# Patient Record
Sex: MALE | Race: WHITE | Employment: FULL TIME | URBAN - METROPOLITAN AREA
[De-identification: names, ages, dates, MRNs, and addresses within clinical notes are randomized per-mention and may not be internally consistent; named-entity substitution may affect disease eponyms.]

---

## 2019-05-03 ENCOUNTER — APPOINTMENT (OUTPATIENT)
Dept: GENERAL RADIOLOGY | Age: 59
DRG: 193 | End: 2019-05-03
Payer: COMMERCIAL

## 2019-05-03 ENCOUNTER — APPOINTMENT (OUTPATIENT)
Dept: CT IMAGING | Age: 59
DRG: 193 | End: 2019-05-03
Payer: COMMERCIAL

## 2019-05-03 ENCOUNTER — HOSPITAL ENCOUNTER (INPATIENT)
Age: 59
LOS: 14 days | Discharge: HOME | DRG: 193 | End: 2019-05-17
Attending: EMERGENCY MEDICINE | Admitting: HOSPITALIST
Payer: COMMERCIAL

## 2019-05-03 DIAGNOSIS — N17.9 AKI (ACUTE KIDNEY INJURY) (HCC): ICD-10-CM

## 2019-05-03 DIAGNOSIS — R77.8 ELEVATED TROPONIN: ICD-10-CM

## 2019-05-03 DIAGNOSIS — R52 PAIN: ICD-10-CM

## 2019-05-03 DIAGNOSIS — J18.9 PNEUMONIA DUE TO ORGANISM: ICD-10-CM

## 2019-05-03 DIAGNOSIS — R11.2 NAUSEA VOMITING AND DIARRHEA: Primary | ICD-10-CM

## 2019-05-03 DIAGNOSIS — R19.7 NAUSEA VOMITING AND DIARRHEA: Primary | ICD-10-CM

## 2019-05-03 DIAGNOSIS — J18.9 PNEUMONIA DUE TO INFECTIOUS ORGANISM, UNSPECIFIED LATERALITY, UNSPECIFIED PART OF LUNG: ICD-10-CM

## 2019-05-03 LAB
ADENOVIRUS DETECTION BY PCR: ABNORMAL
ALBUMIN SERPL-MCNC: 4 GM/DL (ref 3.4–5)
ALP BLD-CCNC: 73 IU/L (ref 40–129)
ALT SERPL-CCNC: 47 U/L (ref 10–40)
ANION GAP SERPL CALCULATED.3IONS-SCNC: 17 MMOL/L (ref 4–16)
AST SERPL-CCNC: 67 IU/L (ref 15–37)
BACTERIA: ABNORMAL /HPF
BASE EXCESS: ABNORMAL (ref 0–3.3)
BASOPHILS ABSOLUTE: 0 K/CU MM
BASOPHILS RELATIVE PERCENT: 0.5 % (ref 0–1)
BILIRUB SERPL-MCNC: 0.3 MG/DL (ref 0–1)
BILIRUBIN URINE: NEGATIVE MG/DL
BLOOD, URINE: ABNORMAL
BORDETELLA PERTUSSIS PCR: NOT DETECTED
BUN BLDV-MCNC: 61 MG/DL (ref 6–23)
CALCIUM SERPL-MCNC: 8 MG/DL (ref 8.3–10.6)
CARBON MONOXIDE, BLOOD: 1.1 % (ref 0–5)
CHLAMYDOPHILA PNEUMONIA PCR: NOT DETECTED
CHLORIDE BLD-SCNC: 96 MMOL/L (ref 99–110)
CHOLESTEROL: 108 MG/DL
CLARITY: CLEAR
CO2 CONTENT: 15.4 MMOL/L (ref 19–24)
CO2: 18 MMOL/L (ref 21–32)
COLOR: YELLOW
COMMENT: ABNORMAL
CORONAVIRUS 229E PCR: NOT DETECTED
CORONAVIRUS HKU1 PCR: NOT DETECTED
CORONAVIRUS NL63 PCR: NOT DETECTED
CORONAVIRUS OC43 PCR: NOT DETECTED
CREAT SERPL-MCNC: 2.5 MG/DL (ref 0.9–1.3)
CREATININE URINE: 100 MG/DL
CREATININE URINE: 101.3 MG/DL (ref 39–259)
DIFFERENTIAL TYPE: ABNORMAL
EKG ATRIAL RATE: 112 BPM
EKG DIAGNOSIS: NORMAL
EKG P AXIS: 28 DEGREES
EKG P-R INTERVAL: 142 MS
EKG Q-T INTERVAL: 314 MS
EKG QRS DURATION: 72 MS
EKG QTC CALCULATION (BAZETT): 428 MS
EKG R AXIS: -10 DEGREES
EKG T AXIS: 58 DEGREES
EKG VENTRICULAR RATE: 112 BPM
EOSINOPHILS ABSOLUTE: 0 K/CU MM
EOSINOPHILS RELATIVE PERCENT: 0 % (ref 0–3)
GFR AFRICAN AMERICAN: 32 ML/MIN/1.73M2
GFR NON-AFRICAN AMERICAN: 27 ML/MIN/1.73M2
GLUCOSE BLD-MCNC: 126 MG/DL (ref 70–99)
GLUCOSE BLD-MCNC: 140 MG/DL (ref 70–99)
GLUCOSE BLD-MCNC: 142 MG/DL (ref 70–99)
GLUCOSE, URINE: NEGATIVE MG/DL
HCO3 ARTERIAL: 14.6 MMOL/L (ref 18–23)
HCT VFR BLD CALC: 49.8 % (ref 42–52)
HDLC SERPL-MCNC: 32 MG/DL
HEMOGLOBIN: 16.1 GM/DL (ref 13.5–18)
HUMAN METAPNEUMOVIRUS PCR: NOT DETECTED
HYALINE CASTS: 2 /LPF
IMMATURE NEUTROPHIL %: 0.3 % (ref 0–0.43)
INFLUENZA A BY PCR: NOT DETECTED
INFLUENZA A H1 (2009) PCR: NOT DETECTED
INFLUENZA A H1 PANDEMIC PCR: NOT DETECTED
INFLUENZA A H3 PCR: NOT DETECTED
INFLUENZA B BY PCR: NOT DETECTED
KETONES, URINE: NEGATIVE MG/DL
LACTATE: 1.5 MMOL/L (ref 0.4–2)
LDL CHOLESTEROL DIRECT: 56 MG/DL
LEUKOCYTE ESTERASE, URINE: NEGATIVE
LIPASE: 76 IU/L (ref 13–60)
LYMPHOCYTES ABSOLUTE: 0.8 K/CU MM
LYMPHOCYTES RELATIVE PERCENT: 10.4 % (ref 24–44)
MCH RBC QN AUTO: 29.8 PG (ref 27–31)
MCHC RBC AUTO-ENTMCNC: 32.3 % (ref 32–36)
MCV RBC AUTO: 92.2 FL (ref 78–100)
METHEMOGLOBIN ARTERIAL: 0.9 %
MONOCYTES ABSOLUTE: 0.3 K/CU MM
MONOCYTES RELATIVE PERCENT: 3.4 % (ref 0–4)
MUCUS: ABNORMAL HPF
MYCOPLASMA PNEUMONIAE PCR: NOT DETECTED
NITRITE URINE, QUANTITATIVE: NEGATIVE
NUCLEATED RBC %: 0 %
O2 SATURATION: 93.9 % (ref 96–97)
OSMOLALITY URINE: 416 MOS/L (ref 292–1090)
PARAINFLUENZA 1 PCR: NOT DETECTED
PARAINFLUENZA 2 PCR: NOT DETECTED
PARAINFLUENZA 3 PCR: NOT DETECTED
PARAINFLUENZA 4 PCR: NOT DETECTED
PCO2 ARTERIAL: 27 MMHG (ref 32–45)
PDW BLD-RTO: 13.4 % (ref 11.7–14.9)
PH BLOOD: 7.34 (ref 7.34–7.45)
PH, URINE: 5 (ref 5–8)
PLATELET # BLD: 136 K/CU MM (ref 140–440)
PMV BLD AUTO: 10.6 FL (ref 7.5–11.1)
PO2 ARTERIAL: 72 MMHG (ref 75–100)
POTASSIUM SERPL-SCNC: 4.3 MMOL/L (ref 3.5–5.1)
PROT/CREAT RATIO, UR: ABNORMAL
PROTEIN UA: 30 MG/DL
RBC # BLD: 5.4 M/CU MM (ref 4.6–6.2)
RBC URINE: <1 /HPF (ref 0–3)
RHINOVIRUS ENTEROVIRUS PCR: NOT DETECTED
RSV PCR: NOT DETECTED
SEGMENTED NEUTROPHILS ABSOLUTE COUNT: 6.3 K/CU MM
SEGMENTED NEUTROPHILS RELATIVE PERCENT: 85.4 % (ref 36–66)
SODIUM BLD-SCNC: 131 MMOL/L (ref 135–145)
SODIUM URINE: 34 MMOL/L (ref 35–167)
SPECIFIC GRAVITY UA: 1.01 (ref 1–1.03)
SQUAMOUS EPITHELIAL: <1 /HPF
TOTAL IMMATURE NEUTOROPHIL: 0.02 K/CU MM
TOTAL NUCLEATED RBC: 0 K/CU MM
TOTAL PROTEIN: 7.1 GM/DL (ref 6.4–8.2)
TRICHOMONAS: ABNORMAL /HPF
TRIGL SERPL-MCNC: 150 MG/DL
TROPONIN T: 0.03 NG/ML
TROPONIN T: <0.01 NG/ML
URINE TOTAL PROTEIN: 37.2 MG/DL
UROBILINOGEN, URINE: NORMAL MG/DL (ref 0.2–1)
WBC # BLD: 7.4 K/CU MM (ref 4–10.5)
WBC UA: 2 /HPF (ref 0–2)

## 2019-05-03 PROCEDURE — 94640 AIRWAY INHALATION TREATMENT: CPT

## 2019-05-03 PROCEDURE — 36600 WITHDRAWAL OF ARTERIAL BLOOD: CPT

## 2019-05-03 PROCEDURE — 93005 ELECTROCARDIOGRAM TRACING: CPT | Performed by: PHYSICIAN ASSISTANT

## 2019-05-03 PROCEDURE — 96375 TX/PRO/DX INJ NEW DRUG ADDON: CPT

## 2019-05-03 PROCEDURE — 94761 N-INVAS EAR/PLS OXIMETRY MLT: CPT

## 2019-05-03 PROCEDURE — 93010 ELECTROCARDIOGRAM REPORT: CPT | Performed by: INTERNAL MEDICINE

## 2019-05-03 PROCEDURE — 82962 GLUCOSE BLOOD TEST: CPT

## 2019-05-03 PROCEDURE — 99285 EMERGENCY DEPT VISIT HI MDM: CPT

## 2019-05-03 PROCEDURE — 2580000003 HC RX 258: Performed by: NURSE PRACTITIONER

## 2019-05-03 PROCEDURE — 1200000000 HC SEMI PRIVATE

## 2019-05-03 PROCEDURE — 87798 DETECT AGENT NOS DNA AMP: CPT

## 2019-05-03 PROCEDURE — 84484 ASSAY OF TROPONIN QUANT: CPT

## 2019-05-03 PROCEDURE — 36415 COLL VENOUS BLD VENIPUNCTURE: CPT

## 2019-05-03 PROCEDURE — 85025 COMPLETE CBC W/AUTO DIFF WBC: CPT

## 2019-05-03 PROCEDURE — 80061 LIPID PANEL: CPT

## 2019-05-03 PROCEDURE — 81001 URINALYSIS AUTO W/SCOPE: CPT

## 2019-05-03 PROCEDURE — 6370000000 HC RX 637 (ALT 250 FOR IP): Performed by: NURSE PRACTITIONER

## 2019-05-03 PROCEDURE — 71045 X-RAY EXAM CHEST 1 VIEW: CPT

## 2019-05-03 PROCEDURE — 84156 ASSAY OF PROTEIN URINE: CPT

## 2019-05-03 PROCEDURE — 96361 HYDRATE IV INFUSION ADD-ON: CPT

## 2019-05-03 PROCEDURE — 83605 ASSAY OF LACTIC ACID: CPT

## 2019-05-03 PROCEDURE — 87633 RESP VIRUS 12-25 TARGETS: CPT

## 2019-05-03 PROCEDURE — 6360000002 HC RX W HCPCS: Performed by: NURSE PRACTITIONER

## 2019-05-03 PROCEDURE — 6370000000 HC RX 637 (ALT 250 FOR IP): Performed by: EMERGENCY MEDICINE

## 2019-05-03 PROCEDURE — 2580000003 HC RX 258

## 2019-05-03 PROCEDURE — 82803 BLOOD GASES ANY COMBINATION: CPT

## 2019-05-03 PROCEDURE — 96365 THER/PROPH/DIAG IV INF INIT: CPT

## 2019-05-03 PROCEDURE — 2580000003 HC RX 258: Performed by: EMERGENCY MEDICINE

## 2019-05-03 PROCEDURE — 83935 ASSAY OF URINE OSMOLALITY: CPT

## 2019-05-03 PROCEDURE — 83721 ASSAY OF BLOOD LIPOPROTEIN: CPT

## 2019-05-03 PROCEDURE — 80053 COMPREHEN METABOLIC PANEL: CPT

## 2019-05-03 PROCEDURE — 87581 M.PNEUMON DNA AMP PROBE: CPT

## 2019-05-03 PROCEDURE — 83690 ASSAY OF LIPASE: CPT

## 2019-05-03 PROCEDURE — 6360000002 HC RX W HCPCS: Performed by: EMERGENCY MEDICINE

## 2019-05-03 PROCEDURE — 74176 CT ABD & PELVIS W/O CONTRAST: CPT

## 2019-05-03 PROCEDURE — 6370000000 HC RX 637 (ALT 250 FOR IP): Performed by: HOSPITALIST

## 2019-05-03 PROCEDURE — 87486 CHLMYD PNEUM DNA AMP PROBE: CPT

## 2019-05-03 PROCEDURE — 84300 ASSAY OF URINE SODIUM: CPT

## 2019-05-03 PROCEDURE — 82570 ASSAY OF URINE CREATININE: CPT

## 2019-05-03 PROCEDURE — 87040 BLOOD CULTURE FOR BACTERIA: CPT

## 2019-05-03 RX ORDER — SODIUM CHLORIDE 9 MG/ML
INJECTION, SOLUTION INTRAVENOUS CONTINUOUS
Status: DISPENSED | OUTPATIENT
Start: 2019-05-03 | End: 2019-05-05

## 2019-05-03 RX ORDER — SODIUM CHLORIDE 9 MG/ML
INJECTION, SOLUTION INTRAVENOUS
Status: COMPLETED
Start: 2019-05-03 | End: 2019-05-03

## 2019-05-03 RX ORDER — ONDANSETRON 2 MG/ML
4 INJECTION INTRAMUSCULAR; INTRAVENOUS EVERY 6 HOURS PRN
Status: DISCONTINUED | OUTPATIENT
Start: 2019-05-03 | End: 2019-05-17 | Stop reason: HOSPADM

## 2019-05-03 RX ORDER — GABAPENTIN 300 MG/1
300 CAPSULE ORAL 3 TIMES DAILY
Status: DISCONTINUED | OUTPATIENT
Start: 2019-05-03 | End: 2019-05-17 | Stop reason: HOSPADM

## 2019-05-03 RX ORDER — PROMETHAZINE HYDROCHLORIDE 25 MG/ML
12.5 INJECTION, SOLUTION INTRAMUSCULAR; INTRAVENOUS EVERY 6 HOURS PRN
Status: DISCONTINUED | OUTPATIENT
Start: 2019-05-03 | End: 2019-05-17 | Stop reason: HOSPADM

## 2019-05-03 RX ORDER — ASPIRIN 325 MG
325 TABLET ORAL DAILY
COMMUNITY
End: 2019-07-10

## 2019-05-03 RX ORDER — SODIUM CHLORIDE 0.9 % (FLUSH) 0.9 %
10 SYRINGE (ML) INJECTION EVERY 12 HOURS SCHEDULED
Status: DISCONTINUED | OUTPATIENT
Start: 2019-05-03 | End: 2019-05-17 | Stop reason: HOSPADM

## 2019-05-03 RX ORDER — IPRATROPIUM BROMIDE AND ALBUTEROL SULFATE 2.5; .5 MG/3ML; MG/3ML
1 SOLUTION RESPIRATORY (INHALATION)
Status: DISCONTINUED | OUTPATIENT
Start: 2019-05-03 | End: 2019-05-08

## 2019-05-03 RX ORDER — HEPARIN SODIUM 5000 [USP'U]/ML
5000 INJECTION, SOLUTION INTRAVENOUS; SUBCUTANEOUS EVERY 8 HOURS SCHEDULED
Status: DISCONTINUED | OUTPATIENT
Start: 2019-05-03 | End: 2019-05-06 | Stop reason: SDUPTHER

## 2019-05-03 RX ORDER — BENZONATATE 200 MG/1
200 CAPSULE ORAL 3 TIMES DAILY PRN
COMMUNITY
End: 2019-07-10 | Stop reason: ALTCHOICE

## 2019-05-03 RX ORDER — ACETAMINOPHEN 325 MG/1
650 TABLET ORAL EVERY 4 HOURS PRN
Status: DISCONTINUED | OUTPATIENT
Start: 2019-05-03 | End: 2019-05-17 | Stop reason: HOSPADM

## 2019-05-03 RX ORDER — GLIMEPIRIDE 2 MG/1
2 TABLET ORAL
COMMUNITY
End: 2019-11-19 | Stop reason: ALTCHOICE

## 2019-05-03 RX ORDER — HYDROXYZINE HYDROCHLORIDE 50 MG/ML
50 INJECTION, SOLUTION INTRAMUSCULAR EVERY 6 HOURS PRN
Status: DISCONTINUED | OUTPATIENT
Start: 2019-05-03 | End: 2019-05-17 | Stop reason: HOSPADM

## 2019-05-03 RX ORDER — DOXYCYCLINE HYCLATE 100 MG
100 TABLET ORAL ONCE
Status: COMPLETED | OUTPATIENT
Start: 2019-05-03 | End: 2019-05-03

## 2019-05-03 RX ORDER — ASPIRIN 325 MG
325 TABLET ORAL DAILY
Status: DISCONTINUED | OUTPATIENT
Start: 2019-05-03 | End: 2019-05-17 | Stop reason: HOSPADM

## 2019-05-03 RX ORDER — NICOTINE POLACRILEX 4 MG
15 LOZENGE BUCCAL PRN
Status: DISCONTINUED | OUTPATIENT
Start: 2019-05-03 | End: 2019-05-17 | Stop reason: HOSPADM

## 2019-05-03 RX ORDER — NEBIVOLOL 10 MG/1
5 TABLET ORAL DAILY
Status: DISCONTINUED | OUTPATIENT
Start: 2019-05-03 | End: 2019-05-15

## 2019-05-03 RX ORDER — ONDANSETRON 2 MG/ML
4 INJECTION INTRAMUSCULAR; INTRAVENOUS EVERY 30 MIN PRN
Status: DISCONTINUED | OUTPATIENT
Start: 2019-05-03 | End: 2019-05-17 | Stop reason: HOSPADM

## 2019-05-03 RX ORDER — BENZONATATE 100 MG/1
200 CAPSULE ORAL 3 TIMES DAILY PRN
Status: DISCONTINUED | OUTPATIENT
Start: 2019-05-03 | End: 2019-05-17 | Stop reason: HOSPADM

## 2019-05-03 RX ORDER — 0.9 % SODIUM CHLORIDE 0.9 %
1000 INTRAVENOUS SOLUTION INTRAVENOUS ONCE
Status: COMPLETED | OUTPATIENT
Start: 2019-05-03 | End: 2019-05-03

## 2019-05-03 RX ORDER — IPRATROPIUM BROMIDE AND ALBUTEROL SULFATE 2.5; .5 MG/3ML; MG/3ML
1 SOLUTION RESPIRATORY (INHALATION) ONCE
Status: COMPLETED | OUTPATIENT
Start: 2019-05-03 | End: 2019-05-03

## 2019-05-03 RX ORDER — DOCUSATE SODIUM 100 MG/1
100 CAPSULE, LIQUID FILLED ORAL 2 TIMES DAILY
Status: DISCONTINUED | OUTPATIENT
Start: 2019-05-03 | End: 2019-05-17 | Stop reason: HOSPADM

## 2019-05-03 RX ORDER — DEXTROSE MONOHYDRATE 25 G/50ML
12.5 INJECTION, SOLUTION INTRAVENOUS PRN
Status: DISCONTINUED | OUTPATIENT
Start: 2019-05-03 | End: 2019-05-17 | Stop reason: HOSPADM

## 2019-05-03 RX ORDER — OXYCODONE HYDROCHLORIDE AND ACETAMINOPHEN 5; 325 MG/1; MG/1
1 TABLET ORAL EVERY 4 HOURS PRN
Status: DISCONTINUED | OUTPATIENT
Start: 2019-05-03 | End: 2019-05-17 | Stop reason: HOSPADM

## 2019-05-03 RX ORDER — TRAZODONE HYDROCHLORIDE 50 MG/1
50-100 TABLET ORAL NIGHTLY
COMMUNITY
End: 2019-11-19

## 2019-05-03 RX ORDER — SODIUM CHLORIDE 0.9 % (FLUSH) 0.9 %
10 SYRINGE (ML) INJECTION PRN
Status: DISCONTINUED | OUTPATIENT
Start: 2019-05-03 | End: 2019-05-17 | Stop reason: HOSPADM

## 2019-05-03 RX ORDER — FAMOTIDINE 20 MG/1
20 TABLET, FILM COATED ORAL 2 TIMES DAILY
Status: DISCONTINUED | OUTPATIENT
Start: 2019-05-03 | End: 2019-05-17 | Stop reason: HOSPADM

## 2019-05-03 RX ORDER — 0.9 % SODIUM CHLORIDE 0.9 %
2200 INTRAVENOUS SOLUTION INTRAVENOUS ONCE
Status: COMPLETED | OUTPATIENT
Start: 2019-05-03 | End: 2019-05-03

## 2019-05-03 RX ORDER — DEXTROSE MONOHYDRATE 50 MG/ML
100 INJECTION, SOLUTION INTRAVENOUS PRN
Status: DISCONTINUED | OUTPATIENT
Start: 2019-05-03 | End: 2019-05-17 | Stop reason: HOSPADM

## 2019-05-03 RX ADMIN — FAMOTIDINE 20 MG: 20 TABLET ORAL at 21:20

## 2019-05-03 RX ADMIN — ONDANSETRON 4 MG: 2 INJECTION INTRAMUSCULAR; INTRAVENOUS at 13:45

## 2019-05-03 RX ADMIN — OXYCODONE AND ACETAMINOPHEN 1 TABLET: 5; 325 TABLET ORAL at 23:33

## 2019-05-03 RX ADMIN — SODIUM CHLORIDE: 9 INJECTION, SOLUTION INTRAVENOUS at 18:29

## 2019-05-03 RX ADMIN — PROMETHAZINE HYDROCHLORIDE 12.5 MG: 25 INJECTION INTRAMUSCULAR; INTRAVENOUS at 19:37

## 2019-05-03 RX ADMIN — NEBIVOLOL HYDROCHLORIDE 5 MG: 10 TABLET ORAL at 18:29

## 2019-05-03 RX ADMIN — ASPIRIN 325 MG ORAL TABLET 325 MG: 325 PILL ORAL at 18:29

## 2019-05-03 RX ADMIN — SODIUM CHLORIDE 2200 ML: 9 INJECTION, SOLUTION INTRAVENOUS at 15:08

## 2019-05-03 RX ADMIN — IPRATROPIUM BROMIDE AND ALBUTEROL SULFATE 1 AMPULE: .5; 3 SOLUTION RESPIRATORY (INHALATION) at 21:29

## 2019-05-03 RX ADMIN — INSULIN LISPRO 1 UNITS: 100 INJECTION, SOLUTION INTRAVENOUS; SUBCUTANEOUS at 21:21

## 2019-05-03 RX ADMIN — GABAPENTIN 300 MG: 300 CAPSULE ORAL at 21:20

## 2019-05-03 RX ADMIN — ONDANSETRON 4 MG: 2 INJECTION INTRAMUSCULAR; INTRAVENOUS at 21:59

## 2019-05-03 RX ADMIN — ACETAMINOPHEN 650 MG: 325 TABLET ORAL at 18:29

## 2019-05-03 RX ADMIN — SODIUM CHLORIDE, PRESERVATIVE FREE 10 ML: 5 INJECTION INTRAVENOUS at 21:20

## 2019-05-03 RX ADMIN — DOXYCYCLINE HYCLATE 100 MG: 100 TABLET, COATED ORAL at 15:56

## 2019-05-03 RX ADMIN — IPRATROPIUM BROMIDE AND ALBUTEROL SULFATE 1 AMPULE: .5; 3 SOLUTION RESPIRATORY (INHALATION) at 13:45

## 2019-05-03 RX ADMIN — CEFTRIAXONE SODIUM 1 G: 1 INJECTION, POWDER, FOR SOLUTION INTRAMUSCULAR; INTRAVENOUS at 15:01

## 2019-05-03 RX ADMIN — HEPARIN SODIUM 5000 UNITS: 5000 INJECTION, SOLUTION INTRAVENOUS; SUBCUTANEOUS at 21:21

## 2019-05-03 RX ADMIN — BENZONATATE 200 MG: 100 CAPSULE ORAL at 18:29

## 2019-05-03 RX ADMIN — Medication 2200 ML: at 15:08

## 2019-05-03 RX ADMIN — OXYCODONE AND ACETAMINOPHEN 1 TABLET: 5; 325 TABLET ORAL at 19:37

## 2019-05-03 RX ADMIN — AZITHROMYCIN MONOHYDRATE 500 MG: 500 INJECTION, POWDER, LYOPHILIZED, FOR SOLUTION INTRAVENOUS at 21:21

## 2019-05-03 RX ADMIN — SODIUM CHLORIDE 1000 ML: 9 INJECTION, SOLUTION INTRAVENOUS at 13:46

## 2019-05-03 ASSESSMENT — ENCOUNTER SYMPTOMS
COUGH: 1
CONSTIPATION: 0
NAUSEA: 1
VOMITING: 1
DIARRHEA: 1
EYE REDNESS: 0
CHEST TIGHTNESS: 1
RHINORRHEA: 0
SHORTNESS OF BREATH: 0
SORE THROAT: 0
ABDOMINAL PAIN: 1
BACK PAIN: 0
WHEEZING: 0

## 2019-05-03 ASSESSMENT — PAIN SCALES - GENERAL
PAINLEVEL_OUTOF10: 0
PAINLEVEL_OUTOF10: 0
PAINLEVEL_OUTOF10: 5
PAINLEVEL_OUTOF10: 6
PAINLEVEL_OUTOF10: 6
PAINLEVEL_OUTOF10: 0

## 2019-05-03 ASSESSMENT — PAIN DESCRIPTION - FREQUENCY: FREQUENCY: INTERMITTENT

## 2019-05-03 ASSESSMENT — PAIN DESCRIPTION - PAIN TYPE
TYPE: ACUTE PAIN
TYPE: ACUTE PAIN

## 2019-05-03 ASSESSMENT — PAIN DESCRIPTION - ONSET: ONSET: ON-GOING

## 2019-05-03 ASSESSMENT — PAIN DESCRIPTION - ORIENTATION: ORIENTATION: UPPER

## 2019-05-03 ASSESSMENT — PAIN DESCRIPTION - LOCATION
LOCATION: CHEST
LOCATION: CHEST

## 2019-05-03 ASSESSMENT — PAIN DESCRIPTION - PROGRESSION: CLINICAL_PROGRESSION: NOT CHANGED

## 2019-05-03 ASSESSMENT — PAIN DESCRIPTION - DESCRIPTORS: DESCRIPTORS: TIGHTNESS;DISCOMFORT

## 2019-05-03 NOTE — LETTER
[address]  [facility] [address]  Phone: [phone]             May 17, 2019    Patient: Elicia Kidd   YOB: 1960   Date of Visit: 5/3/2019             To Whom It May Concern:          Keira Cavazos was seen and treated in our facility  beginning 5/3/2019 until . He may return to work on 05/22/2019.           Sincerely,           Burton Mosley MD         Signature:__________________________________

## 2019-05-03 NOTE — ED PROVIDER NOTES
Triage Chief Complaint:   Fatigue; Nausea; Dizziness; and Chest Pain    [place]:  Isela Ward is a 63 y.o. male that presents with nonbloody, nonbilious vomiting and nonbloody diarrhea for the last week. He has had a decreased appetite and states he has been unable to keep any food or fluids down. He complains of constant pain in his lower abdomen that occasionally moves into the left side of his abdomen. He has had associated chills and subjective fevers. He has intermittent sweats and then chills. Associated chest tightness but no sharp chest pain. No shortness of breath. Occasional dry cough. He denies any dysuria or increased urinary frequency. No known sick contacts. He has not had symptoms like this previously. No previous abdominal surgeries. ROS:   Review of Systems   Constitutional: Positive for appetite change, chills, fatigue and fever (subjective). HENT: Negative for congestion, rhinorrhea and sore throat. Eyes: Negative for redness and visual disturbance. Respiratory: Positive for cough and chest tightness. Negative for shortness of breath and wheezing. Cardiovascular: Negative for chest pain and leg swelling. Gastrointestinal: Positive for abdominal pain (lower abdomen), diarrhea, nausea and vomiting. Negative for constipation. Genitourinary: Negative for dysuria and frequency. Musculoskeletal: Negative for arthralgias and back pain. Skin: Negative for rash and wound. Neurological: Positive for light-headedness. Negative for dizziness, syncope, weakness, numbness and headaches. Psychiatric/Behavioral: Negative for hallucinations and suicidal ideas. Past Medical History:   Diagnosis Date    Diabetes mellitus ([facility] [address]     Hypertension      Past Surgical History:   Procedure Laterality Date    CIRCUMCISION, NON-  2013    OTHER SURGICAL HISTORY Right     Transposition of Right Ulnar Nerve     History reviewed. No pertinent family history.   Social History     Socioeconomic History    Marital status:      Spouse name: Not on file    Number of children: Not on file    Years of education: Not on file    Highest education level: Not on file   Occupational History    Not on file   Social Needs    Financial resource strain: Not on file    Food insecurity:     Worry: Not on file     Inability: Not on file    Transportation needs:     Medical: Not on file     Non-medical: Not on file   Tobacco Use    Smoking status: Current Every Day Smoker     Packs/day: 0.25     Years: 30.00     Pack years: 7.50     Types: Cigarettes    Smokeless tobacco: Never Used   Substance and Sexual Activity    Alcohol use: No     Alcohol/week: 0.0 oz    Drug use: Never    Sexual activity: Not on file   Lifestyle    Physical activity:     Days per week: Not on file     Minutes per session: Not on file    Stress: Not on file   Relationships    Social connections:     Talks on phone: Not on file     Gets together: Not on file     Attends Catholic service: Not on file     Active member of club or organization: Not on file     Attends meetings of clubs or organizations: Not on file     Relationship status: Not on file    Intimate partner violence:     Fear of current or ex partner: Not on file     Emotionally abused: Not on file     Physically abused: Not on file     Forced sexual activity: Not on file   Other Topics Concern    Not on file   Social History Narrative    Not on file     Current Facility-Administered Medications   Medication Dose Route Frequency Provider Last Rate Last Dose    ondansetron (ZOFRAN) injection 4 mg  4 mg Intravenous Q30 Min PRN Leland MD Myla   4 mg at 05/03/19 1345    0.9 % sodium chloride infusion   Intravenous Continuous ASMITA Pan CNP 75 mL/hr at 05/03/19 1829      sodium chloride flush 0.9 % injection 10 mL  10 mL Intravenous 2 times per day ASMITA Pan CNP        sodium chloride flush 0.9 % injection 10 mL  10 mL Intravenous PRN Tereasa Cruz, APRN - CNP        docusate sodium (COLACE) capsule 100 mg  100 mg Oral BID Tereasa Cruz, APRN - CNP        ondansetron [facility]) injection 4 mg  4 mg Intravenous Q6H PRN Tereasa Cruz, APRN - CNP        heparin (porcine) injection 5,000 Units  5,000 Units Subcutaneous 3 times per day Tereasa Cruz, APRN - CNP        acetaminophen (TYLENOL) tablet 650 mg  650 mg Oral Q4H PRN Tereasa Cruz, APRN - CNP   650 mg at 05/03/19 1829    famotidine (PEPCID) tablet 20 mg  20 mg Oral BID Tereasa Cruz, APRN - CNP        [START ON 5/4/2019] cefTRIAXone (ROCEPHIN) 1 g in dextrose 5 % 50 mL IVPB  1 g Intravenous Q24H Tereasa Cruz, APRN - CNP        azithromycin (ZITHROMAX) 500 mg in dextrose 5 % 250 mL IVPB  500 mg Intravenous Q24H Tereasa Cruz, APRN - CNP        ipratropium-albuterol (DUONEB) nebulizer solution 1 ampule  1 ampule Inhalation Q4H WA Tereasa Cruz, APRN - CNP        benzonatate (TESSALON) capsule 200 mg  200 mg Oral TID PRN Tereasa Cruz, APRN - CNP   200 mg at 05/03/19 1829    aspirin tablet 325 mg  325 mg Oral Daily Tereasa Cruz, APRN - CNP   325 mg at 05/03/19 1829    nebivolol (BYSTOLIC) tablet 5 mg  5 mg Oral Daily Tereasa Cruz, APRN - CNP   5 mg at 05/03/19 1829    gabapentin (NEURONTIN) capsule 300 mg  300 mg Oral TID Tereasa Cruz, APRN - CNP        glucose (GLUTOSE) 40 % oral gel 15 g  15 g Oral PRN Tereasa Cruz, APRN - CNP        dextrose 50 % solution 12.5 g  12.5 g Intravenous PRN Tereasa Cruz, APRN - CNP        glucagon (rDNA) injection 1 mg  1 mg Intramuscular PRN Tereasa Cruz, APRN - CNP        dextrose 5 % solution  100 mL/hr Intravenous PRN Tereasa Cruz, APRN - CNP        insulin lispro (HUMALOG) injection vial 0-6 Units  0-6 Units Subcutaneous TID WC Tereasa ASMITA Cruz - CNP        insulin lispro (HUMALOG) injection vial 0-3 Units  0-3 Units Subcutaneous Nightly ASMITA Lemus - JEAN-PAUL        promethazine (PHENERGAN) injection 12.5 mg  12.5 mg Intramuscular Q6H PRN Raman Guillen Christine APRN - CNP        hydrOXYzine (VISTARIL) injection 50 mg  50 mg Intramuscular Q6H PRN Shannon Ryan, APRN - JEAN-PAUL        oxyCODONE-acetaminophen (PERCOCET) 5-325 MG per tablet 1 tablet  1 tablet Oral Q4H PRN Sarah Chu MD         No Known Allergies    Nursing Notes Reviewed     Physical Exam:   ED Triage Vitals [05/03/19 1304]   Enc Vitals Group      BP 82/62      Pulse 110      Resp 16      Temp 99.9 °F (37.7 °C)      Temp Source Oral      SpO2 96 %      Weight 230 lb (104.3 kg)      Height 5' 10\" (1.778 m)      Head Circumference       Peak Flow       Pain Score       Pain Loc       Pain Edu? Excl. in [address]? BP 96/62   Pulse 103   Temp 100.9 °F (38.3 °C) (Oral)   Resp 18   Ht 5' 10\" (1.778 m)   Wt 240 lb 6 oz (109 kg)   SpO2 96%   BMI 34.49 kg/m²   My pulse ox interpretation is - normal  Physical Exam   Constitutional: He appears well-developed and well-nourished. Appears to feel unwell     HENT:   Head: Normocephalic and atraumatic. Eyes: Conjunctivae are normal. Right eye exhibits no discharge. Left eye exhibits no discharge. Cardiovascular: Regular rhythm and intact distal pulses. Tachycardia present. Pulses:       Radial pulses are 2+ on the right side, and 2+ on the left side. Pulmonary/Chest: Effort normal. No respiratory distress. He has wheezes (mild expiratory wheezes bilaterally). Abdominal: Soft. He exhibits no distension. There is tenderness in the suprapubic area and left lower quadrant. There is no rebound and no guarding. Musculoskeletal: Normal range of motion. He exhibits no deformity. Neurological: He is alert. No cranial nerve deficit. Skin: Skin is warm and dry. He is not diaphoretic. Psychiatric: He has a normal mood and affect.  His behavior is normal.       I have reviewed and interpreted all of the currently available lab results from this visit (if applicable):  Results for orders placed or performed during the hospital encounter of 05/03/19 CBC auto diff   Result Value Ref Range    WBC 7.4 4.0 - 10.5 K/CU MM    RBC 5.40 4.6 - 6.2 M/CU MM    Hemoglobin 16.1 13.5 - 18.0 GM/DL    Hematocrit 49.8 42 - 52 %    MCV 92.2 78 - 100 FL    MCH 29.8 27 - 31 PG    MCHC 32.3 32.0 - 36.0 %    RDW 13.4 11.7 - 14.9 %    Platelets 367 (L) 869 - 440 K/CU MM    MPV 10.6 7.5 - 11.1 FL    Differential Type AUTOMATED DIFFERENTIAL     Segs Relative 85.4 (H) 36 - 66 %    Lymphocytes % 10.4 (L) 24 - 44 %    Monocytes % 3.4 0 - 4 %    Eosinophils % 0.0 0 - 3 %    Basophils % 0.5 0 - 1 %    Segs Absolute 6.3 K/CU MM    Lymphocytes # 0.8 K/CU MM    Monocytes # 0.3 K/CU MM    Eosinophils # 0.0 K/CU MM    Basophils # 0.0 K/CU MM    Nucleated RBC % 0.0 %    Total Nucleated RBC 0.0 K/CU MM    Total Immature Neutrophil 0.02 K/CU MM    Immature Neutrophil % 0.3 0 - 0.43 %   CMP   Result Value Ref Range    Sodium 131 (L) 135 - 145 MMOL/L    Potassium 4.3 3.5 - 5.1 MMOL/L    Chloride 96 (L) 99 - 110 mMol/L    CO2 18 (L) 21 - 32 MMOL/L    BUN 61 (H) 6 - 23 MG/DL    CREATININE 2.5 (H) 0.9 - 1.3 MG/DL    Glucose 140 (H) 70 - 99 MG/DL    Calcium 8.0 (L) 8.3 - 10.6 MG/DL    Alb 4.0 3.4 - 5.0 GM/DL    Total Protein 7.1 6.4 - 8.2 GM/DL    Total Bilirubin 0.3 0.0 - 1.0 MG/DL    ALT 47 (H) 10 - 40 U/L    AST 67 (H) 15 - 37 IU/L    Alkaline Phosphatase 73 40 - 129 IU/L    GFR Non- 27 (L) >60 mL/min/1.73m2    GFR  32 (L) >60 mL/min/1.73m2    Anion Gap 17 (H) 4 - 16   Urinalysis   Result Value Ref Range    Color, UA YELLOW YELLOW    Clarity, UA CLEAR CLEAR    Glucose, Urine NEGATIVE NEGATIVE MG/DL    Bilirubin Urine NEGATIVE NEGATIVE MG/DL    Ketones, Urine NEGATIVE NEGATIVE MG/DL    Specific Gravity, UA 1.011 1.001 - 1.035    Blood, Urine MODERATE (A) NEGATIVE    pH, Urine 5.0 5.0 - 8.0    Protein, UA 30 (A) NEGATIVE MG/DL    Urobilinogen, Urine NORMAL 0.2 - 1.0 MG/DL    Nitrite Urine, Quantitative NEGATIVE NEGATIVE    Leukocyte Esterase, Urine NEGATIVE NEGATIVE RBC, UA <1 0 - 3 /HPF    WBC, UA 2 0 - 2 /HPF    Bacteria, UA RARE (A) NEGATIVE /HPF    Squam Epithel, UA <1 /HPF    Mucus, UA RARE (A) NEGATIVE HPF    Trichomonas, UA NONE SEEN NONE SEEN /HPF    Hyaline Casts, UA 2 /LPF   Lactic Acid, Plasma   Result Value Ref Range    Lactate 1.5 0.4 - 2.0 mMOL/L   Lipase   Result Value Ref Range    Lipase 76 (H) 13 - 60 IU/L   Troponin   Result Value Ref Range    Troponin T 0.031 (H) <0.01 NG/ML   Creatinine, urine, random   Result Value Ref Range    Creatinine, Ur 100.0 MG/DL   Sodium, urine, random   Result Value Ref Range    Sodium, Ur 34 (L) 35 - 167 MMOL/L   Osmolality, urine   Result Value Ref Range    Osmolality, Ur 416 292 - 1090 MOS/L   Protein / creatinine ratio, urine   Result Value Ref Range    Urine Total Protein 37.2 (H) <12 MG/DL    Creatinine, Ur 101.3 39 - 259 MG/DL    Prot/Creat Ratio, Ur 0.4  NORMAL:    <0.2  NEPHROTIC: >3.5   (H) <0.2   Troponin   Result Value Ref Range    Troponin T <0.010 <0.01 NG/ML   POCT Glucose   Result Value Ref Range    POC Glucose 126 (H) 70 - 99 MG/DL   EKG 12 Lead   Result Value Ref Range    Ventricular Rate 112 BPM    Atrial Rate 112 BPM    P-R Interval 142 ms    QRS Duration 72 ms    Q-T Interval 314 ms    QTc Calculation (Bazett) 428 ms    P Axis 28 degrees    R Axis -10 degrees    T Axis 58 degrees    Diagnosis       Sinus tachycardia with occasional premature ventricular complexes  Low voltage QRS  Cannot rule out Anteroseptal infarct , age undetermined  Abnormal ECG  No previous ECGs available  Confirmed by ROSARIO Caba ([id number]) on 5/3/2019 4:51:36 PM        Radiographs (if obtained):  [] The following radiograph was interpreted by myself in the absence of a radiologist:  [x]Radiologist's Report Reviewed:  CT ABDOMEN PELVIS WO CONTRAST Additional Contrast? None   Final Result   1. Right lower lobe pneumonia. Recommend chest radiograph in 8 weeks to   confirm resolution.    2. 2.2 cm complex right upper pole renal cyst. Recommend nonemergent MRI of   the abdomen with and without contrast for further evaluation. XR CHEST PORTABLE   Final Result   Left perihilar infiltrate/pneumonia. EKG (if obtained): (All EKG's are interpreted by myself in the absence of a cardiologist)  Sinus tachycardia with occasional PVCs. Rate of 112. NV interval 142, QRS 72, QTC of 428. No ST elevations or depressions. Normal T waves. Impression: Nonspecific EKG. When compared to previous EKG from 8/1/13, the PVC is new and the tachycardia is slightly increased from previous. MDM:  Differential diagnoses considered include gastroenteritis, gastritis, colitis, enteritis, appendicitis, diverticulitis, urinary tract infection, pancreatitis, dehydration, asthma exacerbation. Basic labs are obtained and show an elevated creatinine and BUN level concerning for NELSON. Patient was started on IV fluids and blood cultures were obtained. He was given 30 ML's per kilogram of normal saline. Chest x-ray is concerning for right lower lobe pneumonia. He was started on empiric antibiotics for community-acquired pneumonia. CT scan of his abdomen again recognizes the right lower lobe pneumonia but shows no acute intra-abdominal abnormality is. I suspect this pneumonia is the cause of his fever and cough. He may have had a mild GI bug to start which causes dehydration. After nausea medication his nausea significantly improved. We'll admit him to the hospital for further evaluation and treatment of his above findings. I spoke with Kamlesh Rosa, CONSTANZA for hospitalist, who graciously agreed to admit the patient. Plan of care explained to patient. All questions and concerns were addressed to the patient's satisfaction. Patient understood and agreed with plan. Clinical Impression:  1. Nausea vomiting and diarrhea    2. Pneumonia due to organism    3.  NELSON (acute kidney injury) ([facility] [address]    4. Elevated troponin          Meche Fofana MD

## 2019-05-03 NOTE — LETTER
[address]  [facility] [address]  Phone: [phone]        May 9, 2019     Patient: Val Hobson   YOB: 1960   Date of Visit: 5/3/2019       To Whom it May Concern:    Kat Vicente was admitted the hospital on 5/3/2019. He is currently in Intensive Care Unit, and will possibly need hospitalization for 2-3 more days. If you have any questions or concerns, please don't hesitate to call.     Sincerely,         Varun Jenkins MD  Hospitalist

## 2019-05-03 NOTE — PROGRESS NOTES
Medication History  [facility]    Patient Name: Parris Kayser 1960     Medication history has been completed by: Wei Busch CPhT    Source(s) of information: Patient and Insurance claims    Primary Care Physician: Carly Aggarwal MD     Pharmacy: [address] [place] [address] Av    Allergies as of 05/03/2019    (No Known Allergies)        Prior to Admission medications    Medication Sig Start Date End Date Taking? Authorizing Provider   glimepiride (AMARYL) 2 MG tablet Take 2 mg by mouth every morning (before breakfast)   Yes Historical Provider, MD   traZODone (DESYREL) 50 MG tablet Take  mg by mouth nightly   Yes Historical Provider, MD   aspirin 325 MG tablet Take 325 mg by mouth daily   Yes Historical Provider, MD   benzonatate (TESSALON) 200 MG capsule Take 200 mg by mouth 3 times daily as needed for Cough   Yes Historical Provider, MD   Phenyleph-Chlorphen-Codeine (CAPCOF) 5-2-10 MG/5ML SYRP Take by mouth. Yes Historical Provider, MD   gabapentin (NEURONTIN) 600 MG tablet Take 600 mg by mouth 3 times daily. 3/31/15  Yes Historical Provider, MD   hydrochlorothiazide (HYDRODIURIL) 25 MG tablet Take 25 mg by mouth daily  4/1/15  Yes Historical Provider, MD   BYSTOLIC 5 MG tablet Take 5 mg by mouth daily  3/16/15  Yes Historical Provider, MD   lisinopril (PRINIVIL;ZESTRIL) 40 MG tablet Take 40 mg by mouth daily. Yes Historical Provider, MD       Medications added or changed (ex.  new medication, dosage change, interval change, formulation change):  Glimepiride new medication  Trazodone new medication  Aspirin new medication  Benzonatate new medication  Capcof new medication    Medications removed from list (include reason, ex. noncompliance, medication cost, therapy complete etc.):   Byetta no longer using  Metformin no longer taking  Requip no longer taking  Simvastatin no longer taking  Anoro Ellipta no longer using    Other Comments:  Reviewed and updated med list per patient and verified with claims  Patient states he was given capcof and benzonatate and taking but it does not seem to be working    To my knowledge the above medication history is accurate as of 5/3/2019 2:32 PM.   Henna Jay CPhT   5/3/2019 2:32 PM

## 2019-05-03 NOTE — ED NOTES
Patient's GFR 27, Dr. Jc Pastor notified and verbal order over phone to change exam exam to abd/pel  w/o contrast.

## 2019-05-03 NOTE — ED TRIAGE NOTES
Pt presents to the ER with c/o dizziness, nausea, fatigue and intermittent chest pain and heaviness. Pt was sent in by his PCP today. Chest pain has been going on x 2 weeks. Other symptoms began Monday. Pt rates pain 5-6/10.

## 2019-05-03 NOTE — H&P
History and Physical      Name:  Edwin Self /Age/Sex: 1960  (63 y.o. male)   MRN & CSN:  [id number] & [id number] Admission Date/Time: 5/3/2019  1:12 PM   Location:  [facility]04TR- PCP: Hedy Holland MD       Hospital Day: 1        Admitting Physician: Dr. Ken Rogel MD     Assessment and Plan:   Edwin Self is a 63 y.o. male who presents with  Fatigue; Nausea; Dizziness; and Chest Pain     Pneumonia RLL   Admit to Med/Surg   Respiratory interventions- IS, supplemental oxygen, continuous pulse oximetry, and TCDB. Continue routine inhalers and add prn bronchodilators, expectorants, and antitussives. IV abx Azithro/Rocephin   Pending cultures     Acute on chronic kidney insufficiency- sCr 2.5 with baseline 1.4-1.5. Suspect prerenal d/t volume depletion. CT: \"2.2 cm complex right upper pole renal cyst.\"   Monitor lab trends with IVF   Pending urine indices    Holding nephrotoxic agents as able/ renally dosing medications   No previous evaluation by nephrologist     Chest pain r/o ACS. Concern for anginal source given risk factors. Initial troponin 0.31. EKG shows no acute changes. Telemetry monitoring    Serial troponin level and repeat EKG in AM   Cardiology consult per clinical course   Pending labs for further risk stratification    Antiplatelet/BB/Statin/sl Nitro on medication regimen.  Gastroenteritis- NVD x 5 days. Lipase 76   Stool studies   PRN antiemetics      Essential hypertension- continue home antihypertensive regimen- Monitor BP trends.  DMII with chronic complications and without long term use of insulin. Monitor FSBS and cover with low dose SSI   Hold PO medications while inpatient      Diet Renal   DVT Prophylaxis [] Lovenox, [x]  Heparin, [] SCDs, [] Ambulation  [] Long term AC   GI Prophylaxis [] PPI,  [x] H2 Blocker,  [] Carafate,  [] Diet/Tube Feeds   Code Status Full     Disposition Admit to inpatient.     Patient plans to return home upon gross joint deformities. SKIN -Normal coloration, warm, dry. NEURO-Cranial nerves appear grossly intact, normal speech, no lateralizing weakness. PSYC-Awake, alert, oriented x 4- person, place, time, situation,  Appropriate affect. Past Medical History:      Past Medical History:   Diagnosis Date    Diabetes mellitus ([facility] [address]     Hypertension        Past Surgical  History:    has a past surgical history that includes other surgical history (Right) and Circumcision, non- (2013). Social History:    FAM HX: Assessed and noncontributory    Soc HX:   Social History     Socioeconomic History    Marital status:      Spouse name: None    Number of children: None    Years of education: None    Highest education level: None   Occupational History    None   Social Needs    Financial resource strain: None    Food insecurity:     Worry: None     Inability: None    Transportation needs:     Medical: None     Non-medical: None   Tobacco Use    Smoking status: Current Every Day Smoker     Packs/day: 0.25     Years: 30.00     Pack years: 7.50     Types: Cigarettes    Smokeless tobacco: Never Used   Substance and Sexual Activity    Alcohol use: No     Alcohol/week: 0.0 oz    Drug use: Never    Sexual activity: None   Lifestyle    Physical activity:     Days per week: None     Minutes per session: None    Stress: None   Relationships    Social connections:     Talks on phone: None     Gets together: None     Attends Anglican service: None     Active member of club or organization: None     Attends meetings of clubs or organizations: None     Relationship status: None    Intimate partner violence:     Fear of current or ex partner: None     Emotionally abused: None     Physically abused: None     Forced sexual activity: None   Other Topics Concern    None   Social History Narrative    None     TOBACCO:   reports that he has been smoking cigarettes. He has a 7.50 pack-year smoking history. WITHOUT CONTRAST 5/3/2019 3:11 pm TECHNIQUE: CT of the abdomen and pelvis was performed without the administration of intravenous contrast. Multiplanar reformatted images are provided for review. Dose modulation, iterative reconstruction, and/or weight based adjustment of the mA/kV was utilized to reduce the radiation dose to as low as reasonably achievable. COMPARISON: None. HISTORY: ORDERING SYSTEM PROVIDED HISTORY: lower abd pain TECHNOLOGIST PROVIDED HISTORY: Additional Contrast?->None Ordering Physician Provided Reason for Exam: LOWER ABD PAIN Acuity: Acute Type of Exam: Initial Additional signs and symptoms: dizziness, nausea, fatigue and intermittent chest pain and heaviness Relevant Medical/Surgical History: CT CHANGED TO WITHOUT CONTRAST DUE TO GFR 27 FINDINGS: Lower Chest: There is right lower lobe pneumonia. Organs: The unenhanced liver, spleen, pancreas, adrenal glands and right kidney are unremarkable. There is no hydronephrosis or obstructive uropathy. However, there is a complex cyst within the right upper pole measuring 1.6 x 2.2 cm. The lack of intravenous contrast limits evaluation. GI/Bowel: There is no bowel obstruction. The appendix is within normal limits. Pelvis: The pelvic viscera are within normal limits. Peritoneum/Retroperitoneum: There is no evidence of free fluid or adenopathy. Mild atherosclerosis involves the abdominal aorta and bilateral common iliac arteries. Bones/Soft Tissues: Degenerative changes involve the thoracolumbar spine. 1. Right lower lobe pneumonia. Recommend chest radiograph in 8 weeks to confirm resolution. 2. 2.2 cm complex right upper pole renal cyst.  Recommend nonemergent MRI of the abdomen with and without contrast for further evaluation.      Xr Chest Portable    Result Date: 5/3/2019  EXAMINATION: SINGLE X-RAY VIEW OF THE CHEST, 5/3/2019 1:36 pm COMPARISON: 04/24/2015 HISTORY: ORDERING SYSTEM PROVIDED HISTORY: Chest pain TECHNOLOGIST PROVIDED HISTORY: Reason for exam:-> Chest pain Ordering Physician Provided Reason for Exam: Chest pain Acuity: Acute Type of Exam: Initial Additional signs and symptoms: Pt presents to the ER with c/o dizziness, nausea, fatigue and intermittent chest pain and heaviness. Pt was sent in by his PCP today. Chest pain has been going on x 2 weeks. Other symptoms began Monday. FINDINGS: The heart and mediastinal structures are stable. There is an infiltrate in the left perihilar region compatible with pneumonia. The lungs are otherwise clear. Left perihilar infiltrate/pneumonia.          EKG this visit:  Reviewed             Electronically signed by ASMITA Padilla CNP on 5/3/2019 at 4:30 PM

## 2019-05-04 LAB
ALBUMIN SERPL-MCNC: 3.5 GM/DL (ref 3.4–5)
ALP BLD-CCNC: 58 IU/L (ref 40–128)
ALT SERPL-CCNC: 49 U/L (ref 10–40)
ANION GAP SERPL CALCULATED.3IONS-SCNC: 15 MMOL/L (ref 4–16)
AST SERPL-CCNC: 72 IU/L (ref 15–37)
BANDED NEUTROPHILS ABSOLUTE COUNT: 0.72 K/CU MM
BANDED NEUTROPHILS RELATIVE PERCENT: 11 % (ref 5–11)
BILIRUB SERPL-MCNC: 0.4 MG/DL (ref 0–1)
BUN BLDV-MCNC: 52 MG/DL (ref 6–23)
CALCIUM SERPL-MCNC: 7 MG/DL (ref 8.3–10.6)
CHLORIDE BLD-SCNC: 104 MMOL/L (ref 99–110)
CO2: 14 MMOL/L (ref 21–32)
CREAT SERPL-MCNC: 1.7 MG/DL (ref 0.9–1.3)
DIFFERENTIAL TYPE: ABNORMAL
ESTIMATED AVERAGE GLUCOSE: 157 MG/DL
GFR AFRICAN AMERICAN: 51 ML/MIN/1.73M2
GFR NON-AFRICAN AMERICAN: 42 ML/MIN/1.73M2
GLUCOSE BLD-MCNC: 105 MG/DL (ref 70–99)
GLUCOSE BLD-MCNC: 113 MG/DL (ref 70–99)
GLUCOSE BLD-MCNC: 140 MG/DL (ref 70–99)
GLUCOSE BLD-MCNC: 155 MG/DL (ref 70–99)
GLUCOSE BLD-MCNC: 187 MG/DL (ref 70–99)
HBA1C MFR BLD: 7.1 % (ref 4.2–6.3)
HCT VFR BLD CALC: 48.6 % (ref 42–52)
HEMOGLOBIN: 14.5 GM/DL (ref 13.5–18)
LACTATE: 1.2 MMOL/L (ref 0.4–2)
LYMPHOCYTES ABSOLUTE: 1 K/CU MM
LYMPHOCYTES RELATIVE PERCENT: 16 % (ref 24–44)
MACROCYTES: ABNORMAL
MAGNESIUM: 1.9 MG/DL (ref 1.8–2.4)
MCH RBC QN AUTO: 30.3 PG (ref 27–31)
MCHC RBC AUTO-ENTMCNC: 29.8 % (ref 32–36)
MCV RBC AUTO: 101.7 FL (ref 78–100)
MONOCYTES ABSOLUTE: 0.3 K/CU MM
MONOCYTES RELATIVE PERCENT: 4 % (ref 0–4)
PDW BLD-RTO: 13.9 % (ref 11.7–14.9)
PLATELET # BLD: 118 K/CU MM (ref 140–440)
PLT MORPHOLOGY: ABNORMAL
PMV BLD AUTO: 10.4 FL (ref 7.5–11.1)
POTASSIUM SERPL-SCNC: 4 MMOL/L (ref 3.5–5.1)
RBC # BLD: 4.78 M/CU MM (ref 4.6–6.2)
SEGMENTED NEUTROPHILS ABSOLUTE COUNT: 4.5 K/CU MM
SEGMENTED NEUTROPHILS RELATIVE PERCENT: 69 % (ref 36–66)
SODIUM BLD-SCNC: 133 MMOL/L (ref 135–145)
TOTAL PROTEIN: 5.3 GM/DL (ref 6.4–8.2)
TROPONIN T: 0.02 NG/ML
WBC # BLD: 6.5 K/CU MM (ref 4–10.5)

## 2019-05-04 PROCEDURE — 83036 HEMOGLOBIN GLYCOSYLATED A1C: CPT

## 2019-05-04 PROCEDURE — 85007 BL SMEAR W/DIFF WBC COUNT: CPT

## 2019-05-04 PROCEDURE — 1200000000 HC SEMI PRIVATE

## 2019-05-04 PROCEDURE — 83735 ASSAY OF MAGNESIUM: CPT

## 2019-05-04 PROCEDURE — 87040 BLOOD CULTURE FOR BACTERIA: CPT

## 2019-05-04 PROCEDURE — 6360000002 HC RX W HCPCS: Performed by: HOSPITALIST

## 2019-05-04 PROCEDURE — 6360000002 HC RX W HCPCS: Performed by: NURSE PRACTITIONER

## 2019-05-04 PROCEDURE — 2580000003 HC RX 258: Performed by: NURSE PRACTITIONER

## 2019-05-04 PROCEDURE — 6370000000 HC RX 637 (ALT 250 FOR IP): Performed by: NURSE PRACTITIONER

## 2019-05-04 PROCEDURE — 2580000003 HC RX 258: Performed by: INTERNAL MEDICINE

## 2019-05-04 PROCEDURE — 6370000000 HC RX 637 (ALT 250 FOR IP): Performed by: HOSPITALIST

## 2019-05-04 PROCEDURE — 83605 ASSAY OF LACTIC ACID: CPT

## 2019-05-04 PROCEDURE — 93005 ELECTROCARDIOGRAM TRACING: CPT | Performed by: INTERNAL MEDICINE

## 2019-05-04 PROCEDURE — 82962 GLUCOSE BLOOD TEST: CPT

## 2019-05-04 PROCEDURE — 2700000000 HC OXYGEN THERAPY PER DAY

## 2019-05-04 PROCEDURE — 84484 ASSAY OF TROPONIN QUANT: CPT

## 2019-05-04 PROCEDURE — 94761 N-INVAS EAR/PLS OXIMETRY MLT: CPT

## 2019-05-04 PROCEDURE — 80053 COMPREHEN METABOLIC PANEL: CPT

## 2019-05-04 PROCEDURE — 85027 COMPLETE CBC AUTOMATED: CPT

## 2019-05-04 PROCEDURE — 94640 AIRWAY INHALATION TREATMENT: CPT

## 2019-05-04 PROCEDURE — 36415 COLL VENOUS BLD VENIPUNCTURE: CPT

## 2019-05-04 PROCEDURE — 2500000003 HC RX 250 WO HCPCS: Performed by: INTERNAL MEDICINE

## 2019-05-04 PROCEDURE — 2580000003 HC RX 258: Performed by: HOSPITALIST

## 2019-05-04 RX ORDER — 0.9 % SODIUM CHLORIDE 0.9 %
1000 INTRAVENOUS SOLUTION INTRAVENOUS ONCE
Status: COMPLETED | OUTPATIENT
Start: 2019-05-04 | End: 2019-05-04

## 2019-05-04 RX ADMIN — FAMOTIDINE 20 MG: 20 TABLET ORAL at 09:51

## 2019-05-04 RX ADMIN — HEPARIN SODIUM 5000 UNITS: 5000 INJECTION, SOLUTION INTRAVENOUS; SUBCUTANEOUS at 13:40

## 2019-05-04 RX ADMIN — ACETAMINOPHEN 650 MG: 325 TABLET ORAL at 09:26

## 2019-05-04 RX ADMIN — CEFTRIAXONE 1 G: 1 INJECTION, POWDER, FOR SOLUTION INTRAMUSCULAR; INTRAVENOUS at 09:26

## 2019-05-04 RX ADMIN — SODIUM CHLORIDE, PRESERVATIVE FREE 10 ML: 5 INJECTION INTRAVENOUS at 21:16

## 2019-05-04 RX ADMIN — ASPIRIN 325 MG ORAL TABLET 325 MG: 325 PILL ORAL at 09:26

## 2019-05-04 RX ADMIN — DOCUSATE SODIUM 100 MG: 100 CAPSULE, LIQUID FILLED ORAL at 09:28

## 2019-05-04 RX ADMIN — GABAPENTIN 300 MG: 300 CAPSULE ORAL at 21:15

## 2019-05-04 RX ADMIN — GABAPENTIN 300 MG: 300 CAPSULE ORAL at 13:39

## 2019-05-04 RX ADMIN — IPRATROPIUM BROMIDE AND ALBUTEROL SULFATE 1 AMPULE: .5; 3 SOLUTION RESPIRATORY (INHALATION) at 19:55

## 2019-05-04 RX ADMIN — BENZONATATE 200 MG: 100 CAPSULE ORAL at 21:15

## 2019-05-04 RX ADMIN — SODIUM CHLORIDE 1000 ML: 9 INJECTION, SOLUTION INTRAVENOUS at 21:16

## 2019-05-04 RX ADMIN — FAMOTIDINE 20 MG: 20 TABLET ORAL at 21:16

## 2019-05-04 RX ADMIN — IPRATROPIUM BROMIDE AND ALBUTEROL SULFATE 1 AMPULE: .5; 3 SOLUTION RESPIRATORY (INHALATION) at 10:16

## 2019-05-04 RX ADMIN — SODIUM CHLORIDE, PRESERVATIVE FREE 10 ML: 5 INJECTION INTRAVENOUS at 09:29

## 2019-05-04 RX ADMIN — OXYCODONE AND ACETAMINOPHEN 1 TABLET: 5; 325 TABLET ORAL at 21:15

## 2019-05-04 RX ADMIN — HEPARIN SODIUM 5000 UNITS: 5000 INJECTION, SOLUTION INTRAVENOUS; SUBCUTANEOUS at 05:55

## 2019-05-04 RX ADMIN — GABAPENTIN 300 MG: 300 CAPSULE ORAL at 09:26

## 2019-05-04 RX ADMIN — DOXYCYCLINE 100 MG: 100 INJECTION, POWDER, LYOPHILIZED, FOR SOLUTION INTRAVENOUS at 23:47

## 2019-05-04 RX ADMIN — DOXYCYCLINE 100 MG: 100 INJECTION, POWDER, LYOPHILIZED, FOR SOLUTION INTRAVENOUS at 03:35

## 2019-05-04 RX ADMIN — NEBIVOLOL HYDROCHLORIDE 5 MG: 10 TABLET ORAL at 09:26

## 2019-05-04 RX ADMIN — IPRATROPIUM BROMIDE AND ALBUTEROL SULFATE 1 AMPULE: .5; 3 SOLUTION RESPIRATORY (INHALATION) at 12:09

## 2019-05-04 RX ADMIN — AZITHROMYCIN MONOHYDRATE 500 MG: 500 INJECTION, POWDER, LYOPHILIZED, FOR SOLUTION INTRAVENOUS at 21:16

## 2019-05-04 RX ADMIN — ACETAMINOPHEN 650 MG: 325 TABLET ORAL at 17:41

## 2019-05-04 RX ADMIN — HYDROXYZINE HYDROCHLORIDE 50 MG: 50 INJECTION, SOLUTION INTRAMUSCULAR at 03:35

## 2019-05-04 RX ADMIN — DOCUSATE SODIUM 100 MG: 100 CAPSULE, LIQUID FILLED ORAL at 21:15

## 2019-05-04 RX ADMIN — IPRATROPIUM BROMIDE AND ALBUTEROL SULFATE 1 AMPULE: .5; 3 SOLUTION RESPIRATORY (INHALATION) at 14:49

## 2019-05-04 RX ADMIN — INSULIN LISPRO 1 UNITS: 100 INJECTION, SOLUTION INTRAVENOUS; SUBCUTANEOUS at 13:09

## 2019-05-04 RX ADMIN — INSULIN LISPRO 1 UNITS: 100 INJECTION, SOLUTION INTRAVENOUS; SUBCUTANEOUS at 17:35

## 2019-05-04 RX ADMIN — CEFEPIME 2 G: 2 INJECTION, POWDER, FOR SOLUTION INTRAVENOUS at 13:39

## 2019-05-04 RX ADMIN — SODIUM CHLORIDE: 9 INJECTION, SOLUTION INTRAVENOUS at 12:12

## 2019-05-04 RX ADMIN — HEPARIN SODIUM 5000 UNITS: 5000 INJECTION, SOLUTION INTRAVENOUS; SUBCUTANEOUS at 21:25

## 2019-05-04 ASSESSMENT — PAIN DESCRIPTION - FREQUENCY: FREQUENCY: INTERMITTENT

## 2019-05-04 ASSESSMENT — PAIN DESCRIPTION - DESCRIPTORS: DESCRIPTORS: TIGHTNESS;DISCOMFORT

## 2019-05-04 ASSESSMENT — PAIN DESCRIPTION - ORIENTATION: ORIENTATION: UPPER

## 2019-05-04 ASSESSMENT — PAIN SCALES - GENERAL
PAINLEVEL_OUTOF10: 7
PAINLEVEL_OUTOF10: 0

## 2019-05-04 ASSESSMENT — PAIN DESCRIPTION - ONSET: ONSET: ON-GOING

## 2019-05-04 ASSESSMENT — PAIN DESCRIPTION - PAIN TYPE: TYPE: ACUTE PAIN

## 2019-05-04 ASSESSMENT — PAIN DESCRIPTION - LOCATION: LOCATION: CHEST

## 2019-05-04 NOTE — PROGRESS NOTES
[address] [place] [address]  HOSPITALIST PROGRESS NOTE                       Name:  Giuliana Goetz /Age/Sex: 1960  (63 y.o. male)   MRN & CSN:  [id number] & [id number] Admission Date/Time: 5/3/2019  1:12 PM   Location:  3016/3016-A Attending:  Zuleyka Bello MD                                                  HPI  Giuliana Bishnu is a 63 y.o. male who presents with pneumonia    SUBJECTIVE  -reports still feeling very sick, dizzy, diffuse body aches. With some shortness of breath/cough. No fever    10 point review of systems reviewed and negative unless noted above. ALLERGIES: No Known Allergies    PCP: Bipin Wisdom MD    PAST MEDICAL HISTORY, SURGICAL HISTORY, SOCIAL HISTORY and  HOME MEDICATIONS all reviewed. OBJECTIVE  Vitals:    19 2131 19 0200 19 0915 19 0924   BP:  91/61 106/71    Pulse:  88 105 106   Resp: 24 18 25    Temp:  99.5 °F (37.5 °C) 100.2 °F (37.9 °C)    TempSrc:  Oral Oral    SpO2: 98%  100%    Weight:  245 lb 4.8 oz (111.3 kg)     Height:           PHYSICAL EXAM   GEN Awake male, sitting upright in bed in no apparent distress. Appears given age. EYES Pupils are equally round. No scleral erythema, discharge, or conjunctivitis. HENT Mucous membranes are dry. Oral pharynx without exudates, no evidence of thrush. NECK Supple, no apparent thyromegaly or masses. RESP Clear to auscultation, no wheezes, rales or rhonchi. Symmetric chest movement while on room air. CARDIO/VASC S1/S2 auscultated. Regular rate without appreciable murmurs, rubs, or gallops. No JVD or carotid bruits. Peripheral pulses equal bilaterally and palpable. GI Abdomen is soft without significant tenderness, masses, or guarding. Bowel sounds are normoactive. Rectal exam deferred.  No costovertebral angle tenderness. Normal appearing external genitalia. Maldonado catheter is not present. HEME/LYMPH No palpable cervical lymphadenopathy and no hepatosplenomegaly.  No petechiae or Meds:ondansetron, sodium chloride flush, ondansetron, acetaminophen, benzonatate, glucose, dextrose, glucagon (rDNA), dextrose, promethazine, hydrOXYzine, oxyCODONE-acetaminophen        ASSESSMENT and [facility] Day: 2    1-Probable gram negative pneumonia- likely post-viral given adenovirus positivity-on IV abx- de-escalate depending on culture and or clinical progress. Probable sepsis-with fever, tachycardia- blood culture ordered.   2-Myalgia/dizziness/malaise- likely due to viral syndrome with superimposed pneumonia- continue supportive care  3-NELSON- improving slowly on IVF  4-Elevated troponin- mild- with ?chest pain- cardio consulted    Other chronic issues  -HTN  -DM           Disp: home    Diet DIET RENAL;   DVT Prophylaxis [] Lovenox, [x]  Heparin, [] SCDs, [] Ambulation   GI Prophylaxis [] PPI,  [] H2 Blocker,  [] Carafate,  [] Diet/Tube Feeds   Code Status Full Code   Disposition Patient requires continued admission due to pneumonia   CMS Level of Risk [] Low, [] Moderate,[x]  High  Patient's risk as above due to pneumonia     KILEY WALDEN MD 5/4/2019 12:20 PM

## 2019-05-04 NOTE — CONSULTS
Nephrology Service Consultation        [address], [address] [address]  Phone: [phone]  Office Hours: 8:30AM - 4:30PM  Monday - Friday           Patient:  Rivka Brown  MRN: [id number]  Consulting physician:  Melissa Rosenthal MD  Reason for Consult: elevated cr      PCP: Andra Marshall MD    HISTORY OF PRESENT ILLNESS:   The patient is a 63 y.o. male with dm2 and htn presented with soa, cough. Workup revealed a left perihilar pna, ct a/p wo contrast showed a rll pna. He was initiated on abx. He also reports having had nausea, vomiting and diarrhea. Renal consult for cr 2.5 on admission. His cr was 1.5 in . He reports that he was taking nsaids 4 times per day prior to admission. He is currently on NC and reports not feeling well  Made some urine overnight. REVIEW OF SYSTEMS:  14 point ROS is Negative.  See positive ROS per HPI    Past Medical History:        Diagnosis Date    Diabetes mellitus ([facility] [address]     Hypertension        Past Surgical History:        Procedure Laterality Date    CIRCUMCISION, NON-  2013    OTHER SURGICAL HISTORY Right     Transposition of Right Ulnar Nerve       Medications:   Scheduled Meds:   doxycycline (VIBRAMYCIN) IV  100 mg Intravenous Q12H    sodium chloride flush  10 mL Intravenous 2 times per day    docusate sodium  100 mg Oral BID    heparin (porcine)  5,000 Units Subcutaneous 3 times per day    famotidine  20 mg Oral BID    cefTRIAXone (ROCEPHIN) IV  1 g Intravenous Q24H    azithromycin  500 mg Intravenous Q24H    ipratropium-albuterol  1 ampule Inhalation Q4H WA    aspirin  325 mg Oral Daily    nebivolol  5 mg Oral Daily    gabapentin  300 mg Oral TID    insulin lispro  0-6 Units Subcutaneous TID WC    insulin lispro  0-3 Units Subcutaneous Nightly     Continuous Infusions:   sodium chloride 75 mL/hr at 19 5109    dextrose       PRN Meds:.ondansetron, sodium chloride flush, ondansetron, acetaminophen, benzonatate, glucose, dextrose, glucagon (rDNA), dextrose, promethazine, hydrOXYzine, oxyCODONE-acetaminophen    Allergies:  Patient has no known allergies. Social History:   TOBACCO:   reports that he has been smoking cigarettes. He has a 7.50 pack-year smoking history. He has never used smokeless tobacco.  ETOH:   reports that he does not drink alcohol. OCCUPATION:      Family History:   History reviewed. No pertinent family history. Physical Exam:    Vitals: BP 91/61   Pulse 88   Temp 99.5 °F (37.5 °C) (Oral)   Resp 18   Ht 5' 10\" (1.778 m)   Wt 245 lb 4.8 oz (111.3 kg)   SpO2 98%   BMI 35.20 kg/m²   General appearance: in no acute distress, appears stated age  Skin: Skin color, texture, turgor normal. No rashes or lesions  HEENT: normocephalic, atraumatic  Neck: supple, trachea midline  Lungs: clear to auscultation bilaterally, breathing comfortably on nc  Heart[de-identified] regular rate and rhythm, S1, S2 normal,  Abdomen: soft, non-tender; bowel sounds normal; no masses,   Extremities: extremities normal, atraumatic, no cyanosis or edema  Neurologic: Mental status: alert, oriented, interactive, following commands  Psychiatric: mood and affect appropriate    CBC:   Recent Labs     05/03/19  1320 05/04/19  0545   WBC 7.4 6.5   HGB 16.1 14.5   * 118*     BMP:    Recent Labs     05/03/19  1320 05/04/19  0254   * 133*   K 4.3 4.0   CL 96* 104   CO2 18* 14*   BUN 61* 52*   CREATININE 2.5* 1.7*   GLUCOSE 140* 140*     Hepatic:   Recent Labs     05/03/19  1320 05/04/19  0254   AST 67* 72*   ALT 47* 49*   BILITOT 0.3 0.4   ALKPHOS 73 58     Troponin: No results for input(s): TROPONINI in the last 72 hours. BNP: No results for input(s): BNP in the last 72 hours.   Lipids:   Recent Labs     05/03/19  1811   CHOL 108   HDL 32*     ABGs:   Lab Results   Component Value Date    PO2ART 72 05/03/2019    ZBU9DAA 27.0 05/03/2019     INR: No results for input(s): INR in the last 72 hours.  -----------------------------------------------------------------      Assessment and Recommendations   - NELSON: ddx: prerenal from vomiting and daily nsaid use vs atn vs obstruction//cr 2.5 on admission, cr was 1.5 in 2013// no HN on  Ct a/p wo contrast, UP/C 0.4g/day, UA with 2wbcs and no rbcs  - Hyponatremia; likely from volume depletion  - 2.2cm right upper pole complex renal cysts  - Thrombocytopenia  - Acute hypoxic resp failure from RLL pna  - DM2  - HTN    Plan:  continue ns at 75ml/hr  Renally dose abx and avoid nephrotoxins  Urology evaluation regarding the complex right renal cyst, outpt  Hold lisinopril and hctz    Thank you        Electronically signed by Corey Brito DO on 5/4/2019 at MD Orlin Diallo DO [address],  [address]  PHONE: [phone]  FAX: [phone]

## 2019-05-05 LAB
ANION GAP SERPL CALCULATED.3IONS-SCNC: 13 MMOL/L (ref 4–16)
BASE EXCESS: ABNORMAL (ref 0–3.3)
BUN BLDV-MCNC: 28 MG/DL (ref 6–23)
CALCIUM SERPL-MCNC: 7.1 MG/DL (ref 8.3–10.6)
CARBON MONOXIDE, BLOOD: 1.3 % (ref 0–5)
CHLORIDE BLD-SCNC: 109 MMOL/L (ref 99–110)
CO2 CONTENT: 18.6 MMOL/L (ref 19–24)
CO2: 13 MMOL/L (ref 21–32)
COMMENT: ABNORMAL
CREAT SERPL-MCNC: 1.2 MG/DL (ref 0.9–1.3)
GFR AFRICAN AMERICAN: >60 ML/MIN/1.73M2
GFR NON-AFRICAN AMERICAN: >60 ML/MIN/1.73M2
GLUCOSE BLD-MCNC: 116 MG/DL (ref 70–99)
GLUCOSE BLD-MCNC: 121 MG/DL (ref 70–99)
GLUCOSE BLD-MCNC: 129 MG/DL (ref 70–99)
GLUCOSE BLD-MCNC: 142 MG/DL (ref 70–99)
GLUCOSE BLD-MCNC: 231 MG/DL (ref 70–99)
HCO3 ARTERIAL: 17.7 MMOL/L (ref 18–23)
METHEMOGLOBIN ARTERIAL: 0.9 %
O2 SATURATION: 96.1 % (ref 96–97)
PCO2 ARTERIAL: 28 MMHG (ref 32–45)
PH BLOOD: 7.41 (ref 7.34–7.45)
PO2 ARTERIAL: 85 MMHG (ref 75–100)
POTASSIUM SERPL-SCNC: 4 MMOL/L (ref 3.5–5.1)
PROCALCITONIN: 1.91
SODIUM BLD-SCNC: 135 MMOL/L (ref 135–145)

## 2019-05-05 PROCEDURE — 6360000002 HC RX W HCPCS: Performed by: NURSE PRACTITIONER

## 2019-05-05 PROCEDURE — 36415 COLL VENOUS BLD VENIPUNCTURE: CPT

## 2019-05-05 PROCEDURE — 2580000003 HC RX 258: Performed by: NURSE PRACTITIONER

## 2019-05-05 PROCEDURE — 6370000000 HC RX 637 (ALT 250 FOR IP): Performed by: NURSE PRACTITIONER

## 2019-05-05 PROCEDURE — 94150 VITAL CAPACITY TEST: CPT

## 2019-05-05 PROCEDURE — 82962 GLUCOSE BLOOD TEST: CPT

## 2019-05-05 PROCEDURE — 2700000000 HC OXYGEN THERAPY PER DAY

## 2019-05-05 PROCEDURE — 80048 BASIC METABOLIC PNL TOTAL CA: CPT

## 2019-05-05 PROCEDURE — 1200000000 HC SEMI PRIVATE

## 2019-05-05 PROCEDURE — 36600 WITHDRAWAL OF ARTERIAL BLOOD: CPT

## 2019-05-05 PROCEDURE — 2580000003 HC RX 258: Performed by: HOSPITALIST

## 2019-05-05 PROCEDURE — 6370000000 HC RX 637 (ALT 250 FOR IP): Performed by: HOSPITALIST

## 2019-05-05 PROCEDURE — 2500000003 HC RX 250 WO HCPCS: Performed by: INTERNAL MEDICINE

## 2019-05-05 PROCEDURE — 6360000002 HC RX W HCPCS: Performed by: HOSPITALIST

## 2019-05-05 PROCEDURE — 6370000000 HC RX 637 (ALT 250 FOR IP): Performed by: INTERNAL MEDICINE

## 2019-05-05 PROCEDURE — 99253 IP/OBS CNSLTJ NEW/EST LOW 45: CPT | Performed by: INTERNAL MEDICINE

## 2019-05-05 PROCEDURE — 84145 PROCALCITONIN (PCT): CPT

## 2019-05-05 PROCEDURE — 82803 BLOOD GASES ANY COMBINATION: CPT

## 2019-05-05 PROCEDURE — 94664 DEMO&/EVAL PT USE INHALER: CPT

## 2019-05-05 PROCEDURE — 94640 AIRWAY INHALATION TREATMENT: CPT

## 2019-05-05 PROCEDURE — 2580000003 HC RX 258: Performed by: INTERNAL MEDICINE

## 2019-05-05 PROCEDURE — 94761 N-INVAS EAR/PLS OXIMETRY MLT: CPT

## 2019-05-05 RX ORDER — SODIUM BICARBONATE 650 MG/1
1300 TABLET ORAL 3 TIMES DAILY
Status: COMPLETED | OUTPATIENT
Start: 2019-05-05 | End: 2019-05-08

## 2019-05-05 RX ORDER — CYCLOBENZAPRINE HCL 10 MG
10 TABLET ORAL 3 TIMES DAILY
Status: DISCONTINUED | OUTPATIENT
Start: 2019-05-05 | End: 2019-05-17 | Stop reason: HOSPADM

## 2019-05-05 RX ADMIN — OXYCODONE AND ACETAMINOPHEN 1 TABLET: 5; 325 TABLET ORAL at 07:00

## 2019-05-05 RX ADMIN — BENZONATATE 200 MG: 100 CAPSULE ORAL at 14:08

## 2019-05-05 RX ADMIN — SODIUM BICARBONATE 650 MG TABLET 1300 MG: at 08:43

## 2019-05-05 RX ADMIN — ACETAMINOPHEN 650 MG: 325 TABLET ORAL at 20:49

## 2019-05-05 RX ADMIN — SODIUM BICARBONATE 650 MG TABLET 1300 MG: at 12:26

## 2019-05-05 RX ADMIN — DOCUSATE SODIUM 100 MG: 100 CAPSULE, LIQUID FILLED ORAL at 08:44

## 2019-05-05 RX ADMIN — SODIUM CHLORIDE: 9 INJECTION, SOLUTION INTRAVENOUS at 01:31

## 2019-05-05 RX ADMIN — ACETAMINOPHEN 650 MG: 325 TABLET ORAL at 05:21

## 2019-05-05 RX ADMIN — IPRATROPIUM BROMIDE AND ALBUTEROL SULFATE 1 AMPULE: .5; 3 SOLUTION RESPIRATORY (INHALATION) at 08:13

## 2019-05-05 RX ADMIN — IPRATROPIUM BROMIDE AND ALBUTEROL SULFATE 1 AMPULE: .5; 3 SOLUTION RESPIRATORY (INHALATION) at 20:06

## 2019-05-05 RX ADMIN — OXYCODONE AND ACETAMINOPHEN 1 TABLET: 5; 325 TABLET ORAL at 18:10

## 2019-05-05 RX ADMIN — IPRATROPIUM BROMIDE AND ALBUTEROL SULFATE 1 AMPULE: .5; 3 SOLUTION RESPIRATORY (INHALATION) at 14:13

## 2019-05-05 RX ADMIN — IPRATROPIUM BROMIDE AND ALBUTEROL SULFATE 1 AMPULE: .5; 3 SOLUTION RESPIRATORY (INHALATION) at 01:53

## 2019-05-05 RX ADMIN — HEPARIN SODIUM 5000 UNITS: 5000 INJECTION, SOLUTION INTRAVENOUS; SUBCUTANEOUS at 12:27

## 2019-05-05 RX ADMIN — CEFEPIME 2 G: 2 INJECTION, POWDER, FOR SOLUTION INTRAVENOUS at 01:31

## 2019-05-05 RX ADMIN — CYCLOBENZAPRINE HYDROCHLORIDE 10 MG: 10 TABLET, FILM COATED ORAL at 22:38

## 2019-05-05 RX ADMIN — ACETAMINOPHEN 650 MG: 325 TABLET ORAL at 15:47

## 2019-05-05 RX ADMIN — IPRATROPIUM BROMIDE AND ALBUTEROL SULFATE 1 AMPULE: .5; 3 SOLUTION RESPIRATORY (INHALATION) at 12:08

## 2019-05-05 RX ADMIN — NEBIVOLOL HYDROCHLORIDE 5 MG: 10 TABLET ORAL at 08:43

## 2019-05-05 RX ADMIN — CEFEPIME 2 G: 2 INJECTION, POWDER, FOR SOLUTION INTRAVENOUS at 12:27

## 2019-05-05 RX ADMIN — OXYCODONE AND ACETAMINOPHEN 1 TABLET: 5; 325 TABLET ORAL at 11:01

## 2019-05-05 RX ADMIN — FAMOTIDINE 20 MG: 20 TABLET ORAL at 20:02

## 2019-05-05 RX ADMIN — GABAPENTIN 300 MG: 300 CAPSULE ORAL at 08:44

## 2019-05-05 RX ADMIN — SODIUM CHLORIDE, PRESERVATIVE FREE 10 ML: 5 INJECTION INTRAVENOUS at 20:02

## 2019-05-05 RX ADMIN — GABAPENTIN 300 MG: 300 CAPSULE ORAL at 20:02

## 2019-05-05 RX ADMIN — BENZONATATE 200 MG: 100 CAPSULE ORAL at 05:21

## 2019-05-05 RX ADMIN — HEPARIN SODIUM 5000 UNITS: 5000 INJECTION, SOLUTION INTRAVENOUS; SUBCUTANEOUS at 22:25

## 2019-05-05 RX ADMIN — DOXYCYCLINE 100 MG: 100 INJECTION, POWDER, LYOPHILIZED, FOR SOLUTION INTRAVENOUS at 20:52

## 2019-05-05 RX ADMIN — FAMOTIDINE 20 MG: 20 TABLET ORAL at 08:44

## 2019-05-05 RX ADMIN — DOCUSATE SODIUM 100 MG: 100 CAPSULE, LIQUID FILLED ORAL at 20:06

## 2019-05-05 RX ADMIN — AZITHROMYCIN MONOHYDRATE 500 MG: 500 INJECTION, POWDER, LYOPHILIZED, FOR SOLUTION INTRAVENOUS at 20:01

## 2019-05-05 RX ADMIN — BENZONATATE 200 MG: 100 CAPSULE ORAL at 20:49

## 2019-05-05 RX ADMIN — SODIUM BICARBONATE 650 MG TABLET 1300 MG: at 20:02

## 2019-05-05 RX ADMIN — OXYCODONE AND ACETAMINOPHEN 1 TABLET: 5; 325 TABLET ORAL at 02:25

## 2019-05-05 RX ADMIN — DOXYCYCLINE 100 MG: 100 INJECTION, POWDER, LYOPHILIZED, FOR SOLUTION INTRAVENOUS at 08:43

## 2019-05-05 RX ADMIN — ASPIRIN 325 MG ORAL TABLET 325 MG: 325 PILL ORAL at 08:44

## 2019-05-05 RX ADMIN — GABAPENTIN 300 MG: 300 CAPSULE ORAL at 12:27

## 2019-05-05 RX ADMIN — INSULIN LISPRO 2 UNITS: 100 INJECTION, SOLUTION INTRAVENOUS; SUBCUTANEOUS at 12:27

## 2019-05-05 ASSESSMENT — PAIN SCALES - GENERAL
PAINLEVEL_OUTOF10: 7
PAINLEVEL_OUTOF10: 8
PAINLEVEL_OUTOF10: 6
PAINLEVEL_OUTOF10: 7
PAINLEVEL_OUTOF10: 6

## 2019-05-05 ASSESSMENT — PAIN DESCRIPTION - DESCRIPTORS
DESCRIPTORS: ACHING
DESCRIPTORS: ACHING;TIGHTNESS

## 2019-05-05 ASSESSMENT — PAIN DESCRIPTION - FREQUENCY
FREQUENCY: INTERMITTENT
FREQUENCY: INTERMITTENT

## 2019-05-05 ASSESSMENT — PAIN DESCRIPTION - PAIN TYPE
TYPE: ACUTE PAIN

## 2019-05-05 ASSESSMENT — PAIN DESCRIPTION - LOCATION
LOCATION: SHOULDER
LOCATION: BACK;SHOULDER

## 2019-05-05 ASSESSMENT — PAIN DESCRIPTION - PROGRESSION: CLINICAL_PROGRESSION: NOT CHANGED

## 2019-05-05 ASSESSMENT — PAIN DESCRIPTION - ONSET
ONSET: ON-GOING
ONSET: ON-GOING

## 2019-05-05 NOTE — PLAN OF CARE
Problem: Pain:  Goal: Pain level will decrease  Description  Pain level will decrease  5/5/2019 0841 by Narendra Leon LPN  Outcome: Ongoing     Problem: Pain:  Goal: Control of acute pain  Description  Control of acute pain  5/5/2019 0841 by Narendra Leon LPN  Outcome: Ongoing     Problem: Pain:  Goal: Control of chronic pain  Description  Control of chronic pain  5/5/2019 0841 by Narendra Leon LPN  Outcome: Ongoing

## 2019-05-05 NOTE — PROGRESS NOTES
[address] [place] [address]  HOSPITALIST PROGRESS NOTE                       Name:  Julissa Manjarrez /Age/Sex: 1960  (63 y.o. male)   MRN & CSN:  [id number] & [id number] Admission Date/Time: 5/3/2019  1:12 PM   Location:  [facility]3016-A Attending:  Ginna Nicole MD                                                  HPI  Julissa Manjarrez is a 63 y.o. male who presents with pneumonia    SUBJECTIVE  -feeling better, had fever yesterday but improved. Less shortness of breath    10 point review of systems reviewed and negative unless noted above. ALLERGIES: No Known Allergies    PCP: Selwyn Awad MD    PAST MEDICAL HISTORY, SURGICAL HISTORY, SOCIAL HISTORY and  HOME MEDICATIONS all reviewed. OBJECTIVE  Vitals:    19 0327 19 0524 19 0814 19 0830   BP: 110/74 95/63  112/67   Pulse: 110 103  102   Resp:   23   Temp: 100.2 °F (37.9 °C) 99.9 °F (37.7 °C)  98.9 °F (37.2 °C)   TempSrc: Oral Oral  Oral   SpO2: 98% 96% 95% 96%   Weight: 243 lb 8 oz (110.5 kg)      Height:           PHYSICAL EXAM   GEN Awake male, sitting upright in bed in no apparent distress. Appears given age. EYES Pupils are equally round. No scleral erythema, discharge, or conjunctivitis. HENT Mucous membranes are dry. Oral pharynx without exudates, no evidence of thrush. NECK Supple, no apparent thyromegaly or masses. RESP Clear to auscultation, no wheezes, rales or rhonchi. Symmetric chest movement while on room air. CARDIO/VASC S1/S2 auscultated. Regular rate without appreciable murmurs, rubs, or gallops. No JVD or carotid bruits. Peripheral pulses equal bilaterally and palpable. GI Abdomen is soft without significant tenderness, masses, or guarding. Bowel sounds are normoactive. Rectal exam deferred.  No costovertebral angle tenderness. Normal appearing external genitalia. Maldonado catheter is not present. HEME/LYMPH No palpable cervical lymphadenopathy and no hepatosplenomegaly.  No petechiae or ecchymoses. MSK Spontaneous movement of all extremities. No gross joint deformities. SKIN Normal coloration, warm, dry. NEURO Cranial nerves appear grossly intact, normal speech, no lateralizing weakness. PSYCH Awake, alert, oriented x 4. Affect appropriate. INTAKE: No intake/output data recorded. OUTPUT: No intake/output data recorded. LABS  Recent Labs     05/03/19  1320 05/04/19  0545   WBC 7.4 6.5   HGB 16.1 14.5   HCT 49.8 48.6   * 118*      Recent Labs     05/03/19  1320 05/04/19  0254 05/05/19  0320   * 133* 135   K 4.3 4.0 4.0   CL 96* 104 109   CO2 18* 14* 13*   BUN 61* 52* 28*   CREATININE 2.5* 1.7* 1.2     Recent Labs     05/03/19  1320 05/04/19  0254   AST 67* 72*   ALT 47* 49*   BILITOT 0.3 0.4   ALKPHOS 73 58     No results for input(s): INR in the last 72 hours. Recent Labs     05/03/19  1320 05/03/19  1811 05/04/19  0254   TROPONINT 0.031* <0.010 0.017*          Abnormal labs for today noted      Imaging: CT-   Impression   1. Right lower lobe pneumonia.  Recommend chest radiograph in 8 weeks to   confirm resolution.    2. 2.2 cm complex right upper pole renal cyst.  Recommend nonemergent MRI of   the abdomen with and without contrast for further evaluation.             ECHO:    Microbiology:  Blood culture:    Urine culture:    Sputum culture:    Procedures done this admission:    MEDS  Scheduled Meds:   sodium bicarbonate  1,300 mg Oral TID    cefepime  2 g Intravenous Q12H    doxycycline (VIBRAMYCIN) IV  100 mg Intravenous Q12H    sodium chloride flush  10 mL Intravenous 2 times per day    docusate sodium  100 mg Oral BID    heparin (porcine)  5,000 Units Subcutaneous 3 times per day    famotidine  20 mg Oral BID    azithromycin  500 mg Intravenous Q24H    ipratropium-albuterol  1 ampule Inhalation Q4H WA    aspirin  325 mg Oral Daily    nebivolol  5 mg Oral Daily    gabapentin  300 mg Oral TID    insulin lispro  0-6 Units Subcutaneous TID WC    insulin lispro  0-3 Units Subcutaneous Nightly     Continuous Infusions:   sodium chloride 75 mL/hr at 05/05/19 0131    dextrose       PRN Meds:ondansetron, sodium chloride flush, ondansetron, acetaminophen, benzonatate, glucose, dextrose, glucagon (rDNA), dextrose, promethazine, hydrOXYzine, oxyCODONE-acetaminophen        ASSESSMENT and PLAN  Hospital Day: 3    1-Probable gram negative pneumonia- likely post-viral given adenovirus positivity-on IV abx- de-escalate depending on culture and or clinical progress.  Probable sepsis-with fever, tachycardia- blood culture ordered and pending  2-Myalgia/dizziness/malaise- likely due to viral syndrome with superimposed pneumonia- continue supportive care  3-NELSON- improving slowly on IVF  4-Elevated troponin- mild- with ?chest pain- cardio consulted    Other chronic issues  -HTN  -DM           Disp: home    Diet DIET CARB CONTROL;   DVT Prophylaxis [] Lovenox, [x]  Heparin, [] SCDs, [] Ambulation   GI Prophylaxis [] PPI,  [] H2 Blocker,  [] Carafate,  [] Diet/Tube Feeds   Code Status Full Code   Disposition Patient requires continued admission due to pneumonia   CMS Level of Risk [] Low, [] Moderate,[x]  High  Patient's risk as above due to pneumonia     KILEY WALDEN MD 5/5/2019 11:23 AM

## 2019-05-05 NOTE — PROGRESS NOTES
Nephrology Progress Note        [address], [address] [address]  Phone: [phone]  Office Hours: 8:30AM - 4:30PM  Monday - Friday       [facility]  MD Karen Shore, DO [address],  Austen Moore  [facility]  PHONE: [phone]  FAX: [phone]    5/5/2019 7:53 AM  Subjective:   Admit Date: 5/3/2019  PCP: Eduardo Cruz MD  Interval History: on room air  Reports breathing better      Diet: DIET CARB CONTROL;      Data:   Scheduled Meds:   sodium bicarbonate  1,300 mg Oral TID    cefepime  2 g Intravenous Q12H    doxycycline (VIBRAMYCIN) IV  100 mg Intravenous Q12H    sodium chloride flush  10 mL Intravenous 2 times per day    docusate sodium  100 mg Oral BID    heparin (porcine)  5,000 Units Subcutaneous 3 times per day    famotidine  20 mg Oral BID    azithromycin  500 mg Intravenous Q24H    ipratropium-albuterol  1 ampule Inhalation Q4H WA    aspirin  325 mg Oral Daily    nebivolol  5 mg Oral Daily    gabapentin  300 mg Oral TID    insulin lispro  0-6 Units Subcutaneous TID WC    insulin lispro  0-3 Units Subcutaneous Nightly     Continuous Infusions:   sodium chloride 75 mL/hr at 05/05/19 0131    dextrose       PRN Meds:ondansetron, sodium chloride flush, ondansetron, acetaminophen, benzonatate, glucose, dextrose, glucagon (rDNA), dextrose, promethazine, hydrOXYzine, oxyCODONE-acetaminophen  I/O last 3 completed shifts: In: 10 [I.V.:10]  Out: -   No intake/output data recorded.     Intake/Output Summary (Last 24 hours) at 5/5/2019 0753  Last data filed at 5/4/2019 0929  Gross per 24 hour   Intake 10 ml   Output --   Net 10 ml       CBC:   Recent Labs     05/03/19  1320 05/04/19  0545   WBC 7.4 6.5   HGB 16.1 14.5   * 118*       BMP:    Recent Labs     05/03/19  1320 05/04/19  0254 05/05/19  0320   * 133* 135   K 4.3 4.0 4.0   CL 96* 104 109   CO2 18* 14* 13*   BUN 61* 52* 28* CREATININE 2.5* 1.7* 1.2   GLUCOSE 140* 140* 142*     Hepatic:   Recent Labs     05/03/19  1320 05/04/19  0254   AST 67* 72*   ALT 47* 49*   BILITOT 0.3 0.4   ALKPHOS 73 58     Troponin: No results for input(s): TROPONINI in the last 72 hours. BNP: No results for input(s): BNP in the last 72 hours. Lipids:   Recent Labs     05/03/19  1811   CHOL 108   HDL 32*     ABGs:   Lab Results   Component Value Date    PO2ART 72 05/03/2019    PJA1GAI 27.0 05/03/2019     INR: No results for input(s): INR in the last 72 hours.     Objective:   Vitals: BP 95/63   Pulse 103   Temp 99.9 °F (37.7 °C) (Oral)   Resp 21   Ht 5' 10\" (1.778 m)   Wt 243 lb 8 oz (110.5 kg)   SpO2 96%   BMI 34.94 kg/m²   General appearance: alert and cooperative with exam, in no acute distress  HEENT: normocephalic, atraumatic,   Neck: supple, trachea midline  Lungs: , breathing comfortably on room air  Heart[de-identified] regular rate and rhythm,   Abdomen:  non distended,  Extremities: extremities atraumatic, no cyanosis or edema  Neurologic: alert, oriented, follows commands, interactive    Assessment and Plan:   - NELSON: ddx: prerenal from vomiting and daily nsaid use vs atn vs obstruction//cr 2.5 on admission, cr was 1.5 in 2013// no HN on  Ct a/p wo contrast, UP/C 0.4g/day, UA with 2wbcs and no rbcs  - Hyponatremia; likely from volume depletion; resolved  - NAG metabolic acidosis  - 6.1MU right upper pole complex renal cysts  - Thrombocytopenia  - Acute hypoxic resp failure from RLL pna  - DM2  - HTN     Plan:  - Cr is better today at 1.2  - Serum bicarb is lower so give oral sodium bicarbonate pills  - continue ns at 75ml/hr unitl this afternoon then stop  - Urology evaluation regarding the complex right renal cyst, as outpt  - Hold lisinopril and hctz for now                           Electronically signed by Kamran Mosley DO on 5/5/2019 at 7:53 AM    [address] MD  [address] [facility] DO  [address] [facility],  [facility] [address]  PHONE: [phone]  FAX: [phone]

## 2019-05-05 NOTE — CONSULTS
CARDIOLOGY CONSULT NOTE    Reason for consultation: Chest pain, elevated trop   Referring physician: Aquiles Oakes MD      Primary care physician: Leroy Mujica MD        Dear Aquiles Oakes MD   Thanks for the consult.     History of present illness:Reji is a 63 y. o.year old who  presents with  chest pain for last few days, happening daily, intermittent for 15 to 20 mins and aggravated with activity substernal also,reproducible with palpation, radiated to shoulder, 6/10, tender to touch,associated with shortness of breath, + sweating, nausea, did not get NTG in ED. He has pneumonia and cough,+ fever, no chills, no nausea no vomiting. Blood pressure, cholesterol, blood glucose and weight are well controlled.     Chief Complaint   Patient presents with    Fatigue    Nausea    Dizziness    Chest Pain       Past medical history:    has a past medical history of Diabetes mellitus ([address] and Hypertension. Past surgical history:   has a past surgical history that includes other surgical history (Right) and Circumcision, non- (2013). Social History:   reports that he has been smoking cigarettes. He has a 7.50 pack-year smoking history. He has never used smokeless tobacco. He reports that he does not drink alcohol or use drugs.   Family history:   no family history of CAD, STROKE of DM      sodium bicarbonate tablet 1,300 mg TID   cefepime (MAXIPIME) 2 g in dextrose 5 % 50 mL IVPB Q12H   doxycycline (VIBRAMYCIN) 100 mg in dextrose 5 % 100 mL IVPB Q12H   ondansetron (ZOFRAN) injection 4 mg Q30 Min PRN   sodium chloride flush 0.9 % injection 10 mL 2 times per day   sodium chloride flush 0.9 % injection 10 mL PRN   docusate sodium (COLACE) capsule 100 mg BID   ondansetron (ZOFRAN) injection 4 mg Q6H PRN   heparin (porcine) injection 5,000 Units 3 times per day   acetaminophen (TYLENOL) tablet 650 mg Q4H PRN   famotidine (PEPCID) tablet 20 mg BID   azithromycin (ZITHROMAX) 500 mg in dextrose 5 % 250 CNP   650 mg at 05/05/19 1547    famotidine (PEPCID) tablet 20 mg  20 mg Oral BID Felisha Basil, APRN - CNP   20 mg at 05/05/19 0844    azithromycin (ZITHROMAX) 500 mg in dextrose 5 % 250 mL IVPB  500 mg Intravenous Q24H Felisha Basil, APRN - CNP   Stopped at 05/04/19 2230    ipratropium-albuterol (DUONEB) nebulizer solution 1 ampule  1 ampule Inhalation Q4H WA Felisha Basil, APRN - CNP   1 ampule at 05/05/19 1413    benzonatate (TESSALON) capsule 200 mg  200 mg Oral TID PRN Felisha Basil, APRN - CNP   200 mg at 05/05/19 1408    aspirin tablet 325 mg  325 mg Oral Daily Felisha Basil, APRN - CNP   325 mg at 05/05/19 0844    nebivolol (BYSTOLIC) tablet 5 mg  5 mg Oral Daily Felisha Basil, APRN - CNP   5 mg at 05/05/19 0865    gabapentin (NEURONTIN) capsule 300 mg  300 mg Oral TID Felisha Basil, APRN - CNP   300 mg at 05/05/19 1227    glucose (GLUTOSE) 40 % oral gel 15 g  15 g Oral PRN Felisha Basil, APRN - CNP        dextrose 50 % solution 12.5 g  12.5 g Intravenous PRN Felisha Basil, APRN - CNP        glucagon (rDNA) injection 1 mg  1 mg Intramuscular PRN Felisha Basil, APRN - CNP        dextrose 5 % solution  100 mL/hr Intravenous PRN Felisha Basil, APRN - CNP        insulin lispro (HUMALOG) injection vial 0-6 Units  0-6 Units Subcutaneous TID WC Felisha Basil, APRN - CNP   2 Units at 05/05/19 1227    insulin lispro (HUMALOG) injection vial 0-3 Units  0-3 Units Subcutaneous Nightly Felisha Basil, APRN - CNP   1 Units at 05/03/19 2121    promethazine (PHENERGAN) injection 12.5 mg  12.5 mg Intramuscular Q6H PRN Felisha Basil, APRN - CNP   12.5 mg at 05/03/19 1937    hydrOXYzine (VISTARIL) injection 50 mg  50 mg Intramuscular Q6H PRN Felisha Basil, APRN - CNP   50 mg at 05/04/19 0335    oxyCODONE-acetaminophen (PERCOCET) 5-325 MG per tablet 1 tablet  1 tablet Oral Q4H PRN Yvette Dominguez MD   1 tablet at 05/05/19 1810          Review of Systems:    · Constitutional: No Fever or Weight Loss    · Eyes: No Decreased Vision  · ENT: No Headaches, Hearing Loss or Vertigo  · Cardiovascular: + chest pain, dyspnea on exertion, palpitations or loss of consciousness  · Respiratory: No cough or wheezing    · Gastrointestinal: No abdominal pain, appetite loss, blood in stools, constipation, diarrhea or heartburn  · Genitourinary: No dysuria, trouble voiding, or hematuria  · Musculoskeletal: No gait disturbance, weakness or joint complaints  · Integumentary: No rash or pruritis  · Neurological: No TIA or stroke symptoms  · Psychiatric: No anxiety or depression  · Endocrine: No malaise, fatigue or temperature intolerance  · Hematologic/Lymphatic: No bleeding problems, blood clots or swollen lymph nodes  · Allergic/Immunologic: No nasal congestion or hives  All systems negative except as marked.      ·    ·    ·      Physical Examination:    Vitals:    05/05/19 1800   BP: 102/74   Pulse: 97   Resp: 19   Temp: 99 °F (37.2 °C)   SpO2:       Wt Readings from Last 3 Encounters:   05/05/19 243 lb 8 oz (110.5 kg)   03/14/16 248 lb (112.5 kg)   12/14/15 251 lb 1.6 oz (113.9 kg)     Body mass index is 34.94 kg/m².        General Appearance: No distress, conversant     Constitutional: Well developed, Well nourished, No acute distress, Non-toxic appearance.    HENT:  Normocephalic, Atraumatic, Bilateral external ears normal, Oropharynx moist, No oral exudates, Nose normal. Neck- Normal range of motion, No tenderness, Supple, No stridor,no apical-carotid delay, no carotid bruit  Eyes: PERRL, EOMI, Conjunctiva normal, No discharge.    Respiratory:  Normal breath sounds, No respiratory distress, No wheezing, No chest tenderness. ,no use of accessory muscles, diaphragm movement is normal  Cardiovascular: (PMI) apex non displaced,no lifts no thrills, no s3,no s4, Normal heart rate, Normal rhythm, No murmurs, No rubs, No gallops.  Carotid arteries pulse and amplitude are normal no bruit, no abdominal bruit noted ( normal abdominal aorta ausculation), femoral arteries pulse

## 2019-05-06 ENCOUNTER — APPOINTMENT (OUTPATIENT)
Dept: GENERAL RADIOLOGY | Age: 59
DRG: 193 | End: 2019-05-06
Payer: COMMERCIAL

## 2019-05-06 ENCOUNTER — APPOINTMENT (OUTPATIENT)
Dept: CT IMAGING | Age: 59
DRG: 193 | End: 2019-05-06
Payer: COMMERCIAL

## 2019-05-06 LAB
ANION GAP SERPL CALCULATED.3IONS-SCNC: 15 MMOL/L (ref 4–16)
BUN BLDV-MCNC: 21 MG/DL (ref 6–23)
CALCIUM SERPL-MCNC: 8 MG/DL (ref 8.3–10.6)
CHLORIDE BLD-SCNC: 105 MMOL/L (ref 99–110)
CO2: 18 MMOL/L (ref 21–32)
CREAT SERPL-MCNC: 1.1 MG/DL (ref 0.9–1.3)
EKG ATRIAL RATE: 116 BPM
EKG DIAGNOSIS: NORMAL
EKG P AXIS: 41 DEGREES
EKG P-R INTERVAL: 158 MS
EKG Q-T INTERVAL: 324 MS
EKG QRS DURATION: 90 MS
EKG QTC CALCULATION (BAZETT): 450 MS
EKG R AXIS: 55 DEGREES
EKG T AXIS: 38 DEGREES
EKG VENTRICULAR RATE: 116 BPM
GFR AFRICAN AMERICAN: >60 ML/MIN/1.73M2
GFR NON-AFRICAN AMERICAN: >60 ML/MIN/1.73M2
GLUCOSE BLD-MCNC: 128 MG/DL (ref 70–99)
GLUCOSE BLD-MCNC: 136 MG/DL (ref 70–99)
GLUCOSE BLD-MCNC: 138 MG/DL (ref 70–99)
GLUCOSE BLD-MCNC: 154 MG/DL (ref 70–99)
GLUCOSE BLD-MCNC: 163 MG/DL (ref 70–99)
HCT VFR BLD CALC: 46.3 % (ref 42–52)
HEMOGLOBIN: 14.4 GM/DL (ref 13.5–18)
LV EF: 53 %
LVEF MODALITY: NORMAL
MCH RBC QN AUTO: 29.8 PG (ref 27–31)
MCHC RBC AUTO-ENTMCNC: 31.1 % (ref 32–36)
MCV RBC AUTO: 95.7 FL (ref 78–100)
PDW BLD-RTO: 14.2 % (ref 11.7–14.9)
PLATELET # BLD: 119 K/CU MM (ref 140–440)
PMV BLD AUTO: 11.1 FL (ref 7.5–11.1)
POTASSIUM SERPL-SCNC: 3.8 MMOL/L (ref 3.5–5.1)
RBC # BLD: 4.84 M/CU MM (ref 4.6–6.2)
REASON FOR REJECTION: NORMAL
REASON FOR REJECTION: NORMAL
REJECTED TEST: NORMAL
SODIUM BLD-SCNC: 138 MMOL/L (ref 135–145)
WBC # BLD: 5.7 K/CU MM (ref 4–10.5)

## 2019-05-06 PROCEDURE — 6370000000 HC RX 637 (ALT 250 FOR IP): Performed by: HOSPITALIST

## 2019-05-06 PROCEDURE — 2140000000 HC CCU INTERMEDIATE R&B

## 2019-05-06 PROCEDURE — 85027 COMPLETE CBC AUTOMATED: CPT

## 2019-05-06 PROCEDURE — 6370000000 HC RX 637 (ALT 250 FOR IP): Performed by: INTERNAL MEDICINE

## 2019-05-06 PROCEDURE — 6360000004 HC RX CONTRAST MEDICATION: Performed by: INTERNAL MEDICINE

## 2019-05-06 PROCEDURE — 2580000003 HC RX 258: Performed by: INTERNAL MEDICINE

## 2019-05-06 PROCEDURE — 6370000000 HC RX 637 (ALT 250 FOR IP): Performed by: NURSE PRACTITIONER

## 2019-05-06 PROCEDURE — 2580000003 HC RX 258: Performed by: NURSE PRACTITIONER

## 2019-05-06 PROCEDURE — 94640 AIRWAY INHALATION TREATMENT: CPT

## 2019-05-06 PROCEDURE — 6360000002 HC RX W HCPCS: Performed by: HOSPITALIST

## 2019-05-06 PROCEDURE — 71260 CT THORAX DX C+: CPT

## 2019-05-06 PROCEDURE — 2700000000 HC OXYGEN THERAPY PER DAY

## 2019-05-06 PROCEDURE — 80048 BASIC METABOLIC PNL TOTAL CA: CPT

## 2019-05-06 PROCEDURE — 94150 VITAL CAPACITY TEST: CPT

## 2019-05-06 PROCEDURE — 6360000002 HC RX W HCPCS: Performed by: INTERNAL MEDICINE

## 2019-05-06 PROCEDURE — 6360000002 HC RX W HCPCS

## 2019-05-06 PROCEDURE — 2580000003 HC RX 258: Performed by: HOSPITALIST

## 2019-05-06 PROCEDURE — 36415 COLL VENOUS BLD VENIPUNCTURE: CPT

## 2019-05-06 PROCEDURE — 93010 ELECTROCARDIOGRAM REPORT: CPT | Performed by: INTERNAL MEDICINE

## 2019-05-06 PROCEDURE — 2500000003 HC RX 250 WO HCPCS: Performed by: INTERNAL MEDICINE

## 2019-05-06 PROCEDURE — 93306 TTE W/DOPPLER COMPLETE: CPT

## 2019-05-06 PROCEDURE — 71045 X-RAY EXAM CHEST 1 VIEW: CPT

## 2019-05-06 PROCEDURE — 82962 GLUCOSE BLOOD TEST: CPT

## 2019-05-06 PROCEDURE — 93005 ELECTROCARDIOGRAM TRACING: CPT | Performed by: INTERNAL MEDICINE

## 2019-05-06 PROCEDURE — 6360000002 HC RX W HCPCS: Performed by: NURSE PRACTITIONER

## 2019-05-06 RX ORDER — FUROSEMIDE 10 MG/ML
20 INJECTION INTRAMUSCULAR; INTRAVENOUS ONCE
Status: COMPLETED | OUTPATIENT
Start: 2019-05-06 | End: 2019-05-06

## 2019-05-06 RX ORDER — FUROSEMIDE 10 MG/ML
INJECTION INTRAMUSCULAR; INTRAVENOUS
Status: COMPLETED
Start: 2019-05-06 | End: 2019-05-06

## 2019-05-06 RX ORDER — SODIUM CHLORIDE 0.9 % (FLUSH) 0.9 %
10 SYRINGE (ML) INJECTION 2 TIMES DAILY
Status: DISCONTINUED | OUTPATIENT
Start: 2019-05-06 | End: 2019-05-17 | Stop reason: HOSPADM

## 2019-05-06 RX ADMIN — IPRATROPIUM BROMIDE AND ALBUTEROL SULFATE 1 AMPULE: .5; 3 SOLUTION RESPIRATORY (INHALATION) at 18:58

## 2019-05-06 RX ADMIN — DOXYCYCLINE 100 MG: 100 INJECTION, POWDER, LYOPHILIZED, FOR SOLUTION INTRAVENOUS at 08:43

## 2019-05-06 RX ADMIN — CYCLOBENZAPRINE HYDROCHLORIDE 10 MG: 10 TABLET, FILM COATED ORAL at 14:11

## 2019-05-06 RX ADMIN — ENOXAPARIN SODIUM: 150 INJECTION SUBCUTANEOUS at 05:35

## 2019-05-06 RX ADMIN — FUROSEMIDE 20 MG: 10 INJECTION, SOLUTION INTRAVENOUS at 04:29

## 2019-05-06 RX ADMIN — GABAPENTIN 300 MG: 300 CAPSULE ORAL at 14:11

## 2019-05-06 RX ADMIN — CEFEPIME 2 G: 2 INJECTION, POWDER, FOR SOLUTION INTRAVENOUS at 01:44

## 2019-05-06 RX ADMIN — FAMOTIDINE 20 MG: 20 TABLET ORAL at 07:54

## 2019-05-06 RX ADMIN — ACETAMINOPHEN 650 MG: 325 TABLET ORAL at 03:31

## 2019-05-06 RX ADMIN — ENOXAPARIN SODIUM 105 MG: 150 INJECTION SUBCUTANEOUS at 07:53

## 2019-05-06 RX ADMIN — FUROSEMIDE 20 MG: 10 INJECTION INTRAMUSCULAR; INTRAVENOUS at 04:29

## 2019-05-06 RX ADMIN — SODIUM BICARBONATE 650 MG TABLET 1300 MG: at 07:53

## 2019-05-06 RX ADMIN — DILTIAZEM HYDROCHLORIDE 5 MG/HR: 5 INJECTION INTRAVENOUS at 07:19

## 2019-05-06 RX ADMIN — OXYCODONE AND ACETAMINOPHEN 1 TABLET: 5; 325 TABLET ORAL at 14:17

## 2019-05-06 RX ADMIN — CYCLOBENZAPRINE HYDROCHLORIDE 10 MG: 10 TABLET, FILM COATED ORAL at 21:05

## 2019-05-06 RX ADMIN — GABAPENTIN 300 MG: 300 CAPSULE ORAL at 07:54

## 2019-05-06 RX ADMIN — OXYCODONE AND ACETAMINOPHEN 1 TABLET: 5; 325 TABLET ORAL at 21:05

## 2019-05-06 RX ADMIN — DOCUSATE SODIUM 100 MG: 100 CAPSULE, LIQUID FILLED ORAL at 21:05

## 2019-05-06 RX ADMIN — OXYCODONE AND ACETAMINOPHEN 1 TABLET: 5; 325 TABLET ORAL at 04:38

## 2019-05-06 RX ADMIN — GABAPENTIN 300 MG: 300 CAPSULE ORAL at 21:04

## 2019-05-06 RX ADMIN — IPRATROPIUM BROMIDE AND ALBUTEROL SULFATE 1 AMPULE: .5; 3 SOLUTION RESPIRATORY (INHALATION) at 15:32

## 2019-05-06 RX ADMIN — SODIUM BICARBONATE 650 MG TABLET 1300 MG: at 21:05

## 2019-05-06 RX ADMIN — IOPAMIDOL 80 ML: 755 INJECTION, SOLUTION INTRAVENOUS at 06:10

## 2019-05-06 RX ADMIN — IPRATROPIUM BROMIDE AND ALBUTEROL SULFATE 1 AMPULE: .5; 3 SOLUTION RESPIRATORY (INHALATION) at 12:13

## 2019-05-06 RX ADMIN — SODIUM CHLORIDE, PRESERVATIVE FREE 10 ML: 5 INJECTION INTRAVENOUS at 21:04

## 2019-05-06 RX ADMIN — ACETAMINOPHEN 650 MG: 325 TABLET ORAL at 21:04

## 2019-05-06 RX ADMIN — IPRATROPIUM BROMIDE AND ALBUTEROL SULFATE 1 AMPULE: .5; 3 SOLUTION RESPIRATORY (INHALATION) at 02:50

## 2019-05-06 RX ADMIN — BENZONATATE 200 MG: 100 CAPSULE ORAL at 21:05

## 2019-05-06 RX ADMIN — DOCUSATE SODIUM 100 MG: 100 CAPSULE, LIQUID FILLED ORAL at 07:54

## 2019-05-06 RX ADMIN — CEFEPIME 2 G: 2 INJECTION, POWDER, FOR SOLUTION INTRAVENOUS at 16:09

## 2019-05-06 RX ADMIN — DILTIAZEM HYDROCHLORIDE 10 MG/HR: 5 INJECTION INTRAVENOUS at 19:31

## 2019-05-06 RX ADMIN — OXYCODONE AND ACETAMINOPHEN 1 TABLET: 5; 325 TABLET ORAL at 09:43

## 2019-05-06 RX ADMIN — FAMOTIDINE 20 MG: 20 TABLET ORAL at 21:05

## 2019-05-06 RX ADMIN — CYCLOBENZAPRINE HYDROCHLORIDE 10 MG: 10 TABLET, FILM COATED ORAL at 07:54

## 2019-05-06 RX ADMIN — NEBIVOLOL HYDROCHLORIDE 5 MG: 10 TABLET ORAL at 07:53

## 2019-05-06 RX ADMIN — AZITHROMYCIN MONOHYDRATE 500 MG: 500 INJECTION, POWDER, LYOPHILIZED, FOR SOLUTION INTRAVENOUS at 21:04

## 2019-05-06 RX ADMIN — IPRATROPIUM BROMIDE AND ALBUTEROL SULFATE 1 AMPULE: .5; 3 SOLUTION RESPIRATORY (INHALATION) at 08:17

## 2019-05-06 RX ADMIN — SODIUM BICARBONATE 650 MG TABLET 1300 MG: at 14:11

## 2019-05-06 RX ADMIN — ASPIRIN 325 MG ORAL TABLET 325 MG: 325 PILL ORAL at 07:53

## 2019-05-06 ASSESSMENT — PAIN SCALES - GENERAL
PAINLEVEL_OUTOF10: 0
PAINLEVEL_OUTOF10: 7
PAINLEVEL_OUTOF10: 3
PAINLEVEL_OUTOF10: 8
PAINLEVEL_OUTOF10: 0
PAINLEVEL_OUTOF10: 7
PAINLEVEL_OUTOF10: 2
PAINLEVEL_OUTOF10: 7

## 2019-05-06 ASSESSMENT — PAIN SCALES - WONG BAKER: WONGBAKER_NUMERICALRESPONSE: 0

## 2019-05-06 NOTE — PROGRESS NOTES
Noted some accessory muscle use. Patient acknowledged feeling short of breath. Gave breathing treatment and placed on 3 liter nasal cannula.

## 2019-05-06 NOTE — PROGRESS NOTES
On nc  Now on diltiazem gtt for afib/flutter  Received lasix 20mg iv once by he night team  Had cta chest confirming b/l pna and showing no PE    A&P:  - NELSON: ddx: prerenal from vomiting and daily nsaid use vs atn vs obstruction//cr 2.5 on admission, cr was 1.5 in 2013// no HN on  Ct a/p wo contrast, UP/C 0.4g/day, UA with 2wbcs and no rbcs  - Hyponatremia; likely from volume depletion; resolved  - NAG metabolic acidosis  - 2.2XO right upper pole complex renal cysts: needs urology eval as outpt  - Thrombocytopenia  - Acute hypoxic resp failure from RLL pna  - DM2  - HTN     Plan:  - NELSON has resolved, I will sign off today so please call me back if needed  - continue oral sodium bicarb for few more doses then stop  - continue supportive management  - Urology evaluation regarding the complex right renal cyst, as outpt  - Hold lisinopril and hctz for now until 3 days after discharge

## 2019-05-06 NOTE — CARE COORDINATION
LSW reviewed chart/into room to initiate discharge planning. LSW introduced self and role of LSW. Pt granted permission to speak with Pt sister in room. LSW lives home with girlfriend. Pt drives. Pt has med/Rx insurance. Pt is not going to be able to afford Rx at discharge. Pt agreeable to Med assist referral. During the conversation Pt fell asleep and would not wake. Pt stated Pt is very sick and asked if LSW can return.      Referral to med assist

## 2019-05-06 NOTE — PROGRESS NOTES
Call initiated by: Nursing staff: Filiberto Soto  Call addressed around: 5/5/2019 09: 48 PM  Reason for call: Patient's daughter who is nurse would like to talk to the provider as she thinks patient is worsening and requesting transfer to ICU  Orders placed: Assess patient and reviewed labs and imaging and there is no indication for transfer. Patient's daughter and aunt educated about current condition, but insisted they wanted to talk to the doctor. Flexeril ordered per patient's family request for chronic back pain. Dr. Aletha Dobbs was informed and judged there was no need for transfer as patient is improving.      Zeferino Anguiano, APRN - CNP

## 2019-05-06 NOTE — PROGRESS NOTES
Short progress note    Paged to see patient. He is diaphoretic and has tachcyardia. EKG showed atrial flutter. We will start anticoagulation now and rate control medications.       Venus Oppenheim

## 2019-05-06 NOTE — PROGRESS NOTES
CT PE Protocol completed and sent to PACS and 0625 hrs.   0558-Spoke with Estuardo from floor regarding IV. Multiple persons have attempted additional IV access. I explained to her the duration and intensity of injection for PE Protocol.  I did tell her that I am willing to use 22g as long as she recognizes risk of infiltration.//mll  0530-Contrast ordered  0515-Spoke with RN, will attempt 20g IV and call back./current BMP \"in process\"//mll  0505-22g/current BMP \"in process\"/allergies verified.//allison

## 2019-05-06 NOTE — CARE COORDINATION
Patient discharged home on 5/17/2019    Patient transferred to 3 on 5/9    CN met with patient on 5/8, patient awake and alert, able to do education, Has +Adenovirus. Was transferred to ICU 5/7  due to increased oxygen demand, using accessory muscles, Cardizem gtt with heart rate 100-120    CN met with patient on 5/6, patient sleeping and family in room. Family states patient has been sick for weeks. Will come back later once patient more awake    Initial Patient Assessment Note    What symptoms brought you to the hospital? Patient came in with vomiting and diarrhea,constant pain in lower abdomen that moves to left side, chills, fever, dry cough        Where do you live:       [x] Home with spouse       [] Independent Living     [] Assisted Living                      [] [address]   [] Homeless/Homeless Shelter    [] Group Home      Primary Care Physician:  [x] Dr Milli Lambert                       [] None    [] PA/NP              [] [address] [facility] Physician              Pulmonary Physician:    Consulted:  [] Dinh Rooney     [] Yes   [] Winsome     [] No  [] Jonwala              [] [address]:    [] Other:      Pharmacy:      Meds to Beds:  [] Ileana Gómez      [] Yes   [] [facility]       [] No   [] Mattie Reinoso   [] [facility]      [] Twila Limb    [] [facility]   [] Marci Galvan   [] [address]   [] [address] [facility]   [] [address]   [] [facility]   [x] [place]   [] [employer]   [] Other:    Home Care:         [] Yes, Name:       [x] No            SNF:  [] Yes, Name:   [x] No           Do you have:            DME Company:  [] Home O2     [] CM DME    [] BIPAP/CPAP    [] [employer]   [] Nebulizer     [] [place]                  [x] None of the above    [] Other:       Home COPD Medications:  Inhalers:  Nebulizers: Other:    Are you out of any medications? [] Yes   [x] No   Can you pay for your meds?    [] Yes   [x] No  Do you have transportation:            [x] Yes   [] No          What time do you prefer your appointments:  [] AM   [x] PM  [] Anytime    Goals for this admission:   Patient wants to:get better    Steps to meet goal:   1. Resp Treatments   2. Medications   3. Pneumonia education given to patient/caregiver using the Pneumonia Stoplight and About Pneumonia pamphlet with teach back. Questions answered. Call Physician,Nurse Navigator or Home Health Nurse if have signs and symptoms:  Shortness of breath,thicker/colored phlegm,  using rescue inhalers or nebulizer more than normal,  fatigued, unable to do usual activities,   fever,chills & sweating,   sleeping poorly,  dry cough , headache       Symptoms may indicate you may need to adjust your medications  Limit your activities  Make sure you are taking medications correctly  Force fluids  Take medications for your fever      Call Physician immediately or 911 if have symptoms:  Unrelieved shortness of breath  Shortness of breath at rest  Fever over 101  Confusion  Sharp/stabbing pain in chest worse with deep breaths and cough  Severe to increased coughing  Unusual fatigue  Unable to stay awake  Bluish color to lips or fingernails    Verified if patient had received flu or Pneumonia vaccines previously. Teachback received from patient/family. CN contact information given.     PNEUMONIA CHECKLIST    Was the PNA Order set used      No  Pneumonia Stoplight Education    Yes  Antibiotic given within 24 hours    Yes 5/3 1501  Blood cultures drawn before antibiotic given   Yes 5/3 1316  O2 Saturation on admission     96% RA      Consults:             Smoking Cessation      Yes  Pulmonary       No  Med Assist Consult      Yes  Home Health Care Consult     No  Palliative Care Consult     No  Respiratory Consult      Yes  (including bedside instructions on nebulizers, inhalers, and assessment of oxygen and equipment needs at home)      Discharge:             Pneumococcal Vaccine given before discharge  NA-deferred  Flu Vaccine given before discharge    NA  Inhalers       No  Spacer        NA  Steroids       Yes  Follow-up appointment scheduled within 5-7 days  Yes  Cardio/Pulmonary Wellness program consult  NA  Home Oxygen       No  Neblizer, bipap, cpap at home    No  Home air quality assessment [facility]) [id number] [facility]

## 2019-05-06 NOTE — PROGRESS NOTES
[address] [place] [address]  HOSPITALIST PROGRESS NOTE                       Name:  Leland Cordon /Age/Sex: 1960  (63 y.o. male)   MRN & CSN:  [id number] & [id number] Admission Date/Time: 5/3/2019  1:12 PM   Location:  [address] [place] [address] Attending:  Phuong Alvarez MD                                                  HPI  Leland Cordon is a 63 y.o. male who presents with pneumonia    SUBJECTIVE  -last night went into atrial flutter with RVR- requiring cardizem drip, on NC oxygen, reports feeling better and wants to be discharged. 10 point review of systems reviewed and negative unless noted above. ALLERGIES: No Known Allergies    PCP: Bryce Figueroa MD    PAST MEDICAL HISTORY, SURGICAL HISTORY, SOCIAL HISTORY and  HOME MEDICATIONS all reviewed. OBJECTIVE  Vitals:    19 0253 19 0438 19 0800 19 0817   BP: 117/84 112/72 (!) 133/95    Pulse: 112  128    Resp: 27  24    Temp: 99.9 °F (37.7 °C)  99.2 °F (37.3 °C)    TempSrc: Oral  Oral    SpO2: 98%  99% 99%   Weight: 244 lb (110.7 kg)      Height:           PHYSICAL EXAM   GEN Awake male, sitting upright in bed in no apparent distress. Appears given age. EYES Pupils are equally round. No scleral erythema, discharge, or conjunctivitis. HENT Mucous membranes are dry. Oral pharynx without exudates, no evidence of thrush. NECK Supple, no apparent thyromegaly or masses. RESP Clear to auscultation, no wheezes, rales or rhonchi. Symmetric chest movement while on room air. CARDIO/VASC S1/S2 auscultated. Regular rate without appreciable murmurs, rubs, or gallops. No JVD or carotid bruits. Peripheral pulses equal bilaterally and palpable. GI Abdomen is soft without significant tenderness, masses, or guarding. Bowel sounds are normoactive. Rectal exam deferred.  No costovertebral angle tenderness. Normal appearing external genitalia. Maldonado catheter is not present. HEME/LYMPH No palpable cervical lymphadenopathy and no hepatosplenomegaly. Units Subcutaneous TID     insulin lispro  0-3 Units Subcutaneous Nightly     Continuous Infusions:   diltiazem (CARDIZEM) 125 mg in dextrose 5% 125 mL infusion 10 mg/hr (05/06/19 0509)    dextrose       PRN Meds:ondansetron, sodium chloride flush, ondansetron, acetaminophen, benzonatate, glucose, dextrose, glucagon (rDNA), dextrose, promethazine, hydrOXYzine, oxyCODONE-acetaminophen        ASSESSMENT and [address] [facility] Day: 4    1-Probable gram negative pneumonia- likely post-viral given adenovirus positivity-on IV abx- de-escalate depending on culture and or clinical progress. Probable sepsis-with fever, tachycardia- blood culture ordered.  Procalcitonin was elevated- will repeat in am  2-Myalgia/dizziness/malaise- likely due to viral syndrome with superimposed pneumonia- continue supportive care  3-NELSON- improving slowly on IVF  4-Elevated troponin- mild- with ?chest pain- cardio consulted  5-Atrial flutter with RVR- on cardizem drip, on therapeutic lovenox while awaiting cardio input- echo pending  6-Elevated LFTs- ?chronic, repeat in am to assess trend  7-Renal cyst- to be followed up as an outpatient    Other chronic issues  -HTN  -DM           Disp: home    Diet DIET CARB CONTROL;   DVT Prophylaxis [x] Lovenox, []  Heparin, [] SCDs, [] Ambulation   GI Prophylaxis [] PPI,  [] H2 Blocker,  [] Carafate,  [] Diet/Tube Feeds   Code Status Full Code   Disposition Patient requires continued admission due to pneumonia   CMS Level of Risk [] Low, [] Moderate,[x]  High  Patient's risk as above due to pneumonia     KILEY WALDEN MD 5/6/2019 12:41 PM

## 2019-05-06 NOTE — PROGRESS NOTES
Patient's breathing is labored and unrelieved by breathing treatment. Heart rate in low 130's. PCP on call was notified. Awaiting orders.

## 2019-05-06 NOTE — PROGRESS NOTES
This RN notified pt daughter Delroy Beltre of this pt transfer to [facility]. Delroy Beltre would like to be updated with any changes at [phone].  Electronically signed by Hiro Lomeli RN on 5/6/2019 at 7:03 AM

## 2019-05-07 ENCOUNTER — APPOINTMENT (OUTPATIENT)
Dept: GENERAL RADIOLOGY | Age: 59
DRG: 193 | End: 2019-05-07
Payer: COMMERCIAL

## 2019-05-07 LAB
ANION GAP SERPL CALCULATED.3IONS-SCNC: 17 MMOL/L (ref 4–16)
BUN BLDV-MCNC: 19 MG/DL (ref 6–23)
CALCIUM SERPL-MCNC: 7.9 MG/DL (ref 8.3–10.6)
CHLORIDE BLD-SCNC: 103 MMOL/L (ref 99–110)
CO2: 18 MMOL/L (ref 21–32)
CREAT SERPL-MCNC: 1.1 MG/DL (ref 0.9–1.3)
EKG ATRIAL RATE: 374 BPM
EKG DIAGNOSIS: NORMAL
EKG Q-T INTERVAL: 254 MS
EKG QRS DURATION: 84 MS
EKG QTC CALCULATION (BAZETT): 411 MS
EKG R AXIS: 81 DEGREES
EKG T AXIS: 57 DEGREES
EKG VENTRICULAR RATE: 158 BPM
GFR AFRICAN AMERICAN: >60 ML/MIN/1.73M2
GFR NON-AFRICAN AMERICAN: >60 ML/MIN/1.73M2
GLUCOSE BLD-MCNC: 120 MG/DL (ref 70–99)
GLUCOSE BLD-MCNC: 140 MG/DL (ref 70–99)
GLUCOSE BLD-MCNC: 172 MG/DL (ref 70–99)
GLUCOSE BLD-MCNC: 172 MG/DL (ref 70–99)
GLUCOSE BLD-MCNC: 228 MG/DL (ref 70–99)
LACTATE: 1.3 MMOL/L (ref 0.4–2)
POTASSIUM SERPL-SCNC: 4.4 MMOL/L (ref 3.5–5.1)
PROCALCITONIN: 1.48
SODIUM BLD-SCNC: 138 MMOL/L (ref 135–145)
TROPONIN T: <0.01 NG/ML

## 2019-05-07 PROCEDURE — 87070 CULTURE OTHR SPECIMN AEROBIC: CPT

## 2019-05-07 PROCEDURE — 6360000002 HC RX W HCPCS: Performed by: INTERNAL MEDICINE

## 2019-05-07 PROCEDURE — 2000000000 HC ICU R&B

## 2019-05-07 PROCEDURE — 2500000003 HC RX 250 WO HCPCS: Performed by: INTERNAL MEDICINE

## 2019-05-07 PROCEDURE — 84484 ASSAY OF TROPONIN QUANT: CPT

## 2019-05-07 PROCEDURE — 87205 SMEAR GRAM STAIN: CPT

## 2019-05-07 PROCEDURE — 6370000000 HC RX 637 (ALT 250 FOR IP): Performed by: INTERNAL MEDICINE

## 2019-05-07 PROCEDURE — 93010 ELECTROCARDIOGRAM REPORT: CPT | Performed by: INTERNAL MEDICINE

## 2019-05-07 PROCEDURE — C1751 CATH, INF, PER/CENT/MIDLINE: HCPCS

## 2019-05-07 PROCEDURE — 82962 GLUCOSE BLOOD TEST: CPT

## 2019-05-07 PROCEDURE — 71045 X-RAY EXAM CHEST 1 VIEW: CPT

## 2019-05-07 PROCEDURE — 2700000000 HC OXYGEN THERAPY PER DAY

## 2019-05-07 PROCEDURE — 2580000003 HC RX 258: Performed by: INTERNAL MEDICINE

## 2019-05-07 PROCEDURE — 36592 COLLECT BLOOD FROM PICC: CPT

## 2019-05-07 PROCEDURE — 6370000000 HC RX 637 (ALT 250 FOR IP): Performed by: NURSE PRACTITIONER

## 2019-05-07 PROCEDURE — 99233 SBSQ HOSP IP/OBS HIGH 50: CPT | Performed by: INTERNAL MEDICINE

## 2019-05-07 PROCEDURE — 94640 AIRWAY INHALATION TREATMENT: CPT

## 2019-05-07 PROCEDURE — 6370000000 HC RX 637 (ALT 250 FOR IP): Performed by: HOSPITALIST

## 2019-05-07 PROCEDURE — 76937 US GUIDE VASCULAR ACCESS: CPT

## 2019-05-07 PROCEDURE — 6360000002 HC RX W HCPCS: Performed by: NURSE PRACTITIONER

## 2019-05-07 PROCEDURE — 87899 AGENT NOS ASSAY W/OPTIC: CPT

## 2019-05-07 PROCEDURE — 94761 N-INVAS EAR/PLS OXIMETRY MLT: CPT

## 2019-05-07 PROCEDURE — 2580000003 HC RX 258: Performed by: NURSE PRACTITIONER

## 2019-05-07 PROCEDURE — 02HV33Z INSERTION OF INFUSION DEVICE INTO SUPERIOR VENA CAVA, PERCUTANEOUS APPROACH: ICD-10-PCS | Performed by: RADIOLOGY

## 2019-05-07 PROCEDURE — 36415 COLL VENOUS BLD VENIPUNCTURE: CPT

## 2019-05-07 PROCEDURE — 2580000003 HC RX 258: Performed by: HOSPITALIST

## 2019-05-07 PROCEDURE — 83605 ASSAY OF LACTIC ACID: CPT

## 2019-05-07 PROCEDURE — 87449 NOS EACH ORGANISM AG IA: CPT

## 2019-05-07 PROCEDURE — 80048 BASIC METABOLIC PNL TOTAL CA: CPT

## 2019-05-07 PROCEDURE — 6360000002 HC RX W HCPCS: Performed by: HOSPITALIST

## 2019-05-07 PROCEDURE — 36569 INSJ PICC 5 YR+ W/O IMAGING: CPT

## 2019-05-07 PROCEDURE — 84145 PROCALCITONIN (PCT): CPT

## 2019-05-07 RX ORDER — LIDOCAINE HYDROCHLORIDE 10 MG/ML
5 INJECTION, SOLUTION EPIDURAL; INFILTRATION; INTRACAUDAL; PERINEURAL ONCE
Status: COMPLETED | OUTPATIENT
Start: 2019-05-07 | End: 2019-05-07

## 2019-05-07 RX ORDER — METHYLPREDNISOLONE SODIUM SUCCINATE 40 MG/ML
40 INJECTION, POWDER, LYOPHILIZED, FOR SOLUTION INTRAMUSCULAR; INTRAVENOUS EVERY 8 HOURS
Status: DISCONTINUED | OUTPATIENT
Start: 2019-05-07 | End: 2019-05-08

## 2019-05-07 RX ORDER — GUAIFENESIN 600 MG/1
600 TABLET, EXTENDED RELEASE ORAL 2 TIMES DAILY
Status: DISCONTINUED | OUTPATIENT
Start: 2019-05-07 | End: 2019-05-17 | Stop reason: HOSPADM

## 2019-05-07 RX ORDER — FUROSEMIDE 10 MG/ML
20 INJECTION INTRAMUSCULAR; INTRAVENOUS ONCE
Status: COMPLETED | OUTPATIENT
Start: 2019-05-07 | End: 2019-05-07

## 2019-05-07 RX ORDER — VANCOMYCIN HYDROCHLORIDE 1 G/200ML
1000 INJECTION, SOLUTION INTRAVENOUS EVERY 12 HOURS
Status: DISCONTINUED | OUTPATIENT
Start: 2019-05-07 | End: 2019-05-07 | Stop reason: DRUGHIGH

## 2019-05-07 RX ORDER — SODIUM CHLORIDE 0.9 % (FLUSH) 0.9 %
10 SYRINGE (ML) INJECTION PRN
Status: DISCONTINUED | OUTPATIENT
Start: 2019-05-07 | End: 2019-05-17 | Stop reason: HOSPADM

## 2019-05-07 RX ORDER — SODIUM CHLORIDE 0.9 % (FLUSH) 0.9 %
10 SYRINGE (ML) INJECTION EVERY 12 HOURS SCHEDULED
Status: DISCONTINUED | OUTPATIENT
Start: 2019-05-07 | End: 2019-05-17 | Stop reason: HOSPADM

## 2019-05-07 RX ADMIN — SODIUM BICARBONATE 650 MG TABLET 1300 MG: at 08:52

## 2019-05-07 RX ADMIN — BENZONATATE 200 MG: 100 CAPSULE ORAL at 09:02

## 2019-05-07 RX ADMIN — VANCOMYCIN HYDROCHLORIDE 1750 MG: 5 INJECTION, POWDER, LYOPHILIZED, FOR SOLUTION INTRAVENOUS at 06:42

## 2019-05-07 RX ADMIN — GUAIFENESIN 600 MG: 600 TABLET, EXTENDED RELEASE ORAL at 20:49

## 2019-05-07 RX ADMIN — ENOXAPARIN SODIUM 105 MG: 150 INJECTION SUBCUTANEOUS at 09:02

## 2019-05-07 RX ADMIN — BENZONATATE 200 MG: 100 CAPSULE ORAL at 20:49

## 2019-05-07 RX ADMIN — DILTIAZEM HYDROCHLORIDE 15 MG/HR: 5 INJECTION INTRAVENOUS at 23:39

## 2019-05-07 RX ADMIN — NEBIVOLOL HYDROCHLORIDE 5 MG: 10 TABLET ORAL at 09:02

## 2019-05-07 RX ADMIN — VANCOMYCIN HYDROCHLORIDE 1750 MG: 5 INJECTION, POWDER, LYOPHILIZED, FOR SOLUTION INTRAVENOUS at 17:57

## 2019-05-07 RX ADMIN — OXYCODONE AND ACETAMINOPHEN 1 TABLET: 5; 325 TABLET ORAL at 11:42

## 2019-05-07 RX ADMIN — IPRATROPIUM BROMIDE AND ALBUTEROL SULFATE 1 AMPULE: .5; 3 SOLUTION RESPIRATORY (INHALATION) at 05:55

## 2019-05-07 RX ADMIN — LIDOCAINE HYDROCHLORIDE 5 ML: 10 INJECTION, SOLUTION EPIDURAL; INFILTRATION; INTRACAUDAL; PERINEURAL at 09:31

## 2019-05-07 RX ADMIN — IPRATROPIUM BROMIDE AND ALBUTEROL SULFATE 1 AMPULE: .5; 3 SOLUTION RESPIRATORY (INHALATION) at 10:53

## 2019-05-07 RX ADMIN — FUROSEMIDE 20 MG: 10 INJECTION, SOLUTION INTRAVENOUS at 06:42

## 2019-05-07 RX ADMIN — GABAPENTIN 300 MG: 300 CAPSULE ORAL at 13:36

## 2019-05-07 RX ADMIN — FAMOTIDINE 20 MG: 20 TABLET ORAL at 20:50

## 2019-05-07 RX ADMIN — OXYCODONE AND ACETAMINOPHEN 1 TABLET: 5; 325 TABLET ORAL at 04:38

## 2019-05-07 RX ADMIN — GABAPENTIN 300 MG: 300 CAPSULE ORAL at 20:49

## 2019-05-07 RX ADMIN — SODIUM BICARBONATE 650 MG TABLET 1300 MG: at 13:36

## 2019-05-07 RX ADMIN — INSULIN LISPRO 1 UNITS: 100 INJECTION, SOLUTION INTRAVENOUS; SUBCUTANEOUS at 18:10

## 2019-05-07 RX ADMIN — IPRATROPIUM BROMIDE AND ALBUTEROL SULFATE 1 AMPULE: .5; 3 SOLUTION RESPIRATORY (INHALATION) at 16:00

## 2019-05-07 RX ADMIN — CEFEPIME 2 G: 2 INJECTION, POWDER, FOR SOLUTION INTRAVENOUS at 06:09

## 2019-05-07 RX ADMIN — SODIUM CHLORIDE, PRESERVATIVE FREE 10 ML: 5 INJECTION INTRAVENOUS at 20:52

## 2019-05-07 RX ADMIN — FAMOTIDINE 20 MG: 20 TABLET ORAL at 08:52

## 2019-05-07 RX ADMIN — METHYLPREDNISOLONE SODIUM SUCCINATE 40 MG: 40 INJECTION, POWDER, LYOPHILIZED, FOR SOLUTION INTRAMUSCULAR; INTRAVENOUS at 13:37

## 2019-05-07 RX ADMIN — IPRATROPIUM BROMIDE AND ALBUTEROL SULFATE 1 AMPULE: .5; 3 SOLUTION RESPIRATORY (INHALATION) at 01:29

## 2019-05-07 RX ADMIN — SODIUM BICARBONATE 650 MG TABLET 1300 MG: at 20:49

## 2019-05-07 RX ADMIN — CEFEPIME 2 G: 2 INJECTION, POWDER, FOR SOLUTION INTRAVENOUS at 16:05

## 2019-05-07 RX ADMIN — SODIUM CHLORIDE, PRESERVATIVE FREE 10 ML: 5 INJECTION INTRAVENOUS at 08:52

## 2019-05-07 RX ADMIN — DILTIAZEM HYDROCHLORIDE 15 MG/HR: 5 INJECTION INTRAVENOUS at 03:16

## 2019-05-07 RX ADMIN — INSULIN LISPRO 1 UNITS: 100 INJECTION, SOLUTION INTRAVENOUS; SUBCUTANEOUS at 08:52

## 2019-05-07 RX ADMIN — CYCLOBENZAPRINE HYDROCHLORIDE 10 MG: 10 TABLET, FILM COATED ORAL at 20:49

## 2019-05-07 RX ADMIN — GUAIFENESIN 600 MG: 600 TABLET, EXTENDED RELEASE ORAL at 13:36

## 2019-05-07 RX ADMIN — ENOXAPARIN SODIUM 105 MG: 150 INJECTION SUBCUTANEOUS at 21:11

## 2019-05-07 RX ADMIN — ASPIRIN 325 MG ORAL TABLET 325 MG: 325 PILL ORAL at 08:57

## 2019-05-07 RX ADMIN — CYCLOBENZAPRINE HYDROCHLORIDE 10 MG: 10 TABLET, FILM COATED ORAL at 13:36

## 2019-05-07 RX ADMIN — INSULIN LISPRO 1 UNITS: 100 INJECTION, SOLUTION INTRAVENOUS; SUBCUTANEOUS at 21:14

## 2019-05-07 RX ADMIN — CYCLOBENZAPRINE HYDROCHLORIDE 10 MG: 10 TABLET, FILM COATED ORAL at 08:52

## 2019-05-07 RX ADMIN — ONDANSETRON 4 MG: 2 INJECTION INTRAMUSCULAR; INTRAVENOUS at 01:34

## 2019-05-07 RX ADMIN — METHYLPREDNISOLONE SODIUM SUCCINATE 40 MG: 40 INJECTION, POWDER, LYOPHILIZED, FOR SOLUTION INTRAMUSCULAR; INTRAVENOUS at 20:49

## 2019-05-07 RX ADMIN — IPRATROPIUM BROMIDE AND ALBUTEROL SULFATE 1 AMPULE: .5; 3 SOLUTION RESPIRATORY (INHALATION) at 21:02

## 2019-05-07 RX ADMIN — DILTIAZEM HYDROCHLORIDE 5 MG/HR: 5 INJECTION INTRAVENOUS at 13:45

## 2019-05-07 RX ADMIN — OXYCODONE AND ACETAMINOPHEN 1 TABLET: 5; 325 TABLET ORAL at 20:49

## 2019-05-07 RX ADMIN — GABAPENTIN 300 MG: 300 CAPSULE ORAL at 08:52

## 2019-05-07 ASSESSMENT — PAIN DESCRIPTION - PAIN TYPE: TYPE: ACUTE PAIN;CHRONIC PAIN

## 2019-05-07 ASSESSMENT — PAIN SCALES - GENERAL
PAINLEVEL_OUTOF10: 7
PAINLEVEL_OUTOF10: 0
PAINLEVEL_OUTOF10: 0
PAINLEVEL_OUTOF10: 6
PAINLEVEL_OUTOF10: 7

## 2019-05-07 ASSESSMENT — PAIN DESCRIPTION - LOCATION: LOCATION: BACK

## 2019-05-07 NOTE — CONSULTS
Consult completed. Procedure/rationale explained to pt including benefits vs potential risks/complications; questions answered & consent obtained. #5fr Double Lumen PowerPICC SOLO initiated to RUE Basilic Vein using sterile, UltraSound-guided technique without difficulty/complications. Sterile dressing with SkinPrep, StatLock Securing Device, BioPatch, SwabCaps, & Limb Precautions band applied. pCXR ordered for PICC placement verification, as P-wave changes cannot be trusted for positioning due to underlying rhythm & Rich, primary RN notified.

## 2019-05-07 NOTE — PROGRESS NOTES
Short progress note nocturnist    I was paged about the patient feeling short of breath. He continues to have tachycardia on the maximum dose of cardizem drip. Due to his vital signs, I feel he needs to go to the ICU. He has increasing oxygen requirements. His echocardiogram is fairly normal.  He has bilateral pneumonia at this time. He does have a history of URI prior to pneumonia. I am concerned the patient has or will develop acute respiratory distress syndrome. He had a prolonged delay in entering inpatient care. I will check a troponin and lactate as well. Blood culture negative thus far. Will change to vanc/cefepime for antibiotics.         Key White MD

## 2019-05-07 NOTE — FLOWSHEET NOTE
When Dr. Ami Velasco rounded he was concerned that patient was having red man syndrome in response to vancomycin. Instructed this nurse to page and notify the hospitalist.  Patients significant other informed this nurse that's current skin color was not different then his normal color. Dr. Suzy Herron notified via perfect serve and responded to room. After evaluating patient Dr. Suzy Herron did not agree that patient was having a reaction.   No changes in orders or medications,

## 2019-05-07 NOTE — PROGRESS NOTES
Contacted hospitalist via PS. Pt displaying worsening SOB with accessory muscle use, reddened/wilner color, increased O2 demand, and cardizem gtt at 15 with heart rate still 100s-120s. Pt was due for abx for worsening pneumonia that wasn't compatible with cardizem. Hospitalist decided to transfer pt to ICU and consult PICC team for a line, switch abx and draw a lactic acid and trop.

## 2019-05-07 NOTE — PROGRESS NOTES
Pt has Cefepime abx due at 4am. However, Cardizem gtt going and not compatible with Cefepime. Unable to get a second PIV on pt. Hosp PS to advise.

## 2019-05-07 NOTE — PROGRESS NOTES
Today's plan: POSSIBLE VANNA SYNDROME from vancomycin allergy, recommend to call ID and evaluate  Continue carizem for afib      Admit Date:  5/3/2019    Subjective: ok, + shortness of breath      Chief complaints on admission  Chief Complaint   Patient presents with    Fatigue    Nausea    Dizziness    Chest Pain         History of present illness:Reji is a 63 y. o.year old who  presents with had concerns including Fatigue; Nausea; Dizziness; and Chest Pain. Past medical history:    has a past medical history of Diabetes mellitus ([address] and Hypertension. Past surgical history:   has a past surgical history that includes other surgical history (Right) and Circumcision, non- (2013). Social History:   reports that he has been smoking cigarettes. He has a 7.50 pack-year smoking history. He has never used smokeless tobacco. He reports that he does not drink alcohol or use drugs. Family history:  family history is not on file. No Known Allergies      Objective:   /72   Pulse 111   Temp 98.1 °F (36.7 °C) (Oral)   Resp (!) 34   Ht 5' 10\" (1.778 m)   Wt 235 lb 0.2 oz (106.6 kg)   SpO2 95%   BMI 33.72 kg/m²       Intake/Output Summary (Last 24 hours) at 2019 1152  Last data filed at 2019 1049  Gross per 24 hour   Intake --   Output 425 ml   Net -425 ml       TELEMETRY: Atrial fibrillation     Physical Exam:  Constitutional:  Well developed, Well nourished, No acute distress, Non-toxic appearance. HENT:  Normocephalic, Atraumatic, Bilateral external ears normal, Oropharynx moist, No oral exudates, Nose normal. Neck- Normal range of motion, No tenderness, Supple, No stridor. Eyes:  PERRL, EOMI, Conjunctiva normal, No discharge. Respiratory:  + wheezing, No chest tenderness. ,no use of accessory muscles, diaphragm movement is normal  Cardiovascular: (PMI) apex non displaced,no lifts no thrills, no s3,no s4, Normal heart rate, Normal rhythm, No murmurs, No rubs, No gallops. Carotid arteries pulse and amplitude are normal no bruit, no abdominal bruit noted ( normal abdominal aorta ausculation), femoral arteries pulse and amplitude are normal no bruit, pedal pulses are normal  GI:  Bowel sounds normal, Soft, No tenderness, No masses, No pulsatile masses. : External genitalia appear normal, No masses or lesions. No discharge. No CVA tenderness. Musculoskeletal:  Intact distal pulses, No edema, No tenderness, No cyanosis, No clubbing. Good range of motion in all major joints. No tenderness to palpation or major deformities noted. Back- No tenderness. Integument:  Warm, Dry, No erythema, No rash. Lymphatic:  No lymphadenopathy noted. Neurologic:  Alert & oriented x 3, Normal motor function, Normal sensory function, No focal deficits noted.    Psychiatric:  Affect normal, Judgment normal, Mood normal.     Medications:    sodium chloride flush  10 mL Intravenous 2 times per day    vancomycin  1,750 mg Intravenous Q12H    enoxaparin  1 mg/kg Subcutaneous BID    sodium chloride flush  10 mL Intravenous BID    sodium bicarbonate  1,300 mg Oral TID    cyclobenzaprine  10 mg Oral TID    cefepime  2 g Intravenous Q12H    sodium chloride flush  10 mL Intravenous 2 times per day    docusate sodium  100 mg Oral BID    famotidine  20 mg Oral BID    ipratropium-albuterol  1 ampule Inhalation Q4H WA    aspirin  325 mg Oral Daily    nebivolol  5 mg Oral Daily    gabapentin  300 mg Oral TID    insulin lispro  0-6 Units Subcutaneous TID WC    insulin lispro  0-3 Units Subcutaneous Nightly      diltiazem (CARDIZEM) 125 mg in dextrose 5% 125 mL infusion 15 mg/hr (05/07/19 0316)    dextrose       sodium chloride flush, ondansetron, sodium chloride flush, ondansetron, acetaminophen, benzonatate, glucose, dextrose, glucagon (rDNA), dextrose, promethazine, hydrOXYzine, oxyCODONE-acetaminophen    Lab Data:  CBC:   Recent Labs     05/06/19  0430   WBC 5.7   HGB 14.4   HCT 46.3 MCV 95.7   *     BMP:   Recent Labs     05/05/19  0320 05/06/19  0903 05/07/19  0424    138 138   K 4.0 3.8 4.4    105 103   CO2 13* 18* 18*   BUN 28* 21 19   CREATININE 1.2 1.1 1.1     LIVER PROFILE: No results for input(s): AST, ALT, LIPASE, BILIDIR, BILITOT, ALKPHOS in the last 72 hours. Invalid input(s): AMYLASE,  ALB  PT/INR: No results for input(s): PROTIME, INR in the last 72 hours. APTT: No results for input(s): APTT in the last 72 hours. BNP:  No results for input(s): BNP in the last 72 hours. TROPONIN: @TROPONINI:3@      Assessment:  63 y. o.year old who is admitted for          Plan:  1. Afib'; continue cardizem drip, add aspirin  2. Possible ash syndrome from vancomycin allergy\" recommend to get ID consult  3. Chest pain: atypical, most likely from pneumonia  4. Elevated trop and septal qs pattern on ekg: Once sepsis resolves, will do stress test, for now echo is normal  5. hearling loss: recommend to see ENT out patient  All labs, medications and tests reviewed, continue all other medications of all above medical condition listed as is.       Nba Francois MD 5/7/2019 11:52 AM

## 2019-05-07 NOTE — PROGRESS NOTES
Pt transferred to ICU and bedside report given to [facility].  Daughter and girlfriend contacted and updated on transfer of care and new room number per pt's request.

## 2019-05-07 NOTE — PROGRESS NOTES
Pt O2 sat sitting at 91%.  Resp contacted and will administer an extra breathing treatment and switch from NC to high-flow NC.

## 2019-05-07 NOTE — PROGRESS NOTES
Hospitalist Progress Note      Name:  Sonia Ramires /Age/Sex: 1960  (63 y.o. male)   MRN & CSN:  [id number] & [id number] Admission Date/Time: 5/3/2019  1:12 PM   Location:  -A PCP: Zuleyka Isbell MD         Hospital Day: 5    Assessment and Plan:   Sonia Ramires is a 63 y.o.  male  who presents with Pneumonia    1. Pneumonia: could be from gram negative. RVP +ve Adenovirus. CXR  with interval worsening of a left mid lung zone opacity and right infrahilar opacity. NC at 4 LPM. .6 19. Continue Cefepime and Vancomycin. 2. AF in RVR: on Cardizem infusion, therapeutic Lovenox. Cardiology on consult. 3. Elevated Troponin: for Stress test once sepsis is resolved. 4. Type 2 DM: Last A1C. Lantus, Sliding scale. Hypoglycemia protocol. Accucheck. Hold oral hypoglycemic agents for now  5. Renal Cyst     Diet DIET CARB CONTROL;   DVT Prophylaxis [x] Lovenox, []  Heparin, [] SCDs, [] Warfarin  [] NOAC     GI Prophylaxis [] PPI,  [x] H2 Blocker,  [] Carafate,  [] Diet/Tube Feeds   Code Status Full Code   MDM [] Low, [] Moderate,[x]  High     History of Present Illness:     Chief Complaint: Pneumonia    He was seen and examined. He still complained of cough, SOB, has palpitation. Chest pain resolved. Ten point ROS reviewed negative, unless as noted above    Objective: Intake/Output Summary (Last 24 hours) at 2019 0948  Last data filed at 2019 0739  Gross per 24 hour   Intake --   Output 200 ml   Net -200 ml      Vitals:   Vitals:    19 0803   BP: 106/72   Pulse:    Resp:    Temp:    SpO2:      Physical Exam:   GEN Awake male, sitting upright in bed in no apparent distress. Appears given age. EYES Pupils are equally round. No scleral erythema, discharge, or conjunctivitis. HENT Mucous membranes are moist. Oral pharynx without exudates, no evidence of thrush. NECK Supple, no apparent thyromegaly or masses.   RESP + crackles, bilateral  CARDIO/VASC S1/S2

## 2019-05-07 NOTE — PROGRESS NOTES
I visited the patient. His oxygen saturation went up on the 4 liter nasal cannula. The breathing treatment is being held for now because he suffered a bout of nausea.

## 2019-05-08 LAB
ALBUMIN SERPL-MCNC: 2.8 GM/DL (ref 3.4–5)
ALP BLD-CCNC: 72 IU/L (ref 40–128)
ALT SERPL-CCNC: 71 U/L (ref 10–40)
ANION GAP SERPL CALCULATED.3IONS-SCNC: 13 MMOL/L (ref 4–16)
AST SERPL-CCNC: 105 IU/L (ref 15–37)
BILIRUB SERPL-MCNC: 0.4 MG/DL (ref 0–1)
BUN BLDV-MCNC: 22 MG/DL (ref 6–23)
CALCIUM SERPL-MCNC: 7.8 MG/DL (ref 8.3–10.6)
CHLORIDE BLD-SCNC: 99 MMOL/L (ref 99–110)
CO2: 24 MMOL/L (ref 21–32)
CREAT SERPL-MCNC: 0.9 MG/DL (ref 0.9–1.3)
CULTURE: NORMAL
DOSE AMOUNT: NORMAL
DOSE TIME: NORMAL
GFR AFRICAN AMERICAN: >60 ML/MIN/1.73M2
GFR NON-AFRICAN AMERICAN: >60 ML/MIN/1.73M2
GLUCOSE BLD-MCNC: 264 MG/DL (ref 70–99)
GLUCOSE BLD-MCNC: 278 MG/DL (ref 70–99)
GLUCOSE BLD-MCNC: 319 MG/DL (ref 70–99)
GLUCOSE BLD-MCNC: 321 MG/DL (ref 70–99)
GLUCOSE BLD-MCNC: 346 MG/DL (ref 70–99)
HCT VFR BLD CALC: 41.2 % (ref 42–52)
HEMOGLOBIN: 13.8 GM/DL (ref 13.5–18)
LEGIONELLA URINARY AG: NEGATIVE
Lab: NORMAL
MAGNESIUM: 1.9 MG/DL (ref 1.8–2.4)
MCH RBC QN AUTO: 29.7 PG (ref 27–31)
MCHC RBC AUTO-ENTMCNC: 33.5 % (ref 32–36)
MCV RBC AUTO: 88.8 FL (ref 78–100)
PDW BLD-RTO: 13.7 % (ref 11.7–14.9)
PLATELET # BLD: 197 K/CU MM (ref 140–440)
PMV BLD AUTO: 11.1 FL (ref 7.5–11.1)
POTASSIUM SERPL-SCNC: 3.3 MMOL/L (ref 3.5–5.1)
RBC # BLD: 4.64 M/CU MM (ref 4.6–6.2)
SODIUM BLD-SCNC: 136 MMOL/L (ref 135–145)
SPECIMEN: NORMAL
STREP PNEUMONIAE ANTIGEN: NORMAL
TOTAL PROTEIN: 4.7 GM/DL (ref 6.4–8.2)
VANCOMYCIN TROUGH: 16.1 UG/ML (ref 10–20)
WBC # BLD: 2.2 K/CU MM (ref 4–10.5)

## 2019-05-08 PROCEDURE — 6370000000 HC RX 637 (ALT 250 FOR IP): Performed by: INTERNAL MEDICINE

## 2019-05-08 PROCEDURE — 6370000000 HC RX 637 (ALT 250 FOR IP): Performed by: HOSPITALIST

## 2019-05-08 PROCEDURE — 6370000000 HC RX 637 (ALT 250 FOR IP): Performed by: NURSE PRACTITIONER

## 2019-05-08 PROCEDURE — 85027 COMPLETE CBC AUTOMATED: CPT

## 2019-05-08 PROCEDURE — 2580000003 HC RX 258: Performed by: NURSE PRACTITIONER

## 2019-05-08 PROCEDURE — 83735 ASSAY OF MAGNESIUM: CPT

## 2019-05-08 PROCEDURE — 2000000000 HC ICU R&B

## 2019-05-08 PROCEDURE — 94640 AIRWAY INHALATION TREATMENT: CPT

## 2019-05-08 PROCEDURE — 6360000002 HC RX W HCPCS: Performed by: HOSPITALIST

## 2019-05-08 PROCEDURE — 6360000002 HC RX W HCPCS: Performed by: INTERNAL MEDICINE

## 2019-05-08 PROCEDURE — 80202 ASSAY OF VANCOMYCIN: CPT

## 2019-05-08 PROCEDURE — 82962 GLUCOSE BLOOD TEST: CPT

## 2019-05-08 PROCEDURE — 94761 N-INVAS EAR/PLS OXIMETRY MLT: CPT

## 2019-05-08 PROCEDURE — 99232 SBSQ HOSP IP/OBS MODERATE 35: CPT | Performed by: INTERNAL MEDICINE

## 2019-05-08 PROCEDURE — 2700000000 HC OXYGEN THERAPY PER DAY

## 2019-05-08 PROCEDURE — 2580000003 HC RX 258: Performed by: INTERNAL MEDICINE

## 2019-05-08 PROCEDURE — 80053 COMPREHEN METABOLIC PANEL: CPT

## 2019-05-08 PROCEDURE — 2580000003 HC RX 258: Performed by: HOSPITALIST

## 2019-05-08 PROCEDURE — 36592 COLLECT BLOOD FROM PICC: CPT

## 2019-05-08 PROCEDURE — 94150 VITAL CAPACITY TEST: CPT

## 2019-05-08 PROCEDURE — 2500000003 HC RX 250 WO HCPCS: Performed by: INTERNAL MEDICINE

## 2019-05-08 RX ORDER — POTASSIUM CHLORIDE 7.45 MG/ML
10 INJECTION INTRAVENOUS PRN
Status: DISCONTINUED | OUTPATIENT
Start: 2019-05-08 | End: 2019-05-17 | Stop reason: HOSPADM

## 2019-05-08 RX ORDER — POTASSIUM CHLORIDE 20 MEQ/1
40 TABLET, EXTENDED RELEASE ORAL PRN
Status: DISCONTINUED | OUTPATIENT
Start: 2019-05-08 | End: 2019-05-17 | Stop reason: HOSPADM

## 2019-05-08 RX ORDER — LEVALBUTEROL INHALATION SOLUTION 0.63 MG/3ML
0.63 SOLUTION RESPIRATORY (INHALATION) EVERY 6 HOURS PRN
Status: DISCONTINUED | OUTPATIENT
Start: 2019-05-08 | End: 2019-05-17 | Stop reason: HOSPADM

## 2019-05-08 RX ORDER — POTASSIUM CHLORIDE 1.5 G/1.77G
40 POWDER, FOR SOLUTION ORAL PRN
Status: DISCONTINUED | OUTPATIENT
Start: 2019-05-08 | End: 2019-05-17 | Stop reason: HOSPADM

## 2019-05-08 RX ORDER — METHYLPREDNISOLONE SODIUM SUCCINATE 40 MG/ML
40 INJECTION, POWDER, LYOPHILIZED, FOR SOLUTION INTRAMUSCULAR; INTRAVENOUS EVERY 6 HOURS
Status: DISCONTINUED | OUTPATIENT
Start: 2019-05-08 | End: 2019-05-11

## 2019-05-08 RX ORDER — DILTIAZEM HYDROCHLORIDE 120 MG/1
120 CAPSULE, COATED, EXTENDED RELEASE ORAL DAILY
Status: DISCONTINUED | OUTPATIENT
Start: 2019-05-08 | End: 2019-05-17 | Stop reason: HOSPADM

## 2019-05-08 RX ADMIN — SODIUM CHLORIDE, PRESERVATIVE FREE 10 ML: 5 INJECTION INTRAVENOUS at 09:17

## 2019-05-08 RX ADMIN — CEFEPIME 2 G: 2 INJECTION, POWDER, FOR SOLUTION INTRAVENOUS at 04:51

## 2019-05-08 RX ADMIN — CYCLOBENZAPRINE HYDROCHLORIDE 10 MG: 10 TABLET, FILM COATED ORAL at 09:14

## 2019-05-08 RX ADMIN — Medication 0.63 MG: at 15:10

## 2019-05-08 RX ADMIN — OXYCODONE AND ACETAMINOPHEN 1 TABLET: 5; 325 TABLET ORAL at 07:03

## 2019-05-08 RX ADMIN — CYCLOBENZAPRINE HYDROCHLORIDE 10 MG: 10 TABLET, FILM COATED ORAL at 20:09

## 2019-05-08 RX ADMIN — BENZONATATE 200 MG: 100 CAPSULE ORAL at 07:04

## 2019-05-08 RX ADMIN — GUAIFENESIN 600 MG: 600 TABLET, EXTENDED RELEASE ORAL at 07:05

## 2019-05-08 RX ADMIN — SODIUM CHLORIDE, PRESERVATIVE FREE 10 ML: 5 INJECTION INTRAVENOUS at 10:33

## 2019-05-08 RX ADMIN — ENOXAPARIN SODIUM 105 MG: 150 INJECTION SUBCUTANEOUS at 09:13

## 2019-05-08 RX ADMIN — INSULIN LISPRO 8 UNITS: 100 INJECTION, SOLUTION INTRAVENOUS; SUBCUTANEOUS at 14:06

## 2019-05-08 RX ADMIN — INSULIN LISPRO 3 UNITS: 100 INJECTION, SOLUTION INTRAVENOUS; SUBCUTANEOUS at 09:09

## 2019-05-08 RX ADMIN — SODIUM CHLORIDE, PRESERVATIVE FREE 10 ML: 5 INJECTION INTRAVENOUS at 20:09

## 2019-05-08 RX ADMIN — DILTIAZEM HYDROCHLORIDE 15 MG/HR: 5 INJECTION INTRAVENOUS at 08:55

## 2019-05-08 RX ADMIN — SODIUM BICARBONATE 650 MG TABLET 1300 MG: at 09:14

## 2019-05-08 RX ADMIN — DILTIAZEM HYDROCHLORIDE 15 MG/HR: 5 INJECTION INTRAVENOUS at 18:10

## 2019-05-08 RX ADMIN — ASPIRIN 325 MG ORAL TABLET 325 MG: 325 PILL ORAL at 09:14

## 2019-05-08 RX ADMIN — NEBIVOLOL HYDROCHLORIDE 5 MG: 10 TABLET ORAL at 09:13

## 2019-05-08 RX ADMIN — INSULIN LISPRO 8 UNITS: 100 INJECTION, SOLUTION INTRAVENOUS; SUBCUTANEOUS at 17:59

## 2019-05-08 RX ADMIN — GABAPENTIN 300 MG: 300 CAPSULE ORAL at 14:18

## 2019-05-08 RX ADMIN — METHYLPREDNISOLONE SODIUM SUCCINATE 40 MG: 40 INJECTION, POWDER, LYOPHILIZED, FOR SOLUTION INTRAMUSCULAR; INTRAVENOUS at 10:33

## 2019-05-08 RX ADMIN — DOCUSATE SODIUM 100 MG: 100 CAPSULE, LIQUID FILLED ORAL at 20:09

## 2019-05-08 RX ADMIN — GUAIFENESIN 600 MG: 600 TABLET, EXTENDED RELEASE ORAL at 20:09

## 2019-05-08 RX ADMIN — OXYCODONE AND ACETAMINOPHEN 1 TABLET: 5; 325 TABLET ORAL at 11:27

## 2019-05-08 RX ADMIN — OXYCODONE AND ACETAMINOPHEN 1 TABLET: 5; 325 TABLET ORAL at 21:24

## 2019-05-08 RX ADMIN — Medication 0.63 MG: at 11:08

## 2019-05-08 RX ADMIN — SODIUM CHLORIDE, PRESERVATIVE FREE 10 ML: 5 INJECTION INTRAVENOUS at 09:25

## 2019-05-08 RX ADMIN — GABAPENTIN 300 MG: 300 CAPSULE ORAL at 09:14

## 2019-05-08 RX ADMIN — METHYLPREDNISOLONE SODIUM SUCCINATE 40 MG: 40 INJECTION, POWDER, LYOPHILIZED, FOR SOLUTION INTRAMUSCULAR; INTRAVENOUS at 04:52

## 2019-05-08 RX ADMIN — Medication 0.63 MG: at 20:59

## 2019-05-08 RX ADMIN — FAMOTIDINE 20 MG: 20 TABLET ORAL at 20:09

## 2019-05-08 RX ADMIN — DILTIAZEM HYDROCHLORIDE 120 MG: 120 CAPSULE, COATED, EXTENDED RELEASE ORAL at 10:33

## 2019-05-08 RX ADMIN — POTASSIUM CHLORIDE 40 MEQ: 20 TABLET, EXTENDED RELEASE ORAL at 11:23

## 2019-05-08 RX ADMIN — METHYLPREDNISOLONE SODIUM SUCCINATE 40 MG: 40 INJECTION, POWDER, LYOPHILIZED, FOR SOLUTION INTRAMUSCULAR; INTRAVENOUS at 16:45

## 2019-05-08 RX ADMIN — FAMOTIDINE 20 MG: 20 TABLET ORAL at 09:14

## 2019-05-08 RX ADMIN — INSULIN LISPRO 4 UNITS: 100 INJECTION, SOLUTION INTRAVENOUS; SUBCUTANEOUS at 20:24

## 2019-05-08 RX ADMIN — BENZONATATE 200 MG: 100 CAPSULE ORAL at 16:45

## 2019-05-08 RX ADMIN — ENOXAPARIN SODIUM 105 MG: 150 INJECTION SUBCUTANEOUS at 20:08

## 2019-05-08 RX ADMIN — CEFEPIME 2 G: 2 INJECTION, POWDER, FOR SOLUTION INTRAVENOUS at 16:45

## 2019-05-08 RX ADMIN — CYCLOBENZAPRINE HYDROCHLORIDE 10 MG: 10 TABLET, FILM COATED ORAL at 14:18

## 2019-05-08 RX ADMIN — VANCOMYCIN HYDROCHLORIDE 1750 MG: 5 INJECTION, POWDER, LYOPHILIZED, FOR SOLUTION INTRAVENOUS at 04:52

## 2019-05-08 RX ADMIN — SODIUM CHLORIDE, PRESERVATIVE FREE 10 ML: 5 INJECTION INTRAVENOUS at 16:46

## 2019-05-08 RX ADMIN — VANCOMYCIN HYDROCHLORIDE 1750 MG: 5 INJECTION, POWDER, LYOPHILIZED, FOR SOLUTION INTRAVENOUS at 18:13

## 2019-05-08 RX ADMIN — IPRATROPIUM BROMIDE AND ALBUTEROL SULFATE 1 AMPULE: .5; 3 SOLUTION RESPIRATORY (INHALATION) at 07:14

## 2019-05-08 RX ADMIN — OXYCODONE AND ACETAMINOPHEN 1 TABLET: 5; 325 TABLET ORAL at 16:41

## 2019-05-08 RX ADMIN — GABAPENTIN 300 MG: 300 CAPSULE ORAL at 20:09

## 2019-05-08 ASSESSMENT — PAIN SCALES - GENERAL
PAINLEVEL_OUTOF10: 0
PAINLEVEL_OUTOF10: 2
PAINLEVEL_OUTOF10: 0
PAINLEVEL_OUTOF10: 4
PAINLEVEL_OUTOF10: 5
PAINLEVEL_OUTOF10: 6
PAINLEVEL_OUTOF10: 0
PAINLEVEL_OUTOF10: 6
PAINLEVEL_OUTOF10: 7
PAINLEVEL_OUTOF10: 5
PAINLEVEL_OUTOF10: 6

## 2019-05-08 ASSESSMENT — PAIN DESCRIPTION - DESCRIPTORS
DESCRIPTORS: ACHING
DESCRIPTORS: ACHING;CONSTANT
DESCRIPTORS: ACHING
DESCRIPTORS: ACHING;DULL;CONSTANT
DESCRIPTORS: ACHING

## 2019-05-08 ASSESSMENT — PAIN DESCRIPTION - LOCATION
LOCATION: BACK

## 2019-05-08 ASSESSMENT — PAIN DESCRIPTION - ORIENTATION
ORIENTATION: LOWER;MID
ORIENTATION: MID;LOWER
ORIENTATION: MID;LOWER
ORIENTATION: LOWER
ORIENTATION: MID;LOWER

## 2019-05-08 ASSESSMENT — PAIN DESCRIPTION - PAIN TYPE
TYPE: CHRONIC PAIN

## 2019-05-08 ASSESSMENT — PAIN DESCRIPTION - ONSET
ONSET: ON-GOING
ONSET: ON-GOING

## 2019-05-08 ASSESSMENT — PAIN DESCRIPTION - FREQUENCY
FREQUENCY: CONTINUOUS
FREQUENCY: CONTINUOUS

## 2019-05-08 NOTE — CARE COORDINATION
Follow up visit with pt. Pt has been independent PTA. Pt and S.O reside together in a 1 story home with laundry on the main floor. Pt and S.O drive. 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 Pt's S.O is not employed. Pt has 2 children; 1 in [place] and 1 locally. Pt has PCP. Pt verifies that Keaton Calero from [employer] has spoken with pt re; possible assist with Rxs at discharge. Pt anticipates a discharge to home and expresses no other needs at this time.

## 2019-05-08 NOTE — PROGRESS NOTES
Today's plan: add cardizem 120mg CD oral and taper drip to d.c, continue lovenox, EP consult      Admit Date:  5/3/2019    Subjective: ok, + shortness of breath      Chief complaints on admission  Chief Complaint   Patient presents with    Fatigue    Nausea    Dizziness    Chest Pain         History of present illness:Reji is a 63 y. o.year old who  presents with had concerns including Fatigue; Nausea; Dizziness; and Chest Pain. Past medical history:    has a past medical history of Diabetes mellitus ([address] and Hypertension. Past surgical history:   has a past surgical history that includes other surgical history (Right) and Circumcision, non- (2013). Social History:   reports that he has been smoking cigarettes. He has a 7.50 pack-year smoking history. He has never used smokeless tobacco. He reports that he does not drink alcohol or use drugs. Family history:  family history is not on file. No Known Allergies      Objective:   /82   Pulse 117   Temp 97.8 °F (36.6 °C) (Oral)   Resp 26   Ht 5' 10\" (1.778 m)   Wt 235 lb 0.2 oz (106.6 kg)   SpO2 93%   BMI 33.72 kg/m²       Intake/Output Summary (Last 24 hours) at 2019 0901  Last data filed at 2019 0727  Gross per 24 hour   Intake 550 ml   Output 1325 ml   Net -775 ml       TELEMETRY: Atrial fibrillation     Physical Exam:  Constitutional:  Well developed, Well nourished, No acute distress, Non-toxic appearance. HENT:  Normocephalic, Atraumatic, Bilateral external ears normal, Oropharynx moist, No oral exudates, Nose normal. Neck- Normal range of motion, No tenderness, Supple, No stridor. Eyes:  PERRL, EOMI, Conjunctiva normal, No discharge. Respiratory:  + wheezing, No chest tenderness. ,no use of accessory muscles, diaphragm movement is normal  Cardiovascular: (PMI) apex non displaced,no lifts no thrills, no s3,no s4, Normal heart rate, Normal rhythm, No murmurs, No rubs, No gallops.  Carotid arteries pulse and amplitude are normal no bruit, no abdominal bruit noted ( normal abdominal aorta ausculation), femoral arteries pulse and amplitude are normal no bruit, pedal pulses are normal  GI:  Bowel sounds normal, Soft, No tenderness, No masses, No pulsatile masses. : External genitalia appear normal, No masses or lesions. No discharge. No CVA tenderness. Musculoskeletal:  Intact distal pulses, No edema, No tenderness, No cyanosis, No clubbing. Good range of motion in all major joints. No tenderness to palpation or major deformities noted. Back- No tenderness. Integument:  Warm, Dry, No erythema, No rash. Lymphatic:  No lymphadenopathy noted. Neurologic:  Alert & oriented x 3, Normal motor function, Normal sensory function, No focal deficits noted.    Psychiatric:  Affect normal, Judgment normal, Mood normal.     Medications:    methylPREDNISolone  40 mg Intravenous Q6H    sodium chloride flush  10 mL Intravenous 2 times per day    vancomycin  1,750 mg Intravenous Q12H    guaiFENesin  600 mg Oral BID    enoxaparin  1 mg/kg Subcutaneous BID    sodium chloride flush  10 mL Intravenous BID    sodium bicarbonate  1,300 mg Oral TID    cyclobenzaprine  10 mg Oral TID    cefepime  2 g Intravenous Q12H    sodium chloride flush  10 mL Intravenous 2 times per day    docusate sodium  100 mg Oral BID    famotidine  20 mg Oral BID    aspirin  325 mg Oral Daily    nebivolol  5 mg Oral Daily    gabapentin  300 mg Oral TID    insulin lispro  0-6 Units Subcutaneous TID WC    insulin lispro  0-3 Units Subcutaneous Nightly      diltiazem (CARDIZEM) 125 mg in dextrose 5% 125 mL infusion 15 mg/hr (05/08/19 0855)    dextrose       levalbuterol, sodium chloride flush, ondansetron, sodium chloride flush, ondansetron, acetaminophen, benzonatate, glucose, dextrose, glucagon (rDNA), dextrose, promethazine, hydrOXYzine, oxyCODONE-acetaminophen    Lab Data:  CBC:   Recent Labs     05/06/19  0430   WBC 5.7   HGB 14.4   HCT 46.3   MCV 95.7   *     BMP:   Recent Labs     05/06/19  0903 05/07/19  0424    138   K 3.8 4.4    103   CO2 18* 18*   BUN 21 19   CREATININE 1.1 1.1     LIVER PROFILE: No results for input(s): AST, ALT, LIPASE, BILIDIR, BILITOT, ALKPHOS in the last 72 hours. Invalid input(s): AMYLASE,  ALB  PT/INR: No results for input(s): PROTIME, INR in the last 72 hours. APTT: No results for input(s): APTT in the last 72 hours. BNP:  No results for input(s): BNP in the last 72 hours. TROPONIN: @TROPONINI:3@      Assessment:  63 y. o.year old who is admitted for          Plan:  1. Afib'; continue cardizem drip, continue lovenox and EP consult  2. Chest pain: atypical, most likely from pneumonia  3. Elevated trop and septal qs pattern on ekg: Once sepsis resolves, will do stress test, for now echo is normal  4. hearling loss: recommend to see ENT out patient  All labs, medications and tests reviewed, continue all other medications of all above medical condition listed as is.       Hal Mishra MD 5/8/2019 9:01 AM

## 2019-05-08 NOTE — CARE COORDINATION
Referral from [address] [place] [address]. Met with patient and explained the [employer] program. Patient states his co-paya and deductible are high and he has a hard time affording them especially his Bystolic. I gave him an application and our contact information. Following for discharge needs.

## 2019-05-08 NOTE — PROGRESS NOTES
Hospitalist Progress Note      Name:  Edie Ramirez /Age/Sex: 1960  (63 y.o. male)   MRN & CSN:  [id number] & [id number] Admission Date/Time: 5/3/2019  1:12 PM   Location:  -A PCP: Nellie Cortez MD         Hospital Day: 6    Assessment and Plan:   Edie Ramirez is a 63 y.o.  male  who presents with Pneumonia    1. Pneumonia: could be from gram negative. RVP +ve Adenovirus. CXR  with interval worsening of a left mid lung zone opacity and right infrahilar opacity. NC at 4 LPM. .6 19. Continue Cefepime and Vancomycin. Sputum culture and MRSA pending. Legionella and Strep urine negative. 2. AF in RVR: on Cardizem infusion, therapeutic Lovenox. Cardiology on consult. EP on consult. Started on Cardizem   3. Elevated Troponin: for Stress test once sepsis is resolved. 4. Type 2 DM: Last A1C. Lantus, Sliding scale. Hypoglycemia protocol. Accucheck. Hold oral hypoglycemic agents for now  5. Renal Cyst: outpatient follow up  6. Hypokalemia: replace accordingly. Magnesium  7. Leukopenia: could be from Adenovirus    Diet DIET CARB CONTROL;   DVT Prophylaxis [x] Lovenox, []  Heparin, [] SCDs, [] Warfarin  [] NOAC     GI Prophylaxis [] PPI,  [x] H2 Blocker,  [] Carafate,  [] Diet/Tube Feeds   Code Status Full Code   MDM [] Low, [] Moderate,[x]  High     History of Present Illness:     Chief Complaint: Pneumonia    He was seen and examined. He has cough with scant whitish phlegm. SOB better. Denied fever. Has nausea but no vomiting. Denied abdominal pain. Ten point ROS reviewed negative, unless as noted above    Objective: Intake/Output Summary (Last 24 hours) at 2019 1058  Last data filed at 2019 1039  Gross per 24 hour   Intake 830 ml   Output 1450 ml   Net -620 ml      Vitals:   Vitals:    19 1032   BP: 105/69   Pulse: 100   Resp: 19   Temp:    SpO2: 93%     Physical Exam:   GEN Awake male, sitting upright in bed in no apparent distress.  Appears given Q4H PRN   benzonatate 200 mg TID PRN   glucose 15 g PRN   dextrose 12.5 g PRN   glucagon (rDNA) 1 mg PRN   dextrose 100 mL/hr PRN   promethazine 12.5 mg Q6H PRN   hydrOXYzine 50 mg Q6H PRN   oxyCODONE-acetaminophen 1 tablet Q4H PRN       Recent Labs     05/06/19  0430 05/08/19  0935   WBC 5.7 2.2*   HGB 14.4 13.8   HCT 46.3 41.2*   * 197      Recent Labs     05/06/19  0903 05/07/19  0424 05/08/19  0935    138 136   K 3.8 4.4 3.3*    103 99   CO2 18* 18* 24   BUN 21 19 22   CREATININE 1.1 1.1 0.9     Recent Labs     05/08/19  0935   *   ALT 71*   BILITOT 0.4   ALKPHOS 72     No results for input(s): INR in the last 72 hours.   Recent Labs     05/07/19  0416   TROPONINT <0.010        Imaging reviewed      Electronically signed by Nida Myers MD on 5/8/2019 at 10:58 AM

## 2019-05-08 NOTE — PROGRESS NOTES
[facility]  consulted by Dr. Raissa Hinojosa for monitoring and adjustment. Indication for treatment: Pneumonia  Goal trough: 15-20 mcg/mL     Pertinent Laboratory Values:   Temp Readings from Last 3 Encounters:   05/08/19 98.6 °F (37 °C) (Oral)     Recent Labs     05/06/19  0430 05/07/19  1358 05/08/19  0935   WBC 5.7  --  2.2*   LACTATE  --  1.3  --      Recent Labs     05/06/19  0903 05/07/19  0424 05/08/19  0935   BUN 21 19 22   CREATININE 1.1 1.1 0.9     Estimated Creatinine Clearance: 111 mL/min (based on SCr of 0.9 mg/dL). Intake/Output Summary (Last 24 hours) at 5/8/2019 1716  Last data filed at 5/8/2019 1646  Gross per 24 hour   Intake 960 ml   Output 1525 ml   Net -565 ml       Pertinent Cultures:  Date    Source    Results  5/4   Blood    NGTD  5/7   Sputum   Pending    Vancomycin level:   TROUGH:  No results for input(s): VANCOTROUGH in the last 72 hours. RANDOM:  No results for input(s): VANCORANDOM in the last 72 hours. Assessment:  WBC and temperature: WBC low @ 2.2. Patient is afebrile. SCr, BUN, and urine output: Stable  Day(s) of therapy: #2  Vancomycin level: To be collected/pending    Plan:  Renal labs are stable at this time. Plan to continue current dosing: vancomycin 1750 mg IVPB every 12 hours. A trough level was collected this afternoon and is currently pending. Vancomycin dosing will be adjusted when/if appropriate, pending level result. Pharmacy will continue to follow.       Thank you for the consult,    Kianna Hoover, PharmD, [facility]

## 2019-05-09 ENCOUNTER — APPOINTMENT (OUTPATIENT)
Dept: GENERAL RADIOLOGY | Age: 59
DRG: 193 | End: 2019-05-09
Payer: COMMERCIAL

## 2019-05-09 LAB
ALBUMIN SERPL-MCNC: 3.2 GM/DL (ref 3.4–5)
ALP BLD-CCNC: 83 IU/L (ref 40–128)
ALT SERPL-CCNC: 105 U/L (ref 10–40)
ANION GAP SERPL CALCULATED.3IONS-SCNC: 12 MMOL/L (ref 4–16)
AST SERPL-CCNC: 137 IU/L (ref 15–37)
BILIRUB SERPL-MCNC: 0.4 MG/DL (ref 0–1)
BUN BLDV-MCNC: 25 MG/DL (ref 6–23)
CALCIUM SERPL-MCNC: 8 MG/DL (ref 8.3–10.6)
CHLORIDE BLD-SCNC: 101 MMOL/L (ref 99–110)
CO2: 26 MMOL/L (ref 21–32)
CREAT SERPL-MCNC: 0.9 MG/DL (ref 0.9–1.3)
CULTURE: NORMAL
CULTURE: NORMAL
GFR AFRICAN AMERICAN: >60 ML/MIN/1.73M2
GFR NON-AFRICAN AMERICAN: >60 ML/MIN/1.73M2
GLUCOSE BLD-MCNC: 294 MG/DL (ref 70–99)
GLUCOSE BLD-MCNC: 309 MG/DL (ref 70–99)
GLUCOSE BLD-MCNC: 348 MG/DL (ref 70–99)
GLUCOSE BLD-MCNC: 353 MG/DL (ref 70–99)
GLUCOSE BLD-MCNC: 354 MG/DL (ref 70–99)
HCT VFR BLD CALC: 40.4 % (ref 42–52)
HEMOGLOBIN: 13.2 GM/DL (ref 13.5–18)
Lab: NORMAL
Lab: NORMAL
MAGNESIUM: 2 MG/DL (ref 1.8–2.4)
MCH RBC QN AUTO: 29.4 PG (ref 27–31)
MCHC RBC AUTO-ENTMCNC: 32.7 % (ref 32–36)
MCV RBC AUTO: 90 FL (ref 78–100)
PDW BLD-RTO: 13.5 % (ref 11.7–14.9)
PLATELET # BLD: 228 K/CU MM (ref 140–440)
PMV BLD AUTO: 10.9 FL (ref 7.5–11.1)
POTASSIUM SERPL-SCNC: 3.4 MMOL/L (ref 3.5–5.1)
RBC # BLD: 4.49 M/CU MM (ref 4.6–6.2)
SODIUM BLD-SCNC: 139 MMOL/L (ref 135–145)
SPECIMEN: NORMAL
SPECIMEN: NORMAL
TOTAL PROTEIN: 5.1 GM/DL (ref 6.4–8.2)
WBC # BLD: 4.1 K/CU MM (ref 4–10.5)

## 2019-05-09 PROCEDURE — 6370000000 HC RX 637 (ALT 250 FOR IP): Performed by: HOSPITALIST

## 2019-05-09 PROCEDURE — 94640 AIRWAY INHALATION TREATMENT: CPT

## 2019-05-09 PROCEDURE — 2580000003 HC RX 258: Performed by: NURSE PRACTITIONER

## 2019-05-09 PROCEDURE — 85027 COMPLETE CBC AUTOMATED: CPT

## 2019-05-09 PROCEDURE — 94200 LUNG FUNCTION TEST (MBC/MVV): CPT

## 2019-05-09 PROCEDURE — 6360000002 HC RX W HCPCS: Performed by: HOSPITALIST

## 2019-05-09 PROCEDURE — 6370000000 HC RX 637 (ALT 250 FOR IP): Performed by: INTERNAL MEDICINE

## 2019-05-09 PROCEDURE — 2580000003 HC RX 258: Performed by: INTERNAL MEDICINE

## 2019-05-09 PROCEDURE — APPNB60 APP NON BILLABLE TIME 46-60 MINS: Performed by: NURSE PRACTITIONER

## 2019-05-09 PROCEDURE — 99232 SBSQ HOSP IP/OBS MODERATE 35: CPT | Performed by: INTERNAL MEDICINE

## 2019-05-09 PROCEDURE — 6360000002 HC RX W HCPCS: Performed by: INTERNAL MEDICINE

## 2019-05-09 PROCEDURE — 6370000000 HC RX 637 (ALT 250 FOR IP): Performed by: NURSE PRACTITIONER

## 2019-05-09 PROCEDURE — 82962 GLUCOSE BLOOD TEST: CPT

## 2019-05-09 PROCEDURE — 36592 COLLECT BLOOD FROM PICC: CPT

## 2019-05-09 PROCEDURE — 2500000003 HC RX 250 WO HCPCS: Performed by: INTERNAL MEDICINE

## 2019-05-09 PROCEDURE — 94150 VITAL CAPACITY TEST: CPT

## 2019-05-09 PROCEDURE — 2580000003 HC RX 258: Performed by: HOSPITALIST

## 2019-05-09 PROCEDURE — 83735 ASSAY OF MAGNESIUM: CPT

## 2019-05-09 PROCEDURE — 2060000000 HC ICU INTERMEDIATE R&B

## 2019-05-09 PROCEDURE — 99254 IP/OBS CNSLTJ NEW/EST MOD 60: CPT | Performed by: INTERNAL MEDICINE

## 2019-05-09 PROCEDURE — 2000000000 HC ICU R&B

## 2019-05-09 PROCEDURE — 80053 COMPREHEN METABOLIC PANEL: CPT

## 2019-05-09 PROCEDURE — 71046 X-RAY EXAM CHEST 2 VIEWS: CPT

## 2019-05-09 PROCEDURE — 94761 N-INVAS EAR/PLS OXIMETRY MLT: CPT

## 2019-05-09 RX ORDER — DIGOXIN 0.25 MG/ML
250 INJECTION INTRAMUSCULAR; INTRAVENOUS EVERY 6 HOURS
Status: DISCONTINUED | OUTPATIENT
Start: 2019-05-09 | End: 2019-05-09 | Stop reason: CLARIF

## 2019-05-09 RX ORDER — DIGOXIN 0.25 MG/ML
250 INJECTION INTRAMUSCULAR; INTRAVENOUS EVERY 6 HOURS
Status: COMPLETED | OUTPATIENT
Start: 2019-05-09 | End: 2019-05-10

## 2019-05-09 RX ORDER — DIGOXIN 125 MCG
125 TABLET ORAL DAILY
Status: DISCONTINUED | OUTPATIENT
Start: 2019-05-10 | End: 2019-05-13

## 2019-05-09 RX ORDER — DIGOXIN 0.25 MG/ML
500 INJECTION INTRAMUSCULAR; INTRAVENOUS ONCE
Status: COMPLETED | OUTPATIENT
Start: 2019-05-09 | End: 2019-05-09

## 2019-05-09 RX ORDER — ALBUTEROL SULFATE 2.5 MG/3ML
2.5 SOLUTION RESPIRATORY (INHALATION)
Status: DISCONTINUED | OUTPATIENT
Start: 2019-05-09 | End: 2019-05-17 | Stop reason: HOSPADM

## 2019-05-09 RX ADMIN — OXYCODONE AND ACETAMINOPHEN 1 TABLET: 5; 325 TABLET ORAL at 06:47

## 2019-05-09 RX ADMIN — CEFEPIME 2 G: 2 INJECTION, POWDER, FOR SOLUTION INTRAVENOUS at 17:08

## 2019-05-09 RX ADMIN — FAMOTIDINE 20 MG: 20 TABLET ORAL at 09:10

## 2019-05-09 RX ADMIN — Medication 0.63 MG: at 05:05

## 2019-05-09 RX ADMIN — NEBIVOLOL HYDROCHLORIDE 5 MG: 10 TABLET ORAL at 09:33

## 2019-05-09 RX ADMIN — BENZONATATE 200 MG: 100 CAPSULE ORAL at 20:46

## 2019-05-09 RX ADMIN — SODIUM CHLORIDE, PRESERVATIVE FREE 10 ML: 5 INJECTION INTRAVENOUS at 09:12

## 2019-05-09 RX ADMIN — GABAPENTIN 300 MG: 300 CAPSULE ORAL at 16:28

## 2019-05-09 RX ADMIN — SODIUM CHLORIDE, PRESERVATIVE FREE 10 ML: 5 INJECTION INTRAVENOUS at 21:16

## 2019-05-09 RX ADMIN — CYCLOBENZAPRINE HYDROCHLORIDE 10 MG: 10 TABLET, FILM COATED ORAL at 21:15

## 2019-05-09 RX ADMIN — GUAIFENESIN 600 MG: 600 TABLET, EXTENDED RELEASE ORAL at 09:10

## 2019-05-09 RX ADMIN — GABAPENTIN 300 MG: 300 CAPSULE ORAL at 21:15

## 2019-05-09 RX ADMIN — SODIUM CHLORIDE, PRESERVATIVE FREE 10 ML: 5 INJECTION INTRAVENOUS at 04:53

## 2019-05-09 RX ADMIN — DOCUSATE SODIUM 100 MG: 100 CAPSULE, LIQUID FILLED ORAL at 09:10

## 2019-05-09 RX ADMIN — CYCLOBENZAPRINE HYDROCHLORIDE 10 MG: 10 TABLET, FILM COATED ORAL at 16:28

## 2019-05-09 RX ADMIN — ALBUTEROL SULFATE 2.5 MG: 2.5 SOLUTION RESPIRATORY (INHALATION) at 17:15

## 2019-05-09 RX ADMIN — ALBUTEROL SULFATE 2.5 MG: 2.5 SOLUTION RESPIRATORY (INHALATION) at 22:09

## 2019-05-09 RX ADMIN — ASPIRIN 325 MG ORAL TABLET 325 MG: 325 PILL ORAL at 09:10

## 2019-05-09 RX ADMIN — GUAIFENESIN 600 MG: 600 TABLET, EXTENDED RELEASE ORAL at 21:15

## 2019-05-09 RX ADMIN — DIGOXIN 250 MCG: 0.25 INJECTION INTRAMUSCULAR; INTRAVENOUS at 16:29

## 2019-05-09 RX ADMIN — OXYCODONE AND ACETAMINOPHEN 1 TABLET: 5; 325 TABLET ORAL at 20:46

## 2019-05-09 RX ADMIN — INSULIN LISPRO 8 UNITS: 100 INJECTION, SOLUTION INTRAVENOUS; SUBCUTANEOUS at 09:39

## 2019-05-09 RX ADMIN — INSULIN LISPRO 10 UNITS: 100 INJECTION, SOLUTION INTRAVENOUS; SUBCUTANEOUS at 17:01

## 2019-05-09 RX ADMIN — CEFEPIME 2 G: 2 INJECTION, POWDER, FOR SOLUTION INTRAVENOUS at 04:45

## 2019-05-09 RX ADMIN — METHYLPREDNISOLONE SODIUM SUCCINATE 40 MG: 40 INJECTION, POWDER, LYOPHILIZED, FOR SOLUTION INTRAMUSCULAR; INTRAVENOUS at 12:20

## 2019-05-09 RX ADMIN — GABAPENTIN 300 MG: 300 CAPSULE ORAL at 09:10

## 2019-05-09 RX ADMIN — DILTIAZEM HYDROCHLORIDE 4.5 MG/HR: 5 INJECTION INTRAVENOUS at 16:17

## 2019-05-09 RX ADMIN — SODIUM CHLORIDE, PRESERVATIVE FREE 10 ML: 5 INJECTION INTRAVENOUS at 09:11

## 2019-05-09 RX ADMIN — METHYLPREDNISOLONE SODIUM SUCCINATE 40 MG: 40 INJECTION, POWDER, LYOPHILIZED, FOR SOLUTION INTRAMUSCULAR; INTRAVENOUS at 00:10

## 2019-05-09 RX ADMIN — CYCLOBENZAPRINE HYDROCHLORIDE 10 MG: 10 TABLET, FILM COATED ORAL at 09:10

## 2019-05-09 RX ADMIN — DILTIAZEM HYDROCHLORIDE 12.5 MG/HR: 5 INJECTION INTRAVENOUS at 04:15

## 2019-05-09 RX ADMIN — FAMOTIDINE 20 MG: 20 TABLET ORAL at 21:15

## 2019-05-09 RX ADMIN — METHYLPREDNISOLONE SODIUM SUCCINATE 40 MG: 40 INJECTION, POWDER, LYOPHILIZED, FOR SOLUTION INTRAMUSCULAR; INTRAVENOUS at 04:52

## 2019-05-09 RX ADMIN — SODIUM CHLORIDE, PRESERVATIVE FREE 10 ML: 5 INJECTION INTRAVENOUS at 21:18

## 2019-05-09 RX ADMIN — METHYLPREDNISOLONE SODIUM SUCCINATE 40 MG: 40 INJECTION, POWDER, LYOPHILIZED, FOR SOLUTION INTRAMUSCULAR; INTRAVENOUS at 17:14

## 2019-05-09 RX ADMIN — VANCOMYCIN HYDROCHLORIDE 1750 MG: 5 INJECTION, POWDER, LYOPHILIZED, FOR SOLUTION INTRAVENOUS at 18:03

## 2019-05-09 RX ADMIN — ENOXAPARIN SODIUM 105 MG: 150 INJECTION SUBCUTANEOUS at 21:15

## 2019-05-09 RX ADMIN — ENOXAPARIN SODIUM 105 MG: 150 INJECTION SUBCUTANEOUS at 09:24

## 2019-05-09 RX ADMIN — DOCUSATE SODIUM 100 MG: 100 CAPSULE, LIQUID FILLED ORAL at 21:15

## 2019-05-09 RX ADMIN — DIGOXIN 500 MCG: 250 INJECTION, SOLUTION INTRAMUSCULAR; INTRAVENOUS; PARENTERAL at 09:25

## 2019-05-09 RX ADMIN — OXYCODONE AND ACETAMINOPHEN 1 TABLET: 5; 325 TABLET ORAL at 12:47

## 2019-05-09 RX ADMIN — DILTIAZEM HYDROCHLORIDE 120 MG: 120 CAPSULE, COATED, EXTENDED RELEASE ORAL at 09:10

## 2019-05-09 RX ADMIN — VANCOMYCIN HYDROCHLORIDE 1750 MG: 5 INJECTION, POWDER, LYOPHILIZED, FOR SOLUTION INTRAVENOUS at 05:23

## 2019-05-09 RX ADMIN — INSULIN LISPRO 4 UNITS: 100 INJECTION, SOLUTION INTRAVENOUS; SUBCUTANEOUS at 21:20

## 2019-05-09 ASSESSMENT — PAIN - FUNCTIONAL ASSESSMENT
PAIN_FUNCTIONAL_ASSESSMENT: ACTIVITIES ARE NOT PREVENTED
PAIN_FUNCTIONAL_ASSESSMENT: PREVENTS OR INTERFERES SOME ACTIVE ACTIVITIES AND ADLS

## 2019-05-09 ASSESSMENT — PAIN SCALES - GENERAL
PAINLEVEL_OUTOF10: 7
PAINLEVEL_OUTOF10: 0
PAINLEVEL_OUTOF10: 7
PAINLEVEL_OUTOF10: 7
PAINLEVEL_OUTOF10: 0
PAINLEVEL_OUTOF10: 6

## 2019-05-09 ASSESSMENT — PAIN DESCRIPTION - LOCATION
LOCATION: BACK

## 2019-05-09 ASSESSMENT — ENCOUNTER SYMPTOMS
NAUSEA: 1
COLOR CHANGE: 0
SHORTNESS OF BREATH: 1
DIARRHEA: 1
SINUS PRESSURE: 0
EYE REDNESS: 0
SORE THROAT: 0
CHEST TIGHTNESS: 1
ABDOMINAL PAIN: 0
VOMITING: 1
BACK PAIN: 0
EYE ITCHING: 0
BLOOD IN STOOL: 0
SINUS PAIN: 0
COUGH: 1
CONSTIPATION: 0
ABDOMINAL DISTENTION: 0

## 2019-05-09 ASSESSMENT — PAIN DESCRIPTION - ORIENTATION
ORIENTATION: MID;LOWER

## 2019-05-09 ASSESSMENT — PAIN DESCRIPTION - PAIN TYPE
TYPE: CHRONIC PAIN

## 2019-05-09 ASSESSMENT — PAIN DESCRIPTION - PROGRESSION: CLINICAL_PROGRESSION: GRADUALLY WORSENING

## 2019-05-09 ASSESSMENT — PAIN DESCRIPTION - ONSET
ONSET: ON-GOING

## 2019-05-09 ASSESSMENT — PAIN DESCRIPTION - DESCRIPTORS
DESCRIPTORS: ACHING;BURNING
DESCRIPTORS: ACHING;CONSTANT
DESCRIPTORS: ACHING

## 2019-05-09 ASSESSMENT — PAIN DESCRIPTION - FREQUENCY
FREQUENCY: CONTINUOUS

## 2019-05-09 NOTE — PROGRESS NOTES
Today's plan: add digoxin, and continue cardizem 120mg CD oral and taper drip to d.c, continue lovenox, EP consult      Admit Date:  5/3/2019    Subjective: ok, + shortness of breath      Chief complaints on admission  Chief Complaint   Patient presents with    Fatigue    Nausea    Dizziness    Chest Pain         History of present illness:Reji is a 63 y. o.year old who  presents with had concerns including Fatigue; Nausea; Dizziness; and Chest Pain. Past medical history:    has a past medical history of Diabetes mellitus ([address] and Hypertension. Past surgical history:   has a past surgical history that includes other surgical history (Right) and Circumcision, non- (2013). Social History:   reports that he has been smoking cigarettes. He has a 7.50 pack-year smoking history. He has never used smokeless tobacco. He reports that he does not drink alcohol or use drugs. Family history:  family history is not on file. No Known Allergies      Objective:   /73   Pulse 94   Temp 97.8 °F (36.6 °C) (Oral)   Resp 22   Ht 5' 10\" (1.778 m)   Wt 235 lb 0.2 oz (106.6 kg)   SpO2 95%   BMI 33.72 kg/m²       Intake/Output Summary (Last 24 hours) at 2019 0718  Last data filed at 2019 9861  Gross per 24 hour   Intake 1699 ml   Output 1600 ml   Net 99 ml       TELEMETRY: Atrial fibrillation     Physical Exam:  Constitutional:  Well developed, Well nourished, No acute distress, Non-toxic appearance. HENT:  Normocephalic, Atraumatic, Bilateral external ears normal, Oropharynx moist, No oral exudates, Nose normal. Neck- Normal range of motion, No tenderness, Supple, No stridor. Eyes:  PERRL, EOMI, Conjunctiva normal, No discharge. Respiratory:  + wheezing, No chest tenderness. ,no use of accessory muscles, diaphragm movement is normal  Cardiovascular: (PMI) apex non displaced,no lifts no thrills, no s3,no s4, Normal heart rate, Normal rhythm, No murmurs, No rubs, No gallops.  Carotid dextrose, glucagon (rDNA), dextrose, promethazine, hydrOXYzine, oxyCODONE-acetaminophen    Lab Data:  CBC:   Recent Labs     05/08/19  0935 05/09/19  0640   WBC 2.2* 4.1   HGB 13.8 13.2*   HCT 41.2* 40.4*   MCV 88.8 90.0    228     BMP:   Recent Labs     05/06/19  0903 05/07/19  0424 05/08/19  0935    138 136   K 3.8 4.4 3.3*    103 99   CO2 18* 18* 24   BUN 21 19 22   CREATININE 1.1 1.1 0.9     LIVER PROFILE:   Recent Labs     05/08/19  0935   *   ALT 71*   BILITOT 0.4   ALKPHOS 72     PT/INR: No results for input(s): PROTIME, INR in the last 72 hours. APTT: No results for input(s): APTT in the last 72 hours. BNP:  No results for input(s): BNP in the last 72 hours. TROPONIN: @TROPONINI:3@      Assessment:  63 y. o.year old who is admitted for          Plan:  1. Afib'; continue cardizem drip, continue lovenox and EP consult  2. Chest pain: atypical, most likely from pneumonia  3. Elevated trop and septal qs pattern on ekg: Once sepsis resolves, will do stress test, for now echo is normal  4. hearling loss: recommend to see ENT out patient  All labs, medications and tests reviewed, continue all other medications of all above medical condition listed as is.       Geetha Lopez MD 5/9/2019 7:18 AM

## 2019-05-09 NOTE — CONSULTS
Electrophysiology Consult Note      Reason for consultation: atrial fibrillation    Chief complaint: shortness of breath    Referring physician: Dr Gracie Mooney      Primary care physician: Ashok Bryant MD      History of Present Illness:     Dylon Alvarado is a 63year old male who presented to the Emergency Room with c/o shortness of breath with productive yellow cough, generalized weakness, fever and chills which started one week ago that had progressively become worse over 1 week which led to him coming to the emergency room. He states that at the onset of symptoms he had nausea, vomiting and diarrhea which resolved after a few days. He did see his PCP for these symptoms at onset. When the symptoms did not get better, he called his PCP who recommended he come to the Emergency Room. He denies palpitations, chest pain, dizziness, or syncope. After admitted to the hospital, patient's heart rhythm converted from SR with PAC's to atrial flutter vs atrial fibrillation. EP was consulted for management of atrial flutter. Patient denies previous history of irregular heart beat. Additionally denies history of bleeding. He has a past medical history of  HTN and DM. Past medical history:   Past Medical History:   Diagnosis Date    Diabetes mellitus ([facility]     Hypertension        Surgical history :  Past Surgical History:   Procedure Laterality Date    CIRCUMCISION, NON-  2013    OTHER SURGICAL HISTORY Right     Transposition of Right Ulnar Nerve       Family history: No sudden cardiac death     Social history :  reports that he has been smoking cigarettes. He has a 7.50 pack-year smoking history. He has never used smokeless tobacco. He reports that he does not drink alcohol or use drugs. No Known Allergies    No current facility-administered medications on file prior to encounter.       Current Outpatient Medications on File Prior to Encounter   Medication Sig Dispense Refill    glimepiride (AMARYL) 2 MG tablet Take 2 mg by mouth every morning (before breakfast)      traZODone (DESYREL) 50 MG tablet Take  mg by mouth nightly      aspirin 325 MG tablet Take 325 mg by mouth daily      benzonatate (TESSALON) 200 MG capsule Take 200 mg by mouth 3 times daily as needed for Cough      Phenyleph-Chlorphen-Codeine (CAPCOF) 5-2-10 MG/5ML SYRP Take by mouth.  gabapentin (NEURONTIN) 600 MG tablet Take 600 mg by mouth 3 times daily.  hydrochlorothiazide (HYDRODIURIL) 25 MG tablet Take 25 mg by mouth daily       BYSTOLIC 5 MG tablet Take 5 mg by mouth daily       lisinopril (PRINIVIL;ZESTRIL) 40 MG tablet Take 40 mg by mouth daily. Review of Systems:   Review of Systems   Constitutional: Positive for activity change, chills and fatigue. Negative for appetite change and fever. HENT: Negative for congestion, sinus pressure, sinus pain and sore throat. Eyes: Negative for redness and itching. Respiratory: Positive for cough, chest tightness and shortness of breath. Cardiovascular: Negative for chest pain, palpitations and leg swelling. Gastrointestinal: Positive for diarrhea, nausea and vomiting. Negative for abdominal distention, abdominal pain, blood in stool and constipation. Endocrine: Negative for cold intolerance and heat intolerance. Genitourinary: Negative for difficulty urinating and dysuria. Musculoskeletal: Negative for arthralgias, back pain and gait problem. Skin: Negative for color change, pallor, rash and wound. Neurological: Negative for dizziness, syncope and light-headedness. Psychiatric/Behavioral: Negative for agitation and confusion. The patient is not nervous/anxious.       Physical Examination:    /87   Pulse 94   Temp 97.8 °F (36.6 °C) (Temporal)   Resp 23   Ht 5' 10\" (1.778 m)   Wt 235 lb 0.2 oz (106.6 kg)   SpO2 97%   BMI 33.72 kg/m²    Wt Readings from Last 3 Encounters:   05/07/19 235 lb 0.2 oz (106.6 kg)   03/14/16 248 lb (112.5 kg)   12/14/15 251 lb 1.6 oz (113.9 kg)     Body mass index is 33.72 kg/m². Physical Exam   Constitutional: He is oriented to person, place, and time. He appears well-developed and well-nourished. HENT:   Head: Normocephalic and atraumatic. Eyes: Pupils are equal, round, and reactive to light. Conjunctivae are normal. Right eye exhibits no discharge. Left eye exhibits no discharge. Neck: Neck supple. No JVD present. No thyromegaly present. Cardiovascular: Intact distal pulses. No murmur heard. Irregular rhythm and rate   Pulmonary/Chest: Tachypnea noted. He has wheezes. He has rales. Abdominal: Soft. Bowel sounds are normal. He exhibits no distension. There is no tenderness. Musculoskeletal: He exhibits no edema or deformity. Neurological: He is alert and oriented to person, place, and time. No cranial nerve deficit. Skin: Skin is warm and dry.    Psychiatric: Judgment and thought content normal.     CBC:   Lab Results   Component Value Date    WBC 4.1 05/09/2019    HGB 13.2 05/09/2019    HCT 40.4 05/09/2019     05/09/2019     Lipids:   Lab Results   Component Value Date    CHOL 108 05/03/2019    TRIG 150 (H) 05/03/2019    HDL 32 (L) 05/03/2019    LDLDIRECT 56 05/03/2019     PT/INR: No results found for: INR     BMP:    Lab Results   Component Value Date     05/09/2019    K 3.4 (L) 05/09/2019     05/09/2019    CO2 26 05/09/2019    BUN 25 (H) 05/09/2019     CMP:   Lab Results   Component Value Date     (H) 05/09/2019     (H) 05/09/2019    PROT 5.1 (L) 05/09/2019    BILITOT 0.4 05/09/2019    ALKPHOS 83 05/09/2019     TSH:  No results found for: TSH    EKGINTERPRETATION - EKG Interpretation:        IMPRESSION / RECOMMENDATIONS:     New onset atrial fibrillation  Community acquired pneumonia  HTN  DM-2  Obesity BMI 33    Patient with new onset atrial fibrillation and also had atrial flutter earlier  Patient atrial fibrillation could be from pneumonia  In

## 2019-05-09 NOTE — PROGRESS NOTES
Hospitalist Progress Note      Name:  Sunshine Schwab /Age/Sex: 1960  (63 y.o. male)   MRN & CSN:  [id number] & [id number] Admission Date/Time: 5/3/2019  1:12 PM   Location:  -A PCP: Cecilio Hurst MD         Hospital Day: 7    Assessment and Plan:   Sunshine Schwab is a 63 y.o.  male  who presents with Pneumonia    1. Pneumonia: could be from gram negative. RVP +ve Adenovirus. CXR  with interval worsening of a left mid lung zone opacity and right infrahilar opacity. NC at 4 LPM. .6 19. Continue Cefepime and Vancomycin. Sputum culture with MIrcococcus, Corynebacterium, Staphylococcus, and MRSA pending. Legionella and Strep urine negative. Pulmonology on consult  2. AF in RVR: on Cardizem infusion, therapeutic Lovenox. Cardiology on consult. EP on consult. Started on Cardizem, Digoxin. 3. Elevated Troponin: for Stress test once sepsis is resolved. 4. Type 2 DM: Last A1C. Lantus, Sliding scale. Hypoglycemia protocol. Accucheck. Hold oral hypoglycemic agents for now  5. Renal Cyst: outpatient follow up  6. Hypokalemia: replace accordingly. Magnesium  7. Leukopenia: could be from Adenovirus    Diet DIET CARB CONTROL; Diet NPO, After Midnight   DVT Prophylaxis [x] Lovenox, []  Heparin, [] SCDs, [] Warfarin  [] NOAC     GI Prophylaxis [] PPI,  [x] H2 Blocker,  [] Carafate,  [] Diet/Tube Feeds   Code Status Full Code   MDM [] Low, [] Moderate,[x]  High     History of Present Illness:     Chief Complaint: Pneumonia    He was seen and examined. SOB is the same. Still with cough. Has wheezing this morning. Denied fever. Ten point ROS reviewed negative, unless as noted above    Objective:        Intake/Output Summary (Last 24 hours) at 2019 1252  Last data filed at 2019 1233  Gross per 24 hour   Intake 2411.04 ml   Output 1675 ml   Net 736.04 ml      Vitals:   Vitals:    19 1203   BP: 121/87   Pulse: 105   Resp: 26   Temp:    SpO2: 98%     Physical Exam:   GEN Awake male, sitting upright in bed in no apparent distress. Appears given age. EYES Pupils are equally round. No scleral erythema, discharge, or conjunctivitis. HENT Mucous membranes are moist. Oral pharynx without exudates, no evidence of thrush. NECK Supple, no apparent thyromegaly or masses. RESP + crackles, bilateral, + wheezing, more on the right  CARDIO/VASC S1/S2 auscultated. Irregularly irregular. No JVD or carotid bruits. Peripheral pulses equal bilaterally and palpable. No peripheral edema. GI Abdomen is soft without significant tenderness, masses, or guarding. Bowel sounds are normoactive. Rectal exam deferred. MSK No gross joint deformities. SKIN Normal coloration, warm, dry. NEURO Cranial nerves appear grossly intact, normal speech, no lateralizing weakness. PSYCH Awake, alert, oriented x 4. Affect appropriate.     Medications:   Medications:    digoxin  250 mcg Intravenous Q6H    [START ON 5/10/2019] digoxin  125 mcg Oral Daily    albuterol  2.5 mg Nebulization Q6H WA    methylPREDNISolone  40 mg Intravenous Q6H    diltiazem  120 mg Oral Daily    insulin lispro  0-12 Units Subcutaneous TID WC    insulin lispro  0-6 Units Subcutaneous Nightly    pneumococcal 13-valent conjugate  0.5 mL Intramuscular Once    sodium chloride flush  10 mL Intravenous 2 times per day    vancomycin  1,750 mg Intravenous Q12H    guaiFENesin  600 mg Oral BID    enoxaparin  1 mg/kg Subcutaneous BID    sodium chloride flush  10 mL Intravenous BID    cyclobenzaprine  10 mg Oral TID    cefepime  2 g Intravenous Q12H    sodium chloride flush  10 mL Intravenous 2 times per day    docusate sodium  100 mg Oral BID    famotidine  20 mg Oral BID    aspirin  325 mg Oral Daily    nebivolol  5 mg Oral Daily    gabapentin  300 mg Oral TID      Infusions:    diltiazem (CARDIZEM) 125 mg in dextrose 5% 125 mL infusion 7.5 mg/hr (05/09/19 1221)    dextrose       PRN Meds:     levalbuterol 0.63 mg Q6H PRN   potassium chloride 40 mEq PRN   Or     potassium alternative oral replacement 40 mEq PRN   Or     potassium chloride 10 mEq PRN   sodium chloride flush 10 mL PRN   ondansetron 4 mg Q30 Min PRN   sodium chloride flush 10 mL PRN   ondansetron 4 mg Q6H PRN   acetaminophen 650 mg Q4H PRN   benzonatate 200 mg TID PRN   glucose 15 g PRN   dextrose 12.5 g PRN   glucagon (rDNA) 1 mg PRN   dextrose 100 mL/hr PRN   promethazine 12.5 mg Q6H PRN   hydrOXYzine 50 mg Q6H PRN   oxyCODONE-acetaminophen 1 tablet Q4H PRN       Recent Labs     05/08/19  0935 05/09/19  0640   WBC 2.2* 4.1   HGB 13.8 13.2*   HCT 41.2* 40.4*    228      Recent Labs     05/07/19  0424 05/08/19  0935 05/09/19  0640    136 139   K 4.4 3.3* 3.4*    99 101   CO2 18* 24 26   BUN 19 22 25*   CREATININE 1.1 0.9 0.9     Recent Labs     05/08/19  0935 05/09/19  0640   * 137*   ALT 71* 105*   BILITOT 0.4 0.4   ALKPHOS 72 83     No results for input(s): INR in the last 72 hours.   Recent Labs     05/07/19  0416   TROPONINT <0.010        Imaging reviewed      Electronically signed by Poonam Reyes MD on 5/9/2019 at 12:52 PM

## 2019-05-09 NOTE — PLAN OF CARE
Problem: Pain:  Goal: Pain level will decrease  Description  Pain level will decrease  Outcome: Ongoing  Goal: Control of acute pain  Description  Control of acute pain  Outcome: Ongoing  Goal: Control of chronic pain  Description  Control of chronic pain  Outcome: Ongoing     Problem: Risk for Impaired Skin Integrity  Goal: Tissue integrity - skin and mucous membranes  Description  Structural intactness and normal physiological function of skin and  mucous membranes.   Outcome: Ongoing

## 2019-05-09 NOTE — PROGRESS NOTES
[address] [facility]  consulted by Dr. Osmar Hernandes for monitoring and adjustment. Indication for treatment: Pneumonia  Goal trough: 15-20 mcg/mL     Pertinent Laboratory Values:   Temp Readings from Last 3 Encounters:   05/09/19 97.8 °F (36.6 °C) (Temporal)     Recent Labs     05/07/19  1358 05/08/19  0935 05/09/19  0640   WBC  --  2.2* 4.1   LACTATE 1.3  --   --      Recent Labs     05/07/19  0424 05/08/19  0935 05/09/19  0640   BUN 19 22 25*   CREATININE 1.1 0.9 0.9     Estimated Creatinine Clearance: 111 mL/min (based on SCr of 0.9 mg/dL). Intake/Output Summary (Last 24 hours) at 5/9/2019 1641  Last data filed at 5/9/2019 1233  Gross per 24 hour   Intake 2291.04 ml   Output 1675 ml   Net 616.04 ml       Pertinent Cultures:  Date    Source    Results  5/4   Blood    NGTD  5/7   Sputum   Pending    Vancomycin level:   TROUGH:    Recent Labs     05/08/19  1650   VANCOTROUGH 16.1     RANDOM:  No results for input(s): VANCORANDOM in the last 72 hours. Assessment:  · WBC and temperature: WBC low @ 4.1. Patient is afebrile. · SCr, BUN, and urine output: Stable  · Day(s) of therapy: #3  · Vancomycin level: 16.1 mcg/mL - therapeutic (obtained 05-08-19)    Plan:  · Renal labs are stable at this time. · Most recent level is therapeutic @ 16.1 mcg/mL. · Plan to continue current dosing: vancomycin 1750 mg IVPB every 12 hours. · Pharmacy will continue to follow and adjust dosing as appropriate.       Thank you for the consult,    Cong White, PharmD, [facility], located within [facility]

## 2019-05-09 NOTE — PROGRESS NOTES
Daughter called requesting letter be written with patients diagnosis, seriousness of illness, possible length of stay to be used as request for son in law be released early from [employer] deployment in [facility]. Dr Ana Navarrete notified.

## 2019-05-09 NOTE — PROGRESS NOTES
Dr Ward Hint at bedside. Aware cardizem gtt now on 10ml/hr. Order to continue to wean that gtt off. Dr stated he will start dig this am for pt. Will continue to monitor pt.

## 2019-05-10 ENCOUNTER — APPOINTMENT (OUTPATIENT)
Dept: ULTRASOUND IMAGING | Age: 59
DRG: 193 | End: 2019-05-10
Payer: COMMERCIAL

## 2019-05-10 LAB
ALBUMIN SERPL-MCNC: 3.2 GM/DL (ref 3.4–5)
ALP BLD-CCNC: 95 IU/L (ref 40–128)
ALT SERPL-CCNC: 127 U/L (ref 10–40)
ANION GAP SERPL CALCULATED.3IONS-SCNC: 15 MMOL/L (ref 4–16)
AST SERPL-CCNC: 94 IU/L (ref 15–37)
BILIRUB SERPL-MCNC: 0.4 MG/DL (ref 0–1)
BUN BLDV-MCNC: 27 MG/DL (ref 6–23)
CALCIUM SERPL-MCNC: 8.1 MG/DL (ref 8.3–10.6)
CHLORIDE BLD-SCNC: 102 MMOL/L (ref 99–110)
CO2: 27 MMOL/L (ref 21–32)
CREAT SERPL-MCNC: 1 MG/DL (ref 0.9–1.3)
CULTURE: ABNORMAL
GFR AFRICAN AMERICAN: >60 ML/MIN/1.73M2
GFR NON-AFRICAN AMERICAN: >60 ML/MIN/1.73M2
GLUCOSE BLD-MCNC: 258 MG/DL (ref 70–99)
GLUCOSE BLD-MCNC: 287 MG/DL (ref 70–99)
GLUCOSE BLD-MCNC: 324 MG/DL (ref 70–99)
GLUCOSE BLD-MCNC: 342 MG/DL (ref 70–99)
GRAM SMEAR: ABNORMAL
HCT VFR BLD CALC: 39.4 % (ref 42–52)
HEMOGLOBIN: 13 GM/DL (ref 13.5–18)
Lab: ABNORMAL
MCH RBC QN AUTO: 29.6 PG (ref 27–31)
MCHC RBC AUTO-ENTMCNC: 33 % (ref 32–36)
MCV RBC AUTO: 89.7 FL (ref 78–100)
PDW BLD-RTO: 13.1 % (ref 11.7–14.9)
PLATELET # BLD: 265 K/CU MM (ref 140–440)
PMV BLD AUTO: 10.4 FL (ref 7.5–11.1)
POTASSIUM SERPL-SCNC: 3.7 MMOL/L (ref 3.5–5.1)
RBC # BLD: 4.39 M/CU MM (ref 4.6–6.2)
SODIUM BLD-SCNC: 144 MMOL/L (ref 135–145)
SPECIMEN: ABNORMAL
TOTAL PROTEIN: 5.2 GM/DL (ref 6.4–8.2)
WBC # BLD: 5.7 K/CU MM (ref 4–10.5)

## 2019-05-10 PROCEDURE — 80053 COMPREHEN METABOLIC PANEL: CPT

## 2019-05-10 PROCEDURE — 6360000002 HC RX W HCPCS: Performed by: INTERNAL MEDICINE

## 2019-05-10 PROCEDURE — 76705 ECHO EXAM OF ABDOMEN: CPT

## 2019-05-10 PROCEDURE — 2580000003 HC RX 258: Performed by: INTERNAL MEDICINE

## 2019-05-10 PROCEDURE — 6360000002 HC RX W HCPCS: Performed by: HOSPITALIST

## 2019-05-10 PROCEDURE — 6370000000 HC RX 637 (ALT 250 FOR IP): Performed by: INTERNAL MEDICINE

## 2019-05-10 PROCEDURE — 6370000000 HC RX 637 (ALT 250 FOR IP): Performed by: NURSE PRACTITIONER

## 2019-05-10 PROCEDURE — 94640 AIRWAY INHALATION TREATMENT: CPT

## 2019-05-10 PROCEDURE — 82962 GLUCOSE BLOOD TEST: CPT

## 2019-05-10 PROCEDURE — 2580000003 HC RX 258: Performed by: HOSPITALIST

## 2019-05-10 PROCEDURE — 99232 SBSQ HOSP IP/OBS MODERATE 35: CPT | Performed by: INTERNAL MEDICINE

## 2019-05-10 PROCEDURE — 6370000000 HC RX 637 (ALT 250 FOR IP): Performed by: HOSPITALIST

## 2019-05-10 PROCEDURE — 85027 COMPLETE CBC AUTOMATED: CPT

## 2019-05-10 PROCEDURE — 99232 SBSQ HOSP IP/OBS MODERATE 35: CPT | Performed by: NURSE PRACTITIONER

## 2019-05-10 PROCEDURE — 2140000000 HC CCU INTERMEDIATE R&B

## 2019-05-10 RX ADMIN — DIGOXIN 250 MCG: 0.25 INJECTION INTRAMUSCULAR; INTRAVENOUS at 00:03

## 2019-05-10 RX ADMIN — INSULIN LISPRO 6 UNITS: 100 INJECTION, SOLUTION INTRAVENOUS; SUBCUTANEOUS at 13:24

## 2019-05-10 RX ADMIN — DOCUSATE SODIUM 100 MG: 100 CAPSULE, LIQUID FILLED ORAL at 21:16

## 2019-05-10 RX ADMIN — CYCLOBENZAPRINE HYDROCHLORIDE 10 MG: 10 TABLET, FILM COATED ORAL at 16:08

## 2019-05-10 RX ADMIN — CEFEPIME 2 G: 2 INJECTION, POWDER, FOR SOLUTION INTRAVENOUS at 04:16

## 2019-05-10 RX ADMIN — NEBIVOLOL HYDROCHLORIDE 5 MG: 10 TABLET ORAL at 09:07

## 2019-05-10 RX ADMIN — OXYCODONE AND ACETAMINOPHEN 1 TABLET: 5; 325 TABLET ORAL at 21:21

## 2019-05-10 RX ADMIN — DIGOXIN 125 MCG: 125 TABLET ORAL at 09:07

## 2019-05-10 RX ADMIN — METHYLPREDNISOLONE SODIUM SUCCINATE 40 MG: 40 INJECTION, POWDER, LYOPHILIZED, FOR SOLUTION INTRAMUSCULAR; INTRAVENOUS at 11:35

## 2019-05-10 RX ADMIN — METHYLPREDNISOLONE SODIUM SUCCINATE 40 MG: 40 INJECTION, POWDER, LYOPHILIZED, FOR SOLUTION INTRAMUSCULAR; INTRAVENOUS at 05:50

## 2019-05-10 RX ADMIN — CYCLOBENZAPRINE HYDROCHLORIDE 10 MG: 10 TABLET, FILM COATED ORAL at 09:08

## 2019-05-10 RX ADMIN — INSULIN LISPRO 8 UNITS: 100 INJECTION, SOLUTION INTRAVENOUS; SUBCUTANEOUS at 17:09

## 2019-05-10 RX ADMIN — CEFEPIME 2 G: 2 INJECTION, POWDER, FOR SOLUTION INTRAVENOUS at 16:13

## 2019-05-10 RX ADMIN — GUAIFENESIN 600 MG: 600 TABLET, EXTENDED RELEASE ORAL at 09:07

## 2019-05-10 RX ADMIN — GABAPENTIN 300 MG: 300 CAPSULE ORAL at 16:08

## 2019-05-10 RX ADMIN — VANCOMYCIN HYDROCHLORIDE 1750 MG: 5 INJECTION, POWDER, LYOPHILIZED, FOR SOLUTION INTRAVENOUS at 05:50

## 2019-05-10 RX ADMIN — METHYLPREDNISOLONE SODIUM SUCCINATE 40 MG: 40 INJECTION, POWDER, LYOPHILIZED, FOR SOLUTION INTRAMUSCULAR; INTRAVENOUS at 00:10

## 2019-05-10 RX ADMIN — SODIUM CHLORIDE, PRESERVATIVE FREE 10 ML: 5 INJECTION INTRAVENOUS at 00:03

## 2019-05-10 RX ADMIN — GABAPENTIN 300 MG: 300 CAPSULE ORAL at 21:16

## 2019-05-10 RX ADMIN — GABAPENTIN 300 MG: 300 CAPSULE ORAL at 09:07

## 2019-05-10 RX ADMIN — DOCUSATE SODIUM 100 MG: 100 CAPSULE, LIQUID FILLED ORAL at 09:07

## 2019-05-10 RX ADMIN — METHYLPREDNISOLONE SODIUM SUCCINATE 40 MG: 40 INJECTION, POWDER, LYOPHILIZED, FOR SOLUTION INTRAMUSCULAR; INTRAVENOUS at 16:13

## 2019-05-10 RX ADMIN — INSULIN LISPRO 4 UNITS: 100 INJECTION, SOLUTION INTRAVENOUS; SUBCUTANEOUS at 21:14

## 2019-05-10 RX ADMIN — SODIUM CHLORIDE, PRESERVATIVE FREE 10 ML: 5 INJECTION INTRAVENOUS at 11:35

## 2019-05-10 RX ADMIN — OXYCODONE AND ACETAMINOPHEN 1 TABLET: 5; 325 TABLET ORAL at 09:12

## 2019-05-10 RX ADMIN — FAMOTIDINE 20 MG: 20 TABLET ORAL at 09:07

## 2019-05-10 RX ADMIN — FAMOTIDINE 20 MG: 20 TABLET ORAL at 21:15

## 2019-05-10 RX ADMIN — SODIUM CHLORIDE, PRESERVATIVE FREE 10 ML: 5 INJECTION INTRAVENOUS at 09:07

## 2019-05-10 RX ADMIN — VANCOMYCIN HYDROCHLORIDE 1750 MG: 5 INJECTION, POWDER, LYOPHILIZED, FOR SOLUTION INTRAVENOUS at 17:09

## 2019-05-10 RX ADMIN — ALBUTEROL SULFATE 2.5 MG: 2.5 SOLUTION RESPIRATORY (INHALATION) at 13:28

## 2019-05-10 RX ADMIN — SODIUM CHLORIDE, PRESERVATIVE FREE 10 ML: 5 INJECTION INTRAVENOUS at 21:16

## 2019-05-10 RX ADMIN — APIXABAN 5 MG: 5 TABLET, FILM COATED ORAL at 21:16

## 2019-05-10 RX ADMIN — CYCLOBENZAPRINE HYDROCHLORIDE 10 MG: 10 TABLET, FILM COATED ORAL at 21:16

## 2019-05-10 RX ADMIN — GUAIFENESIN 600 MG: 600 TABLET, EXTENDED RELEASE ORAL at 21:15

## 2019-05-10 RX ADMIN — DILTIAZEM HYDROCHLORIDE 120 MG: 120 CAPSULE, COATED, EXTENDED RELEASE ORAL at 09:07

## 2019-05-10 RX ADMIN — OXYCODONE AND ACETAMINOPHEN 1 TABLET: 5; 325 TABLET ORAL at 17:22

## 2019-05-10 RX ADMIN — SODIUM CHLORIDE, PRESERVATIVE FREE 10 ML: 5 INJECTION INTRAVENOUS at 05:50

## 2019-05-10 RX ADMIN — ENOXAPARIN SODIUM 105 MG: 150 INJECTION SUBCUTANEOUS at 09:08

## 2019-05-10 RX ADMIN — BENZONATATE 200 MG: 100 CAPSULE ORAL at 17:23

## 2019-05-10 RX ADMIN — ALBUTEROL SULFATE 2.5 MG: 2.5 SOLUTION RESPIRATORY (INHALATION) at 22:07

## 2019-05-10 RX ADMIN — ASPIRIN 325 MG ORAL TABLET 325 MG: 325 PILL ORAL at 09:07

## 2019-05-10 ASSESSMENT — PAIN SCALES - GENERAL
PAINLEVEL_OUTOF10: 7
PAINLEVEL_OUTOF10: 7
PAINLEVEL_OUTOF10: 0
PAINLEVEL_OUTOF10: 6

## 2019-05-10 NOTE — PROGRESS NOTES
Today's plan: continue digoxin, and continue cardizem 120mg CD oral, off cardizem, continue lovenox, EP consult appreciated      Admit Date:  5/3/2019    Subjective: ok, + shortness of breath      Chief complaints on admission  Chief Complaint   Patient presents with    Fatigue    Nausea    Dizziness    Chest Pain         History of present illness:Reji is a 63 y. o.year old who  presents with had concerns including Fatigue; Nausea; Dizziness; and Chest Pain. Past medical history:    has a past medical history of Diabetes mellitus ([address] and Hypertension. Past surgical history:   has a past surgical history that includes other surgical history (Right) and Circumcision, non- (2013). Social History:   reports that he has been smoking cigarettes. He has a 7.50 pack-year smoking history. He has never used smokeless tobacco. He reports that he does not drink alcohol or use drugs. Family history:  family history is not on file. No Known Allergies      Objective:   BP (!) 143/89   Pulse 108   Temp 97.9 °F (36.6 °C) (Oral)   Resp 21   Ht 5' 10\" (1.778 m)   Wt 235 lb 0.2 oz (106.6 kg)   SpO2 97%   BMI 33.72 kg/m²       Intake/Output Summary (Last 24 hours) at 5/10/2019 1544  Last data filed at 5/10/2019 0600  Gross per 24 hour   Intake 100 ml   Output 1575 ml   Net -1475 ml       TELEMETRY: Atrial fibrillation     Physical Exam:  Constitutional:  Well developed, Well nourished, No acute distress, Non-toxic appearance. HENT:  Normocephalic, Atraumatic, Bilateral external ears normal, Oropharynx moist, No oral exudates, Nose normal. Neck- Normal range of motion, No tenderness, Supple, No stridor. Eyes:  PERRL, EOMI, Conjunctiva normal, No discharge. Respiratory:  + wheezing, No chest tenderness. ,no use of accessory muscles, diaphragm movement is normal  Cardiovascular: (PMI) apex non displaced,no lifts no thrills, no s3,no s4, Normal heart rate, Normal rhythm, No murmurs, No rubs, No gallops. Carotid arteries pulse and amplitude are normal no bruit, no abdominal bruit noted ( normal abdominal aorta ausculation), femoral arteries pulse and amplitude are normal no bruit, pedal pulses are normal  GI:  Bowel sounds normal, Soft, No tenderness, No masses, No pulsatile masses. : External genitalia appear normal, No masses or lesions. No discharge. No CVA tenderness. Musculoskeletal:  Intact distal pulses, No edema, No tenderness, No cyanosis, No clubbing. Good range of motion in all major joints. No tenderness to palpation or major deformities noted. Back- No tenderness. Integument:  Warm, Dry, No erythema, No rash. Lymphatic:  No lymphadenopathy noted. Neurologic:  Alert & oriented x 3, Normal motor function, Normal sensory function, No focal deficits noted.    Psychiatric:  Affect normal, Judgment normal, Mood normal.     Medications:    apixaban  5 mg Oral BID    digoxin  125 mcg Oral Daily    albuterol  2.5 mg Nebulization Q6H WA    methylPREDNISolone  40 mg Intravenous Q6H    diltiazem  120 mg Oral Daily    insulin lispro  0-12 Units Subcutaneous TID WC    insulin lispro  0-6 Units Subcutaneous Nightly    pneumococcal 13-valent conjugate  0.5 mL Intramuscular Once    sodium chloride flush  10 mL Intravenous 2 times per day    vancomycin  1,750 mg Intravenous Q12H    guaiFENesin  600 mg Oral BID    sodium chloride flush  10 mL Intravenous BID    cyclobenzaprine  10 mg Oral TID    cefepime  2 g Intravenous Q12H    sodium chloride flush  10 mL Intravenous 2 times per day    docusate sodium  100 mg Oral BID    famotidine  20 mg Oral BID    aspirin  325 mg Oral Daily    nebivolol  5 mg Oral Daily    gabapentin  300 mg Oral TID      diltiazem (CARDIZEM) 125 mg in dextrose 5% 125 mL infusion Stopped (05/10/19 1326)    dextrose       levalbuterol, potassium chloride **OR** potassium alternative oral replacement **OR** potassium chloride, sodium chloride flush, ondansetron, sodium chloride flush, ondansetron, acetaminophen, benzonatate, glucose, dextrose, glucagon (rDNA), dextrose, promethazine, hydrOXYzine, oxyCODONE-acetaminophen    Lab Data:  CBC:   Recent Labs     05/08/19 0935 05/09/19  0640 05/10/19  0425   WBC 2.2* 4.1 5.7   HGB 13.8 13.2* 13.0*   HCT 41.2* 40.4* 39.4*   MCV 88.8 90.0 89.7    228 265     BMP:   Recent Labs     05/08/19  0935 05/09/19  0640 05/10/19  0425    139 144   K 3.3* 3.4* 3.7   CL 99 101 102   CO2 24 26 27   BUN 22 25* 27*   CREATININE 0.9 0.9 1.0     LIVER PROFILE:   Recent Labs     05/08/19 0935 05/09/19  0640 05/10/19  0425   * 137* 94*   ALT 71* 105* 127*   BILITOT 0.4 0.4 0.4   ALKPHOS 72 83 95     PT/INR: No results for input(s): PROTIME, INR in the last 72 hours. APTT: No results for input(s): APTT in the last 72 hours. BNP:  No results for input(s): BNP in the last 72 hours. TROPONIN: @TROPONINI:3@      Assessment:  63 y. o.year old who is admitted for          Plan:  1. Afib'; continue cardizem drip, continue lovenox and EP consult  2. Chest pain: atypical, most likely from pneumonia  3. Elevated trop and septal qs pattern on ekg: Once sepsis resolves, will do stress test, for now echo is normal  4. hearling loss: recommend to see ENT out patient  All labs, medications and tests reviewed, continue all other medications of all above medical condition listed as is.       Nba Francois MD 5/10/2019 3:44 PM

## 2019-05-10 NOTE — PROGRESS NOTES
Admit Date:  5/3/2019    Admission diagnosis / Complaint : shortness of breath      Subjective:  Mr. Amauri Foy denies complaints. States he is feeling well. States shortness of breath has improved. Objective:   /83   Pulse 107   Temp 97.9 °F (36.6 °C) (Oral)   Resp 16   Ht 5' 10\" (1.778 m)   Wt 235 lb 0.2 oz (106.6 kg)   SpO2 95%   BMI 33.72 kg/m²       Intake/Output Summary (Last 24 hours) at 5/10/2019 1351  Last data filed at 5/10/2019 0600  Gross per 24 hour   Intake 100 ml   Output 1575 ml   Net -1475 ml       TELEMETRY: Atrial fibrillation    has a past medical history of Diabetes mellitus ([facility] [address] and Hypertension. has a past surgical history that includes other surgical history (Right) and Circumcision, non- (2013).      Physical Exam:  General:  Awake, alert, NAD  Skin:  Warm and dry  Neck:  JVD not present  Chest: bilateral wheeze noted, respiration easy  Cardiovascular:  IRRR S1S2  Abdomen:  Soft nontender  Extremities:  No edema    Medications:    digoxin  125 mcg Oral Daily    albuterol  2.5 mg Nebulization Q6H WA    methylPREDNISolone  40 mg Intravenous Q6H    diltiazem  120 mg Oral Daily    insulin lispro  0-12 Units Subcutaneous TID WC    insulin lispro  0-6 Units Subcutaneous Nightly    pneumococcal 13-valent conjugate  0.5 mL Intramuscular Once    sodium chloride flush  10 mL Intravenous 2 times per day    vancomycin  1,750 mg Intravenous Q12H    guaiFENesin  600 mg Oral BID    enoxaparin  1 mg/kg Subcutaneous BID    sodium chloride flush  10 mL Intravenous BID    cyclobenzaprine  10 mg Oral TID    cefepime  2 g Intravenous Q12H    sodium chloride flush  10 mL Intravenous 2 times per day    docusate sodium  100 mg Oral BID    famotidine  20 mg Oral BID    aspirin  325 mg Oral Daily    nebivolol  5 mg Oral Daily    gabapentin  300 mg Oral TID      diltiazem (CARDIZEM) 125 mg in dextrose 5% 125 mL infusion Stopped (05/10/19 1326)    dextrose physical examination and treatment plan. Necessary changes to the note has been made in history, physical examination and plan to the above note.     Derrick Willard M.D 05/10/19

## 2019-05-10 NOTE — PROGRESS NOTES
Pt ambulated about 200ft around unit using a front wheel walker. Pt tolerated well, no oxygen. HR went up 108. Will continue to encourage ambulation.   Tabatha sOborne RN   6:13 PM  5/10/2019

## 2019-05-10 NOTE — PROGRESS NOTES
[facility]  consulted by Dr. Adarsh Duron for monitoring and adjustment. Indication for treatment: Pneumonia  Goal trough: 15-20 mcg/mL     Pertinent Laboratory Values:   Temp Readings from Last 3 Encounters:   05/10/19 97.9 °F (36.6 °C) (Oral)     Recent Labs     05/08/19  0935 05/09/19  0640 05/10/19  0425   WBC 2.2* 4.1 5.7     Recent Labs     05/08/19  0935 05/09/19  0640 05/10/19  0425   BUN 22 25* 27*   CREATININE 0.9 0.9 1.0     Estimated Creatinine Clearance: 100 mL/min (based on SCr of 1 mg/dL). Intake/Output Summary (Last 24 hours) at 5/10/2019 1624  Last data filed at 5/10/2019 0600  Gross per 24 hour   Intake 100 ml   Output 1575 ml   Net -1475 ml       Pertinent Cultures:  Date    Source    Results  5/4   Blood    NGTD  5/7   Sputum   Pending    Vancomycin level:   TROUGH:    Recent Labs     05/08/19  1650   VANCOTROUGH 16.1     RANDOM:  No results for input(s): VANCORANDOM in the last 72 hours. Assessment:  · WBC and temperature: WBC now in normal range. Patient is afebrile. · SCr, BUN, and urine output: Stable  · Day(s) of therapy: #4  · Vancomycin level: 16.1 mcg/mL - therapeutic (obtained 05-08-19)    Plan:  · Renal labs are stable at this time. · Most recent level is therapeutic @ 16.1 mcg/mL. · Plan to continue current dosing: vancomycin 1750 mg IVPB every 12 hours. · Pharmacy will continue to follow and adjust dosing as appropriate. Thank you for the consult,    Jessica Epperson.  Ernesto Mccauley, [address]  5/10/2019 @ 4:41 PM

## 2019-05-10 NOTE — PROGRESS NOTES
Nutrition Assessment (Low Risk)    Type and Reason for Visit: Initial(los 7)    Nutrition Recommendations:   Continue Carb Control 4 Diet     Nutrition Assessment:  Adequate intake, consuming 76% to all of meals. No reported decreased intake nor wt loss PTA, BMI 33.8. Patient assessed for nutritional risk. Deemed to be at low risk at this time. Will continue to monitor for changes in status.      Malnutrition Assessment:  · Malnutrition Status: No malnutrition    Nutrition Risk Level   Risk Level: Low    Nutrition Diagnosis:   · Problem: No nutrition diagnosis at this time    Nutrition Intervention:  Food and/or Delivery: Continue current diet  Nutrition Education/Counseling/Coordination of Care:  Continued Inpatient Monitoring, Education not appropriate at this time, Coordination of Care      Electronically signed by Pawan Ma RD, [place] on 5/10/19 at 12:38 PM    Contact Number: [phone]

## 2019-05-11 LAB
ALBUMIN SERPL-MCNC: 3.5 GM/DL (ref 3.4–5)
ALP BLD-CCNC: 104 IU/L (ref 40–128)
ALT SERPL-CCNC: 125 U/L (ref 10–40)
ANION GAP SERPL CALCULATED.3IONS-SCNC: 17 MMOL/L (ref 4–16)
AST SERPL-CCNC: 48 IU/L (ref 15–37)
BILIRUB SERPL-MCNC: 0.4 MG/DL (ref 0–1)
BUN BLDV-MCNC: 33 MG/DL (ref 6–23)
CALCIUM SERPL-MCNC: 8.1 MG/DL (ref 8.3–10.6)
CHLORIDE BLD-SCNC: 96 MMOL/L (ref 99–110)
CO2: 24 MMOL/L (ref 21–32)
CREAT SERPL-MCNC: 1.1 MG/DL (ref 0.9–1.3)
GFR AFRICAN AMERICAN: >60 ML/MIN/1.73M2
GFR NON-AFRICAN AMERICAN: >60 ML/MIN/1.73M2
GLUCOSE BLD-MCNC: 239 MG/DL (ref 70–99)
GLUCOSE BLD-MCNC: 254 MG/DL (ref 70–99)
GLUCOSE BLD-MCNC: 362 MG/DL (ref 70–99)
GLUCOSE BLD-MCNC: 371 MG/DL (ref 70–99)
GLUCOSE BLD-MCNC: 407 MG/DL (ref 70–99)
HCT VFR BLD CALC: 43.6 % (ref 42–52)
HEMOGLOBIN: 14.2 GM/DL (ref 13.5–18)
MCH RBC QN AUTO: 29.2 PG (ref 27–31)
MCHC RBC AUTO-ENTMCNC: 32.6 % (ref 32–36)
MCV RBC AUTO: 89.7 FL (ref 78–100)
PDW BLD-RTO: 13.2 % (ref 11.7–14.9)
PLATELET # BLD: 379 K/CU MM (ref 140–440)
PMV BLD AUTO: 10.2 FL (ref 7.5–11.1)
POTASSIUM SERPL-SCNC: 3.8 MMOL/L (ref 3.5–5.1)
RBC # BLD: 4.86 M/CU MM (ref 4.6–6.2)
SODIUM BLD-SCNC: 137 MMOL/L (ref 135–145)
T3 FREE: 2.2 PG/ML (ref 2.3–4.2)
T4 FREE: 1.52 NG/DL (ref 0.9–1.8)
TOTAL PROTEIN: 5.6 GM/DL (ref 6.4–8.2)
TSH HIGH SENSITIVITY: 0.15 UIU/ML (ref 0.27–4.2)
WBC # BLD: 11.1 K/CU MM (ref 4–10.5)

## 2019-05-11 PROCEDURE — 6370000000 HC RX 637 (ALT 250 FOR IP): Performed by: INTERNAL MEDICINE

## 2019-05-11 PROCEDURE — 82962 GLUCOSE BLOOD TEST: CPT

## 2019-05-11 PROCEDURE — 94761 N-INVAS EAR/PLS OXIMETRY MLT: CPT

## 2019-05-11 PROCEDURE — 6360000002 HC RX W HCPCS: Performed by: INTERNAL MEDICINE

## 2019-05-11 PROCEDURE — 99231 SBSQ HOSP IP/OBS SF/LOW 25: CPT | Performed by: INTERNAL MEDICINE

## 2019-05-11 PROCEDURE — 84443 ASSAY THYROID STIM HORMONE: CPT

## 2019-05-11 PROCEDURE — 6370000000 HC RX 637 (ALT 250 FOR IP): Performed by: NURSE PRACTITIONER

## 2019-05-11 PROCEDURE — 84439 ASSAY OF FREE THYROXINE: CPT

## 2019-05-11 PROCEDURE — 2140000000 HC CCU INTERMEDIATE R&B

## 2019-05-11 PROCEDURE — 2580000003 HC RX 258: Performed by: INTERNAL MEDICINE

## 2019-05-11 PROCEDURE — 80053 COMPREHEN METABOLIC PANEL: CPT

## 2019-05-11 PROCEDURE — 94640 AIRWAY INHALATION TREATMENT: CPT

## 2019-05-11 PROCEDURE — 6370000000 HC RX 637 (ALT 250 FOR IP): Performed by: HOSPITALIST

## 2019-05-11 PROCEDURE — 85027 COMPLETE CBC AUTOMATED: CPT

## 2019-05-11 PROCEDURE — 84481 FREE ASSAY (FT-3): CPT

## 2019-05-11 PROCEDURE — 2580000003 HC RX 258: Performed by: HOSPITALIST

## 2019-05-11 PROCEDURE — 36415 COLL VENOUS BLD VENIPUNCTURE: CPT

## 2019-05-11 PROCEDURE — 6360000002 HC RX W HCPCS: Performed by: HOSPITALIST

## 2019-05-11 RX ORDER — ACETAMINOPHEN 80 MG
TABLET,CHEWABLE ORAL
Status: COMPLETED
Start: 2019-05-11 | End: 2019-05-11

## 2019-05-11 RX ORDER — INSULIN GLARGINE 100 [IU]/ML
10 INJECTION, SOLUTION SUBCUTANEOUS NIGHTLY
Status: DISCONTINUED | OUTPATIENT
Start: 2019-05-11 | End: 2019-05-12

## 2019-05-11 RX ORDER — METHYLPREDNISOLONE SODIUM SUCCINATE 40 MG/ML
40 INJECTION, POWDER, LYOPHILIZED, FOR SOLUTION INTRAMUSCULAR; INTRAVENOUS EVERY 8 HOURS
Status: DISCONTINUED | OUTPATIENT
Start: 2019-05-11 | End: 2019-05-12

## 2019-05-11 RX ADMIN — METHYLPREDNISOLONE SODIUM SUCCINATE 40 MG: 40 INJECTION, POWDER, LYOPHILIZED, FOR SOLUTION INTRAMUSCULAR; INTRAVENOUS at 20:45

## 2019-05-11 RX ADMIN — ALBUTEROL SULFATE 2.5 MG: 2.5 SOLUTION RESPIRATORY (INHALATION) at 21:54

## 2019-05-11 RX ADMIN — APIXABAN 5 MG: 5 TABLET, FILM COATED ORAL at 20:44

## 2019-05-11 RX ADMIN — APIXABAN 5 MG: 5 TABLET, FILM COATED ORAL at 08:10

## 2019-05-11 RX ADMIN — FAMOTIDINE 20 MG: 20 TABLET ORAL at 20:44

## 2019-05-11 RX ADMIN — ASPIRIN 325 MG ORAL TABLET 325 MG: 325 PILL ORAL at 08:10

## 2019-05-11 RX ADMIN — CYCLOBENZAPRINE HYDROCHLORIDE 10 MG: 10 TABLET, FILM COATED ORAL at 20:44

## 2019-05-11 RX ADMIN — METHYLPREDNISOLONE SODIUM SUCCINATE 40 MG: 40 INJECTION, POWDER, LYOPHILIZED, FOR SOLUTION INTRAMUSCULAR; INTRAVENOUS at 00:14

## 2019-05-11 RX ADMIN — CYCLOBENZAPRINE HYDROCHLORIDE 10 MG: 10 TABLET, FILM COATED ORAL at 08:11

## 2019-05-11 RX ADMIN — ALBUTEROL SULFATE 2.5 MG: 2.5 SOLUTION RESPIRATORY (INHALATION) at 08:34

## 2019-05-11 RX ADMIN — INSULIN LISPRO 10 UNITS: 100 INJECTION, SOLUTION INTRAVENOUS; SUBCUTANEOUS at 12:03

## 2019-05-11 RX ADMIN — ALBUTEROL SULFATE 2.5 MG: 2.5 SOLUTION RESPIRATORY (INHALATION) at 15:45

## 2019-05-11 RX ADMIN — GABAPENTIN 300 MG: 300 CAPSULE ORAL at 14:27

## 2019-05-11 RX ADMIN — CYCLOBENZAPRINE HYDROCHLORIDE 10 MG: 10 TABLET, FILM COATED ORAL at 14:27

## 2019-05-11 RX ADMIN — OXYCODONE AND ACETAMINOPHEN 1 TABLET: 5; 325 TABLET ORAL at 14:37

## 2019-05-11 RX ADMIN — DOCUSATE SODIUM 100 MG: 100 CAPSULE, LIQUID FILLED ORAL at 08:10

## 2019-05-11 RX ADMIN — CEFEPIME 2 G: 2 INJECTION, POWDER, FOR SOLUTION INTRAVENOUS at 17:05

## 2019-05-11 RX ADMIN — INSULIN LISPRO 3 UNITS: 100 INJECTION, SOLUTION INTRAVENOUS; SUBCUTANEOUS at 20:46

## 2019-05-11 RX ADMIN — Medication: at 08:23

## 2019-05-11 RX ADMIN — INSULIN LISPRO 9 UNITS: 100 INJECTION, SOLUTION INTRAVENOUS; SUBCUTANEOUS at 16:40

## 2019-05-11 RX ADMIN — FAMOTIDINE 20 MG: 20 TABLET ORAL at 08:10

## 2019-05-11 RX ADMIN — GABAPENTIN 300 MG: 300 CAPSULE ORAL at 08:10

## 2019-05-11 RX ADMIN — INSULIN GLARGINE 10 UNITS: 100 INJECTION, SOLUTION SUBCUTANEOUS at 22:26

## 2019-05-11 RX ADMIN — VANCOMYCIN HYDROCHLORIDE 1750 MG: 5 INJECTION, POWDER, LYOPHILIZED, FOR SOLUTION INTRAVENOUS at 04:57

## 2019-05-11 RX ADMIN — SODIUM CHLORIDE, PRESERVATIVE FREE 10 ML: 5 INJECTION INTRAVENOUS at 20:45

## 2019-05-11 RX ADMIN — OXYCODONE AND ACETAMINOPHEN 1 TABLET: 5; 325 TABLET ORAL at 08:22

## 2019-05-11 RX ADMIN — DILTIAZEM HYDROCHLORIDE 120 MG: 120 CAPSULE, COATED, EXTENDED RELEASE ORAL at 08:11

## 2019-05-11 RX ADMIN — NEBIVOLOL HYDROCHLORIDE 5 MG: 10 TABLET ORAL at 08:10

## 2019-05-11 RX ADMIN — GABAPENTIN 300 MG: 300 CAPSULE ORAL at 20:44

## 2019-05-11 RX ADMIN — BENZONATATE 200 MG: 100 CAPSULE ORAL at 20:45

## 2019-05-11 RX ADMIN — SODIUM CHLORIDE, PRESERVATIVE FREE 10 ML: 5 INJECTION INTRAVENOUS at 08:12

## 2019-05-11 RX ADMIN — GUAIFENESIN 600 MG: 600 TABLET, EXTENDED RELEASE ORAL at 20:44

## 2019-05-11 RX ADMIN — SODIUM CHLORIDE, PRESERVATIVE FREE 10 ML: 5 INJECTION INTRAVENOUS at 08:11

## 2019-05-11 RX ADMIN — METHYLPREDNISOLONE SODIUM SUCCINATE 40 MG: 40 INJECTION, POWDER, LYOPHILIZED, FOR SOLUTION INTRAMUSCULAR; INTRAVENOUS at 04:28

## 2019-05-11 RX ADMIN — METHYLPREDNISOLONE SODIUM SUCCINATE 40 MG: 40 INJECTION, POWDER, LYOPHILIZED, FOR SOLUTION INTRAMUSCULAR; INTRAVENOUS at 14:27

## 2019-05-11 RX ADMIN — OXYCODONE AND ACETAMINOPHEN 1 TABLET: 5; 325 TABLET ORAL at 20:44

## 2019-05-11 RX ADMIN — DIGOXIN 125 MCG: 125 TABLET ORAL at 08:10

## 2019-05-11 RX ADMIN — INSULIN LISPRO 10 UNITS: 100 INJECTION, SOLUTION INTRAVENOUS; SUBCUTANEOUS at 08:15

## 2019-05-11 RX ADMIN — CEFEPIME 2 G: 2 INJECTION, POWDER, FOR SOLUTION INTRAVENOUS at 04:28

## 2019-05-11 RX ADMIN — GUAIFENESIN 600 MG: 600 TABLET, EXTENDED RELEASE ORAL at 08:10

## 2019-05-11 ASSESSMENT — PAIN SCALES - GENERAL
PAINLEVEL_OUTOF10: 7
PAINLEVEL_OUTOF10: 6
PAINLEVEL_OUTOF10: 6
PAINLEVEL_OUTOF10: 7

## 2019-05-11 ASSESSMENT — PAIN DESCRIPTION - PROGRESSION
CLINICAL_PROGRESSION: GRADUALLY WORSENING

## 2019-05-11 ASSESSMENT — PAIN DESCRIPTION - FREQUENCY: FREQUENCY: CONTINUOUS

## 2019-05-11 ASSESSMENT — PAIN DESCRIPTION - PAIN TYPE: TYPE: CHRONIC PAIN

## 2019-05-11 ASSESSMENT — PAIN DESCRIPTION - ONSET: ONSET: ON-GOING

## 2019-05-11 ASSESSMENT — PAIN DESCRIPTION - ORIENTATION: ORIENTATION: MID;LOWER

## 2019-05-11 ASSESSMENT — PAIN SCALES - WONG BAKER
WONGBAKER_NUMERICALRESPONSE: 0
WONGBAKER_NUMERICALRESPONSE: 0

## 2019-05-11 ASSESSMENT — PAIN DESCRIPTION - LOCATION: LOCATION: BACK

## 2019-05-11 ASSESSMENT — PAIN DESCRIPTION - DESCRIPTORS: DESCRIPTORS: ACHING

## 2019-05-11 NOTE — PROGRESS NOTES
No JVD   Chest:  Clear to auscultation, respiration easy  Cardiovascular:  RRR S1S2  Abdomen:   nontender  Extremities:  tr edema  Pulses; palpable  Neuro: grossly normal    Medications:    apixaban  5 mg Oral BID    digoxin  125 mcg Oral Daily    albuterol  2.5 mg Nebulization Q6H WA    methylPREDNISolone  40 mg Intravenous Q6H    diltiazem  120 mg Oral Daily    insulin lispro  0-12 Units Subcutaneous TID WC    insulin lispro  0-6 Units Subcutaneous Nightly    pneumococcal 13-valent conjugate  0.5 mL Intramuscular Once    sodium chloride flush  10 mL Intravenous 2 times per day    vancomycin  1,750 mg Intravenous Q12H    guaiFENesin  600 mg Oral BID    sodium chloride flush  10 mL Intravenous BID    cyclobenzaprine  10 mg Oral TID    cefepime  2 g Intravenous Q12H    sodium chloride flush  10 mL Intravenous 2 times per day    docusate sodium  100 mg Oral BID    famotidine  20 mg Oral BID    aspirin  325 mg Oral Daily    nebivolol  5 mg Oral Daily    gabapentin  300 mg Oral TID      diltiazem (CARDIZEM) 125 mg in dextrose 5% 125 mL infusion Stopped (05/10/19 1326)    dextrose       levalbuterol, potassium chloride **OR** potassium alternative oral replacement **OR** potassium chloride, sodium chloride flush, ondansetron, sodium chloride flush, ondansetron, acetaminophen, benzonatate, glucose, dextrose, glucagon (rDNA), dextrose, promethazine, hydrOXYzine, oxyCODONE-acetaminophen    Lab Data:  CBC:   Recent Labs     05/08/19 0935 05/09/19  0640 05/10/19  0425   WBC 2.2* 4.1 5.7   HGB 13.8 13.2* 13.0*   HCT 41.2* 40.4* 39.4*   MCV 88.8 90.0 89.7    228 265     BMP:   Recent Labs     05/08/19  0935 05/09/19  0640 05/10/19  0425    139 144   K 3.3* 3.4* 3.7   CL 99 101 102   CO2 24 26 27   BUN 22 25* 27*   CREATININE 0.9 0.9 1.0     LIVER PROFILE:   Recent Labs     05/08/19  0935 05/09/19  0640 05/10/19  0425   * 137* 94*   ALT 71* 105* 127*   BILITOT 0.4 0.4 0.4   ALKPHOS 72 83 95     PT/INR: No results for input(s): PROTIME, INR in the last 72 hours. APTT: No results for input(s): APTT in the last 72 hours. BNP:  No results for input(s): BNP in the last 72 hours.   TROPONIN: @TROPONINI:3@  Labs, consult, tests reviewed    Assessment/ PLAN:    As above                  Lacy Gustafson MD 5/11/2019 8:49 AM

## 2019-05-11 NOTE — PLAN OF CARE
Problem: Pain:  Goal: Pain level will decrease  Description  Pain level will decrease  5/11/2019 0222 by Jeniffer Silveira RN  Outcome: Ongoing  5/10/2019 1845 by Harmony Howell RN  Outcome: Ongoing  Goal: Control of acute pain  Description  Control of acute pain  5/11/2019 0222 by Jeniffer Silveira RN  Outcome: Ongoing  5/10/2019 1845 by Harmony Howell RN  Outcome: Ongoing  Goal: Control of chronic pain  Description  Control of chronic pain  5/11/2019 0222 by Jeniffer Silveira RN  Outcome: Ongoing  5/10/2019 1845 by Harmony Howell RN  Outcome: Ongoing     Problem: Risk for Impaired Skin Integrity  Goal: Tissue integrity - skin and mucous membranes  Description  Structural intactness and normal physiological function of skin and  mucous membranes.   5/11/2019 0222 by Jeniffer Silveira RN  Outcome: Ongoing  5/10/2019 1845 by Harmony Howell RN  Outcome: Ongoing

## 2019-05-11 NOTE — PROGRESS NOTES
sodium chloride flush 10 mL PRN   ondansetron 4 mg Q30 Min PRN   sodium chloride flush 10 mL PRN   ondansetron 4 mg Q6H PRN   acetaminophen 650 mg Q4H PRN   benzonatate 200 mg TID PRN   glucose 15 g PRN   dextrose 12.5 g PRN   glucagon (rDNA) 1 mg PRN   dextrose 100 mL/hr PRN   promethazine 12.5 mg Q6H PRN   hydrOXYzine 50 mg Q6H PRN   oxyCODONE-acetaminophen 1 tablet Q4H PRN       Recent Labs     05/09/19  0640 05/10/19  0425 05/11/19  1118   WBC 4.1 5.7 11.1*   HGB 13.2* 13.0* 14.2   HCT 40.4* 39.4* 43.6    265 379      Recent Labs     05/09/19  0640 05/10/19  0425 05/11/19  1118    144 137   K 3.4* 3.7 3.8    102 96*   CO2 26 27 24   BUN 25* 27* 33*   CREATININE 0.9 1.0 1.1     Recent Labs     05/09/19  0640 05/10/19  0425 05/11/19  1118   * 94* 48*   * 127* 125*   BILITOT 0.4 0.4 0.4   ALKPHOS 83 95 104     Imaging reviewed    Electronically signed by Cuca Mills MD on 5/11/2019 at 12:34 PM

## 2019-05-12 LAB
ALBUMIN SERPL-MCNC: 3.4 GM/DL (ref 3.4–5)
ALP BLD-CCNC: 93 IU/L (ref 40–128)
ALT SERPL-CCNC: 106 U/L (ref 10–40)
ANION GAP SERPL CALCULATED.3IONS-SCNC: 13 MMOL/L (ref 4–16)
AST SERPL-CCNC: 42 IU/L (ref 15–37)
BILIRUB SERPL-MCNC: 0.3 MG/DL (ref 0–1)
BUN BLDV-MCNC: 32 MG/DL (ref 6–23)
CALCIUM SERPL-MCNC: 8.1 MG/DL (ref 8.3–10.6)
CHLORIDE BLD-SCNC: 101 MMOL/L (ref 99–110)
CO2: 29 MMOL/L (ref 21–32)
CREAT SERPL-MCNC: 1.1 MG/DL (ref 0.9–1.3)
GFR AFRICAN AMERICAN: >60 ML/MIN/1.73M2
GFR NON-AFRICAN AMERICAN: >60 ML/MIN/1.73M2
GLUCOSE BLD-MCNC: 240 MG/DL (ref 70–99)
GLUCOSE BLD-MCNC: 243 MG/DL (ref 70–99)
GLUCOSE BLD-MCNC: 299 MG/DL (ref 70–99)
GLUCOSE BLD-MCNC: 305 MG/DL (ref 70–99)
GLUCOSE BLD-MCNC: 360 MG/DL (ref 70–99)
HCT VFR BLD CALC: 41.3 % (ref 42–52)
HEMOGLOBIN: 13.4 GM/DL (ref 13.5–18)
MCH RBC QN AUTO: 29.3 PG (ref 27–31)
MCHC RBC AUTO-ENTMCNC: 32.4 % (ref 32–36)
MCV RBC AUTO: 90.2 FL (ref 78–100)
PDW BLD-RTO: 13.2 % (ref 11.7–14.9)
PLATELET # BLD: 384 K/CU MM (ref 140–440)
PMV BLD AUTO: 10.2 FL (ref 7.5–11.1)
POTASSIUM SERPL-SCNC: 4 MMOL/L (ref 3.5–5.1)
RBC # BLD: 4.58 M/CU MM (ref 4.6–6.2)
SODIUM BLD-SCNC: 143 MMOL/L (ref 135–145)
TOTAL PROTEIN: 5.3 GM/DL (ref 6.4–8.2)
WBC # BLD: 10.9 K/CU MM (ref 4–10.5)

## 2019-05-12 PROCEDURE — 6370000000 HC RX 637 (ALT 250 FOR IP): Performed by: NURSE PRACTITIONER

## 2019-05-12 PROCEDURE — 2140000000 HC CCU INTERMEDIATE R&B

## 2019-05-12 PROCEDURE — 6360000002 HC RX W HCPCS: Performed by: INTERNAL MEDICINE

## 2019-05-12 PROCEDURE — 2580000003 HC RX 258: Performed by: HOSPITALIST

## 2019-05-12 PROCEDURE — 2580000003 HC RX 258: Performed by: INTERNAL MEDICINE

## 2019-05-12 PROCEDURE — 80053 COMPREHEN METABOLIC PANEL: CPT

## 2019-05-12 PROCEDURE — 94761 N-INVAS EAR/PLS OXIMETRY MLT: CPT

## 2019-05-12 PROCEDURE — 6370000000 HC RX 637 (ALT 250 FOR IP): Performed by: INTERNAL MEDICINE

## 2019-05-12 PROCEDURE — 85027 COMPLETE CBC AUTOMATED: CPT

## 2019-05-12 PROCEDURE — 36591 DRAW BLOOD OFF VENOUS DEVICE: CPT

## 2019-05-12 PROCEDURE — 94640 AIRWAY INHALATION TREATMENT: CPT

## 2019-05-12 PROCEDURE — 6360000002 HC RX W HCPCS: Performed by: HOSPITALIST

## 2019-05-12 PROCEDURE — 82962 GLUCOSE BLOOD TEST: CPT

## 2019-05-12 PROCEDURE — 99231 SBSQ HOSP IP/OBS SF/LOW 25: CPT | Performed by: INTERNAL MEDICINE

## 2019-05-12 PROCEDURE — 6370000000 HC RX 637 (ALT 250 FOR IP): Performed by: HOSPITALIST

## 2019-05-12 RX ORDER — METHYLPREDNISOLONE SODIUM SUCCINATE 40 MG/ML
40 INJECTION, POWDER, LYOPHILIZED, FOR SOLUTION INTRAMUSCULAR; INTRAVENOUS EVERY 12 HOURS
Status: DISCONTINUED | OUTPATIENT
Start: 2019-05-12 | End: 2019-05-13

## 2019-05-12 RX ORDER — INSULIN GLARGINE 100 [IU]/ML
25 INJECTION, SOLUTION SUBCUTANEOUS NIGHTLY
Status: DISCONTINUED | OUTPATIENT
Start: 2019-05-12 | End: 2019-05-17 | Stop reason: HOSPADM

## 2019-05-12 RX ADMIN — GUAIFENESIN 600 MG: 600 TABLET, EXTENDED RELEASE ORAL at 08:21

## 2019-05-12 RX ADMIN — CYCLOBENZAPRINE HYDROCHLORIDE 10 MG: 10 TABLET, FILM COATED ORAL at 15:23

## 2019-05-12 RX ADMIN — APIXABAN 5 MG: 5 TABLET, FILM COATED ORAL at 08:20

## 2019-05-12 RX ADMIN — FAMOTIDINE 20 MG: 20 TABLET ORAL at 20:29

## 2019-05-12 RX ADMIN — GUAIFENESIN 600 MG: 600 TABLET, EXTENDED RELEASE ORAL at 20:28

## 2019-05-12 RX ADMIN — INSULIN LISPRO 10 UNITS: 100 INJECTION, SOLUTION INTRAVENOUS; SUBCUTANEOUS at 12:44

## 2019-05-12 RX ADMIN — DIGOXIN 125 MCG: 125 TABLET ORAL at 08:20

## 2019-05-12 RX ADMIN — OXYCODONE AND ACETAMINOPHEN 1 TABLET: 5; 325 TABLET ORAL at 05:23

## 2019-05-12 RX ADMIN — INSULIN LISPRO 3 UNITS: 100 INJECTION, SOLUTION INTRAVENOUS; SUBCUTANEOUS at 20:34

## 2019-05-12 RX ADMIN — GABAPENTIN 300 MG: 300 CAPSULE ORAL at 08:20

## 2019-05-12 RX ADMIN — OXYCODONE AND ACETAMINOPHEN 1 TABLET: 5; 325 TABLET ORAL at 15:25

## 2019-05-12 RX ADMIN — INSULIN GLARGINE 25 UNITS: 100 INJECTION, SOLUTION SUBCUTANEOUS at 20:34

## 2019-05-12 RX ADMIN — SODIUM CHLORIDE, PRESERVATIVE FREE 10 ML: 5 INJECTION INTRAVENOUS at 12:47

## 2019-05-12 RX ADMIN — CYCLOBENZAPRINE HYDROCHLORIDE 10 MG: 10 TABLET, FILM COATED ORAL at 20:29

## 2019-05-12 RX ADMIN — BENZONATATE 200 MG: 100 CAPSULE ORAL at 05:23

## 2019-05-12 RX ADMIN — METHYLPREDNISOLONE SODIUM SUCCINATE 40 MG: 40 INJECTION, POWDER, LYOPHILIZED, FOR SOLUTION INTRAMUSCULAR; INTRAVENOUS at 05:09

## 2019-05-12 RX ADMIN — OXYCODONE AND ACETAMINOPHEN 1 TABLET: 5; 325 TABLET ORAL at 20:35

## 2019-05-12 RX ADMIN — FAMOTIDINE 20 MG: 20 TABLET ORAL at 08:20

## 2019-05-12 RX ADMIN — ALBUTEROL SULFATE 2.5 MG: 2.5 SOLUTION RESPIRATORY (INHALATION) at 21:19

## 2019-05-12 RX ADMIN — CEFEPIME 2 G: 2 INJECTION, POWDER, FOR SOLUTION INTRAVENOUS at 16:34

## 2019-05-12 RX ADMIN — GABAPENTIN 300 MG: 300 CAPSULE ORAL at 20:28

## 2019-05-12 RX ADMIN — INSULIN LISPRO 12 UNITS: 100 INJECTION, SOLUTION INTRAVENOUS; SUBCUTANEOUS at 12:39

## 2019-05-12 RX ADMIN — DOCUSATE SODIUM 100 MG: 100 CAPSULE, LIQUID FILLED ORAL at 08:20

## 2019-05-12 RX ADMIN — INSULIN LISPRO 15 UNITS: 100 INJECTION, SOLUTION INTRAVENOUS; SUBCUTANEOUS at 08:24

## 2019-05-12 RX ADMIN — ALBUTEROL SULFATE 2.5 MG: 2.5 SOLUTION RESPIRATORY (INHALATION) at 07:43

## 2019-05-12 RX ADMIN — ASPIRIN 325 MG ORAL TABLET 325 MG: 325 PILL ORAL at 08:21

## 2019-05-12 RX ADMIN — GABAPENTIN 300 MG: 300 CAPSULE ORAL at 15:23

## 2019-05-12 RX ADMIN — SODIUM CHLORIDE, PRESERVATIVE FREE 10 ML: 5 INJECTION INTRAVENOUS at 20:44

## 2019-05-12 RX ADMIN — CYCLOBENZAPRINE HYDROCHLORIDE 10 MG: 10 TABLET, FILM COATED ORAL at 08:20

## 2019-05-12 RX ADMIN — CEFEPIME 2 G: 2 INJECTION, POWDER, FOR SOLUTION INTRAVENOUS at 05:08

## 2019-05-12 RX ADMIN — NEBIVOLOL HYDROCHLORIDE 5 MG: 10 TABLET ORAL at 08:20

## 2019-05-12 RX ADMIN — INSULIN LISPRO 6 UNITS: 100 INJECTION, SOLUTION INTRAVENOUS; SUBCUTANEOUS at 16:34

## 2019-05-12 RX ADMIN — APIXABAN 5 MG: 5 TABLET, FILM COATED ORAL at 20:28

## 2019-05-12 RX ADMIN — INSULIN LISPRO 10 UNITS: 100 INJECTION, SOLUTION INTRAVENOUS; SUBCUTANEOUS at 16:37

## 2019-05-12 RX ADMIN — OXYCODONE AND ACETAMINOPHEN 1 TABLET: 5; 325 TABLET ORAL at 10:13

## 2019-05-12 RX ADMIN — ALBUTEROL SULFATE 2.5 MG: 2.5 SOLUTION RESPIRATORY (INHALATION) at 14:00

## 2019-05-12 RX ADMIN — DILTIAZEM HYDROCHLORIDE 120 MG: 120 CAPSULE, COATED, EXTENDED RELEASE ORAL at 08:20

## 2019-05-12 RX ADMIN — METHYLPREDNISOLONE SODIUM SUCCINATE 40 MG: 40 INJECTION, POWDER, LYOPHILIZED, FOR SOLUTION INTRAMUSCULAR; INTRAVENOUS at 17:12

## 2019-05-12 ASSESSMENT — PAIN SCALES - GENERAL
PAINLEVEL_OUTOF10: 4
PAINLEVEL_OUTOF10: 6
PAINLEVEL_OUTOF10: 6
PAINLEVEL_OUTOF10: 4
PAINLEVEL_OUTOF10: 7
PAINLEVEL_OUTOF10: 7
PAINLEVEL_OUTOF10: 6
PAINLEVEL_OUTOF10: 7

## 2019-05-12 ASSESSMENT — PAIN DESCRIPTION - FREQUENCY
FREQUENCY: CONTINUOUS

## 2019-05-12 ASSESSMENT — PAIN DESCRIPTION - ONSET
ONSET: ON-GOING

## 2019-05-12 ASSESSMENT — PAIN DESCRIPTION - ORIENTATION
ORIENTATION: MID
ORIENTATION: MID;LOWER
ORIENTATION: MID;LOWER
ORIENTATION: MID
ORIENTATION: MID;LOWER

## 2019-05-12 ASSESSMENT — PAIN DESCRIPTION - PAIN TYPE
TYPE: CHRONIC PAIN

## 2019-05-12 ASSESSMENT — PAIN DESCRIPTION - LOCATION
LOCATION: BACK

## 2019-05-12 ASSESSMENT — PAIN DESCRIPTION - DESCRIPTORS
DESCRIPTORS: CONSTANT;THROBBING;ACHING
DESCRIPTORS: CONSTANT;ACHING
DESCRIPTORS: CONSTANT;ACHING
DESCRIPTORS: CONSTANT;ACHING;THROBBING

## 2019-05-12 ASSESSMENT — PAIN SCALES - WONG BAKER: WONGBAKER_NUMERICALRESPONSE: 0

## 2019-05-12 ASSESSMENT — PAIN DESCRIPTION - PROGRESSION: CLINICAL_PROGRESSION: GRADUALLY WORSENING

## 2019-05-12 ASSESSMENT — PULMONARY FUNCTION TESTS: PEFR_L/MIN: 3

## 2019-05-12 NOTE — PLAN OF CARE
Problem: Pain:  Description  Pain management should include both nonpharmacologic and pharmacologic interventions. Goal: Pain level will decrease  Description  Pain level will decrease  Outcome: Ongoing  Goal: Control of acute pain  Description  Control of acute pain  Outcome: Ongoing  Goal: Control of chronic pain  Description  Control of chronic pain  Outcome: Ongoing     Problem: Risk for Impaired Skin Integrity  Goal: Tissue integrity - skin and mucous membranes  Description  Structural intactness and normal physiological function of skin and  mucous membranes.   Outcome: Ongoing

## 2019-05-12 NOTE — PROGRESS NOTES
Hospitalist Progress Note      Name:  King Chinchilla /Age/Sex: 1960  (63 y.o. male)   MRN & CSN:  [id number] & [id number] Admission Date/Time: 5/3/2019  1:12 PM   Location:  [facility]/3112 PCP: Jose Carlos Walter MD         Hospital Day: 10    Assessment and Plan:   King Chinchilla is a 63 y.o.  male  who presents with Pneumonia    1. Pneumonia: RVP +ve Adenovirus. CXR  with interval worsening of a left mid lung zone opacity and right infrahilar opacity. NC at 4 LPM. .6 19. Continue Cefepime. Sputum culture polymicorbial Corynebacterium, Staphylococcus, and MRSA pending. Legionella and Strep urine negative. Taper Steroid  2. AF in RVR: on Cardizem infusion D/C, therapeutic Lovenox, Eliquis started. Cardiology on consult. EP on consult. Started on Cardizem, Digoxin. Started PO. Still with episodes of RVR. May need to increase Cardizem. 3. Elevated Troponin: for Stress test once sepsis is resolved. 4. Type 2 DM: Last A1C. Lantus 10 units, Sliding scale. Hypoglycemia protocol. Accucheck. Hold oral hypoglycemic agents for now   5. Renal Cyst: outpatient follow up  6. Hypokalemia: replace accordingly. Magnesium  7. Leukopenia: could be from Adenovirus  8. Low TSH: T3 and T4 normal. Could be from sick euthyroid syndrome    Diet DIET CARB CONTROL;   DVT Prophylaxis [x] Lovenox, []  Heparin, [] SCDs, [] Warfarin  [] NOAC     GI Prophylaxis [] PPI,  [x] H2 Blocker,  [] Carafate,  [] Diet/Tube Feeds   Code Status Full Code   MDM [] Low, [] Moderate,[x]  High     History of Present Illness:     Chief Complaint: Pneumonia    He was seen and examined. Sob is better. Cough is better. Still with episodes of RVR. Ten point ROS reviewed negative, unless as noted above    Objective:        Intake/Output Summary (Last 24 hours) at 2019 1123  Last data filed at 2019 0600  Gross per 24 hour   Intake 1240 ml   Output 1300 ml   Net -60 ml      Vitals:   Vitals:    19 0822   BP:    Pulse:

## 2019-05-13 ENCOUNTER — APPOINTMENT (OUTPATIENT)
Dept: GENERAL RADIOLOGY | Age: 59
DRG: 193 | End: 2019-05-13
Payer: COMMERCIAL

## 2019-05-13 LAB
ALBUMIN SERPL-MCNC: 3.5 GM/DL (ref 3.4–5)
ALP BLD-CCNC: 99 IU/L (ref 40–128)
ALT SERPL-CCNC: 94 U/L (ref 10–40)
ANION GAP SERPL CALCULATED.3IONS-SCNC: 13 MMOL/L (ref 4–16)
AST SERPL-CCNC: 36 IU/L (ref 15–37)
BILIRUB SERPL-MCNC: 0.3 MG/DL (ref 0–1)
BUN BLDV-MCNC: 33 MG/DL (ref 6–23)
CALCIUM SERPL-MCNC: 8 MG/DL (ref 8.3–10.6)
CHLORIDE BLD-SCNC: 101 MMOL/L (ref 99–110)
CO2: 29 MMOL/L (ref 21–32)
CREAT SERPL-MCNC: 1.1 MG/DL (ref 0.9–1.3)
DIGOXIN LEVEL: 0.6 NG/ML (ref 0.8–2)
DOSE AMOUNT: ABNORMAL
DOSE TIME: ABNORMAL
GFR AFRICAN AMERICAN: >60 ML/MIN/1.73M2
GFR NON-AFRICAN AMERICAN: >60 ML/MIN/1.73M2
GLUCOSE BLD-MCNC: 120 MG/DL (ref 70–99)
GLUCOSE BLD-MCNC: 210 MG/DL (ref 70–99)
GLUCOSE BLD-MCNC: 231 MG/DL (ref 70–99)
GLUCOSE BLD-MCNC: 238 MG/DL (ref 70–99)
GLUCOSE BLD-MCNC: 260 MG/DL (ref 70–99)
HCT VFR BLD CALC: 32 % (ref 42–52)
HEMOGLOBIN: 10.4 GM/DL (ref 13.5–18)
MCH RBC QN AUTO: 29.8 PG (ref 27–31)
MCHC RBC AUTO-ENTMCNC: 32.5 % (ref 32–36)
MCV RBC AUTO: 91.7 FL (ref 78–100)
PDW BLD-RTO: 13.4 % (ref 11.7–14.9)
PLATELET # BLD: 532 K/CU MM (ref 140–440)
PMV BLD AUTO: 10.5 FL (ref 7.5–11.1)
POTASSIUM SERPL-SCNC: 3.7 MMOL/L (ref 3.5–5.1)
RBC # BLD: 3.49 M/CU MM (ref 4.6–6.2)
SODIUM BLD-SCNC: 143 MMOL/L (ref 135–145)
TOTAL PROTEIN: 5.4 GM/DL (ref 6.4–8.2)
WBC # BLD: 17.2 K/CU MM (ref 4–10.5)

## 2019-05-13 PROCEDURE — 94761 N-INVAS EAR/PLS OXIMETRY MLT: CPT

## 2019-05-13 PROCEDURE — 99232 SBSQ HOSP IP/OBS MODERATE 35: CPT | Performed by: INTERNAL MEDICINE

## 2019-05-13 PROCEDURE — 6360000002 HC RX W HCPCS: Performed by: INTERNAL MEDICINE

## 2019-05-13 PROCEDURE — 6360000002 HC RX W HCPCS: Performed by: HOSPITALIST

## 2019-05-13 PROCEDURE — 6370000000 HC RX 637 (ALT 250 FOR IP): Performed by: INTERNAL MEDICINE

## 2019-05-13 PROCEDURE — 6370000000 HC RX 637 (ALT 250 FOR IP): Performed by: NURSE PRACTITIONER

## 2019-05-13 PROCEDURE — 2580000003 HC RX 258: Performed by: NURSE PRACTITIONER

## 2019-05-13 PROCEDURE — 2140000000 HC CCU INTERMEDIATE R&B

## 2019-05-13 PROCEDURE — 2580000003 HC RX 258: Performed by: INTERNAL MEDICINE

## 2019-05-13 PROCEDURE — APPSS30 APP SPLIT SHARED TIME 16-30 MINUTES: Performed by: NURSE PRACTITIONER

## 2019-05-13 PROCEDURE — 36593 DECLOT VASCULAR DEVICE: CPT

## 2019-05-13 PROCEDURE — 2580000003 HC RX 258: Performed by: HOSPITALIST

## 2019-05-13 PROCEDURE — 94640 AIRWAY INHALATION TREATMENT: CPT

## 2019-05-13 PROCEDURE — 6370000000 HC RX 637 (ALT 250 FOR IP): Performed by: HOSPITALIST

## 2019-05-13 PROCEDURE — 82962 GLUCOSE BLOOD TEST: CPT

## 2019-05-13 PROCEDURE — 80053 COMPREHEN METABOLIC PANEL: CPT

## 2019-05-13 PROCEDURE — 80162 ASSAY OF DIGOXIN TOTAL: CPT

## 2019-05-13 PROCEDURE — 85027 COMPLETE CBC AUTOMATED: CPT

## 2019-05-13 PROCEDURE — 71046 X-RAY EXAM CHEST 2 VIEWS: CPT

## 2019-05-13 RX ORDER — DIGOXIN 0.25 MG/ML
250 INJECTION INTRAMUSCULAR; INTRAVENOUS ONCE
Status: COMPLETED | OUTPATIENT
Start: 2019-05-13 | End: 2019-05-13

## 2019-05-13 RX ORDER — DIGOXIN 125 MCG
125 TABLET ORAL ONCE
Status: COMPLETED | OUTPATIENT
Start: 2019-05-13 | End: 2019-05-13

## 2019-05-13 RX ORDER — DIGOXIN 250 MCG
250 TABLET ORAL DAILY
Status: DISCONTINUED | OUTPATIENT
Start: 2019-05-14 | End: 2019-05-16

## 2019-05-13 RX ORDER — METHYLPREDNISOLONE SODIUM SUCCINATE 40 MG/ML
40 INJECTION, POWDER, LYOPHILIZED, FOR SOLUTION INTRAMUSCULAR; INTRAVENOUS DAILY
Status: DISCONTINUED | OUTPATIENT
Start: 2019-05-14 | End: 2019-05-17 | Stop reason: HOSPADM

## 2019-05-13 RX ADMIN — NEBIVOLOL HYDROCHLORIDE 5 MG: 10 TABLET ORAL at 08:59

## 2019-05-13 RX ADMIN — SODIUM CHLORIDE, PRESERVATIVE FREE 10 ML: 5 INJECTION INTRAVENOUS at 20:56

## 2019-05-13 RX ADMIN — APIXABAN 5 MG: 5 TABLET, FILM COATED ORAL at 08:58

## 2019-05-13 RX ADMIN — GUAIFENESIN 600 MG: 600 TABLET, EXTENDED RELEASE ORAL at 08:59

## 2019-05-13 RX ADMIN — OXYCODONE AND ACETAMINOPHEN 1 TABLET: 5; 325 TABLET ORAL at 06:20

## 2019-05-13 RX ADMIN — OXYCODONE AND ACETAMINOPHEN 1 TABLET: 5; 325 TABLET ORAL at 09:23

## 2019-05-13 RX ADMIN — CEFEPIME 2 G: 2 INJECTION, POWDER, FOR SOLUTION INTRAVENOUS at 05:32

## 2019-05-13 RX ADMIN — GABAPENTIN 300 MG: 300 CAPSULE ORAL at 20:52

## 2019-05-13 RX ADMIN — OXYCODONE AND ACETAMINOPHEN 1 TABLET: 5; 325 TABLET ORAL at 13:53

## 2019-05-13 RX ADMIN — ALBUTEROL SULFATE 2.5 MG: 2.5 SOLUTION RESPIRATORY (INHALATION) at 20:11

## 2019-05-13 RX ADMIN — DIGOXIN 125 MCG: 125 TABLET ORAL at 12:32

## 2019-05-13 RX ADMIN — INSULIN LISPRO 9 UNITS: 100 INJECTION, SOLUTION INTRAVENOUS; SUBCUTANEOUS at 09:05

## 2019-05-13 RX ADMIN — DILTIAZEM HYDROCHLORIDE 120 MG: 120 CAPSULE, COATED, EXTENDED RELEASE ORAL at 08:58

## 2019-05-13 RX ADMIN — GUAIFENESIN 600 MG: 600 TABLET, EXTENDED RELEASE ORAL at 20:52

## 2019-05-13 RX ADMIN — BENZONATATE 200 MG: 100 CAPSULE ORAL at 09:23

## 2019-05-13 RX ADMIN — CYCLOBENZAPRINE HYDROCHLORIDE 10 MG: 10 TABLET, FILM COATED ORAL at 08:59

## 2019-05-13 RX ADMIN — INSULIN LISPRO 10 UNITS: 100 INJECTION, SOLUTION INTRAVENOUS; SUBCUTANEOUS at 12:35

## 2019-05-13 RX ADMIN — DIGOXIN 125 MCG: 125 TABLET ORAL at 08:59

## 2019-05-13 RX ADMIN — OXYCODONE AND ACETAMINOPHEN 1 TABLET: 5; 325 TABLET ORAL at 18:58

## 2019-05-13 RX ADMIN — GABAPENTIN 300 MG: 300 CAPSULE ORAL at 13:53

## 2019-05-13 RX ADMIN — SODIUM CHLORIDE, PRESERVATIVE FREE 10 ML: 5 INJECTION INTRAVENOUS at 20:54

## 2019-05-13 RX ADMIN — ALBUTEROL SULFATE 2.5 MG: 2.5 SOLUTION RESPIRATORY (INHALATION) at 08:48

## 2019-05-13 RX ADMIN — INSULIN LISPRO 10 UNITS: 100 INJECTION, SOLUTION INTRAVENOUS; SUBCUTANEOUS at 09:06

## 2019-05-13 RX ADMIN — FAMOTIDINE 20 MG: 20 TABLET ORAL at 20:52

## 2019-05-13 RX ADMIN — METHYLPREDNISOLONE SODIUM SUCCINATE 40 MG: 40 INJECTION, POWDER, LYOPHILIZED, FOR SOLUTION INTRAMUSCULAR; INTRAVENOUS at 05:38

## 2019-05-13 RX ADMIN — DIGOXIN 250 MCG: 0.25 INJECTION INTRAMUSCULAR; INTRAVENOUS at 09:18

## 2019-05-13 RX ADMIN — CYCLOBENZAPRINE HYDROCHLORIDE 10 MG: 10 TABLET, FILM COATED ORAL at 13:53

## 2019-05-13 RX ADMIN — OXYCODONE AND ACETAMINOPHEN 1 TABLET: 5; 325 TABLET ORAL at 02:01

## 2019-05-13 RX ADMIN — FAMOTIDINE 20 MG: 20 TABLET ORAL at 08:58

## 2019-05-13 RX ADMIN — APIXABAN 5 MG: 5 TABLET, FILM COATED ORAL at 20:52

## 2019-05-13 RX ADMIN — ALTEPLASE 1 MG: 2.2 INJECTION, POWDER, LYOPHILIZED, FOR SOLUTION INTRAVENOUS at 09:06

## 2019-05-13 RX ADMIN — GABAPENTIN 300 MG: 300 CAPSULE ORAL at 08:59

## 2019-05-13 RX ADMIN — CEFEPIME 2 G: 2 INJECTION, POWDER, FOR SOLUTION INTRAVENOUS at 16:45

## 2019-05-13 RX ADMIN — SODIUM CHLORIDE, PRESERVATIVE FREE 10 ML: 5 INJECTION INTRAVENOUS at 20:55

## 2019-05-13 RX ADMIN — INSULIN LISPRO 10 UNITS: 100 INJECTION, SOLUTION INTRAVENOUS; SUBCUTANEOUS at 16:30

## 2019-05-13 RX ADMIN — INSULIN LISPRO 6 UNITS: 100 INJECTION, SOLUTION INTRAVENOUS; SUBCUTANEOUS at 16:27

## 2019-05-13 RX ADMIN — INSULIN LISPRO 6 UNITS: 100 INJECTION, SOLUTION INTRAVENOUS; SUBCUTANEOUS at 12:32

## 2019-05-13 RX ADMIN — ALBUTEROL SULFATE 2.5 MG: 2.5 SOLUTION RESPIRATORY (INHALATION) at 02:25

## 2019-05-13 RX ADMIN — INSULIN GLARGINE 25 UNITS: 100 INJECTION, SOLUTION SUBCUTANEOUS at 20:52

## 2019-05-13 RX ADMIN — SODIUM CHLORIDE, PRESERVATIVE FREE 10 ML: 5 INJECTION INTRAVENOUS at 08:59

## 2019-05-13 RX ADMIN — ASPIRIN 325 MG ORAL TABLET 325 MG: 325 PILL ORAL at 08:59

## 2019-05-13 RX ADMIN — DOCUSATE SODIUM 100 MG: 100 CAPSULE, LIQUID FILLED ORAL at 20:51

## 2019-05-13 RX ADMIN — CYCLOBENZAPRINE HYDROCHLORIDE 10 MG: 10 TABLET, FILM COATED ORAL at 20:51

## 2019-05-13 ASSESSMENT — PAIN SCALES - GENERAL
PAINLEVEL_OUTOF10: 0
PAINLEVEL_OUTOF10: 7
PAINLEVEL_OUTOF10: 0
PAINLEVEL_OUTOF10: 5
PAINLEVEL_OUTOF10: 7
PAINLEVEL_OUTOF10: 0
PAINLEVEL_OUTOF10: 7

## 2019-05-13 ASSESSMENT — PAIN DESCRIPTION - PAIN TYPE: TYPE: CHRONIC PAIN

## 2019-05-13 ASSESSMENT — PAIN DESCRIPTION - LOCATION: LOCATION: BACK

## 2019-05-13 NOTE — PROGRESS NOTES
[facility] [place]  [address], [address] [facility],[address]  Phone: [phone]    Fax [phone]                  Serena Faith MD, Solomon Thurston MD, [address], MD, MD Emmy Simon MD Bertina Goods, MD Tobie Lapping, ASMITA Cordon, APRN Manual Schaumann, ASMITA    Cardiology Progress Note     Today's Plan: HR control    Admit Date:  5/3/2019    Consult reason/ Seen today for: Atrial Fib    Subjective and  Overnight Events:  He says he is feeling a little better. He is still SOB on exertion, but states it is improved. Assessment / Plan / Recommendation:     1. Elevated Troponin : will continue medical management for now. Continue Aspirin and anti anginal therapy. DAPT   2. Paroxysmal afib: CHADs score is 1, HR Elevated, dig given this morning. Will increase dose and recheck level wednesday. 3. HTN: stable, continue medications  4. DVT prophylaxis if not contraindicated while in the hospital.     History of Presenting Illness:    Chief complain on admission : 63 y. o.year old who is admitted for  Chief Complaint   Patient presents with    Fatigue    Nausea    Dizziness    Chest Pain        Past medical history:    has a past medical history of Diabetes mellitus ([address] and Hypertension. Past surgical history:   has a past surgical history that includes other surgical history (Right) and Circumcision, non- (2013). Social History:   reports that he has been smoking cigarettes. He has a 7.50 pack-year smoking history. He has never used smokeless tobacco. He reports that he does not drink alcohol or use drugs. Family history:  family history is not on file.     No Known Allergies    Review of Systems:   All 14 systems were reviewed and are negative  Except for the positive findings  which as documented     BP (!) 164/96   Pulse 137   Temp 97.9 °F (36.6 °C) (Axillary)   Resp 24   Ht 5' 10\" (1.778 m)   Wt 234 lb 12.8 oz (106.5 kg)   SpO2 93%   BMI 33.69 kg/m²       Intake/Output Summary (Last 24 hours) at 5/13/2019 1132  Last data filed at 5/13/2019 0641  Gross per 24 hour   Intake 480 ml   Output 575 ml   Net -95 ml       Physical Exam:  Constitutional:  Well developed, Well nourished, No acute distress, Non-toxic appearance. HENT:  Normocephalic, Atraumatic, Bilateral external ears normal, Oropharynx moist, No oral exudates, Nose normal. Neck- Normal range of motion, No tenderness, Supple, No stridor. Eyes:  PERRL, EOMI, Conjunctiva normal, No discharge. Respiratory:  Normal breath sounds, No respiratory distress, No wheezing, No chest tenderness. Cardiovascular:  Normal heart rate, Normal rhythm, No murmurs appreciated, No rubs appreciated, No gallops appreciated, JVP not elevated  Abdomen/GI:  Bowel sounds normal, Soft, No tenderness, No masses, No pulsatile masses. Musculoskeletal:  Intact distal pulses, No edema, No tenderness, No cyanosis, No clubbing. Good range of motion in all major joints. No tenderness to palpation or major deformities noted. Back- No tenderness. Integument:  Warm, Dry, No erythema, No rash. Lymphatic:  No lymphadenopathy noted. Neurologic:  Alert & oriented x 3, Normal motor function, Normal sensory function, No focal deficits noted.    Psychiatric:  Affect  and  Mood : improved    Medications:    methylPREDNISolone  40 mg Intravenous Q12H    insulin glargine  25 Units Subcutaneous Nightly    insulin lispro  10 Units Subcutaneous TID WC    insulin lispro  0-18 Units Subcutaneous TID WC    insulin lispro  0-9 Units Subcutaneous Nightly    apixaban  5 mg Oral BID    digoxin  125 mcg Oral Daily    albuterol  2.5 mg Nebulization Q6H WA    diltiazem  120 mg Oral Daily    pneumococcal 13-valent conjugate  0.5 mL Intramuscular Once    sodium chloride flush  10 mL Intravenous 2 times per day    guaiFENesin  600 mg Oral BID    sodium chloride flush  10 mL Intravenous BID  cyclobenzaprine  10 mg Oral TID    cefepime  2 g Intravenous Q12H    sodium chloride flush  10 mL Intravenous 2 times per day    docusate sodium  100 mg Oral BID    famotidine  20 mg Oral BID    aspirin  325 mg Oral Daily    nebivolol  5 mg Oral Daily    gabapentin  300 mg Oral TID      diltiazem (CARDIZEM) 125 mg in dextrose 5% 125 mL infusion Stopped (05/10/19 1326)    dextrose       levalbuterol, potassium chloride **OR** potassium alternative oral replacement **OR** potassium chloride, sodium chloride flush, ondansetron, sodium chloride flush, ondansetron, acetaminophen, benzonatate, glucose, dextrose, glucagon (rDNA), dextrose, promethazine, hydrOXYzine, oxyCODONE-acetaminophen    Lab Data:  CBC:   Recent Labs     05/11/19  1118 05/12/19  0515 05/13/19  0615   WBC 11.1* 10.9* 17.2*   HGB 14.2 13.4* 10.4*   HCT 43.6 41.3* 32.0*   MCV 89.7 90.2 91.7    384 532*     BMP:   Recent Labs     05/11/19 1118 05/12/19  0515 05/13/19  0615    143 143   K 3.8 4.0 3.7   CL 96* 101 101   CO2 24 29 29   BUN 33* 32* 33*   CREATININE 1.1 1.1 1.1     PT/INR: No results for input(s): PROTIME, INR in the last 72 hours. BNP:  No results for input(s): PROBNP in the last 72 hours. TROPONIN: No results for input(s): TROPONINT in the last 72 hours. ECHO :   Echocardiogram 5/6/2019 Limited study due to patients body habitus. Patient high heart rate unable to lie flat due to SOB.   Left ventricular function is low normal, EF is estimated at 50-55 %.   Mild left ventricular hypertrophy.   No significant valvular abnormalities.   No evidence of pericardial effusion. All labs, medications and tests reviewed by myself , continue all other medications of all above medical condition listed as is except for changes mentioned above. Thank you very much for consult , please call with questions.     Electronically signed by ASMITA Nunez CNP on 5/13/2019 at 11:32 AM    I have seen ,spoken to  and examined this patient personally, independently of the nurse practitioner. I have reviewed the hospital care given to date and reviewed all pertinent labs and imaging. The plan was developed mutually at the time of the visit with the patient,  NP  and myself. I have spoken with patient, nursing staff and provided written and verbal instructions . The above note has been reviewed and I agree with the assessment, diagnosis, and treatment plan with changes made by me as follows     CARDIOLOGY ATTENDING ADDENDUM    HPI:  I have reviewed the above HPI  And agree with above   Perla Potts is a 63 y. o.year old who and presents with had concerns including Fatigue; Nausea; Dizziness; and Chest Pain. Chief Complaint   Patient presents with    Fatigue    Nausea    Dizziness    Chest Pain     Interval history:  HR high    Physical Exam:  General:  Awake, alert, NAD  Head:normal  Eye:normal  Neck:  No JVD   Chest:  Clear to auscultation, respiration easy  Cardiovascular:  RRR S1S2  Abdomen:   nontender  Extremities:  no edema  Pulses; palpable  Neuro: grossly normal      MEDICAL DECISION MAKING;    I agree with the above plan, which was planned by myself and discussed with NP.   HR is high, increase dig  Will increase cardizem if needed          Martin Garcia MD [address] [facility]

## 2019-05-13 NOTE — CONSULTS
Consult completed. 5Fr Double-Lumen PICC to RUE Basilic Vein assessed: Red lumen is patent, returning blood easily and flushing without any resistance; Purple Lumen is at-risk, returning blood sluggishly and flushing with moderate resistance. Sterile dressing change performed with SkinPrep, WinGuard Securement Device, BioPatch, and new MaxPlus Caps & SwabCaps; no change to patency noted. Procedure/rationale explained including benefits vs potential risks/complications; questions answered & consent obtained. Cathflo instilled into PICC Purple Lumen and LockCap applied - Philippe Campos, Primary RN, notified. After 44min dwell time Cathflo aspirated out along with 10ml blood waste per protocol; PICC Purple Lumen now returns blood easily and flushes without any resistance. Turbulent flush performed and new cap/SwabCap applied. Pt denies other c/o or needs. Philippe Campos, Primary RN, notified.

## 2019-05-13 NOTE — PLAN OF CARE
Problem: Pain:  Goal: Pain level will decrease  Description  Pain level will decrease  5/13/2019 1126 by Jessica Douglas RN  Outcome: Ongoing  5/12/2019 2313 by Maylin Merchant RN  Outcome: Ongoing  Goal: Control of acute pain  Description  Control of acute pain  5/13/2019 1126 by Jessica Douglas RN  Outcome: Ongoing  5/12/2019 2313 by Maylin Merchant RN  Outcome: Ongoing  Goal: Control of chronic pain  Description  Control of chronic pain  5/13/2019 1126 by Jessica Douglas RN  Outcome: Ongoing  5/12/2019 2313 by Maylin Merchant RN  Outcome: Ongoing     Problem: Risk for Impaired Skin Integrity  Goal: Tissue integrity - skin and mucous membranes  Description  Structural intactness and normal physiological function of skin and  mucous membranes.   5/13/2019 1126 by Jessica Douglas RN  Outcome: Ongoing  5/12/2019 2313 by Maylin Merchant RN  Outcome: Ongoing     Problem: Cardiac Output - Decreased:  Goal: Hemodynamic stability will improve  Description  Hemodynamic stability will improve  5/13/2019 1126 by Jessica Douglas RN  Outcome: Ongoing  5/12/2019 2313 by Maylin Merchant RN  Outcome: Ongoing     Problem: Breathing Pattern - Ineffective:  Goal: Ability to achieve and maintain a regular respiratory rate will improve  Description  Ability to achieve and maintain a regular respiratory rate will improve  5/13/2019 1126 by Jessica Douglas RN  Outcome: Ongoing  5/12/2019 2313 by Maylin Merchant RN  Outcome: Ongoing

## 2019-05-13 NOTE — PROGRESS NOTES
Hospitalist Progress Note      Name:  Edwin Self /Age/Sex: 1960  (63 y.o. male)   MRN & CSN:  [id number] & [id number] Admission Date/Time: 5/3/2019  1:12 PM   Location:  [address] [place], [place] [address] PCP: Hedy Holland MD         Hospital Day: 11    Assessment and Plan:   Edwin Self is a 63 y.o.  male  who presents with Pneumonia    1. Pneumonia: RVP +ve Adenovirus. CXR  with interval worsening of a left mid lung zone opacity and right infrahilar opacity. NC at 4 LPM. .6 19. Continue Cefepime. Sputum culture polymicorbial Corynebacterium, Staphylococcus, and MRSA pending. Legionella and Strep urine negative. Now on Cefepime. Taper Steroid. Repeat CXR  2. AF in RVR: on Cardizem infusion D/C, therapeutic Lovenox, Eliquis started. Cardiology on consult. EP on consult. Started on Cardizem, Digoxin. Started PO. Still with episodes of RVR. May need to increase Cardizem. 3. Elevated Troponin: for Stress test once sepsis is resolved. 4. Type 2 DM: Last A1C. Lantus 25 units, Humalog 10 untis TID. Sliding scale. Hypoglycemia protocol. Accucheck. Hold oral hypoglycemic agents for now   5. Renal Cyst: outpatient follow up  6. Hypokalemia: replace accordingly. Magnesium  7. Leukopenia: could be from Adenovirus  8. Low TSH: T3 and T4 normal. Could be from sick euthyroid syndrome    Diet DIET CARB CONTROL;   DVT Prophylaxis [x] Lovenox, []  Heparin, [] SCDs, [] Warfarin  [] NOAC     GI Prophylaxis [] PPI,  [x] H2 Blocker,  [] Carafate,  [] Diet/Tube Feeds   Code Status Full Code   MDM [] Low, [] Moderate,[x]  High     History of Present Illness:     Chief Complaint: Pneumonia    He was seen and examined. Wheezing has improved. SOB and cough better. Denied chest pain. Has palpitations, worse with any movement. Ten point ROS reviewed negative, unless as noted above    Objective:        Intake/Output Summary (Last 24 hours) at 2019 1200  Last data filed at 2019 0641  Gross per 24 hour   Intake 480 ml   Output 575 ml   Net -95 ml      Vitals:   Vitals:    05/13/19 0918   BP:    Pulse: 137   Resp:    Temp:    SpO2:      Physical Exam:   GEN Awake male, sitting upright in bed in no apparent distress. Appears given age. EYES Pupils are equally round. No scleral erythema, discharge, or conjunctivitis. HENT Mucous membranes are moist. Oral pharynx without exudates, no evidence of thrush. NECK Supple, no apparent thyromegaly or masses. RESP Occasional crackles and wheezing. CARDIO/VASC S1/S2 auscultated. Irregularly irregular. No JVD or carotid bruits. Peripheral pulses equal bilaterally and palpable. No peripheral edema. GI Abdomen is soft without significant tenderness, masses, or guarding. Bowel sounds are normoactive. Rectal exam deferred. MSK No gross joint deformities. SKIN Normal coloration, warm, dry. NEURO Cranial nerves appear grossly intact, normal speech, no lateralizing weakness. PSYCH Awake, alert, oriented x 4. Affect appropriate.     Medications:   Medications:    digoxin  125 mcg Oral Once    [START ON 5/14/2019] digoxin  250 mcg Oral Daily    methylPREDNISolone  40 mg Intravenous Q12H    insulin glargine  25 Units Subcutaneous Nightly    insulin lispro  10 Units Subcutaneous TID WC    insulin lispro  0-18 Units Subcutaneous TID WC    insulin lispro  0-9 Units Subcutaneous Nightly    apixaban  5 mg Oral BID    albuterol  2.5 mg Nebulization Q6H WA    diltiazem  120 mg Oral Daily    pneumococcal 13-valent conjugate  0.5 mL Intramuscular Once    sodium chloride flush  10 mL Intravenous 2 times per day    guaiFENesin  600 mg Oral BID    sodium chloride flush  10 mL Intravenous BID    cyclobenzaprine  10 mg Oral TID    cefepime  2 g Intravenous Q12H    sodium chloride flush  10 mL Intravenous 2 times per day    docusate sodium  100 mg Oral BID    famotidine  20 mg Oral BID    aspirin  325 mg Oral Daily    nebivolol  5 mg Oral Daily    gabapentin  300 mg Oral TID Infusions:    diltiazem (CARDIZEM) 125 mg in dextrose 5% 125 mL infusion Stopped (05/10/19 1326)    dextrose       PRN Meds:     levalbuterol 0.63 mg Q6H PRN   potassium chloride 40 mEq PRN   Or     potassium alternative oral replacement 40 mEq PRN   Or     potassium chloride 10 mEq PRN   sodium chloride flush 10 mL PRN   ondansetron 4 mg Q30 Min PRN   sodium chloride flush 10 mL PRN   ondansetron 4 mg Q6H PRN   acetaminophen 650 mg Q4H PRN   benzonatate 200 mg TID PRN   glucose 15 g PRN   dextrose 12.5 g PRN   glucagon (rDNA) 1 mg PRN   dextrose 100 mL/hr PRN   promethazine 12.5 mg Q6H PRN   hydrOXYzine 50 mg Q6H PRN   oxyCODONE-acetaminophen 1 tablet Q4H PRN       Recent Labs     05/11/19  1118 05/12/19  0515 05/13/19  0615   WBC 11.1* 10.9* 17.2*   HGB 14.2 13.4* 10.4*   HCT 43.6 41.3* 32.0*    384 532*      Recent Labs     05/11/19  1118 05/12/19  0515 05/13/19  0615    143 143   K 3.8 4.0 3.7   CL 96* 101 101   CO2 24 29 29   BUN 33* 32* 33*   CREATININE 1.1 1.1 1.1     Recent Labs     05/11/19  1118 05/12/19  0515 05/13/19  0615   AST 48* 42* 36   * 106* 94*   BILITOT 0.4 0.3 0.3   ALKPHOS 104 93 99     Imaging reviewed    Electronically signed by Camille Martin MD on 5/13/2019 at 12:00 PM

## 2019-05-14 LAB
ALBUMIN SERPL-MCNC: 3.4 GM/DL (ref 3.4–5)
ALP BLD-CCNC: 92 IU/L (ref 40–128)
ALT SERPL-CCNC: 82 U/L (ref 10–40)
ANION GAP SERPL CALCULATED.3IONS-SCNC: 11 MMOL/L (ref 4–16)
AST SERPL-CCNC: 32 IU/L (ref 15–37)
BILIRUB SERPL-MCNC: 0.4 MG/DL (ref 0–1)
BUN BLDV-MCNC: 29 MG/DL (ref 6–23)
CALCIUM SERPL-MCNC: 8.2 MG/DL (ref 8.3–10.6)
CHLORIDE BLD-SCNC: 103 MMOL/L (ref 99–110)
CO2: 31 MMOL/L (ref 21–32)
CREAT SERPL-MCNC: 1 MG/DL (ref 0.9–1.3)
GFR AFRICAN AMERICAN: >60 ML/MIN/1.73M2
GFR NON-AFRICAN AMERICAN: >60 ML/MIN/1.73M2
GLUCOSE BLD-MCNC: 108 MG/DL (ref 70–99)
GLUCOSE BLD-MCNC: 202 MG/DL (ref 70–99)
GLUCOSE BLD-MCNC: 209 MG/DL (ref 70–99)
GLUCOSE BLD-MCNC: 256 MG/DL (ref 70–99)
GLUCOSE BLD-MCNC: 89 MG/DL (ref 70–99)
HCT VFR BLD CALC: 43.2 % (ref 42–52)
HEMOGLOBIN: 14.1 GM/DL (ref 13.5–18)
MCH RBC QN AUTO: 29.7 PG (ref 27–31)
MCHC RBC AUTO-ENTMCNC: 32.6 % (ref 32–36)
MCV RBC AUTO: 91.1 FL (ref 78–100)
PDW BLD-RTO: 13.3 % (ref 11.7–14.9)
PLATELET # BLD: 381 K/CU MM (ref 140–440)
PMV BLD AUTO: 10.2 FL (ref 7.5–11.1)
POTASSIUM SERPL-SCNC: 3.9 MMOL/L (ref 3.5–5.1)
RBC # BLD: 4.74 M/CU MM (ref 4.6–6.2)
SODIUM BLD-SCNC: 145 MMOL/L (ref 135–145)
TOTAL PROTEIN: 5.3 GM/DL (ref 6.4–8.2)
WBC # BLD: 16.4 K/CU MM (ref 4–10.5)

## 2019-05-14 PROCEDURE — 2580000003 HC RX 258: Performed by: INTERNAL MEDICINE

## 2019-05-14 PROCEDURE — 94640 AIRWAY INHALATION TREATMENT: CPT

## 2019-05-14 PROCEDURE — 80053 COMPREHEN METABOLIC PANEL: CPT

## 2019-05-14 PROCEDURE — 2140000000 HC CCU INTERMEDIATE R&B

## 2019-05-14 PROCEDURE — 99219 PR INITIAL OBSERVATION CARE/DAY 50 MINUTES: CPT | Performed by: NURSE PRACTITIONER

## 2019-05-14 PROCEDURE — 2580000003 HC RX 258: Performed by: HOSPITALIST

## 2019-05-14 PROCEDURE — 6360000002 HC RX W HCPCS: Performed by: INTERNAL MEDICINE

## 2019-05-14 PROCEDURE — 6370000000 HC RX 637 (ALT 250 FOR IP): Performed by: INTERNAL MEDICINE

## 2019-05-14 PROCEDURE — 6370000000 HC RX 637 (ALT 250 FOR IP): Performed by: HOSPITALIST

## 2019-05-14 PROCEDURE — 6370000000 HC RX 637 (ALT 250 FOR IP): Performed by: NURSE PRACTITIONER

## 2019-05-14 PROCEDURE — 2580000003 HC RX 258: Performed by: NURSE PRACTITIONER

## 2019-05-14 PROCEDURE — 36592 COLLECT BLOOD FROM PICC: CPT

## 2019-05-14 PROCEDURE — 99232 SBSQ HOSP IP/OBS MODERATE 35: CPT | Performed by: INTERNAL MEDICINE

## 2019-05-14 PROCEDURE — 82962 GLUCOSE BLOOD TEST: CPT

## 2019-05-14 PROCEDURE — 85027 COMPLETE CBC AUTOMATED: CPT

## 2019-05-14 PROCEDURE — 6360000002 HC RX W HCPCS: Performed by: HOSPITALIST

## 2019-05-14 PROCEDURE — 94761 N-INVAS EAR/PLS OXIMETRY MLT: CPT

## 2019-05-14 RX ADMIN — DOCUSATE SODIUM 100 MG: 100 CAPSULE, LIQUID FILLED ORAL at 20:11

## 2019-05-14 RX ADMIN — FAMOTIDINE 20 MG: 20 TABLET ORAL at 07:50

## 2019-05-14 RX ADMIN — ASPIRIN 325 MG ORAL TABLET 325 MG: 325 PILL ORAL at 07:50

## 2019-05-14 RX ADMIN — CYCLOBENZAPRINE HYDROCHLORIDE 10 MG: 10 TABLET, FILM COATED ORAL at 07:51

## 2019-05-14 RX ADMIN — ALBUTEROL SULFATE 2.5 MG: 2.5 SOLUTION RESPIRATORY (INHALATION) at 15:28

## 2019-05-14 RX ADMIN — OXYCODONE AND ACETAMINOPHEN 1 TABLET: 5; 325 TABLET ORAL at 11:17

## 2019-05-14 RX ADMIN — INSULIN LISPRO 10 UNITS: 100 INJECTION, SOLUTION INTRAVENOUS; SUBCUTANEOUS at 17:12

## 2019-05-14 RX ADMIN — DILTIAZEM HYDROCHLORIDE 120 MG: 120 CAPSULE, COATED, EXTENDED RELEASE ORAL at 07:50

## 2019-05-14 RX ADMIN — APIXABAN 5 MG: 5 TABLET, FILM COATED ORAL at 07:51

## 2019-05-14 RX ADMIN — INSULIN LISPRO 9 UNITS: 100 INJECTION, SOLUTION INTRAVENOUS; SUBCUTANEOUS at 11:34

## 2019-05-14 RX ADMIN — CYCLOBENZAPRINE HYDROCHLORIDE 10 MG: 10 TABLET, FILM COATED ORAL at 15:15

## 2019-05-14 RX ADMIN — GUAIFENESIN 600 MG: 600 TABLET, EXTENDED RELEASE ORAL at 20:12

## 2019-05-14 RX ADMIN — INSULIN GLARGINE 25 UNITS: 100 INJECTION, SOLUTION SUBCUTANEOUS at 20:12

## 2019-05-14 RX ADMIN — CEFEPIME 2 G: 2 INJECTION, POWDER, FOR SOLUTION INTRAVENOUS at 15:16

## 2019-05-14 RX ADMIN — METHYLPREDNISOLONE SODIUM SUCCINATE 40 MG: 40 INJECTION, POWDER, LYOPHILIZED, FOR SOLUTION INTRAMUSCULAR; INTRAVENOUS at 07:50

## 2019-05-14 RX ADMIN — OXYCODONE AND ACETAMINOPHEN 1 TABLET: 5; 325 TABLET ORAL at 06:19

## 2019-05-14 RX ADMIN — SODIUM CHLORIDE, PRESERVATIVE FREE 10 ML: 5 INJECTION INTRAVENOUS at 07:52

## 2019-05-14 RX ADMIN — BENZONATATE 200 MG: 100 CAPSULE ORAL at 07:50

## 2019-05-14 RX ADMIN — NEBIVOLOL HYDROCHLORIDE 5 MG: 10 TABLET ORAL at 07:50

## 2019-05-14 RX ADMIN — OXYCODONE AND ACETAMINOPHEN 1 TABLET: 5; 325 TABLET ORAL at 15:15

## 2019-05-14 RX ADMIN — GABAPENTIN 300 MG: 300 CAPSULE ORAL at 15:15

## 2019-05-14 RX ADMIN — FAMOTIDINE 20 MG: 20 TABLET ORAL at 20:12

## 2019-05-14 RX ADMIN — OXYCODONE AND ACETAMINOPHEN 1 TABLET: 5; 325 TABLET ORAL at 01:52

## 2019-05-14 RX ADMIN — INSULIN LISPRO 6 UNITS: 100 INJECTION, SOLUTION INTRAVENOUS; SUBCUTANEOUS at 17:08

## 2019-05-14 RX ADMIN — DULOXETINE HYDROCHLORIDE 250 MCG: 30 CAPSULE, DELAYED RELEASE ORAL at 07:50

## 2019-05-14 RX ADMIN — CYCLOBENZAPRINE HYDROCHLORIDE 10 MG: 10 TABLET, FILM COATED ORAL at 20:11

## 2019-05-14 RX ADMIN — SODIUM CHLORIDE, PRESERVATIVE FREE 10 ML: 5 INJECTION INTRAVENOUS at 20:15

## 2019-05-14 RX ADMIN — ALBUTEROL SULFATE 2.5 MG: 2.5 SOLUTION RESPIRATORY (INHALATION) at 08:27

## 2019-05-14 RX ADMIN — OXYCODONE AND ACETAMINOPHEN 1 TABLET: 5; 325 TABLET ORAL at 20:11

## 2019-05-14 RX ADMIN — INSULIN LISPRO 3 UNITS: 100 INJECTION, SOLUTION INTRAVENOUS; SUBCUTANEOUS at 20:12

## 2019-05-14 RX ADMIN — GABAPENTIN 300 MG: 300 CAPSULE ORAL at 07:50

## 2019-05-14 RX ADMIN — SODIUM CHLORIDE, PRESERVATIVE FREE 10 ML: 5 INJECTION INTRAVENOUS at 07:50

## 2019-05-14 RX ADMIN — GABAPENTIN 300 MG: 300 CAPSULE ORAL at 20:11

## 2019-05-14 RX ADMIN — ALBUTEROL SULFATE 2.5 MG: 2.5 SOLUTION RESPIRATORY (INHALATION) at 19:36

## 2019-05-14 RX ADMIN — APIXABAN 5 MG: 5 TABLET, FILM COATED ORAL at 20:12

## 2019-05-14 RX ADMIN — CEFEPIME 2 G: 2 INJECTION, POWDER, FOR SOLUTION INTRAVENOUS at 04:16

## 2019-05-14 RX ADMIN — GUAIFENESIN 600 MG: 600 TABLET, EXTENDED RELEASE ORAL at 07:50

## 2019-05-14 RX ADMIN — INSULIN LISPRO 10 UNITS: 100 INJECTION, SOLUTION INTRAVENOUS; SUBCUTANEOUS at 11:34

## 2019-05-14 ASSESSMENT — PAIN SCALES - GENERAL
PAINLEVEL_OUTOF10: 0
PAINLEVEL_OUTOF10: 7
PAINLEVEL_OUTOF10: 0
PAINLEVEL_OUTOF10: 7
PAINLEVEL_OUTOF10: 2
PAINLEVEL_OUTOF10: 7

## 2019-05-14 ASSESSMENT — PAIN DESCRIPTION - DESCRIPTORS
DESCRIPTORS: ACHING
DESCRIPTORS: ACHING
DESCRIPTORS: ACHING;DISCOMFORT;PRESSURE

## 2019-05-14 ASSESSMENT — PAIN DESCRIPTION - PAIN TYPE
TYPE: CHRONIC PAIN

## 2019-05-14 ASSESSMENT — PAIN DESCRIPTION - FREQUENCY
FREQUENCY: CONTINUOUS

## 2019-05-14 ASSESSMENT — PAIN DESCRIPTION - ORIENTATION
ORIENTATION: LOWER
ORIENTATION: LOWER;MID
ORIENTATION: LOWER

## 2019-05-14 ASSESSMENT — PAIN DESCRIPTION - ONSET
ONSET: ON-GOING
ONSET: ON-GOING

## 2019-05-14 ASSESSMENT — PAIN DESCRIPTION - LOCATION
LOCATION: BACK

## 2019-05-14 NOTE — PROGRESS NOTES
Hospitalist Progress Note      Name:  Karena Zambrano /Age/Sex: 1960  (63 y.o. male)   MRN & CSN:  [id number] & [id number] Admission Date/Time: 5/3/2019  1:12 PM   Location:  [address] [place], [place] [address] PCP: Simon Rodriguez MD         Hospital Day: 12    Assessment and Plan:   Karena Zambrano is a 63 y.o.  male  who presents with Pneumonia    1. Pneumonia: RVP +ve Adenovirus. CXR  with interval worsening of a left mid lung zone opacity and right infrahilar opacity. NC at 4 LPM. .6 19. Continue Cefepime. Sputum culture polymicorbial Corynebacterium, Staphylococcus, and MRSA pending. Legionella and Strep urine negative. Now on Cefepime. Taper Steroid. Repeat CXR OP recommended  2. AF in RVR: on Cardizem infusion D/C, therapeutic Lovenox, Eliquis started. Cardiology on consult. EP on consult. Started on Cardizem, Digoxin. Started PO. Still with episodes of RVR. Cardioversion planned  3. Elevated Troponin: for Stress test once sepsis is resolved. 4. Type 2 DM: Last A1C. Lantus 25 units, Humalog 10 untis TID. Sliding scale. Hypoglycemia protocol. Accucheck. Hold oral hypoglycemic agents for now   5. Renal Cyst: outpatient follow up  6. Hypokalemia: replace accordingly. Magnesium  7. Leukopenia: could be from Adenovirus  8. Low TSH: T3 and T4 normal. Could be from sick euthyroid syndrome    Diet DIET CARB CONTROL;   DVT Prophylaxis [x] Lovenox, []  Heparin, [] SCDs, [] Warfarin  [] NOAC     GI Prophylaxis [] PPI,  [x] H2 Blocker,  [] Carafate,  [] Diet/Tube Feeds   Code Status Full Code   MDM [] Low, [] Moderate,[x]  High     History of Present Illness:     Chief Complaint: Pneumonia    Reported dyspnea, no chest pain, no abd pain, no N/V, no F/C. Ten point ROS reviewed negative, unless as noted above    Objective:        Intake/Output Summary (Last 24 hours) at 2019 0956  Last data filed at 2019 0636  Gross per 24 hour   Intake 150 ml   Output 400 ml   Net -250 ml      Vitals:   Vitals: 05/14/19 0829   BP:    Pulse:    Resp: 20   Temp:    SpO2: 95%     Physical Exam:   GEN Awake male, sitting upright in bed in no apparent distress. Appears given age. RESP  wheezing. CARDIO/VASC S1/S2 auscultated. Irregularly irregular. GI Abdomen is soft without significant tenderness, or guarding. Bowel sounds are normoactive. MSK No gross joint deformities. SKIN Normal coloration, warm, dry. NEURO  normal speech, no lateralizing weakness. PSYCH Awake, alert, oriented x 4. Affect appropriate.     Medications:   Medications:    digoxin  250 mcg Oral Daily    methylPREDNISolone  40 mg Intravenous Daily    insulin glargine  25 Units Subcutaneous Nightly    insulin lispro  10 Units Subcutaneous TID WC    insulin lispro  0-18 Units Subcutaneous TID WC    insulin lispro  0-9 Units Subcutaneous Nightly    apixaban  5 mg Oral BID    albuterol  2.5 mg Nebulization Q6H WA    diltiazem  120 mg Oral Daily    pneumococcal 13-valent conjugate  0.5 mL Intramuscular Once    sodium chloride flush  10 mL Intravenous 2 times per day    guaiFENesin  600 mg Oral BID    sodium chloride flush  10 mL Intravenous BID    cyclobenzaprine  10 mg Oral TID    cefepime  2 g Intravenous Q12H    sodium chloride flush  10 mL Intravenous 2 times per day    docusate sodium  100 mg Oral BID    famotidine  20 mg Oral BID    aspirin  325 mg Oral Daily    nebivolol  5 mg Oral Daily    gabapentin  300 mg Oral TID      Infusions:    diltiazem (CARDIZEM) 125 mg in dextrose 5% 125 mL infusion Stopped (05/10/19 1326)    dextrose       PRN Meds:     levalbuterol 0.63 mg Q6H PRN   potassium chloride 40 mEq PRN   Or     potassium alternative oral replacement 40 mEq PRN   Or     potassium chloride 10 mEq PRN   sodium chloride flush 10 mL PRN   ondansetron 4 mg Q30 Min PRN   sodium chloride flush 10 mL PRN   ondansetron 4 mg Q6H PRN   acetaminophen 650 mg Q4H PRN   benzonatate 200 mg TID PRN   glucose 15 g PRN   dextrose 12.5 g

## 2019-05-14 NOTE — PROGRESS NOTES
Admit Date:  5/3/2019    Admission diagnosis / Complaint: shortness of breath      Subjective:  Mr. Zahida Coe is on room air. States he is feeling better. Reports palpitations with ambulation. States SOB has improved. Objective:   BP (!) 131/93   Pulse 91   Temp 98.1 °F (36.7 °C) (Oral)   Resp 13   Ht 5' 10\" (1.778 m)   Wt 234 lb 12.8 oz (106.5 kg)   SpO2 98%   BMI 33.69 kg/m²       Intake/Output Summary (Last 24 hours) at 2019 1519  Last data filed at 2019 0636  Gross per 24 hour   Intake 150 ml   Output 400 ml   Net -250 ml       TELEMETRY: Atrial fibrillation    has a past medical history of Diabetes mellitus ([facility] [address] and Hypertension. has a past surgical history that includes other surgical history (Right) and Circumcision, non- (2013).      Physical Exam:  General:  Awake, alert, NAD  Skin:  Warm and dry  Neck:  JVD not present  Chest: bilateral lower base wheeze noted, respiration easy  Cardiovascular:  IRRR S1S2  Abdomen:  Soft nontender  Extremities:  No edema    Medications:    digoxin  250 mcg Oral Daily    methylPREDNISolone  40 mg Intravenous Daily    insulin glargine  25 Units Subcutaneous Nightly    insulin lispro  10 Units Subcutaneous TID WC    insulin lispro  0-18 Units Subcutaneous TID WC    insulin lispro  0-9 Units Subcutaneous Nightly    apixaban  5 mg Oral BID    albuterol  2.5 mg Nebulization Q6H WA    diltiazem  120 mg Oral Daily    pneumococcal 13-valent conjugate  0.5 mL Intramuscular Once    sodium chloride flush  10 mL Intravenous 2 times per day    guaiFENesin  600 mg Oral BID    sodium chloride flush  10 mL Intravenous BID    cyclobenzaprine  10 mg Oral TID    cefepime  2 g Intravenous Q12H    sodium chloride flush  10 mL Intravenous 2 times per day    docusate sodium  100 mg Oral BID    famotidine  20 mg Oral BID    aspirin  325 mg Oral Daily    nebivolol  5 mg Oral Daily    gabapentin  300 mg Oral TID      diltiazem (CARDIZEM) 125 mg in dextrose 5% 125 mL infusion Stopped (05/10/19 1326)    dextrose       levalbuterol, potassium chloride **OR** potassium alternative oral replacement **OR** potassium chloride, sodium chloride flush, ondansetron, sodium chloride flush, ondansetron, acetaminophen, benzonatate, glucose, dextrose, glucagon (rDNA), dextrose, promethazine, hydrOXYzine, oxyCODONE-acetaminophen    Lab Data:  CBC:   Recent Labs     05/12/19 0515 05/13/19 0615 05/14/19  0510   WBC 10.9* 17.2* 16.4*   HGB 13.4* 10.4* 14.1   HCT 41.3* 32.0* 43.2   MCV 90.2 91.7 91.1    532* 381     BMP:   Recent Labs     05/12/19 0515 05/13/19 0615 05/14/19  0510    143 145   K 4.0 3.7 3.9    101 103   CO2 29 29 31   BUN 32* 33* 29*   CREATININE 1.1 1.1 1.0     LIVER PROFILE:   Recent Labs     05/12/19 0515 05/13/19 0615 05/14/19  0510   AST 42* 36 32   * 94* 82*   BILITOT 0.3 0.3 0.4   ALKPHOS 93 99 92     PT/INR: No results for input(s): PROTIME, INR in the last 72 hours. APTT: No results for input(s): APTT in the last 72 hours. BNP:  No results for input(s): BNP in the last 72 hours. TROPONIN: @TROPONINI:3@      Assessment and Plan:    New onset atrial fibrillation  Community acquired pneumonia  HTn  DM-2  Obesity BMI 33    Rate is controlled at rest  However still having palpitations with ambulation  HR also increases with activity  Do not recommend digoxin for long term use    Discussed with Dr Sapphire Mendez and Dr Deven Masters  Will plan for cardioversion tomorrow with Dr Chrystal [facility] if no contraindication    Cheryle Combes, APRN-CNP 5/14/2019 3:19 PM      I have seen and examined this patient personally, independently of the nurse practitioner. I have reviewed the hospital care given to date and reviewed all pertinent labs and imaging. The plan was developed mutually at the time of the visit with the patient, Nurse practioner (NP)  and myself.  I have spoken with patient, nursing staff and provided written and verbal instructions. The above note has julien reviewed and I agree with the history, physical examination and treatment plan. Necessary changes to the note has been made in history, physical examination and plan to the above note.     Candice Miranda M.D 05/14/19

## 2019-05-14 NOTE — PROGRESS NOTES
[facility] [place] [facility] [place]  [address], [address] [facility],Third [address]  Phone: [phone]    Fax [phone]                  Lev Mills MD, Karen Holm MD, Stephania Monreal MD, MD Loren Paul MD Fran Bishop, MD MALLARD FILLMORE GATES, APRN      Oscar Crespose, APRN  Heide Asp, APRN    Cardiology Progress Note     Today's Plan: HR control    Admit Date:  5/3/2019    Consult reason/ Seen today for: Atrial Fib    Subjective and  Overnight Events:  He ststes that he is feeling better today. He is continuing to cough up thick sputum. Denies any Chest pain, but palpitations increase with ambulation. Assessment / Plan / Recommendation:     1. Elevated Troponin : will continue medical management for now. Continue Aspirin   2. Paroxysmal afib: CHADs score is 1, HR improved. EP wants cardioversion in AM. Concerned will not hold due to pneumonia and elevated WBC. 3. HTN: stable, continue medications  4. DVT prophylaxis if not contraindicated while in the hospital.     History of Presenting Illness:    Chief complain on admission : 63 y. o.year old who is admitted for  Chief Complaint   Patient presents with    Fatigue    Nausea    Dizziness    Chest Pain        Past medical history:    has a past medical history of Diabetes mellitus ([address] and Hypertension. Past surgical history:   has a past surgical history that includes other surgical history (Right) and Circumcision, non- (2013). Social History:   reports that he has been smoking cigarettes. He has a 7.50 pack-year smoking history. He has never used smokeless tobacco. He reports that he does not drink alcohol or use drugs. Family history:  family history is not on file.     No Known Allergies    Review of Systems:   All 14 systems were reviewed and are negative  Except for the positive findings  which as documented     BP (!) 131/93   Pulse 91   Temp 98.1 °F (36.7 °C) (Oral)   Resp 25  5' 10\" (1.778 m)   Wt 234 lb 12.8 oz (106.5 kg)   SpO2 98%   BMI 33.69 kg/m²       Intake/Output Summary (Last 24 hours) at 5/14/2019 1656  Last data filed at 5/14/2019 0636  Gross per 24 hour   Intake 150 ml   Output 400 ml   Net -250 ml       Physical Exam:  Constitutional:  Well developed, Well nourished, No acute distress, Non-toxic appearance. HENT:  Normocephalic, Atraumatic, Bilateral external ears normal, Oropharynx moist, No oral exudates, Nose normal. Neck- Normal range of motion, No tenderness, Supple, No stridor. Eyes:  PERRL, EOMI, Conjunctiva normal, No discharge. Respiratory:  Diminished breath sounds in the bases, No respiratory distress, No wheezing, No chest tenderness. On RA. Cardiovascular:  Normal heart rate, Normal rhythm, No murmurs appreciated, No rubs appreciated, No gallops appreciated, JVP not elevated  Abdomen/GI:  Bowel sounds normal, Soft, No tenderness, No masses, No pulsatile masses. Musculoskeletal:  Intact distal pulses, No edema, No tenderness, No cyanosis, No clubbing. Good range of motion in all major joints. No tenderness to palpation or major deformities noted. Back- No tenderness. Integument:  Warm, Dry, No erythema, No rash. Lymphatic:  No lymphadenopathy noted. Neurologic:  Alert & oriented x 3, Normal motor function, Normal sensory function, No focal deficits noted.    Psychiatric:  Affect  and  Mood : improved    Medications:    digoxin  250 mcg Oral Daily    methylPREDNISolone  40 mg Intravenous Daily    insulin glargine  25 Units Subcutaneous Nightly    insulin lispro  10 Units Subcutaneous TID     insulin lispro  0-18 Units Subcutaneous TID     insulin lispro  0-9 Units Subcutaneous Nightly    apixaban  5 mg Oral BID    albuterol  2.5 mg Nebulization Q6H WA    diltiazem  120 mg Oral Daily    pneumococcal 13-valent conjugate  0.5 mL Intramuscular Once    sodium chloride flush  10 mL Intravenous 2 times per day    guaiFENesin  600 mg Oral BID    sodium chloride flush  10 mL Intravenous BID    cyclobenzaprine  10 mg Oral TID    cefepime  2 g Intravenous Q12H    sodium chloride flush  10 mL Intravenous 2 times per day    docusate sodium  100 mg Oral BID    famotidine  20 mg Oral BID    aspirin  325 mg Oral Daily    nebivolol  5 mg Oral Daily    gabapentin  300 mg Oral TID      diltiazem (CARDIZEM) 125 mg in dextrose 5% 125 mL infusion Stopped (05/10/19 1326)    dextrose       levalbuterol, potassium chloride **OR** potassium alternative oral replacement **OR** potassium chloride, sodium chloride flush, ondansetron, sodium chloride flush, ondansetron, acetaminophen, benzonatate, glucose, dextrose, glucagon (rDNA), dextrose, promethazine, hydrOXYzine, oxyCODONE-acetaminophen    Lab Data:  CBC:   Recent Labs     05/12/19  0515 05/13/19  0615 05/14/19  0510   WBC 10.9* 17.2* 16.4*   HGB 13.4* 10.4* 14.1   HCT 41.3* 32.0* 43.2   MCV 90.2 91.7 91.1    532* 381     BMP:   Recent Labs     05/12/19  0515 05/13/19  0615 05/14/19  0510    143 145   K 4.0 3.7 3.9    101 103   CO2 29 29 31   BUN 32* 33* 29*   CREATININE 1.1 1.1 1.0     PT/INR: No results for input(s): PROTIME, INR in the last 72 hours. BNP:  No results for input(s): PROBNP in the last 72 hours. TROPONIN: No results for input(s): TROPONINT in the last 72 hours. ECHO :   Echocardiogram 5/6/2019 Limited study due to patients body habitus. Patient high heart rate unable to lie flat due to SOB.   Left ventricular function is low normal, EF is estimated at 50-55 %.   Mild left ventricular hypertrophy.   No significant valvular abnormalities.   No evidence of pericardial effusion. All labs, medications and tests reviewed by myself , continue all other medications of all above medical condition listed as is except for changes mentioned above. Thank you very much for consult , please call with questions.     Electronically signed by ASMITA Stevens CNP on 5/14/2019 at 4:56 PM   I have seen ,spoken to  and examined this patient personally, independently of the nurse practitioner. I have reviewed the hospital care given to date and reviewed all pertinent labs and imaging. The plan was developed mutually at the time of the visit with the patient,  NP  and myself. I have spoken with patient, nursing staff and provided written and verbal instructions . The above note has been reviewed and I agree with the assessment, diagnosis, and treatment plan with changes made by me as follows     CARDIOLOGY ATTENDING ADDENDUM    HPI:  I have reviewed the above HPI  And agree with above   Jacquelyn Sunshine is a 63 y. o.year old who and presents with had concerns including Fatigue; Nausea; Dizziness; and Chest Pain.   Chief Complaint   Patient presents with    Fatigue    Nausea    Dizziness    Chest Pain     Interval history:  Mild SOB    Physical Exam:  General:  Awake, alert, NAD  Head:normal  Eye:normal  Neck:  No JVD   Chest:  Clear to auscultation, respiration easy  Cardiovascular:  RRR S1S2  Abdomen:   nontender  Extremities:  tr edema  Pulses; palpable  Neuro: grossly normal      MEDICAL DECISION MAKING;    I agree with the above plan, which was planned by myself and discussed with NP.  WALDEMAR and DCCV in am per EP Ladona Merlin MD [facility]

## 2019-05-15 LAB
ALBUMIN SERPL-MCNC: 3.2 GM/DL (ref 3.4–5)
ALP BLD-CCNC: 89 IU/L (ref 40–128)
ALT SERPL-CCNC: 68 U/L (ref 10–40)
ANION GAP SERPL CALCULATED.3IONS-SCNC: 10 MMOL/L (ref 4–16)
AST SERPL-CCNC: 26 IU/L (ref 15–37)
BILIRUB SERPL-MCNC: 0.4 MG/DL (ref 0–1)
BUN BLDV-MCNC: 29 MG/DL (ref 6–23)
CALCIUM SERPL-MCNC: 8.1 MG/DL (ref 8.3–10.6)
CHLORIDE BLD-SCNC: 103 MMOL/L (ref 99–110)
CO2: 31 MMOL/L (ref 21–32)
CREAT SERPL-MCNC: 1.1 MG/DL (ref 0.9–1.3)
DIGOXIN LEVEL: 0.9 NG/ML (ref 0.8–2)
DOSE AMOUNT: NORMAL
DOSE TIME: NORMAL
GFR AFRICAN AMERICAN: >60 ML/MIN/1.73M2
GFR NON-AFRICAN AMERICAN: >60 ML/MIN/1.73M2
GLUCOSE BLD-MCNC: 127 MG/DL (ref 70–99)
GLUCOSE BLD-MCNC: 208 MG/DL (ref 70–99)
GLUCOSE BLD-MCNC: 223 MG/DL (ref 70–99)
GLUCOSE BLD-MCNC: 83 MG/DL (ref 70–99)
GLUCOSE BLD-MCNC: 84 MG/DL (ref 70–99)
HCT VFR BLD CALC: 41.9 % (ref 42–52)
HEMOGLOBIN: 13.4 GM/DL (ref 13.5–18)
MCH RBC QN AUTO: 29.6 PG (ref 27–31)
MCHC RBC AUTO-ENTMCNC: 32 % (ref 32–36)
MCV RBC AUTO: 92.7 FL (ref 78–100)
PDW BLD-RTO: 13.7 % (ref 11.7–14.9)
PLATELET # BLD: 411 K/CU MM (ref 140–440)
PMV BLD AUTO: 9.7 FL (ref 7.5–11.1)
POTASSIUM SERPL-SCNC: 4.1 MMOL/L (ref 3.5–5.1)
RBC # BLD: 4.52 M/CU MM (ref 4.6–6.2)
SODIUM BLD-SCNC: 144 MMOL/L (ref 135–145)
TOTAL PROTEIN: 5 GM/DL (ref 6.4–8.2)
WBC # BLD: 15.4 K/CU MM (ref 4–10.5)

## 2019-05-15 PROCEDURE — 6360000002 HC RX W HCPCS: Performed by: INTERNAL MEDICINE

## 2019-05-15 PROCEDURE — 6370000000 HC RX 637 (ALT 250 FOR IP): Performed by: NURSE PRACTITIONER

## 2019-05-15 PROCEDURE — 2580000003 HC RX 258: Performed by: NURSE PRACTITIONER

## 2019-05-15 PROCEDURE — 85027 COMPLETE CBC AUTOMATED: CPT

## 2019-05-15 PROCEDURE — 2580000003 HC RX 258: Performed by: HOSPITALIST

## 2019-05-15 PROCEDURE — 80053 COMPREHEN METABOLIC PANEL: CPT

## 2019-05-15 PROCEDURE — 80162 ASSAY OF DIGOXIN TOTAL: CPT

## 2019-05-15 PROCEDURE — 82962 GLUCOSE BLOOD TEST: CPT

## 2019-05-15 PROCEDURE — 6360000002 HC RX W HCPCS: Performed by: HOSPITALIST

## 2019-05-15 PROCEDURE — 6370000000 HC RX 637 (ALT 250 FOR IP): Performed by: INTERNAL MEDICINE

## 2019-05-15 PROCEDURE — 2140000000 HC CCU INTERMEDIATE R&B

## 2019-05-15 PROCEDURE — 94150 VITAL CAPACITY TEST: CPT

## 2019-05-15 PROCEDURE — 36415 COLL VENOUS BLD VENIPUNCTURE: CPT

## 2019-05-15 PROCEDURE — 6370000000 HC RX 637 (ALT 250 FOR IP): Performed by: HOSPITALIST

## 2019-05-15 PROCEDURE — 99232 SBSQ HOSP IP/OBS MODERATE 35: CPT | Performed by: INTERNAL MEDICINE

## 2019-05-15 PROCEDURE — 2580000003 HC RX 258: Performed by: INTERNAL MEDICINE

## 2019-05-15 PROCEDURE — 94761 N-INVAS EAR/PLS OXIMETRY MLT: CPT

## 2019-05-15 PROCEDURE — 94640 AIRWAY INHALATION TREATMENT: CPT

## 2019-05-15 PROCEDURE — APPSS30 APP SPLIT SHARED TIME 16-30 MINUTES: Performed by: NURSE PRACTITIONER

## 2019-05-15 RX ORDER — CARVEDILOL 12.5 MG/1
12.5 TABLET ORAL 2 TIMES DAILY WITH MEALS
Status: DISCONTINUED | OUTPATIENT
Start: 2019-05-15 | End: 2019-05-17 | Stop reason: HOSPADM

## 2019-05-15 RX ADMIN — GABAPENTIN 300 MG: 300 CAPSULE ORAL at 13:21

## 2019-05-15 RX ADMIN — FAMOTIDINE 20 MG: 20 TABLET ORAL at 20:47

## 2019-05-15 RX ADMIN — CYCLOBENZAPRINE HYDROCHLORIDE 10 MG: 10 TABLET, FILM COATED ORAL at 08:38

## 2019-05-15 RX ADMIN — ALBUTEROL SULFATE 2.5 MG: 2.5 SOLUTION RESPIRATORY (INHALATION) at 08:01

## 2019-05-15 RX ADMIN — OXYCODONE AND ACETAMINOPHEN 1 TABLET: 5; 325 TABLET ORAL at 22:00

## 2019-05-15 RX ADMIN — CYCLOBENZAPRINE HYDROCHLORIDE 10 MG: 10 TABLET, FILM COATED ORAL at 13:21

## 2019-05-15 RX ADMIN — DOCUSATE SODIUM 100 MG: 100 CAPSULE, LIQUID FILLED ORAL at 20:48

## 2019-05-15 RX ADMIN — ALBUTEROL SULFATE 2.5 MG: 2.5 SOLUTION RESPIRATORY (INHALATION) at 11:34

## 2019-05-15 RX ADMIN — GABAPENTIN 300 MG: 300 CAPSULE ORAL at 08:38

## 2019-05-15 RX ADMIN — METHYLPREDNISOLONE SODIUM SUCCINATE 40 MG: 40 INJECTION, POWDER, LYOPHILIZED, FOR SOLUTION INTRAMUSCULAR; INTRAVENOUS at 08:38

## 2019-05-15 RX ADMIN — SODIUM CHLORIDE, PRESERVATIVE FREE 10 ML: 5 INJECTION INTRAVENOUS at 20:50

## 2019-05-15 RX ADMIN — INSULIN LISPRO 6 UNITS: 100 INJECTION, SOLUTION INTRAVENOUS; SUBCUTANEOUS at 16:43

## 2019-05-15 RX ADMIN — DILTIAZEM HYDROCHLORIDE 120 MG: 120 CAPSULE, COATED, EXTENDED RELEASE ORAL at 08:38

## 2019-05-15 RX ADMIN — INSULIN LISPRO 10 UNITS: 100 INJECTION, SOLUTION INTRAVENOUS; SUBCUTANEOUS at 16:42

## 2019-05-15 RX ADMIN — APIXABAN 5 MG: 5 TABLET, FILM COATED ORAL at 08:38

## 2019-05-15 RX ADMIN — INSULIN LISPRO 6 UNITS: 100 INJECTION, SOLUTION INTRAVENOUS; SUBCUTANEOUS at 12:08

## 2019-05-15 RX ADMIN — CARVEDILOL 12.5 MG: 12.5 TABLET, FILM COATED ORAL at 16:42

## 2019-05-15 RX ADMIN — CYCLOBENZAPRINE HYDROCHLORIDE 10 MG: 10 TABLET, FILM COATED ORAL at 20:47

## 2019-05-15 RX ADMIN — ASPIRIN 325 MG ORAL TABLET 325 MG: 325 PILL ORAL at 08:38

## 2019-05-15 RX ADMIN — FAMOTIDINE 20 MG: 20 TABLET ORAL at 08:38

## 2019-05-15 RX ADMIN — DULOXETINE HYDROCHLORIDE 250 MCG: 30 CAPSULE, DELAYED RELEASE ORAL at 08:38

## 2019-05-15 RX ADMIN — INSULIN LISPRO 10 UNITS: 100 INJECTION, SOLUTION INTRAVENOUS; SUBCUTANEOUS at 12:08

## 2019-05-15 RX ADMIN — GUAIFENESIN 600 MG: 600 TABLET, EXTENDED RELEASE ORAL at 20:47

## 2019-05-15 RX ADMIN — OXYCODONE AND ACETAMINOPHEN 1 TABLET: 5; 325 TABLET ORAL at 13:21

## 2019-05-15 RX ADMIN — INSULIN GLARGINE 25 UNITS: 100 INJECTION, SOLUTION SUBCUTANEOUS at 20:48

## 2019-05-15 RX ADMIN — APIXABAN 5 MG: 5 TABLET, FILM COATED ORAL at 20:47

## 2019-05-15 RX ADMIN — OXYCODONE AND ACETAMINOPHEN 1 TABLET: 5; 325 TABLET ORAL at 17:35

## 2019-05-15 RX ADMIN — ALBUTEROL SULFATE 2.5 MG: 2.5 SOLUTION RESPIRATORY (INHALATION) at 19:12

## 2019-05-15 RX ADMIN — BENZONATATE 200 MG: 100 CAPSULE ORAL at 08:38

## 2019-05-15 RX ADMIN — CEFEPIME 2 G: 2 INJECTION, POWDER, FOR SOLUTION INTRAVENOUS at 16:00

## 2019-05-15 RX ADMIN — SODIUM CHLORIDE, PRESERVATIVE FREE 10 ML: 5 INJECTION INTRAVENOUS at 08:39

## 2019-05-15 RX ADMIN — GUAIFENESIN 600 MG: 600 TABLET, EXTENDED RELEASE ORAL at 08:38

## 2019-05-15 RX ADMIN — CEFEPIME 2 G: 2 INJECTION, POWDER, FOR SOLUTION INTRAVENOUS at 05:01

## 2019-05-15 RX ADMIN — GABAPENTIN 300 MG: 300 CAPSULE ORAL at 20:47

## 2019-05-15 RX ADMIN — OXYCODONE AND ACETAMINOPHEN 1 TABLET: 5; 325 TABLET ORAL at 05:02

## 2019-05-15 RX ADMIN — NEBIVOLOL HYDROCHLORIDE 5 MG: 10 TABLET ORAL at 08:38

## 2019-05-15 RX ADMIN — OXYCODONE AND ACETAMINOPHEN 1 TABLET: 5; 325 TABLET ORAL at 08:46

## 2019-05-15 ASSESSMENT — PAIN SCALES - GENERAL
PAINLEVEL_OUTOF10: 7
PAINLEVEL_OUTOF10: 7
PAINLEVEL_OUTOF10: 0
PAINLEVEL_OUTOF10: 0
PAINLEVEL_OUTOF10: 7

## 2019-05-15 ASSESSMENT — PAIN SCALES - WONG BAKER
WONGBAKER_NUMERICALRESPONSE: 0

## 2019-05-15 ASSESSMENT — PAIN DESCRIPTION - DESCRIPTORS: DESCRIPTORS: ACHING

## 2019-05-15 ASSESSMENT — PAIN DESCRIPTION - PROGRESSION
CLINICAL_PROGRESSION: GRADUALLY WORSENING

## 2019-05-15 ASSESSMENT — PAIN DESCRIPTION - LOCATION: LOCATION: BACK

## 2019-05-15 ASSESSMENT — PAIN DESCRIPTION - ORIENTATION: ORIENTATION: LOWER;MID

## 2019-05-15 ASSESSMENT — PAIN DESCRIPTION - PAIN TYPE: TYPE: CHRONIC PAIN

## 2019-05-15 ASSESSMENT — PAIN DESCRIPTION - ONSET: ONSET: ON-GOING

## 2019-05-15 ASSESSMENT — PAIN DESCRIPTION - FREQUENCY: FREQUENCY: CONTINUOUS

## 2019-05-15 NOTE — CARE COORDINATION
CM met with pt for f/u for d/c planning. Pt states that the d/c plan remains the same. Home with girlfriend. HHC offered and pt refused. Pt denies any needs at this time. CM will continue to follow.   TE

## 2019-05-15 NOTE — PROGRESS NOTES
[address], [address] [facility],Third Floor  Phone: [phone]    Fax [phone]                  Mychal Simmons MD, Alexandra Dobbs MD, Mohini Shane MD, MD Maddi Nova MD Sandor Anes, MD Wendell Kell, APRELDON Ngo, ASMITA Martin, APRELDON    Cardiology Progress Note     Today's Plan: DCCV not needed converted, Renate in am    Admit Date:  5/3/2019    Consult reason/ Seen today for: Atrial Fib    Subjective and  Overnight Events: He is feeling much better today. He denies any Cardiac complaints. He had some short runs of VTach over a three minute period early this morning. Patient is agreeable to cardiac workup. Assessment / Plan / Recommendation:     1. Elevated Troponin : Will continue medical management for now. Continue Aspirin   2. VT and frequent ectopy: Renate in AM. Npo after MN. 3. Paroxysmal afib: CHADs score is 1, In Sinus Rhythm. 4. HTN: stable, continue medications  5. DVT prophylaxis if not contraindicated while in the hospital.     History of Presenting Illness:    Chief complain on admission : 63 y. o.year old who is admitted for  Chief Complaint   Patient presents with    Fatigue    Nausea    Dizziness    Chest Pain        Past medical history:    has a past medical history of Diabetes mellitus ([address] and Hypertension. Past surgical history:   has a past surgical history that includes other surgical history (Right) and Circumcision, non- (2013). Social History:   reports that he has been smoking cigarettes. He has a 7.50 pack-year smoking history. He has never used smokeless tobacco. He reports that he does not drink alcohol or use drugs. Family history:  family history is not on file.     No Known Allergies    Review of Systems:   All 14 systems were reviewed and are negative  Except for the positive findings  which as documented     BP (!) 145/88   Pulse 94   Temp 97.6 Intravenous 2 times per day    guaiFENesin  600 mg Oral BID    sodium chloride flush  10 mL Intravenous BID    cyclobenzaprine  10 mg Oral TID    cefepime  2 g Intravenous Q12H    sodium chloride flush  10 mL Intravenous 2 times per day    docusate sodium  100 mg Oral BID    famotidine  20 mg Oral BID    aspirin  325 mg Oral Daily    gabapentin  300 mg Oral TID      diltiazem (CARDIZEM) 125 mg in dextrose 5% 125 mL infusion Stopped (05/10/19 1326)    dextrose       levalbuterol, potassium chloride **OR** potassium alternative oral replacement **OR** potassium chloride, sodium chloride flush, ondansetron, sodium chloride flush, ondansetron, acetaminophen, benzonatate, glucose, dextrose, glucagon (rDNA), dextrose, promethazine, hydrOXYzine, oxyCODONE-acetaminophen    Lab Data:  CBC:   Recent Labs     05/13/19  0615 05/14/19  0510 05/15/19  0448   WBC 17.2* 16.4* 15.4*   HGB 10.4* 14.1 13.4*   HCT 32.0* 43.2 41.9*   MCV 91.7 91.1 92.7   * 381 411     BMP:   Recent Labs     05/13/19  0615 05/14/19  0510 05/15/19  0448    145 144   K 3.7 3.9 4.1    103 103   CO2 29 31 31   BUN 33* 29* 29*   CREATININE 1.1 1.0 1.1     PT/INR: No results for input(s): PROTIME, INR in the last 72 hours. BNP:  No results for input(s): PROBNP in the last 72 hours. TROPONIN: No results for input(s): TROPONINT in the last 72 hours. ECHO :   Echocardiogram 5/6/2019 Limited study due to patients body habitus. Patient high heart rate unable to lie flat due to SOB.   Left ventricular function is low normal, EF is estimated at 50-55 %.   Mild left ventricular hypertrophy.   No significant valvular abnormalities.   No evidence of pericardial effusion. All labs, medications and tests reviewed by myself , continue all other medications of all above medical condition listed as is except for changes mentioned above. Thank you very much for consult , please call with questions.     Electronically signed by Manish Rodriguez

## 2019-05-15 NOTE — PLAN OF CARE
Problem: Pain:  Goal: Pain level will decrease  Description  Pain level will decrease  5/15/2019 0410 by Ganga Centeno RN  Outcome: Ongoing  5/14/2019 2349 by Ganga Centeno RN  Outcome: Ongoing  Goal: Control of acute pain  Description  Control of acute pain  5/15/2019 0410 by Ganga Centeno RN  Outcome: Ongoing  5/14/2019 2349 by Ganga Centeno RN  Outcome: Ongoing  Goal: Control of chronic pain  Description  Control of chronic pain  5/15/2019 0410 by Ganga Centeno RN  Outcome: Ongoing  5/14/2019 2349 by Ganga Centeno RN  Outcome: Ongoing     Problem: Risk for Impaired Skin Integrity  Goal: Tissue integrity - skin and mucous membranes  Description  Structural intactness and normal physiological function of skin and  mucous membranes.   5/15/2019 0410 by Ganga Centeno RN  Outcome: Ongoing  5/14/2019 2349 by Ganga Centeno RN  Outcome: Ongoing     Problem: Cardiac Output - Decreased:  Goal: Hemodynamic stability will improve  Description  Hemodynamic stability will improve  5/15/2019 0410 by Ganga Centeno RN  Outcome: Ongoing  5/14/2019 2349 by Ganga Centeno RN  Outcome: Ongoing     Problem: Breathing Pattern - Ineffective:  Goal: Ability to achieve and maintain a regular respiratory rate will improve  Description  Ability to achieve and maintain a regular respiratory rate will improve  5/15/2019 0410 by Ganga Centeno RN  Outcome: Ongoing  5/14/2019 2349 by Ganga Centeno RN  Outcome: Ongoing

## 2019-05-15 NOTE — PROGRESS NOTES
(36.7 °C)   SpO2: 98%     Physical Exam:   GEN Awake male, sitting upright in bed in no apparent distress. Appears given age. RESP  wheezing. CARDIO/VASC S1/S2 auscultated. Sima Never GI Abdomen is soft without significant tenderness, or guarding. Bowel sounds are normoactive. MSK No gross joint deformities. SKIN Normal coloration, warm, dry. NEURO  normal speech, no lateralizing weakness. PSYCH Awake, alert, oriented x 4. Affect appropriate.     Medications:   Medications:    digoxin  250 mcg Oral Daily    methylPREDNISolone  40 mg Intravenous Daily    insulin glargine  25 Units Subcutaneous Nightly    insulin lispro  10 Units Subcutaneous TID WC    insulin lispro  0-18 Units Subcutaneous TID WC    insulin lispro  0-9 Units Subcutaneous Nightly    apixaban  5 mg Oral BID    albuterol  2.5 mg Nebulization Q6H WA    diltiazem  120 mg Oral Daily    pneumococcal 13-valent conjugate  0.5 mL Intramuscular Once    sodium chloride flush  10 mL Intravenous 2 times per day    guaiFENesin  600 mg Oral BID    sodium chloride flush  10 mL Intravenous BID    cyclobenzaprine  10 mg Oral TID    cefepime  2 g Intravenous Q12H    sodium chloride flush  10 mL Intravenous 2 times per day    docusate sodium  100 mg Oral BID    famotidine  20 mg Oral BID    aspirin  325 mg Oral Daily    nebivolol  5 mg Oral Daily    gabapentin  300 mg Oral TID      Infusions:    diltiazem (CARDIZEM) 125 mg in dextrose 5% 125 mL infusion Stopped (05/10/19 1326)    dextrose       PRN Meds:     levalbuterol 0.63 mg Q6H PRN   potassium chloride 40 mEq PRN   Or     potassium alternative oral replacement 40 mEq PRN   Or     potassium chloride 10 mEq PRN   sodium chloride flush 10 mL PRN   ondansetron 4 mg Q30 Min PRN   sodium chloride flush 10 mL PRN   ondansetron 4 mg Q6H PRN   acetaminophen 650 mg Q4H PRN   benzonatate 200 mg TID PRN   glucose 15 g PRN   dextrose 12.5 g PRN   glucagon (rDNA) 1 mg PRN   dextrose 100 mL/hr PRN promethazine 12.5 mg Q6H PRN   hydrOXYzine 50 mg Q6H PRN   oxyCODONE-acetaminophen 1 tablet Q4H PRN       Recent Labs     05/13/19 0615 05/14/19  0510 05/15/19  0448   WBC 17.2* 16.4* 15.4*   HGB 10.4* 14.1 13.4*   HCT 32.0* 43.2 41.9*   * 381 411      Recent Labs     05/13/19 0615 05/14/19  0510 05/15/19  0448    145 144   K 3.7 3.9 4.1    103 103   CO2 29 31 31   BUN 33* 29* 29*   CREATININE 1.1 1.0 1.1     Recent Labs     05/13/19 0615 05/14/19  0510 05/15/19  0448   AST 36 32 26   ALT 94* 82* 68*   BILITOT 0.3 0.4 0.4   ALKPHOS 99 92 89     Imaging reviewed    Electronically signed by Gabby Almazan MD on 5/15/2019 at 9:42 AM

## 2019-05-16 ENCOUNTER — APPOINTMENT (OUTPATIENT)
Dept: NUCLEAR MEDICINE | Age: 59
DRG: 193 | End: 2019-05-16
Payer: COMMERCIAL

## 2019-05-16 LAB
ALBUMIN SERPL-MCNC: 3.6 GM/DL (ref 3.4–5)
ALP BLD-CCNC: 99 IU/L (ref 40–128)
ALT SERPL-CCNC: 64 U/L (ref 10–40)
ANION GAP SERPL CALCULATED.3IONS-SCNC: 13 MMOL/L (ref 4–16)
AST SERPL-CCNC: 26 IU/L (ref 15–37)
BILIRUB SERPL-MCNC: 0.4 MG/DL (ref 0–1)
BUN BLDV-MCNC: 29 MG/DL (ref 6–23)
CALCIUM SERPL-MCNC: 8.9 MG/DL (ref 8.3–10.6)
CHLORIDE BLD-SCNC: 101 MMOL/L (ref 99–110)
CO2: 30 MMOL/L (ref 21–32)
CREAT SERPL-MCNC: 1.1 MG/DL (ref 0.9–1.3)
GFR AFRICAN AMERICAN: >60 ML/MIN/1.73M2
GFR NON-AFRICAN AMERICAN: >60 ML/MIN/1.73M2
GLUCOSE BLD-MCNC: 103 MG/DL (ref 70–99)
GLUCOSE BLD-MCNC: 111 MG/DL (ref 70–99)
GLUCOSE BLD-MCNC: 152 MG/DL (ref 70–99)
GLUCOSE BLD-MCNC: 221 MG/DL (ref 70–99)
GLUCOSE BLD-MCNC: 277 MG/DL (ref 70–99)
GLUCOSE BLD-MCNC: 99 MG/DL (ref 70–99)
HCT VFR BLD CALC: 43.4 % (ref 42–52)
HEMOGLOBIN: 13.9 GM/DL (ref 13.5–18)
LV EF: 60 %
LVEF MODALITY: NORMAL
MCH RBC QN AUTO: 29.5 PG (ref 27–31)
MCHC RBC AUTO-ENTMCNC: 32 % (ref 32–36)
MCV RBC AUTO: 92.1 FL (ref 78–100)
PDW BLD-RTO: 13.5 % (ref 11.7–14.9)
PLATELET # BLD: 395 K/CU MM (ref 140–440)
PMV BLD AUTO: 10.3 FL (ref 7.5–11.1)
POTASSIUM SERPL-SCNC: 4.8 MMOL/L (ref 3.5–5.1)
RBC # BLD: 4.71 M/CU MM (ref 4.6–6.2)
SODIUM BLD-SCNC: 144 MMOL/L (ref 135–145)
TOTAL PROTEIN: 5.7 GM/DL (ref 6.4–8.2)
WBC # BLD: 16.2 K/CU MM (ref 4–10.5)

## 2019-05-16 PROCEDURE — 80053 COMPREHEN METABOLIC PANEL: CPT

## 2019-05-16 PROCEDURE — 6370000000 HC RX 637 (ALT 250 FOR IP): Performed by: NURSE PRACTITIONER

## 2019-05-16 PROCEDURE — 6360000002 HC RX W HCPCS: Performed by: NURSE PRACTITIONER

## 2019-05-16 PROCEDURE — 3430000000 HC RX DIAGNOSTIC RADIOPHARMACEUTICAL: Performed by: INTERNAL MEDICINE

## 2019-05-16 PROCEDURE — A9500 TC99M SESTAMIBI: HCPCS | Performed by: INTERNAL MEDICINE

## 2019-05-16 PROCEDURE — 94150 VITAL CAPACITY TEST: CPT

## 2019-05-16 PROCEDURE — 6370000000 HC RX 637 (ALT 250 FOR IP): Performed by: HOSPITALIST

## 2019-05-16 PROCEDURE — 99232 SBSQ HOSP IP/OBS MODERATE 35: CPT | Performed by: INTERNAL MEDICINE

## 2019-05-16 PROCEDURE — 2580000003 HC RX 258: Performed by: HOSPITALIST

## 2019-05-16 PROCEDURE — 94640 AIRWAY INHALATION TREATMENT: CPT

## 2019-05-16 PROCEDURE — 6370000000 HC RX 637 (ALT 250 FOR IP): Performed by: INTERNAL MEDICINE

## 2019-05-16 PROCEDURE — 94761 N-INVAS EAR/PLS OXIMETRY MLT: CPT

## 2019-05-16 PROCEDURE — 2140000000 HC CCU INTERMEDIATE R&B

## 2019-05-16 PROCEDURE — 78452 HT MUSCLE IMAGE SPECT MULT: CPT

## 2019-05-16 PROCEDURE — APPSS30 APP SPLIT SHARED TIME 16-30 MINUTES: Performed by: NURSE PRACTITIONER

## 2019-05-16 PROCEDURE — 6360000002 HC RX W HCPCS: Performed by: INTERNAL MEDICINE

## 2019-05-16 PROCEDURE — 85027 COMPLETE CBC AUTOMATED: CPT

## 2019-05-16 PROCEDURE — 93017 CV STRESS TEST TRACING ONLY: CPT

## 2019-05-16 PROCEDURE — 6360000002 HC RX W HCPCS: Performed by: HOSPITALIST

## 2019-05-16 PROCEDURE — 2580000003 HC RX 258: Performed by: INTERNAL MEDICINE

## 2019-05-16 PROCEDURE — 82962 GLUCOSE BLOOD TEST: CPT

## 2019-05-16 RX ADMIN — DILTIAZEM HYDROCHLORIDE 120 MG: 120 CAPSULE, COATED, EXTENDED RELEASE ORAL at 13:08

## 2019-05-16 RX ADMIN — Medication 30 MILLICURIE: at 09:50

## 2019-05-16 RX ADMIN — APIXABAN 5 MG: 5 TABLET, FILM COATED ORAL at 13:10

## 2019-05-16 RX ADMIN — OXYCODONE AND ACETAMINOPHEN 1 TABLET: 5; 325 TABLET ORAL at 09:16

## 2019-05-16 RX ADMIN — FAMOTIDINE 20 MG: 20 TABLET ORAL at 13:12

## 2019-05-16 RX ADMIN — INSULIN LISPRO 10 UNITS: 100 INJECTION, SOLUTION INTRAVENOUS; SUBCUTANEOUS at 17:00

## 2019-05-16 RX ADMIN — CYCLOBENZAPRINE HYDROCHLORIDE 10 MG: 10 TABLET, FILM COATED ORAL at 13:12

## 2019-05-16 RX ADMIN — DULOXETINE HYDROCHLORIDE 250 MCG: 30 CAPSULE, DELAYED RELEASE ORAL at 13:12

## 2019-05-16 RX ADMIN — OXYCODONE AND ACETAMINOPHEN 1 TABLET: 5; 325 TABLET ORAL at 22:37

## 2019-05-16 RX ADMIN — ONDANSETRON 4 MG: 2 INJECTION INTRAMUSCULAR; INTRAVENOUS at 22:39

## 2019-05-16 RX ADMIN — SODIUM CHLORIDE, PRESERVATIVE FREE 10 ML: 5 INJECTION INTRAVENOUS at 13:10

## 2019-05-16 RX ADMIN — MAGNESIUM OXIDE TAB 400 MG (241.3 MG ELEMENTAL MG) 200 MG: 400 (241.3 MG) TAB at 16:53

## 2019-05-16 RX ADMIN — CEFEPIME 2 G: 2 INJECTION, POWDER, FOR SOLUTION INTRAVENOUS at 05:04

## 2019-05-16 RX ADMIN — ALBUTEROL SULFATE 2.5 MG: 2.5 SOLUTION RESPIRATORY (INHALATION) at 22:21

## 2019-05-16 RX ADMIN — FAMOTIDINE 20 MG: 20 TABLET ORAL at 20:51

## 2019-05-16 RX ADMIN — CARVEDILOL 12.5 MG: 12.5 TABLET, FILM COATED ORAL at 16:53

## 2019-05-16 RX ADMIN — REGADENOSON 0.4 MG: 0.08 INJECTION, SOLUTION INTRAVENOUS at 09:46

## 2019-05-16 RX ADMIN — METHYLPREDNISOLONE SODIUM SUCCINATE 40 MG: 40 INJECTION, POWDER, LYOPHILIZED, FOR SOLUTION INTRAMUSCULAR; INTRAVENOUS at 13:12

## 2019-05-16 RX ADMIN — GUAIFENESIN 600 MG: 600 TABLET, EXTENDED RELEASE ORAL at 13:12

## 2019-05-16 RX ADMIN — INSULIN GLARGINE 25 UNITS: 100 INJECTION, SOLUTION SUBCUTANEOUS at 20:45

## 2019-05-16 RX ADMIN — OXYCODONE AND ACETAMINOPHEN 1 TABLET: 5; 325 TABLET ORAL at 13:10

## 2019-05-16 RX ADMIN — GABAPENTIN 300 MG: 300 CAPSULE ORAL at 20:51

## 2019-05-16 RX ADMIN — INSULIN LISPRO 9 UNITS: 100 INJECTION, SOLUTION INTRAVENOUS; SUBCUTANEOUS at 16:58

## 2019-05-16 RX ADMIN — MAGNESIUM OXIDE TAB 400 MG (241.3 MG ELEMENTAL MG) 200 MG: 400 (241.3 MG) TAB at 22:38

## 2019-05-16 RX ADMIN — DOCUSATE SODIUM 100 MG: 100 CAPSULE, LIQUID FILLED ORAL at 13:12

## 2019-05-16 RX ADMIN — OXYCODONE AND ACETAMINOPHEN 1 TABLET: 5; 325 TABLET ORAL at 05:04

## 2019-05-16 RX ADMIN — GABAPENTIN 300 MG: 300 CAPSULE ORAL at 13:11

## 2019-05-16 RX ADMIN — INSULIN LISPRO 3 UNITS: 100 INJECTION, SOLUTION INTRAVENOUS; SUBCUTANEOUS at 20:46

## 2019-05-16 RX ADMIN — ALBUTEROL SULFATE 2.5 MG: 2.5 SOLUTION RESPIRATORY (INHALATION) at 15:49

## 2019-05-16 RX ADMIN — ALBUTEROL SULFATE 2.5 MG: 2.5 SOLUTION RESPIRATORY (INHALATION) at 07:26

## 2019-05-16 RX ADMIN — SODIUM CHLORIDE, PRESERVATIVE FREE 10 ML: 5 INJECTION INTRAVENOUS at 22:39

## 2019-05-16 RX ADMIN — Medication 10 MILLICURIE: at 08:00

## 2019-05-16 RX ADMIN — GUAIFENESIN 600 MG: 600 TABLET, EXTENDED RELEASE ORAL at 22:37

## 2019-05-16 RX ADMIN — APIXABAN 5 MG: 5 TABLET, FILM COATED ORAL at 20:51

## 2019-05-16 RX ADMIN — OXYCODONE AND ACETAMINOPHEN 1 TABLET: 5; 325 TABLET ORAL at 17:06

## 2019-05-16 RX ADMIN — CEFEPIME 2 G: 2 INJECTION, POWDER, FOR SOLUTION INTRAVENOUS at 16:53

## 2019-05-16 RX ADMIN — ASPIRIN 325 MG ORAL TABLET 325 MG: 325 PILL ORAL at 13:12

## 2019-05-16 RX ADMIN — DOCUSATE SODIUM 100 MG: 100 CAPSULE, LIQUID FILLED ORAL at 20:51

## 2019-05-16 RX ADMIN — CYCLOBENZAPRINE HYDROCHLORIDE 10 MG: 10 TABLET, FILM COATED ORAL at 20:51

## 2019-05-16 ASSESSMENT — PAIN DESCRIPTION - PAIN TYPE
TYPE: CHRONIC PAIN
TYPE: CHRONIC PAIN

## 2019-05-16 ASSESSMENT — PAIN DESCRIPTION - ORIENTATION
ORIENTATION: LOWER
ORIENTATION: LOWER

## 2019-05-16 ASSESSMENT — PAIN DESCRIPTION - PROGRESSION
CLINICAL_PROGRESSION: GRADUALLY WORSENING

## 2019-05-16 ASSESSMENT — PAIN DESCRIPTION - LOCATION
LOCATION: BACK
LOCATION: BACK

## 2019-05-16 ASSESSMENT — PAIN SCALES - GENERAL
PAINLEVEL_OUTOF10: 7
PAINLEVEL_OUTOF10: 6

## 2019-05-16 ASSESSMENT — PAIN SCALES - WONG BAKER
WONGBAKER_NUMERICALRESPONSE: 0

## 2019-05-16 ASSESSMENT — PAIN DESCRIPTION - FREQUENCY
FREQUENCY: CONTINUOUS
FREQUENCY: CONTINUOUS

## 2019-05-16 ASSESSMENT — PAIN DESCRIPTION - ONSET: ONSET: ON-GOING

## 2019-05-16 ASSESSMENT — PAIN DESCRIPTION - DESCRIPTORS
DESCRIPTORS: ACHING;CONSTANT
DESCRIPTORS: ACHING;CONSTANT;THROBBING

## 2019-05-16 NOTE — PLAN OF CARE
Problem: Pain:  Description  Pain management should include both nonpharmacologic and pharmacologic interventions. Goal: Pain level will decrease  Description  Pain level will decrease  Outcome: Ongoing  Goal: Control of acute pain  Description  Control of acute pain  Outcome: Ongoing  Goal: Control of chronic pain  Description  Control of chronic pain  Outcome: Ongoing     Problem: Risk for Impaired Skin Integrity  Goal: Tissue integrity - skin and mucous membranes  Description  Structural intactness and normal physiological function of skin and  mucous membranes.   Outcome: Ongoing     Problem: Cardiac Output - Decreased:  Goal: Hemodynamic stability will improve  Description  Hemodynamic stability will improve  Outcome: Ongoing     Problem: Breathing Pattern - Ineffective:  Goal: Ability to achieve and maintain a regular respiratory rate will improve  Description  Ability to achieve and maintain a regular respiratory rate will improve  Outcome: Ongoing

## 2019-05-16 NOTE — PROGRESS NOTES
[facility] ([facility] [place] [facility]IL[facility] [place]  [address], [address] [facility],Third Floor  Phone: [phone]    Fax [phone]                  Rene Teixeira MD, Carly Ramirez MD, Anastasia Lutz MD, MD Minh Anthony MD Bernida Angst, MD Millard Corporal, APRN      Sergio Bragg, APRN  Librado Calzada, APRN    Cardiology Progress Note     Today's Plan: Renate negative, add mag    Admit Date:  5/3/2019    Consult reason/ Seen today for: Atrial Fib    Subjective and  Overnight Events: He is feeling much better today. He denies any cardiac complaints. Continues to have frequent PVC's. Assessment / Plan / Recommendation:     1. Elevated Troponin : Will continue medical management for now. Continue Aspirin   2. VT and frequent ectopy: Renate negative. Add mag 200 mg BID. 3. Paroxysmal afib: CHADs score is 1, In Sinus Rhythm. 4. HTN: stable, continue medications  5. DVT prophylaxis if not contraindicated while in the hospital.   6. DC dig TAMARA caused by Dig    History of Presenting Illness:    Chief complain on admission : 63 y. o.year old who is admitted for  Chief Complaint   Patient presents with    Fatigue    Nausea    Dizziness    Chest Pain        Past medical history:    has a past medical history of Diabetes mellitus ([address] and Hypertension. Past surgical history:   has a past surgical history that includes other surgical history (Right) and Circumcision, non- (2013). Social History:   reports that he has been smoking cigarettes. He has a 7.50 pack-year smoking history. He has never used smokeless tobacco. He reports that he does not drink alcohol or use drugs. Family history:  family history is not on file.     No Known Allergies    Review of Systems:   All 14 systems were reviewed and are negative  Except for the positive findings  which as documented     BP (!) 148/93   Pulse 103   Temp 97.9 °F (36.6 °C) (Oral)   Resp 21   Ht 5' 10\" (1.778 m)   Wt 230 lb 12.8 oz (104.7 kg)   SpO2 98%   BMI 33.12 kg/m²       Intake/Output Summary (Last 24 hours) at 5/16/2019 1411  Last data filed at 5/16/2019 1330  Gross per 24 hour   Intake --   Output 1850 ml   Net -1850 ml       Physical Exam:  Constitutional:  Well developed, Well nourished, No acute distress, Non-toxic appearance. HENT:  Normocephalic, Atraumatic, Bilateral external ears normal, Oropharynx moist, No oral exudates, Nose normal. Neck- Normal range of motion, No tenderness, Supple, No stridor. Eyes:  PERRL, EOMI, Conjunctiva normal, No discharge. Respiratory:  normal breath sounds, No respiratory distress, No wheezing, No chest tenderness. On RA. Cardiovascular:  Normal heart rate, Sinus rhythm with PVC's noted, No murmurs appreciated, No rubs appreciated, No gallops appreciated, JVP not elevated  Abdomen/GI:  Bowel sounds normal, Soft, No tenderness, No masses, No pulsatile masses. Musculoskeletal:  Intact distal pulses, No edema, No tenderness, No cyanosis, No clubbing. Good range of motion in all major joints. No tenderness to palpation or major deformities noted. Back- No tenderness. Integument:  Warm, Dry, No erythema, No rash. Lymphatic:  No lymphadenopathy noted. Neurologic:  Alert & oriented x 3, Normal motor function, Normal sensory function, No focal deficits noted.    Psychiatric:  Affect  and  Mood : improved    Medications:    magnesium oxide  200 mg Oral BID    carvedilol  12.5 mg Oral BID     digoxin  250 mcg Oral Daily    methylPREDNISolone  40 mg Intravenous Daily    insulin glargine  25 Units Subcutaneous Nightly    insulin lispro  10 Units Subcutaneous TID     insulin lispro  0-18 Units Subcutaneous TID     insulin lispro  0-9 Units Subcutaneous Nightly    apixaban  5 mg Oral BID    albuterol  2.5 mg Nebulization Q6H WA    diltiazem  120 mg Oral Daily    pneumococcal 13-valent conjugate  0.5 mL Intramuscular Once    sodium chloride flush  10 mL Intravenous 2 times per day    guaiFENesin  600 mg Oral BID    sodium chloride flush  10 mL Intravenous BID    cyclobenzaprine  10 mg Oral TID    cefepime  2 g Intravenous Q12H    sodium chloride flush  10 mL Intravenous 2 times per day    docusate sodium  100 mg Oral BID    famotidine  20 mg Oral BID    aspirin  325 mg Oral Daily    gabapentin  300 mg Oral TID      diltiazem (CARDIZEM) 125 mg in dextrose 5% 125 mL infusion Stopped (05/10/19 1326)    dextrose       levalbuterol, potassium chloride **OR** potassium alternative oral replacement **OR** potassium chloride, sodium chloride flush, ondansetron, sodium chloride flush, ondansetron, acetaminophen, benzonatate, glucose, dextrose, glucagon (rDNA), dextrose, promethazine, hydrOXYzine, oxyCODONE-acetaminophen    Lab Data:  CBC:   Recent Labs     05/14/19  0510 05/15/19  0448 05/16/19  0541   WBC 16.4* 15.4* 16.2*   HGB 14.1 13.4* 13.9   HCT 43.2 41.9* 43.4   MCV 91.1 92.7 92.1    411 395     BMP:   Recent Labs     05/14/19  0510 05/15/19  0448 05/16/19  0541    144 144   K 3.9 4.1 4.8    103 101   CO2 31 31 30   BUN 29* 29* 29*   CREATININE 1.0 1.1 1.1     PT/INR: No results for input(s): PROTIME, INR in the last 72 hours. BNP:  No results for input(s): PROBNP in the last 72 hours. TROPONIN: No results for input(s): TROPONINT in the last 72 hours. ECHO :   Echocardiogram 5/6/2019 Limited study due to patients body habitus. Patient high heart rate unable to lie flat due to SOB.   Left ventricular function is low normal, EF is estimated at 50-55 %.   Mild left ventricular hypertrophy.   No significant valvular abnormalities.   No evidence of pericardial effusion. All labs, medications and tests reviewed by myself , continue all other medications of all above medical condition listed as is except for changes mentioned above. Thank you very much for consult , please call with questions.     Electronically signed by Deepti Appiah

## 2019-05-17 VITALS
SYSTOLIC BLOOD PRESSURE: 108 MMHG | RESPIRATION RATE: 23 BRPM | WEIGHT: 230.8 LBS | DIASTOLIC BLOOD PRESSURE: 78 MMHG | TEMPERATURE: 98.4 F | OXYGEN SATURATION: 92 % | HEIGHT: 70 IN | HEART RATE: 96 BPM | BODY MASS INDEX: 33.04 KG/M2

## 2019-05-17 LAB
GLUCOSE BLD-MCNC: 132 MG/DL (ref 70–99)
GLUCOSE BLD-MCNC: 75 MG/DL (ref 70–99)

## 2019-05-17 PROCEDURE — 6370000000 HC RX 637 (ALT 250 FOR IP): Performed by: INTERNAL MEDICINE

## 2019-05-17 PROCEDURE — 6360000002 HC RX W HCPCS: Performed by: INTERNAL MEDICINE

## 2019-05-17 PROCEDURE — 2580000003 HC RX 258: Performed by: INTERNAL MEDICINE

## 2019-05-17 PROCEDURE — 6370000000 HC RX 637 (ALT 250 FOR IP): Performed by: NURSE PRACTITIONER

## 2019-05-17 PROCEDURE — 6370000000 HC RX 637 (ALT 250 FOR IP): Performed by: HOSPITALIST

## 2019-05-17 PROCEDURE — APPSS30 APP SPLIT SHARED TIME 16-30 MINUTES: Performed by: NURSE PRACTITIONER

## 2019-05-17 PROCEDURE — 99231 SBSQ HOSP IP/OBS SF/LOW 25: CPT | Performed by: INTERNAL MEDICINE

## 2019-05-17 PROCEDURE — 82962 GLUCOSE BLOOD TEST: CPT

## 2019-05-17 RX ORDER — GUAIFENESIN 600 MG/1
600 TABLET, EXTENDED RELEASE ORAL 2 TIMES DAILY
Qty: 30 TABLET | Refills: 0 | Status: SHIPPED | OUTPATIENT
Start: 2019-05-17 | End: 2019-07-10 | Stop reason: ALTCHOICE

## 2019-05-17 RX ORDER — OXYCODONE HYDROCHLORIDE AND ACETAMINOPHEN 5; 325 MG/1; MG/1
1 TABLET ORAL EVERY 8 HOURS PRN
Qty: 9 TABLET | Refills: 0 | Status: SHIPPED | OUTPATIENT
Start: 2019-05-17 | End: 2019-05-20

## 2019-05-17 RX ORDER — CARVEDILOL 12.5 MG/1
12.5 TABLET ORAL 2 TIMES DAILY WITH MEALS
Qty: 60 TABLET | Refills: 1 | Status: SHIPPED | OUTPATIENT
Start: 2019-05-17 | End: 2019-07-10 | Stop reason: SDUPTHER

## 2019-05-17 RX ORDER — DILTIAZEM HYDROCHLORIDE 120 MG/1
120 CAPSULE, COATED, EXTENDED RELEASE ORAL DAILY
Qty: 30 CAPSULE | Refills: 1 | Status: SHIPPED | OUTPATIENT
Start: 2019-05-18 | End: 2019-07-10 | Stop reason: SDUPTHER

## 2019-05-17 RX ORDER — PREDNISONE 10 MG/1
TABLET ORAL
Qty: 18 TABLET | Refills: 0 | Status: SHIPPED | OUTPATIENT
Start: 2019-05-17 | End: 2019-07-10 | Stop reason: ALTCHOICE

## 2019-05-17 RX ORDER — LISINOPRIL 10 MG/1
10 TABLET ORAL DAILY
Qty: 30 TABLET | Refills: 1 | Status: SHIPPED | OUTPATIENT
Start: 2019-05-17 | End: 2019-07-10 | Stop reason: SDUPTHER

## 2019-05-17 RX ORDER — CYCLOBENZAPRINE HCL 10 MG
10 TABLET ORAL 3 TIMES DAILY PRN
Qty: 30 TABLET | Refills: 0 | Status: SHIPPED | OUTPATIENT
Start: 2019-05-17 | End: 2019-05-27

## 2019-05-17 RX ADMIN — METHYLPREDNISOLONE SODIUM SUCCINATE 40 MG: 40 INJECTION, POWDER, LYOPHILIZED, FOR SOLUTION INTRAMUSCULAR; INTRAVENOUS at 09:55

## 2019-05-17 RX ADMIN — DOCUSATE SODIUM 100 MG: 100 CAPSULE, LIQUID FILLED ORAL at 09:54

## 2019-05-17 RX ADMIN — CARVEDILOL 12.5 MG: 12.5 TABLET, FILM COATED ORAL at 09:54

## 2019-05-17 RX ADMIN — APIXABAN 5 MG: 5 TABLET, FILM COATED ORAL at 09:54

## 2019-05-17 RX ADMIN — ASPIRIN 325 MG ORAL TABLET 325 MG: 325 PILL ORAL at 09:53

## 2019-05-17 RX ADMIN — CYCLOBENZAPRINE HYDROCHLORIDE 10 MG: 10 TABLET, FILM COATED ORAL at 09:54

## 2019-05-17 RX ADMIN — SODIUM CHLORIDE, PRESERVATIVE FREE 10 ML: 5 INJECTION INTRAVENOUS at 09:53

## 2019-05-17 RX ADMIN — GABAPENTIN 300 MG: 300 CAPSULE ORAL at 09:54

## 2019-05-17 RX ADMIN — MAGNESIUM OXIDE TAB 400 MG (241.3 MG ELEMENTAL MG) 200 MG: 400 (241.3 MG) TAB at 09:53

## 2019-05-17 RX ADMIN — OXYCODONE AND ACETAMINOPHEN 1 TABLET: 5; 325 TABLET ORAL at 09:53

## 2019-05-17 RX ADMIN — INSULIN LISPRO 10 UNITS: 100 INJECTION, SOLUTION INTRAVENOUS; SUBCUTANEOUS at 07:48

## 2019-05-17 RX ADMIN — GUAIFENESIN 600 MG: 600 TABLET, EXTENDED RELEASE ORAL at 09:54

## 2019-05-17 RX ADMIN — FAMOTIDINE 20 MG: 20 TABLET ORAL at 09:54

## 2019-05-17 RX ADMIN — DILTIAZEM HYDROCHLORIDE 120 MG: 120 CAPSULE, COATED, EXTENDED RELEASE ORAL at 09:53

## 2019-05-17 RX ADMIN — OXYCODONE AND ACETAMINOPHEN 1 TABLET: 5; 325 TABLET ORAL at 04:35

## 2019-05-17 ASSESSMENT — PAIN SCALES - GENERAL
PAINLEVEL_OUTOF10: 7
PAINLEVEL_OUTOF10: 7
PAINLEVEL_OUTOF10: 6

## 2019-05-17 NOTE — PROGRESS NOTES
[address], [address] [facility],[address]  Phone: [phone]    Fax [phone]                  Lacy Gustafson MD, Zac Mack MD, Itzel Gómez MD, MD Kwasi Bernal MD Osker Alert, MD Idelle Rake, ASMITA Winkler, ASMITA Youssef, APRELDON    Cardiology Progress Note     Today's Plan: okay for discharge from cardiology perspective    Admit Date:  5/3/2019    Consult reason/ Seen today for: Atrial Fib    Subjective and  Overnight Events: He is ready to go home. Denies any complaints today. Discussed medication compliance and ability to afford medications. Patient instructed to call office if he has issues affording any cardiac medications. Assessment / Plan / Recommendation:     1. Elevated Troponin : Will continue medical management for now. Continue Aspirin   2. VT and frequent ectopy: Renate negative. Decreased with Mag PO  3. Paroxysmal afib: CHADs score is 1, In Sinus Rhythm. 4. HTN: stable, continue medications  5. DVT prophylaxis if not contraindicated while in the hospital.   6. DC dig TAAMRA caused by Dig    History of Presenting Illness:    Chief complain on admission : 63 y. o.year old who is admitted for  Chief Complaint   Patient presents with    Fatigue    Nausea    Dizziness    Chest Pain        Past medical history:    has a past medical history of Diabetes mellitus ([address] and Hypertension. Past surgical history:   has a past surgical history that includes other surgical history (Right) and Circumcision, non- (2013). Social History:   reports that he has been smoking cigarettes. He has a 7.50 pack-year smoking history. He has never used smokeless tobacco. He reports that he does not drink alcohol or use drugs. Family history:  family history is not on file.     No Known Allergies    Review of Systems:   All 14 systems were reviewed and are negative  Except for the positive pneumococcal 13-valent conjugate  0.5 mL Intramuscular Once    sodium chloride flush  10 mL Intravenous 2 times per day    guaiFENesin  600 mg Oral BID    sodium chloride flush  10 mL Intravenous BID    cyclobenzaprine  10 mg Oral TID    sodium chloride flush  10 mL Intravenous 2 times per day    docusate sodium  100 mg Oral BID    famotidine  20 mg Oral BID    aspirin  325 mg Oral Daily    gabapentin  300 mg Oral TID      diltiazem (CARDIZEM) 125 mg in dextrose 5% 125 mL infusion Stopped (05/10/19 1326)    dextrose       levalbuterol, potassium chloride **OR** potassium alternative oral replacement **OR** potassium chloride, sodium chloride flush, ondansetron, sodium chloride flush, ondansetron, acetaminophen, benzonatate, glucose, dextrose, glucagon (rDNA), dextrose, promethazine, hydrOXYzine, oxyCODONE-acetaminophen    Lab Data:  CBC:   Recent Labs     05/15/19  0448 05/16/19  0541   WBC 15.4* 16.2*   HGB 13.4* 13.9   HCT 41.9* 43.4   MCV 92.7 92.1    395     BMP:   Recent Labs     05/15/19  0448 05/16/19  0541    144   K 4.1 4.8    101   CO2 31 30   BUN 29* 29*   CREATININE 1.1 1.1     PT/INR: No results for input(s): PROTIME, INR in the last 72 hours. BNP:  No results for input(s): PROBNP in the last 72 hours. TROPONIN: No results for input(s): TROPONINT in the last 72 hours. ECHO :   Echocardiogram 5/6/2019 Limited study due to patients body habitus. Patient high heart rate unable to lie flat due to SOB.   Left ventricular function is low normal, EF is estimated at 50-55 %.   Mild left ventricular hypertrophy.   No significant valvular abnormalities.   No evidence of pericardial effusion. All labs, medications and tests reviewed by myself , continue all other medications of all above medical condition listed as is except for changes mentioned above. Thank you very much for consult , please call with questions.     Electronically signed by ASMITA Barker CNP on 5/17/2019 at 1:34 PM     I have seen ,spoken to  and examined this patient personally, independently of the nurse practitioner. I have reviewed the hospital care given to date and reviewed all pertinent labs and imaging. The plan was developed mutually at the time of the visit with the patient,  NP  and myself. I have spoken with patient, nursing staff and provided written and verbal instructions . The above note has been reviewed and I agree with the assessment, diagnosis, and treatment plan with changes made by me as follows     CARDIOLOGY ATTENDING ADDENDUM    HPI:  I have reviewed the above HPI  And agree with above   Artie Rdz is a 63 y. o.year old who and presents with had concerns including Fatigue; Nausea; Dizziness; and Chest Pain. Chief Complaint   Patient presents with    Fatigue    Nausea    Dizziness    Chest Pain     Interval history:  Feels better    Physical Exam:  General:  Awake, alert, NAD  Head:normal  Eye:normal  Neck:  No JVD   Chest:  Clear to auscultation, respiration easy  Cardiovascular:  RRR S1S2  Abdomen:   nontender  Extremities:  tr edema  Pulses; palpable  Neuro: grossly normal      MEDICAL DECISION MAKING;    I agree with the above plan, which was planned by myself and discussed with NP.   Can dc  Fu as op        Briseida Mccallum MD [facility] [place]

## 2019-05-17 NOTE — DISCHARGE SUMMARY
Discharge Summary    Name:  Iliana Guzman /Age/Sex: 1960  (63 y.o. male)   MRN & CSN:  [id number] & [id number] Admission Date/Time: 5/3/2019  1:12 PM   Attending:  Jimmy Orantes MD Discharging Physician: Cayden Peres MD       Hospital Course:   Iliana Guzman is a 63 y.o.  male  who presents with Pneumonia    1. Pneumonia: RVP +ve Adenovirus. CXR  with interval worsening of a left mid lung zone opacity and right infrahilar opacity. NC at 4 LPM. .6 19. Continue Cefepime. Sputum culture polymicorbial Corynebacterium, Staphylococcus, and MRSA pending. Legionella and Strep urine negative. Cefepime D # 12 discontinue. Taper Steroid. Repeat CXR OP recommended , sx and clinically improved, refused rehab or HH and pt was discharged home in a stable condition. 2. AF in RVR : on Cardizem infusion D/C, therapeutic Lovenox, Eliquis started. Cardiology on consult. EP on consult. Started on Cardizem, Digoxin. Started PO. Still with episodes of RVR. Medication adjusted by cardiology, EP. and was cleared for discharge by EP with OP FU   3. Elevated Troponin: sec to above, no ACS, Stress test neg  4. Type 2 DM:  Lantus , Sliding scale. Hypoglycemia protocol. 5.  Renal Cyst: outpatient follow up  6. Hypokalemia: replace accordingly. Magnesium  7. Leukopenia: could be from Adenovirus, resolved  8. Low TSH: T3 and T4 normal. Most likely sec to above, OP Fu TSH with PCP       The patient expressed appropriate understanding of and agreement with the discharge recommendations, medications, and plan.      Consults this admission:  IP CONSULT TO HOSPITALIST  IP CONSULT TO NEPHROLOGY  IP CONSULT TO CARDIOLOGY  IP CONSULT TO PULMONOLOGY  PHARMACY TO DOSE VANCOMYCIN  IP CONSULT TO ELECTROPHYSIOLOGY  IP CONSULT TO IV TEAM    Discharge Instruction:   Follow up appointments:     See AVS    Disposition: Discharged to:   ?Home,   Condition on discharge: Stable    Discharge Medications:      Jacquelyn Marks Home Medication Instructions SEBLE:[id number]    Printed on:05/17/19 1201   Medication Information                      apixaban (ELIQUIS) 5 MG TABS tablet  Take 1 tablet by mouth 2 times daily             aspirin 325 MG tablet  Take 325 mg by mouth daily             benzonatate (TESSALON) 200 MG capsule  Take 200 mg by mouth 3 times daily as needed for Cough             carvedilol (COREG) 12.5 MG tablet  Take 1 tablet by mouth 2 times daily (with meals)             cyclobenzaprine (FLEXERIL) 10 MG tablet  Take 1 tablet by mouth 3 times daily as needed for Muscle spasms             diltiazem (CARDIZEM CD) 120 MG extended release capsule  Take 1 capsule by mouth daily             gabapentin (NEURONTIN) 600 MG tablet  Take 600 mg by mouth 3 times daily. glimepiride (AMARYL) 2 MG tablet  Take 2 mg by mouth every morning (before breakfast)             guaiFENesin (MUCINEX) 600 MG extended release tablet  Take 1 tablet by mouth 2 times daily             lisinopril (PRINIVIL;ZESTRIL) 10 MG tablet  Take 1 tablet by mouth daily             oxyCODONE-acetaminophen (PERCOCET) 5-325 MG per tablet  Take 1 tablet by mouth every 8 hours as needed (moderate pain) for up to 3 days. predniSONE (DELTASONE) 10 MG tablet  Take 30mg daily x 3 d, then 20mg daily x 3d, then 10mg daily x 3.             traZODone (DESYREL) 50 MG tablet  Take  mg by mouth nightly                 Objective Findings at Discharge:   /78   Pulse 96   Temp 98.4 °F (36.9 °C) (Oral)   Resp 23   Ht 5' 10\" (1.778 m)   Wt 230 lb 12.8 oz (104.7 kg)   SpO2 92%   BMI 33.12 kg/m²            PHYSICAL EXAM   GEN    Awake male, sitting upright in bed in no apparent distress. Appears given age. RESP   wheezing. improved  CARDIO/VASC           S1/S2 auscultated. Ajit Browning GI        Abdomen is soft without significant tenderness, or guarding. Bowel sounds are normoactive. MSK    No gross joint deformities.   SKIN    Normal coloration, warm, dry. NEURO            normal speech, no lateralizing weakness. PSYCH            Awake, alert, oriented x 4. Affect appropriate.     BMP/CBC  Recent Labs     05/15/19  0448 05/16/19  0541    144   K 4.1 4.8    101   CO2 31 30   BUN 29* 29*   CREATININE 1.1 1.1   WBC 15.4* 16.2*   HCT 41.9* 43.4    395       IMAGING:  Ct Abdomen Pelvis Wo Contrast Additional Contrast? None    Result Date: 5/3/2019  EXAMINATION: CT OF THE ABDOMEN AND PELVIS WITHOUT CONTRAST 5/3/2019 3:11 pm TECHNIQUE: CT of the abdomen and pelvis was performed without the administration of intravenous contrast. Multiplanar reformatted images are provided for review. Dose modulation, iterative reconstruction, and/or weight based adjustment of the mA/kV was utilized to reduce the radiation dose to as low as reasonably achievable. COMPARISON: None. HISTORY: ORDERING SYSTEM PROVIDED HISTORY: lower abd pain TECHNOLOGIST PROVIDED HISTORY: Additional Contrast?->None Ordering Physician Provided Reason for Exam: LOWER ABD PAIN Acuity: Acute Type of Exam: Initial Additional signs and symptoms: dizziness, nausea, fatigue and intermittent chest pain and heaviness Relevant Medical/Surgical History: CT CHANGED TO WITHOUT CONTRAST DUE TO GFR 27 FINDINGS: Lower Chest: There is right lower lobe pneumonia. Organs: The unenhanced liver, spleen, pancreas, adrenal glands and right kidney are unremarkable. There is no hydronephrosis or obstructive uropathy. However, there is a complex cyst within the right upper pole measuring 1.6 x 2.2 cm. The lack of intravenous contrast limits evaluation. GI/Bowel: There is no bowel obstruction. The appendix is within normal limits. Pelvis: The pelvic viscera are within normal limits. Peritoneum/Retroperitoneum: There is no evidence of free fluid or adenopathy. Mild atherosclerosis involves the abdominal aorta and bilateral common iliac arteries.  Bones/Soft Tissues: Degenerative changes involve the thoracolumbar spine. 1. Right lower lobe pneumonia. Recommend chest radiograph in 8 weeks to confirm resolution. 2. 2.2 cm complex right upper pole renal cyst.  Recommend nonemergent MRI of the abdomen with and without contrast for further evaluation. Xr Chest Standard (2 Vw)    Result Date: 5/13/2019  EXAMINATION: TWO XRAY VIEWS OF THE CHEST 5/13/2019 1:02 pm COMPARISON: 05/09/2019 and 05/07/2019. HISTORY: ORDERING SYSTEM PROVIDED HISTORY: Follow pneumonia TECHNOLOGIST PROVIDED HISTORY: Reason for exam:->Follow pneumonia Ordering Physician Provided Reason for Exam: Follow pneumonia Acuity: Acute Type of Exam: Subsequent/Follow-up Additional signs and symptoms: Follow pneumonia Relevant Medical/Surgical History: hypertension FINDINGS: Right-sided PICC line remains in place. Patchy airspace opacities bilaterally, left worse than right, with continued improvement. No new airspace disease is seen. No pleural effusions, pulmonary edema or pneumothoraces. Cardiac and mediastinal silhouettes are within normal limits and stable. No acute bony abnormalities. Patchy airspace opacities bilaterally with continued improvement from prior exams compatible with resolving multifocal pneumonia. Recommend continued follow-up. Xr Chest Standard (2 Vw)    Result Date: 5/9/2019  EXAMINATION: TWO X-RAY VIEWS OF THE CHEST, 5/9/2019 3:56 pm COMPARISON: 05/07/2019. HISTORY: ORDERING SYSTEM PROVIDED HISTORY: SOB, wheezing, follow PNA TECHNOLOGIST PROVIDED HISTORY: Reason for exam:->SOB, wheezing, follow PNA Ordering Physician Provided Reason for Exam: SOB, wheezing, follow PNA Acuity: Acute Type of Exam: Initial Additional signs and symptoms: NA Relevant Medical/Surgical History: Diabetes, hypertension FINDINGS: Right-sided PICC line is redemonstrated. This appears retracted from prior exam with tip now likely in the SVC. Persistent airspace opacities to the left upper lobe and right lower lobe, improved.   No new airspace disease is seen. No pleural effusions or pneumothoraces. Cardiac and mediastinal silhouettes are stable. Stable appearance to bony structures. Improving multifocal pneumonia. Right-sided PICC line redemonstrated and appears retracted from prior exam with tip now located in the SVC. No pneumothorax. Xr Chest Portable    Result Date: 5/7/2019  EXAMINATION: SINGLE XRAY VIEW OF THE CHEST 5/7/2019 10:38 am COMPARISON: 05/06/2019 HISTORY: ORDERING SYSTEM PROVIDED HISTORY: for PICC placement verification, as P-wave changes cannot be trusted for positioning due to underlying rhythm TECHNOLOGIST PROVIDED HISTORY: Reason for exam:->for PICC placement verification, as P-wave changes cannot be trusted for positioning due to underlying rhythm Ordering Physician Provided Reason for Exam: for PICC placement verification, as P-wave changes cannot be trusted for positioning due to underlying rhythm Acuity: Acute Type of Exam: Initial Additional signs and symptoms: for PICC placement verification, as P-wave changes cannot be trusted for positioning due to underlying rhythm Relevant Medical/Surgical History: hypertension FINDINGS: Interval placement of a right PICC line with the tip projecting over the right atrium. Focal airspace opacity in left mid lung and right lung base are unchanged. No pneumothorax. No pleural effusion. Stable cardiac silhouette. The osseous structures are stable. Right PICC line tip projecting over the right atrium. Xr Chest Portable    Result Date: 5/3/2019  EXAMINATION: SINGLE X-RAY VIEW OF THE CHEST, 5/3/2019 1:36 pm COMPARISON: 04/24/2015 HISTORY: ORDERING SYSTEM PROVIDED HISTORY: Chest pain TECHNOLOGIST PROVIDED HISTORY: Reason for exam:-> Chest pain Ordering Physician Provided Reason for Exam: Chest pain Acuity: Acute Type of Exam: Initial Additional signs and symptoms: Pt presents to the ER with c/o dizziness, nausea, fatigue and intermittent chest pain and heaviness. in caliber. Mediastinum: There is mediastinal and bilateral hilar adenopathy. The heart and pericardium demonstrate no acute abnormality. Coronary artery calcifications are noted. There is no acute abnormality of the thoracic aorta. Lungs/pleura: There is a trace right pleural effusion. There is dense consolidation in the left upper lobe and both lower lobes, right greater than left. There are also patchy small ground-glass opacities bilaterally. There is no pneumothorax. Upper Abdomen: Limited images of the upper abdomen are unremarkable. Soft Tissues/Bones: No acute bone or soft tissue abnormality. 1. No scan evidence for pulmonary embolus. 2. Bilateral pneumonia. 3. Coronary artery disease. Us Abdomen Limited    Result Date: 5/10/2019  EXAMINATION: RIGHT UPPER QUADRANT ULTRASOUND 5/10/2019 6:03 am COMPARISON: None. HISTORY: ORDERING SYSTEM PROVIDED HISTORY: abnormal labs TECHNOLOGIST PROVIDED HISTORY: Reason for exam:->abnormal labs Ordering Physician Provided Reason for Exam: elevated LFTs Acuity: Unknown Type of Exam: Initial FINDINGS: LIVER:  There is increased echogenicity of the liver suggesting fatty infiltration. No evidence of intrahepatic biliary ductal dilatation. BILIARY SYSTEM:  Gallbladder is unremarkable without evidence of pericholecystic fluid, wall thickening or stones. Negative sonographic Silva's sign. Common bile duct is within normal limits measuring 6 mm. RIGHT KIDNEY: There is a slightly exophytic echogenic lesion arising from the right kidney measuring approximately 1.9 x 1.6 x 1.9 cm. PANCREAS:  Visualized portions of the pancreas are unremarkable. OTHER: No evidence of right upper quadrant ascites. 1. Mild diffuse fatty infiltration of the liver. 2. There is an exophytic echogenic lesion arising from the right kidney. Recommend further evaluation with a renal protocol CT or MRI.      Nm Myocardial Spect Rest Exercise Or Rx    Result Date: 5/16/2019  Cardiac Perfusion Imaging   Demographics   Patient Name      Bk Martin     Date of study        05/16/2019   Date of Birth     1960         Gender               Male   Age               63 year(s)         Race                    Patient Number    [id number]         Room Number          7046   Visit Number      [id number]          Height               70 inches   Corporate ID      [id number]           Weight               245 pounds   Accession Number  [id number]                                        NM Technologist      Disha Pineda RT   Ordering          Rachelle Wild  Physician         Flor PARTIDA        Cardiologist         Flor PARTIDA   Conclusions   Summary  Normal [facility] nuclear scintigraphic study suggestive of normal myocardial  perfusion. Gated images demonstrate normal left ventricular systolic function with EF  of 60 %. A fixed perfusion defect of aPICAL wall of left ventricle with normal wall  motion noted suggestive of artifact. Signatures   ------------------------------------------------------------------  Electronically signed by Cassandra Gómez MD  (Interpreting cardiologist) on 05/16/2019 at 13:49  ------------------------------------------------------------------  Procedure Procedure Type:   Nuclear Stress Test:Pharmacological, Myocardial Perfusion Imaging with  Pharm, NM MYOCARDIAL SPECT REST EXERCISE OR RX  Indications: Elevated Troponins and abnormal rest ECG. Risk Factors   The patient risk factors include:obesity, hypertension and diabetes  mellitus. Stress Protocols   Resting ECG  Normal sinus rhythm. Nonspecific ST and T wave changes. The patient exercised according to the  [facility] for 1:01 min:s, achieving a work  level of Max. METS:  1.00. The resting heart rate of 91 bpm  trice to a maximal heart rate of 114 bpm.  This value represents 69 % of  the maximal, age-predicted heart rate.   The resting blood pressure of 150/96

## 2019-05-17 NOTE — PROGRESS NOTES
CLINICAL PHARMACY NOTE: MEDS TO [address] Select Patient?: No  Total # of Prescriptions Filled: 7   The following medications were delivered to the patient:  · Carvedilol 12.5mg  · Lisinopril 10mg  · Cyclobenzaprine 10mg  · Prednisone 10mg  · Diltiazem ER 120mg  · Oxycodone/APAP 5-325mg  · Eliquis 5mg  Total # of Interventions Completed: 2  Time Spent (min): 60    Additional Documentation:  Checked coverage for Xarelto vs. Eliquis for physician. Coupon card used for [employer]. [employer] voucher used.     Amador Hernandez, PharmD, [place]  5/17/2019  4:51 PM

## 2019-05-17 NOTE — PLAN OF CARE
Problem: Pain:  Goal: Pain level will decrease  Description  Pain level will decrease  5/17/2019 0001 by Meka Orosco RN  Outcome: Ongoing  5/16/2019 1324 by Luiz Bob RN  Outcome: Ongoing  Goal: Control of acute pain  Description  Control of acute pain  5/17/2019 0001 by Meka Orosco RN  Outcome: Ongoing  5/16/2019 1324 by Luiz Bob RN  Outcome: Ongoing  Goal: Control of chronic pain  Description  Control of chronic pain  5/17/2019 0001 by Meka Orosco RN  Outcome: Ongoing  5/16/2019 1324 by Luiz Bob RN  Outcome: Ongoing     Problem: Risk for Impaired Skin Integrity  Goal: Tissue integrity - skin and mucous membranes  Description  Structural intactness and normal physiological function of skin and  mucous membranes.   5/17/2019 0001 by Meka Orosco RN  Outcome: Ongoing  5/16/2019 1324 by Luiz Bob RN  Outcome: Ongoing     Problem: Cardiac Output - Decreased:  Goal: Hemodynamic stability will improve  Description  Hemodynamic stability will improve  5/17/2019 0001 by Meka Orosco RN  Outcome: Ongoing  5/16/2019 1324 by Luiz Bob RN  Outcome: Ongoing     Problem: Breathing Pattern - Ineffective:  Goal: Ability to achieve and maintain a regular respiratory rate will improve  Description  Ability to achieve and maintain a regular respiratory rate will improve  5/17/2019 0001 by Meka Orosco RN  Outcome: Ongoing  5/16/2019 1324 by Luiz Bob RN  Outcome: Ongoing

## 2019-05-20 ENCOUNTER — TELEPHONE (OUTPATIENT)
Dept: CARDIOLOGY CLINIC | Age: 59
End: 2019-05-20

## 2019-05-20 NOTE — TELEPHONE ENCOUNTER
Patient returned call and per [facility] patient can just keep OV with  on Wednesday patient needs cardioversion.

## 2019-05-20 NOTE — TELEPHONE ENCOUNTER
Attempted to call patient back. No answer left message asking patient to return our call when available.

## 2019-05-22 ENCOUNTER — OFFICE VISIT (OUTPATIENT)
Dept: CARDIOLOGY CLINIC | Age: 59
End: 2019-05-22
Payer: COMMERCIAL

## 2019-05-22 VITALS
RESPIRATION RATE: 16 BRPM | WEIGHT: 217 LBS | HEART RATE: 92 BPM | BODY MASS INDEX: 31.07 KG/M2 | SYSTOLIC BLOOD PRESSURE: 124 MMHG | DIASTOLIC BLOOD PRESSURE: 88 MMHG | HEIGHT: 70 IN

## 2019-05-22 DIAGNOSIS — I48.0 PAF (PAROXYSMAL ATRIAL FIBRILLATION) (HCC): Primary | ICD-10-CM

## 2019-05-22 DIAGNOSIS — J44.0 CHRONIC OBSTRUCTIVE PULMONARY DISEASE WITH ACUTE LOWER RESPIRATORY INFECTION (HCC): ICD-10-CM

## 2019-05-22 PROCEDURE — 99214 OFFICE O/P EST MOD 30 MIN: CPT | Performed by: INTERNAL MEDICINE

## 2019-05-22 NOTE — PROGRESS NOTES
Puja Choudhury MD        OFFICE  FOLLOWUP NOTE    Chief complaints: patient is here for management of PAF, HTN, DM, COPD  Subjective: patient feels better, no chest pain, no shortness of breath, no dizziness, no palpitations    HPI Perla Potts is a 63 y. o.year old who  has a past medical history of [facility] ([facility] H/O cardiovascular stress test, H/O echocardiogram, and Hypertension. and presents for management of PAF, HTN, DM, COPD, which are well controlled    HE IS DISCHARGED from hospital after prolonged stay with pneumonia and afib, NOW IN [place]IN[place]. Current Outpatient Medications   Medication Sig Dispense Refill    cyclobenzaprine (FLEXERIL) 10 MG tablet Take 1 tablet by mouth 3 times daily as needed for Muscle spasms 30 tablet 0    guaiFENesin (MUCINEX) 600 MG extended release tablet Take 1 tablet by mouth 2 times daily 30 tablet 0    predniSONE (DELTASONE) 10 MG tablet Take 30mg daily x 3 d, then 20mg daily x 3d, then 10mg daily x 3. 18 tablet 0    diltiazem (CARDIZEM CD) 120 MG extended release capsule Take 1 capsule by mouth daily 30 capsule 1    carvedilol (COREG) 12.5 MG tablet Take 1 tablet by mouth 2 times daily (with meals) 60 tablet 1    apixaban (ELIQUIS) 5 MG TABS tablet Take 1 tablet by mouth 2 times daily 60 tablet 1    lisinopril (PRINIVIL;ZESTRIL) 10 MG tablet Take 1 tablet by mouth daily 30 tablet 1    glimepiride (AMARYL) 2 MG tablet Take 2 mg by mouth every morning (before breakfast)      traZODone (DESYREL) 50 MG tablet Take  mg by mouth nightly      aspirin 325 MG tablet Take 325 mg by mouth daily      benzonatate (TESSALON) 200 MG capsule Take 200 mg by mouth 3 times daily as needed for Cough      gabapentin (NEURONTIN) 600 MG tablet Take 600 mg by mouth 3 times daily. No current facility-administered medications for this visit. Allergies: Patient has no known allergies.   Past Medical History:   Diagnosis Date    Diabetes mellitus ([facility]  H/O cardiovascular stress test 2019    Normal lexiscan nuclear scintigraphic styd suggestive of normal myocardial perfusion. EF 60%    H/O echocardiogram 2019    Limited study-Left ventricular function is low normal. EF 50-55% Mild left ventricular hypertrophy.  Hypertension      Past Surgical History:   Procedure Laterality Date    CIRCUMCISION, NON-  2013    OTHER SURGICAL HISTORY Right     Transposition of Right Ulnar Nerve     History reviewed. No pertinent family history. Social History     Tobacco Use    Smoking status: Former Smoker     Packs/day: 0.25     Years: 30.00     Pack years: 7.50     Types: Cigarettes    Smokeless tobacco: Never Used   Substance Use Topics    Alcohol use: No     Alcohol/week: 0.0 oz      @AppTap@  Review of Systems:   · Constitutional: No Fever or Weight Loss   · Eyes: No Decreased Vision  · ENT: No Headaches, Hearing Loss or Vertigo  · Cardiovascular: No chest pain, dyspnea on exertion, palpitations or loss of consciousness  · Respiratory: No cough or wheezing    · Gastrointestinal: No abdominal pain, appetite loss, blood in stools, constipation, diarrhea or heartburn  · Genitourinary: No dysuria, trouble voiding, or hematuria  · Musculoskeletal:  No gait disturbance, weakness or joint complaints  · Integumentary: No rash or pruritis  · Neurological: No TIA or stroke symptoms  · Psychiatric: No anxiety or depression  · Endocrine: No malaise, fatigue or temperature intolerance  · Hematologic/Lymphatic: No bleeding problems, blood clots or swollen lymph nodes  · Allergic/Immunologic: No nasal congestion or hives  All systems negative except as marked.    Objective:  /88 (Site: Left Upper Arm, Position: Sitting, Cuff Size: Large Adult)   Pulse 92   Resp 16   Ht 5' 10\" (1.778 m)   Wt 217 lb (98.4 kg)   BMI 31.14 kg/m²   Wt Readings from Last 3 Encounters:   19 217 lb (98.4 kg)   05/15/19 230 lb 12.8 oz (104.7 kg)   16 248 lb (112.5 kg)     Body mass index is 31.14 kg/m². GENERAL - Alert, oriented, pleasant, in no apparent distress,normal grooming  HEENT - pupils are reactive to light and accomodation, cornea intact, conjunctive normal color, ears are normal in exam,throat exam in normal, teeth, gum and palate are normal, oral mucosa is normal without any notation of pallor or cyanosis  Neck - Supple. No jugular venous distention noted. No carotid bruits, no apical -carotid delay  Respiratory:  + wheezing, No chest tenderness. ,no use of accessory muscles, diaphragm movement is normal  Cardiovascular: (PMI) apex non displaced,no lifts no thrills, no s3,no s4, Normal heart rate, Normal rhythm, No murmurs, No rubs, No gallops. Carotid arteries pulse and amplitude are normal no bruit, no abdominal bruit noted ( normal abdominal aorta ausculation), femoral arteries pulse and amplitude are normal no bruit, pedal pulses are normal  Femoral pulses have normal amplitude, no bruits   Extremities - No cyanosis, clubbing, or significant edema, no varicose veins    Abdomen - No masses, tenderness, or organomegaly, no hepato-splenomegally, no bruits  Musculoskeletal - No significant edema, no kyphosis or scoliosis, no deformity in any extremity noted, muscle strength and tone are normal  Skin: no ulcer,no scar,no stasis dermatitis   Neurologic - alert oriented times 3,Cranial nerves II through XII are grossly intact. There were no gross focal neurologic abnormalities.  All sensory and motor nerves examined and were normal  Psychiatric: normal mood and affect    No results found for: CKTOTAL, CKMB, CKMBINDEX, TROPONINI  BNP:  No results found for: BNP  PT/INR:  No results found for: PTINR  Lab Results   Component Value Date    LABA1C 7.1 (H) 05/04/2019     Lab Results   Component Value Date    CHOL 108 05/03/2019    TRIG 150 (H) 05/03/2019    HDL 32 (L) 05/03/2019    LDLDIRECT 56 05/03/2019     Lab Results   Component Value Date    ALT 64 (H) 05/16/2019    AST 26 05/16/2019     TSH:  No results found for: TSH    Impression:  Ellis Christensen is a 63 y. o.year old who  has a past medical history of Diabetes mellitus ([facility] H/O cardiovascular stress test, H/O echocardiogram, and Hypertension. and presents with     Plan:  1. PAF: eliquis samples provided, continue cardzem, may stop aspirin  2. HTN: stable, continue coreg and diltiazem  3. DM; stable continue amary  4. Severe COPD: recommend to see pulmonary, continue presdnisone  5. Health maintenance: exerise and diet  All labs, medications and tests reviewed, continue all other medications of all above medical condition listed as is.     [unfilled]

## 2019-05-22 NOTE — PROGRESS NOTES
[id number] Score for Atrial Fibrillation Stroke Risk   Risk   Factors  Component Value   C CHF No 0   H HTN No 0   A2 Age >= 76 No,  (59 y.o.) 0   D DM No 0   S2 Prior Stroke/TIA No 0   V Vascular Disease No 0   A Age 74-69 No,  (59 y.o.) 0   Sc Sex male 0    [id number]  Score  0   Score last updated 9/87/35 9:53 PM    Click here for a link to the UpToDate guideline \"Atrial Fibrillation: Anticoagulation therapy to prevent embolization    Disclaimer: Risk Score calculation is dependent on accuracy of patient problem list and past encounter diagnosis.

## 2019-07-10 ENCOUNTER — HOSPITAL ENCOUNTER (OUTPATIENT)
Age: 59
Discharge: HOME | End: 2019-07-10
Payer: COMMERCIAL

## 2019-07-10 ENCOUNTER — HOSPITAL ENCOUNTER (OUTPATIENT)
Dept: GENERAL RADIOLOGY | Age: 59
Discharge: HOME | End: 2019-07-10
Payer: COMMERCIAL

## 2019-07-10 DIAGNOSIS — J18.9 PNEUMONIA DUE TO INFECTIOUS ORGANISM, UNSPECIFIED LATERALITY, UNSPECIFIED PART OF LUNG: ICD-10-CM

## 2019-07-10 DIAGNOSIS — J43.2 CENTRILOBULAR EMPHYSEMA (HCC): ICD-10-CM

## 2019-07-10 PROCEDURE — 71046 X-RAY EXAM CHEST 2 VIEWS: CPT

## 2019-07-10 RX ORDER — CARVEDILOL 12.5 MG/1
12.5 TABLET ORAL 2 TIMES DAILY WITH MEALS
Qty: 60 TABLET | Refills: 5 | Status: SHIPPED | OUTPATIENT
Start: 2019-07-10 | End: 2019-12-26 | Stop reason: SDUPTHER

## 2019-07-10 RX ORDER — LISINOPRIL 10 MG/1
10 TABLET ORAL DAILY
Qty: 30 TABLET | Refills: 5 | Status: SHIPPED | OUTPATIENT
Start: 2019-07-10 | End: 2019-12-26 | Stop reason: SDUPTHER

## 2019-07-10 RX ORDER — DILTIAZEM HYDROCHLORIDE 120 MG/1
120 CAPSULE, COATED, EXTENDED RELEASE ORAL DAILY
Qty: 30 CAPSULE | Refills: 5 | Status: SHIPPED | OUTPATIENT
Start: 2019-07-10 | End: 2019-12-26 | Stop reason: SDUPTHER

## 2019-08-13 ENCOUNTER — HOSPITAL ENCOUNTER (OUTPATIENT)
Dept: CT IMAGING | Age: 59
Discharge: HOME | End: 2019-08-13
Payer: COMMERCIAL

## 2019-08-13 DIAGNOSIS — Q61.00 CONGENITAL HEMORRHAGIC CYST OF KIDNEY: ICD-10-CM

## 2019-08-13 DIAGNOSIS — R31.29 HEMATURIA, MICROSCOPIC: ICD-10-CM

## 2019-08-13 LAB
GFR AFRICAN AMERICAN: >60 ML/MIN/1.73M2
GFR NON-AFRICAN AMERICAN: 50 ML/MIN/1.73M2
POC CREATININE: 1.4 MG/DL (ref 0.9–1.3)

## 2019-08-13 PROCEDURE — 74178 CT ABD&PLV WO CNTR FLWD CNTR: CPT

## 2019-08-13 PROCEDURE — 2580000003 HC RX 258: Performed by: NURSE PRACTITIONER

## 2019-08-13 PROCEDURE — 6360000004 HC RX CONTRAST MEDICATION: Performed by: NURSE PRACTITIONER

## 2019-08-13 RX ORDER — SODIUM CHLORIDE 0.9 % (FLUSH) 0.9 %
10 SYRINGE (ML) INJECTION ONCE
Status: COMPLETED | OUTPATIENT
Start: 2019-08-13 | End: 2019-08-13

## 2019-08-13 RX ADMIN — Medication 10 ML: at 16:19

## 2019-08-13 RX ADMIN — IOPAMIDOL 75 ML: 755 INJECTION, SOLUTION INTRAVENOUS at 16:18

## 2019-11-19 ENCOUNTER — APPOINTMENT (OUTPATIENT)
Dept: GENERAL RADIOLOGY | Age: 59
End: 2019-11-19
Payer: COMMERCIAL

## 2019-11-19 ENCOUNTER — HOSPITAL ENCOUNTER (EMERGENCY)
Age: 59
Discharge: ANOTHER HOSPITAL | End: 2019-11-20
Attending: EMERGENCY MEDICINE
Payer: COMMERCIAL

## 2019-11-19 DIAGNOSIS — I20.0 UNSTABLE ANGINA (HCC): Primary | ICD-10-CM

## 2019-11-19 PROBLEM — R07.9 CHEST PAIN: Status: ACTIVE | Noted: 2019-11-19

## 2019-11-19 LAB
ANION GAP SERPL CALCULATED.3IONS-SCNC: 14 MMOL/L (ref 4–16)
BASOPHILS ABSOLUTE: 0.1 K/CU MM
BASOPHILS RELATIVE PERCENT: 0.5 % (ref 0–1)
BUN BLDV-MCNC: 22 MG/DL (ref 6–23)
CALCIUM SERPL-MCNC: 9.3 MG/DL (ref 8.3–10.6)
CHLORIDE BLD-SCNC: 95 MMOL/L (ref 99–110)
CHP ED QC CHECK: YES
CO2: 28 MMOL/L (ref 21–32)
CREAT SERPL-MCNC: 1.2 MG/DL (ref 0.9–1.3)
DIFFERENTIAL TYPE: ABNORMAL
EKG ATRIAL RATE: 81 BPM
EKG ATRIAL RATE: 92 BPM
EKG DIAGNOSIS: NORMAL
EKG DIAGNOSIS: NORMAL
EKG P AXIS: 38 DEGREES
EKG P AXIS: 47 DEGREES
EKG P-R INTERVAL: 210 MS
EKG P-R INTERVAL: 216 MS
EKG Q-T INTERVAL: 362 MS
EKG Q-T INTERVAL: 402 MS
EKG QRS DURATION: 116 MS
EKG QRS DURATION: 118 MS
EKG QTC CALCULATION (BAZETT): 447 MS
EKG QTC CALCULATION (BAZETT): 466 MS
EKG R AXIS: 28 DEGREES
EKG R AXIS: 46 DEGREES
EKG T AXIS: 35 DEGREES
EKG T AXIS: 39 DEGREES
EKG VENTRICULAR RATE: 81 BPM
EKG VENTRICULAR RATE: 92 BPM
EOSINOPHILS ABSOLUTE: 0.5 K/CU MM
EOSINOPHILS RELATIVE PERCENT: 4.2 % (ref 0–3)
GFR AFRICAN AMERICAN: >60 ML/MIN/1.73M2
GFR NON-AFRICAN AMERICAN: >60 ML/MIN/1.73M2
GLUCOSE BLD-MCNC: 126 MG/DL
GLUCOSE BLD-MCNC: 129 MG/DL (ref 70–99)
GLUCOSE BLD-MCNC: 166 MG/DL (ref 70–99)
HCT VFR BLD CALC: 52.2 % (ref 42–52)
HEMOGLOBIN: 16.6 GM/DL (ref 13.5–18)
IMMATURE NEUTROPHIL %: 0.2 % (ref 0–0.43)
LYMPHOCYTES ABSOLUTE: 1.9 K/CU MM
LYMPHOCYTES RELATIVE PERCENT: 16.3 % (ref 24–44)
MCH RBC QN AUTO: 28 PG (ref 27–31)
MCHC RBC AUTO-ENTMCNC: 31.8 % (ref 32–36)
MCV RBC AUTO: 88.2 FL (ref 78–100)
MONOCYTES ABSOLUTE: 0.8 K/CU MM
MONOCYTES RELATIVE PERCENT: 6.6 % (ref 0–4)
PDW BLD-RTO: 14.8 % (ref 11.7–14.9)
PLATELET # BLD: 219 K/CU MM (ref 140–440)
PMV BLD AUTO: 11.1 FL (ref 7.5–11.1)
POTASSIUM SERPL-SCNC: 4.3 MMOL/L (ref 3.5–5.1)
RBC # BLD: 5.92 M/CU MM (ref 4.6–6.2)
SEGMENTED NEUTROPHILS ABSOLUTE COUNT: 8.5 K/CU MM
SEGMENTED NEUTROPHILS RELATIVE PERCENT: 72.2 % (ref 36–66)
SODIUM BLD-SCNC: 137 MMOL/L (ref 135–145)
TOTAL IMMATURE NEUTOROPHIL: 0.02 K/CU MM
TROPONIN T: 0.01 NG/ML
WBC # BLD: 11.8 K/CU MM (ref 4–10.5)

## 2019-11-19 PROCEDURE — 71045 X-RAY EXAM CHEST 1 VIEW: CPT

## 2019-11-19 PROCEDURE — 84484 ASSAY OF TROPONIN QUANT: CPT

## 2019-11-19 PROCEDURE — 82962 GLUCOSE BLOOD TEST: CPT

## 2019-11-19 PROCEDURE — 99285 EMERGENCY DEPT VISIT HI MDM: CPT

## 2019-11-19 PROCEDURE — 85025 COMPLETE CBC W/AUTO DIFF WBC: CPT

## 2019-11-19 PROCEDURE — 93010 ELECTROCARDIOGRAM REPORT: CPT | Performed by: INTERNAL MEDICINE

## 2019-11-19 PROCEDURE — 6370000000 HC RX 637 (ALT 250 FOR IP): Performed by: EMERGENCY MEDICINE

## 2019-11-19 PROCEDURE — 2580000003 HC RX 258: Performed by: EMERGENCY MEDICINE

## 2019-11-19 PROCEDURE — 93005 ELECTROCARDIOGRAM TRACING: CPT | Performed by: EMERGENCY MEDICINE

## 2019-11-19 PROCEDURE — 80048 BASIC METABOLIC PNL TOTAL CA: CPT

## 2019-11-19 RX ORDER — ASPIRIN 81 MG/1
324 TABLET, CHEWABLE ORAL ONCE
Status: COMPLETED | OUTPATIENT
Start: 2019-11-19 | End: 2019-11-19

## 2019-11-19 RX ORDER — METFORMIN HYDROCHLORIDE 500 MG/1
TABLET, EXTENDED RELEASE ORAL
Refills: 5 | COMMUNITY
Start: 2019-10-30

## 2019-11-19 RX ORDER — NITROGLYCERIN 0.4 MG/1
0.4 TABLET SUBLINGUAL EVERY 5 MIN PRN
Status: DISCONTINUED | OUTPATIENT
Start: 2019-11-19 | End: 2019-11-20 | Stop reason: HOSPADM

## 2019-11-19 RX ORDER — BENZONATATE 200 MG/1
CAPSULE ORAL
Refills: 0 | COMMUNITY
Start: 2019-10-30 | End: 2021-02-24

## 2019-11-19 RX ORDER — GLIPIZIDE 10 MG/1
10 TABLET, FILM COATED, EXTENDED RELEASE ORAL DAILY
COMMUNITY
End: 2021-04-21

## 2019-11-19 RX ORDER — 0.9 % SODIUM CHLORIDE 0.9 %
1000 INTRAVENOUS SOLUTION INTRAVENOUS ONCE
Status: COMPLETED | OUTPATIENT
Start: 2019-11-19 | End: 2019-11-19

## 2019-11-19 RX ORDER — ROSUVASTATIN CALCIUM 20 MG/1
TABLET, COATED ORAL
Refills: 5 | COMMUNITY
Start: 2019-10-25 | End: 2021-12-08 | Stop reason: ALTCHOICE

## 2019-11-19 RX ORDER — HYDROCHLOROTHIAZIDE 25 MG/1
25 TABLET ORAL DAILY
COMMUNITY
End: 2019-12-04 | Stop reason: SDUPTHER

## 2019-11-19 RX ADMIN — NITROGLYCERIN 0.4 MG: 0.4 TABLET SUBLINGUAL at 15:53

## 2019-11-19 RX ADMIN — ASPIRIN 81 MG 324 MG: 81 TABLET ORAL at 15:02

## 2019-11-19 RX ADMIN — NITROGLYCERIN 0.4 MG: 0.4 TABLET SUBLINGUAL at 16:23

## 2019-11-19 RX ADMIN — NITROGLYCERIN 0.4 MG: 0.4 TABLET SUBLINGUAL at 15:12

## 2019-11-19 RX ADMIN — SODIUM CHLORIDE 1000 ML: 9 INJECTION, SOLUTION INTRAVENOUS at 15:17

## 2019-11-19 ASSESSMENT — ENCOUNTER SYMPTOMS: SHORTNESS OF BREATH: 1

## 2019-11-19 ASSESSMENT — PAIN SCALES - GENERAL
PAINLEVEL_OUTOF10: 6
PAINLEVEL_OUTOF10: 2

## 2019-11-19 ASSESSMENT — PAIN DESCRIPTION - LOCATION: LOCATION: CHEST

## 2019-11-19 ASSESSMENT — PAIN DESCRIPTION - PAIN TYPE
TYPE: ACUTE PAIN
TYPE: ACUTE PAIN

## 2019-11-19 ASSESSMENT — PAIN DESCRIPTION - FREQUENCY: FREQUENCY: CONTINUOUS

## 2019-11-19 ASSESSMENT — PAIN DESCRIPTION - ORIENTATION: ORIENTATION: MID

## 2019-11-19 ASSESSMENT — PAIN DESCRIPTION - DESCRIPTORS: DESCRIPTORS: SQUEEZING

## 2019-11-20 ENCOUNTER — HOSPITAL ENCOUNTER (INPATIENT)
Age: 59
LOS: 7 days | Discharge: HOME HEALTH | DRG: 234 | End: 2019-11-27
Attending: INTERNAL MEDICINE | Admitting: INTERNAL MEDICINE
Payer: COMMERCIAL

## 2019-11-20 VITALS
HEIGHT: 70 IN | WEIGHT: 235 LBS | RESPIRATION RATE: 15 BRPM | HEART RATE: 118 BPM | SYSTOLIC BLOOD PRESSURE: 100 MMHG | BODY MASS INDEX: 33.64 KG/M2 | DIASTOLIC BLOOD PRESSURE: 43 MMHG | OXYGEN SATURATION: 99 % | TEMPERATURE: 97.8 F

## 2019-11-20 DIAGNOSIS — I20.0 UNSTABLE ANGINA PECTORIS (HCC): Primary | ICD-10-CM

## 2019-11-20 PROBLEM — I48.0 PAROXYSMAL ATRIAL FIBRILLATION (HCC): Status: ACTIVE | Noted: 2019-11-20

## 2019-11-20 LAB
ANION GAP SERPL CALCULATED.3IONS-SCNC: 14 MMOL/L (ref 4–16)
BASE EXCESS MIXED: ABNORMAL (ref 0–1.2)
BUN BLDV-MCNC: 19 MG/DL (ref 6–23)
CALCIUM SERPL-MCNC: 9.6 MG/DL (ref 8.3–10.6)
CARBON MONOXIDE, BLOOD: 2.3 % (ref 0–5)
CHLORIDE BLD-SCNC: 94 MMOL/L (ref 99–110)
CO2 CONTENT: 26.7 MMOL/L (ref 19–24)
CO2: 28 MMOL/L (ref 21–32)
CREAT SERPL-MCNC: 1.1 MG/DL (ref 0.9–1.3)
ESTIMATED AVERAGE GLUCOSE: 143 MG/DL
GFR AFRICAN AMERICAN: >60 ML/MIN/1.73M2
GFR NON-AFRICAN AMERICAN: >60 ML/MIN/1.73M2
GLUCOSE BLD-MCNC: 115 MG/DL (ref 70–99)
GLUCOSE BLD-MCNC: 122 MG/DL (ref 70–99)
GLUCOSE BLD-MCNC: 140 MG/DL (ref 70–99)
GLUCOSE BLD-MCNC: 152 MG/DL (ref 70–99)
HBA1C MFR BLD: 6.6 % (ref 4.2–6.3)
HCO3 ARTERIAL: 25.4 MMOL/L (ref 18–23)
HCT VFR BLD CALC: 53.6 % (ref 42–52)
HEMOGLOBIN: 16.6 GM/DL (ref 13.5–18)
MCH RBC QN AUTO: 27.8 PG (ref 27–31)
MCHC RBC AUTO-ENTMCNC: 31 % (ref 32–36)
MCV RBC AUTO: 89.6 FL (ref 78–100)
METHEMOGLOBIN ARTERIAL: 1 %
O2 SATURATION: 91.5 % (ref 96–97)
PCO2 ARTERIAL: 43 MMHG (ref 32–45)
PDW BLD-RTO: 15 % (ref 11.7–14.9)
PH BLOOD: 7.38 (ref 7.34–7.45)
PLATELET # BLD: 252 K/CU MM (ref 140–440)
PMV BLD AUTO: 9.6 FL (ref 7.5–11.1)
PO2 ARTERIAL: 56 MMHG (ref 75–100)
POTASSIUM SERPL-SCNC: 4.5 MMOL/L (ref 3.5–5.1)
RBC # BLD: 5.98 M/CU MM (ref 4.6–6.2)
SODIUM BLD-SCNC: 136 MMOL/L (ref 135–145)
TROPONIN T: 0.01 NG/ML
WBC # BLD: 9 K/CU MM (ref 4–10.5)

## 2019-11-20 PROCEDURE — 82803 BLOOD GASES ANY COMBINATION: CPT

## 2019-11-20 PROCEDURE — 36415 COLL VENOUS BLD VENIPUNCTURE: CPT

## 2019-11-20 PROCEDURE — 6370000000 HC RX 637 (ALT 250 FOR IP): Performed by: NURSE PRACTITIONER

## 2019-11-20 PROCEDURE — C1769 GUIDE WIRE: HCPCS

## 2019-11-20 PROCEDURE — B2151ZZ FLUOROSCOPY OF LEFT HEART USING LOW OSMOLAR CONTRAST: ICD-10-PCS | Performed by: INTERNAL MEDICINE

## 2019-11-20 PROCEDURE — 6370000000 HC RX 637 (ALT 250 FOR IP): Performed by: INTERNAL MEDICINE

## 2019-11-20 PROCEDURE — 2580000003 HC RX 258: Performed by: INTERNAL MEDICINE

## 2019-11-20 PROCEDURE — 2709999900 HC NON-CHARGEABLE SUPPLY

## 2019-11-20 PROCEDURE — 2500000003 HC RX 250 WO HCPCS

## 2019-11-20 PROCEDURE — 94640 AIRWAY INHALATION TREATMENT: CPT

## 2019-11-20 PROCEDURE — 85027 COMPLETE CBC AUTOMATED: CPT

## 2019-11-20 PROCEDURE — 93458 L HRT ARTERY/VENTRICLE ANGIO: CPT | Performed by: INTERNAL MEDICINE

## 2019-11-20 PROCEDURE — 6360000002 HC RX W HCPCS

## 2019-11-20 PROCEDURE — 94660 CPAP INITIATION&MGMT: CPT

## 2019-11-20 PROCEDURE — 82962 GLUCOSE BLOOD TEST: CPT

## 2019-11-20 PROCEDURE — B246ZZZ ULTRASONOGRAPHY OF RIGHT AND LEFT HEART: ICD-10-PCS | Performed by: INTERNAL MEDICINE

## 2019-11-20 PROCEDURE — 2720000010 HC SURG SUPPLY STERILE

## 2019-11-20 PROCEDURE — 93458 L HRT ARTERY/VENTRICLE ANGIO: CPT

## 2019-11-20 PROCEDURE — 2100000000 HC CCU R&B

## 2019-11-20 PROCEDURE — 80048 BASIC METABOLIC PNL TOTAL CA: CPT

## 2019-11-20 PROCEDURE — 99222 1ST HOSP IP/OBS MODERATE 55: CPT | Performed by: INTERNAL MEDICINE

## 2019-11-20 PROCEDURE — B2111ZZ FLUOROSCOPY OF MULTIPLE CORONARY ARTERIES USING LOW OSMOLAR CONTRAST: ICD-10-PCS | Performed by: INTERNAL MEDICINE

## 2019-11-20 PROCEDURE — C1894 INTRO/SHEATH, NON-LASER: HCPCS

## 2019-11-20 PROCEDURE — 93005 ELECTROCARDIOGRAM TRACING: CPT | Performed by: NURSE PRACTITIONER

## 2019-11-20 PROCEDURE — 83036 HEMOGLOBIN GLYCOSYLATED A1C: CPT

## 2019-11-20 PROCEDURE — 6360000004 HC RX CONTRAST MEDICATION

## 2019-11-20 PROCEDURE — C1887 CATHETER, GUIDING: HCPCS

## 2019-11-20 PROCEDURE — 93571 IV DOP VEL&/PRESS C FLO 1ST: CPT | Performed by: INTERNAL MEDICINE

## 2019-11-20 PROCEDURE — 84484 ASSAY OF TROPONIN QUANT: CPT

## 2019-11-20 PROCEDURE — 4A023N7 MEASUREMENT OF CARDIAC SAMPLING AND PRESSURE, LEFT HEART, PERCUTANEOUS APPROACH: ICD-10-PCS | Performed by: INTERNAL MEDICINE

## 2019-11-20 RX ORDER — SODIUM CHLORIDE 0.9 % (FLUSH) 0.9 %
10 SYRINGE (ML) INJECTION EVERY 12 HOURS SCHEDULED
Status: DISCONTINUED | OUTPATIENT
Start: 2019-11-20 | End: 2019-11-20 | Stop reason: SDUPTHER

## 2019-11-20 RX ORDER — ROSUVASTATIN CALCIUM 20 MG/1
20 TABLET, COATED ORAL NIGHTLY
Status: DISCONTINUED | OUTPATIENT
Start: 2019-11-20 | End: 2019-11-22

## 2019-11-20 RX ORDER — ACETAMINOPHEN 325 MG/1
650 TABLET ORAL EVERY 4 HOURS PRN
Status: DISCONTINUED | OUTPATIENT
Start: 2019-11-20 | End: 2019-11-22

## 2019-11-20 RX ORDER — NITROGLYCERIN 0.4 MG/1
0.4 TABLET SUBLINGUAL EVERY 5 MIN PRN
Status: DISCONTINUED | OUTPATIENT
Start: 2019-11-20 | End: 2019-11-22

## 2019-11-20 RX ORDER — ONDANSETRON 2 MG/ML
4 INJECTION INTRAMUSCULAR; INTRAVENOUS EVERY 6 HOURS PRN
Status: DISCONTINUED | OUTPATIENT
Start: 2019-11-20 | End: 2019-11-22

## 2019-11-20 RX ORDER — ASPIRIN 81 MG/1
81 TABLET, CHEWABLE ORAL DAILY
Status: DISCONTINUED | OUTPATIENT
Start: 2019-11-21 | End: 2019-11-22

## 2019-11-20 RX ORDER — 0.9 % SODIUM CHLORIDE 0.9 %
500 INTRAVENOUS SOLUTION INTRAVENOUS ONCE
Status: COMPLETED | OUTPATIENT
Start: 2019-11-20 | End: 2019-11-20

## 2019-11-20 RX ORDER — DEXTROSE MONOHYDRATE 25 G/50ML
12.5 INJECTION, SOLUTION INTRAVENOUS PRN
Status: DISCONTINUED | OUTPATIENT
Start: 2019-11-20 | End: 2019-11-22

## 2019-11-20 RX ORDER — LORAZEPAM 2 MG/ML
0.5 INJECTION INTRAMUSCULAR ONCE
Status: COMPLETED | OUTPATIENT
Start: 2019-11-21 | End: 2019-11-21

## 2019-11-20 RX ORDER — SODIUM CHLORIDE 9 MG/ML
25 INJECTION, SOLUTION INTRAVENOUS PRN
Status: DISCONTINUED | OUTPATIENT
Start: 2019-11-20 | End: 2019-11-22

## 2019-11-20 RX ORDER — SODIUM CHLORIDE 0.9 % (FLUSH) 0.9 %
10 SYRINGE (ML) INJECTION PRN
Status: DISCONTINUED | OUTPATIENT
Start: 2019-11-20 | End: 2019-11-20 | Stop reason: SDUPTHER

## 2019-11-20 RX ORDER — SODIUM CHLORIDE 0.9 % (FLUSH) 0.9 %
10 SYRINGE (ML) INJECTION EVERY 12 HOURS SCHEDULED
Status: DISCONTINUED | OUTPATIENT
Start: 2019-11-20 | End: 2019-11-22

## 2019-11-20 RX ORDER — ALBUTEROL SULFATE 90 UG/1
2 AEROSOL, METERED RESPIRATORY (INHALATION) 2 TIMES DAILY
Status: DISCONTINUED | OUTPATIENT
Start: 2019-11-20 | End: 2019-11-22

## 2019-11-20 RX ORDER — DILTIAZEM HYDROCHLORIDE 120 MG/1
120 CAPSULE, COATED, EXTENDED RELEASE ORAL DAILY
Status: DISCONTINUED | OUTPATIENT
Start: 2019-11-20 | End: 2019-11-22

## 2019-11-20 RX ORDER — HYDRALAZINE HYDROCHLORIDE 20 MG/ML
10 INJECTION INTRAMUSCULAR; INTRAVENOUS EVERY 10 MIN PRN
Status: DISCONTINUED | OUTPATIENT
Start: 2019-11-20 | End: 2019-11-22

## 2019-11-20 RX ORDER — SODIUM CHLORIDE 0.9 % (FLUSH) 0.9 %
10 SYRINGE (ML) INJECTION PRN
Status: DISCONTINUED | OUTPATIENT
Start: 2019-11-20 | End: 2019-11-22

## 2019-11-20 RX ORDER — LISINOPRIL 10 MG/1
10 TABLET ORAL DAILY
Status: DISCONTINUED | OUTPATIENT
Start: 2019-11-20 | End: 2019-11-22

## 2019-11-20 RX ORDER — NICOTINE POLACRILEX 4 MG
15 LOZENGE BUCCAL PRN
Status: DISCONTINUED | OUTPATIENT
Start: 2019-11-20 | End: 2019-11-22

## 2019-11-20 RX ORDER — SODIUM CHLORIDE 9 MG/ML
INJECTION, SOLUTION INTRAVENOUS CONTINUOUS
Status: DISCONTINUED | OUTPATIENT
Start: 2019-11-20 | End: 2019-11-22

## 2019-11-20 RX ORDER — HYDROCHLOROTHIAZIDE 25 MG/1
25 TABLET ORAL DAILY
Status: DISCONTINUED | OUTPATIENT
Start: 2019-11-20 | End: 2019-11-22

## 2019-11-20 RX ORDER — IPRATROPIUM BROMIDE AND ALBUTEROL SULFATE 2.5; .5 MG/3ML; MG/3ML
1 SOLUTION RESPIRATORY (INHALATION)
Status: DISCONTINUED | OUTPATIENT
Start: 2019-11-20 | End: 2019-11-22

## 2019-11-20 RX ORDER — CARVEDILOL 12.5 MG/1
12.5 TABLET ORAL 2 TIMES DAILY WITH MEALS
Status: DISCONTINUED | OUTPATIENT
Start: 2019-11-20 | End: 2019-11-22

## 2019-11-20 RX ORDER — DEXTROSE MONOHYDRATE 50 MG/ML
100 INJECTION, SOLUTION INTRAVENOUS PRN
Status: DISCONTINUED | OUTPATIENT
Start: 2019-11-20 | End: 2019-11-22

## 2019-11-20 RX ORDER — ACETAMINOPHEN 325 MG/1
650 TABLET ORAL EVERY 4 HOURS PRN
Status: DISCONTINUED | OUTPATIENT
Start: 2019-11-20 | End: 2019-11-20 | Stop reason: SDUPTHER

## 2019-11-20 RX ADMIN — SODIUM CHLORIDE: 9 INJECTION, SOLUTION INTRAVENOUS at 09:06

## 2019-11-20 RX ADMIN — ACETAMINOPHEN 650 MG: 325 TABLET ORAL at 21:14

## 2019-11-20 RX ADMIN — HYDROCHLOROTHIAZIDE 25 MG: 25 TABLET ORAL at 09:00

## 2019-11-20 RX ADMIN — NITROGLYCERIN 0.4 MG: 0.4 TABLET, ORALLY DISINTEGRATING SUBLINGUAL at 09:06

## 2019-11-20 RX ADMIN — CARVEDILOL 12.5 MG: 12.5 TABLET, FILM COATED ORAL at 09:00

## 2019-11-20 RX ADMIN — SODIUM CHLORIDE: 9 INJECTION, SOLUTION INTRAVENOUS at 19:30

## 2019-11-20 RX ADMIN — LISINOPRIL 10 MG: 5 TABLET ORAL at 09:00

## 2019-11-20 RX ADMIN — IPRATROPIUM BROMIDE AND ALBUTEROL SULFATE 1 AMPULE: .5; 3 SOLUTION RESPIRATORY (INHALATION) at 22:08

## 2019-11-20 RX ADMIN — CARVEDILOL 12.5 MG: 12.5 TABLET, FILM COATED ORAL at 21:14

## 2019-11-20 RX ADMIN — SODIUM CHLORIDE: 9 INJECTION, SOLUTION INTRAVENOUS at 21:32

## 2019-11-20 RX ADMIN — ROSUVASTATIN CALCIUM 20 MG: 20 TABLET, COATED ORAL at 21:14

## 2019-11-20 RX ADMIN — DILTIAZEM HYDROCHLORIDE 120 MG: 120 CAPSULE, COATED, EXTENDED RELEASE ORAL at 09:00

## 2019-11-20 RX ADMIN — ALBUTEROL SULFATE 2 PUFF: 90 AEROSOL, METERED RESPIRATORY (INHALATION) at 07:25

## 2019-11-20 RX ADMIN — NITROGLYCERIN 0.4 MG: 0.4 TABLET, ORALLY DISINTEGRATING SUBLINGUAL at 09:12

## 2019-11-20 ASSESSMENT — PAIN SCALES - GENERAL
PAINLEVEL_OUTOF10: 7
PAINLEVEL_OUTOF10: 0
PAINLEVEL_OUTOF10: 2
PAINLEVEL_OUTOF10: 6
PAINLEVEL_OUTOF10: 3

## 2019-11-20 ASSESSMENT — PAIN DESCRIPTION - PAIN TYPE
TYPE: ACUTE PAIN
TYPE: ACUTE PAIN
TYPE: CHRONIC PAIN

## 2019-11-20 ASSESSMENT — PAIN DESCRIPTION - DESCRIPTORS
DESCRIPTORS: ACHING;DISCOMFORT
DESCRIPTORS: TIGHTNESS;CRAMPING

## 2019-11-20 ASSESSMENT — PAIN DESCRIPTION - LOCATION
LOCATION: CHEST
LOCATION: CHEST
LOCATION: BACK

## 2019-11-20 ASSESSMENT — PAIN DESCRIPTION - ORIENTATION
ORIENTATION: MID
ORIENTATION: MID
ORIENTATION: LOWER

## 2019-11-20 ASSESSMENT — PAIN - FUNCTIONAL ASSESSMENT: PAIN_FUNCTIONAL_ASSESSMENT: ACTIVITIES ARE NOT PREVENTED

## 2019-11-20 ASSESSMENT — PAIN DESCRIPTION - FREQUENCY
FREQUENCY: CONTINUOUS
FREQUENCY: CONTINUOUS

## 2019-11-20 ASSESSMENT — PAIN DESCRIPTION - PROGRESSION: CLINICAL_PROGRESSION: GRADUALLY WORSENING

## 2019-11-20 ASSESSMENT — PAIN DESCRIPTION - ONSET: ONSET: ON-GOING

## 2019-11-20 ASSESSMENT — PAIN DESCRIPTION - DIRECTION: RADIATING_TOWARDS: NO WHERE

## 2019-11-21 ENCOUNTER — ANESTHESIA (OUTPATIENT)
Dept: OPERATING ROOM | Age: 59
DRG: 234 | End: 2019-11-21
Payer: COMMERCIAL

## 2019-11-21 ENCOUNTER — APPOINTMENT (OUTPATIENT)
Dept: ULTRASOUND IMAGING | Age: 59
DRG: 234 | End: 2019-11-21
Attending: INTERNAL MEDICINE
Payer: COMMERCIAL

## 2019-11-21 ENCOUNTER — ANESTHESIA EVENT (OUTPATIENT)
Dept: OPERATING ROOM | Age: 59
DRG: 234 | End: 2019-11-21
Payer: COMMERCIAL

## 2019-11-21 ENCOUNTER — APPOINTMENT (OUTPATIENT)
Dept: CT IMAGING | Age: 59
DRG: 234 | End: 2019-11-21
Attending: INTERNAL MEDICINE
Payer: COMMERCIAL

## 2019-11-21 PROBLEM — I21.4 NSTEMI (NON-ST ELEVATED MYOCARDIAL INFARCTION) (HCC): Status: ACTIVE | Noted: 2019-11-21

## 2019-11-21 LAB
DLCO %PRED: 0 %
DLCO PRED: NORMAL
DLCO/VA %PRED: NORMAL
DLCO/VA PRED: NORMAL
DLCO/VA: NORMAL
DLCO: NORMAL
EXPIRATORY TIME: NORMAL
FEF 25-75% %PRED-PRE: NORMAL
FEF 25-75% PRED: NORMAL
FEF 25-75%-PRE: NORMAL
FEV1 %PRED-PRE: 61 %
FEV1 PRED: NORMAL
FEV1/FVC %PRED-PRE: NORMAL
FEV1/FVC PRED: NORMAL
FEV1/FVC: 66 %
FEV1: NORMAL
FVC %PRED-PRE: NORMAL
FVC PRED: NORMAL
FVC: NORMAL
GAW %PRED: NORMAL
GAW PRED: NORMAL
GAW: NORMAL
GLUCOSE BLD-MCNC: 102 MG/DL (ref 70–99)
GLUCOSE BLD-MCNC: 105 MG/DL (ref 70–99)
GLUCOSE BLD-MCNC: 127 MG/DL (ref 70–99)
GLUCOSE BLD-MCNC: 128 MG/DL (ref 70–99)
IC %PRED: NORMAL
IC PRED: NORMAL
IC: NORMAL
LV EF: 48 %
LVEF MODALITY: NORMAL
MVV %PRED-PRE: NORMAL
MVV PRED: NORMAL
MVV-PRE: NORMAL
PEF %PRED-PRE: NORMAL
PEF PRED: NORMAL
PEF-PRE: NORMAL
RAW %PRED: NORMAL
RAW PRED: NORMAL
RAW: NORMAL
RV %PRED: NORMAL
RV PRED: NORMAL
RV: NORMAL
SVC %PRED: NORMAL
SVC PRED: NORMAL
SVC: NORMAL
TLC %PRED: 0 %
TLC PRED: NORMAL
TLC: NORMAL
VA %PRED: NORMAL
VA PRED: NORMAL
VA: NORMAL
VTG %PRED: NORMAL
VTG PRED: NORMAL
VTG: NORMAL

## 2019-11-21 PROCEDURE — 71250 CT THORAX DX C-: CPT

## 2019-11-21 PROCEDURE — 6370000000 HC RX 637 (ALT 250 FOR IP): Performed by: INTERNAL MEDICINE

## 2019-11-21 PROCEDURE — 2580000003 HC RX 258: Performed by: THORACIC SURGERY (CARDIOTHORACIC VASCULAR SURGERY)

## 2019-11-21 PROCEDURE — 6360000002 HC RX W HCPCS

## 2019-11-21 PROCEDURE — P9047 ALBUMIN (HUMAN), 25%, 50ML: HCPCS

## 2019-11-21 PROCEDURE — 94660 CPAP INITIATION&MGMT: CPT

## 2019-11-21 PROCEDURE — 94640 AIRWAY INHALATION TREATMENT: CPT

## 2019-11-21 PROCEDURE — P9045 ALBUMIN (HUMAN), 5%, 250 ML: HCPCS

## 2019-11-21 PROCEDURE — 99233 SBSQ HOSP IP/OBS HIGH 50: CPT | Performed by: INTERNAL MEDICINE

## 2019-11-21 PROCEDURE — 2100000000 HC CCU R&B

## 2019-11-21 PROCEDURE — 85025 COMPLETE CBC W/AUTO DIFF WBC: CPT

## 2019-11-21 PROCEDURE — 2500000003 HC RX 250 WO HCPCS

## 2019-11-21 PROCEDURE — 93306 TTE W/DOPPLER COMPLETE: CPT

## 2019-11-21 PROCEDURE — 2580000003 HC RX 258: Performed by: INTERNAL MEDICINE

## 2019-11-21 PROCEDURE — 82962 GLUCOSE BLOOD TEST: CPT

## 2019-11-21 PROCEDURE — 6360000002 HC RX W HCPCS: Performed by: NURSE PRACTITIONER

## 2019-11-21 PROCEDURE — 93971 EXTREMITY STUDY: CPT

## 2019-11-21 PROCEDURE — 94150 VITAL CAPACITY TEST: CPT

## 2019-11-21 PROCEDURE — 94010 BREATHING CAPACITY TEST: CPT

## 2019-11-21 PROCEDURE — 6370000000 HC RX 637 (ALT 250 FOR IP): Performed by: THORACIC SURGERY (CARDIOTHORACIC VASCULAR SURGERY)

## 2019-11-21 PROCEDURE — 93880 EXTRACRANIAL BILAT STUDY: CPT

## 2019-11-21 PROCEDURE — 94761 N-INVAS EAR/PLS OXIMETRY MLT: CPT

## 2019-11-21 RX ORDER — LORAZEPAM 0.5 MG/1
0.5 TABLET ORAL EVERY 6 HOURS PRN
Status: DISCONTINUED | OUTPATIENT
Start: 2019-11-21 | End: 2019-11-22

## 2019-11-21 RX ORDER — SODIUM PHOSPHATE, DIBASIC AND SODIUM PHOSPHATE, MONOBASIC 7; 19 G/133ML; G/133ML
1 ENEMA RECTAL
Status: COMPLETED | OUTPATIENT
Start: 2019-11-21 | End: 2019-11-21

## 2019-11-21 RX ORDER — POLYETHYLENE GLYCOL 3350 17 G/17G
17 POWDER, FOR SOLUTION ORAL DAILY PRN
Status: DISCONTINUED | OUTPATIENT
Start: 2019-11-21 | End: 2019-11-22

## 2019-11-21 RX ORDER — CHLORHEXIDINE GLUCONATE 0.12 MG/ML
15 RINSE ORAL ONCE
Status: COMPLETED | OUTPATIENT
Start: 2019-11-21 | End: 2019-11-21

## 2019-11-21 RX ORDER — CEFAZOLIN SODIUM 2 G/100ML
2 INJECTION, SOLUTION INTRAVENOUS
Status: DISCONTINUED | OUTPATIENT
Start: 2019-11-21 | End: 2019-11-22

## 2019-11-21 RX ORDER — SODIUM CHLORIDE 0.9 % (FLUSH) 0.9 %
10 SYRINGE (ML) INJECTION EVERY 12 HOURS SCHEDULED
Status: DISCONTINUED | OUTPATIENT
Start: 2019-11-21 | End: 2019-11-22 | Stop reason: HOSPADM

## 2019-11-21 RX ORDER — CARVEDILOL 3.12 MG/1
3.12 TABLET ORAL ONCE
Status: COMPLETED | OUTPATIENT
Start: 2019-11-21 | End: 2019-11-22

## 2019-11-21 RX ADMIN — LISINOPRIL 10 MG: 5 TABLET ORAL at 08:57

## 2019-11-21 RX ADMIN — IPRATROPIUM BROMIDE AND ALBUTEROL SULFATE 1 AMPULE: .5; 3 SOLUTION RESPIRATORY (INHALATION) at 20:41

## 2019-11-21 RX ADMIN — CHLORHEXIDINE GLUCONATE 0.12% ORAL RINSE 15 ML: 1.2 LIQUID ORAL at 21:28

## 2019-11-21 RX ADMIN — CHLORHEXIDINE GLUCONATE: 4 LIQUID TOPICAL at 21:29

## 2019-11-21 RX ADMIN — IPRATROPIUM BROMIDE AND ALBUTEROL SULFATE 1 AMPULE: .5; 3 SOLUTION RESPIRATORY (INHALATION) at 14:48

## 2019-11-21 RX ADMIN — LORAZEPAM 0.5 MG: 0.5 TABLET ORAL at 15:44

## 2019-11-21 RX ADMIN — LORAZEPAM 0.5 MG: 2 INJECTION, SOLUTION INTRAMUSCULAR; INTRAVENOUS at 00:10

## 2019-11-21 RX ADMIN — CARVEDILOL 12.5 MG: 12.5 TABLET, FILM COATED ORAL at 08:57

## 2019-11-21 RX ADMIN — Medication: at 21:28

## 2019-11-21 RX ADMIN — ASPIRIN 81 MG 81 MG: 81 TABLET ORAL at 08:57

## 2019-11-21 RX ADMIN — ROSUVASTATIN CALCIUM 20 MG: 20 TABLET, COATED ORAL at 21:28

## 2019-11-21 RX ADMIN — Medication 10 ML: at 21:40

## 2019-11-21 RX ADMIN — DILTIAZEM HYDROCHLORIDE 120 MG: 120 CAPSULE, COATED, EXTENDED RELEASE ORAL at 08:57

## 2019-11-21 RX ADMIN — SODIUM PHOSPHATE, DIBASIC AND SODIUM PHOSPHATE, MONOBASIC 1 ENEMA: 7; 19 ENEMA RECTAL at 21:29

## 2019-11-21 RX ADMIN — IPRATROPIUM BROMIDE AND ALBUTEROL SULFATE 1 AMPULE: .5; 3 SOLUTION RESPIRATORY (INHALATION) at 07:12

## 2019-11-21 RX ADMIN — HYDROCHLOROTHIAZIDE 25 MG: 25 TABLET ORAL at 08:57

## 2019-11-21 RX ADMIN — LORAZEPAM 0.5 MG: 0.5 TABLET ORAL at 21:28

## 2019-11-21 RX ADMIN — CARVEDILOL 12.5 MG: 12.5 TABLET, FILM COATED ORAL at 17:12

## 2019-11-21 RX ADMIN — SODIUM CHLORIDE, PRESERVATIVE FREE 10 ML: 5 INJECTION INTRAVENOUS at 21:28

## 2019-11-21 RX ADMIN — LORAZEPAM 0.5 MG: 0.5 TABLET ORAL at 09:07

## 2019-11-21 RX ADMIN — ACETAMINOPHEN 650 MG: 325 TABLET ORAL at 06:11

## 2019-11-21 ASSESSMENT — LIFESTYLE VARIABLES: SMOKING_STATUS: 0

## 2019-11-21 ASSESSMENT — PAIN SCALES - GENERAL
PAINLEVEL_OUTOF10: 3
PAINLEVEL_OUTOF10: 0
PAINLEVEL_OUTOF10: 6
PAINLEVEL_OUTOF10: 0
PAINLEVEL_OUTOF10: 5
PAINLEVEL_OUTOF10: 0

## 2019-11-21 ASSESSMENT — PAIN DESCRIPTION - PAIN TYPE: TYPE: CHRONIC PAIN

## 2019-11-21 ASSESSMENT — PAIN DESCRIPTION - FREQUENCY: FREQUENCY: CONTINUOUS

## 2019-11-21 ASSESSMENT — PAIN DESCRIPTION - ONSET: ONSET: ON-GOING

## 2019-11-21 ASSESSMENT — PULMONARY FUNCTION TESTS
FEV1/FVC: 66
FEV1_PERCENT_PREDICTED_PRE: 61

## 2019-11-21 ASSESSMENT — PAIN DESCRIPTION - PROGRESSION: CLINICAL_PROGRESSION: GRADUALLY WORSENING

## 2019-11-21 ASSESSMENT — PAIN DESCRIPTION - LOCATION: LOCATION: BACK

## 2019-11-21 ASSESSMENT — PAIN - FUNCTIONAL ASSESSMENT: PAIN_FUNCTIONAL_ASSESSMENT: ACTIVITIES ARE NOT PREVENTED

## 2019-11-21 ASSESSMENT — PAIN DESCRIPTION - DESCRIPTORS: DESCRIPTORS: ACHING;DISCOMFORT

## 2019-11-21 ASSESSMENT — PAIN DESCRIPTION - ORIENTATION: ORIENTATION: LOWER

## 2019-11-22 ENCOUNTER — APPOINTMENT (OUTPATIENT)
Dept: GENERAL RADIOLOGY | Age: 59
DRG: 234 | End: 2019-11-22
Attending: INTERNAL MEDICINE
Payer: COMMERCIAL

## 2019-11-22 VITALS
DIASTOLIC BLOOD PRESSURE: 84 MMHG | SYSTOLIC BLOOD PRESSURE: 110 MMHG | RESPIRATION RATE: 10 BRPM | TEMPERATURE: 97.5 F | OXYGEN SATURATION: 100 %

## 2019-11-22 LAB
ANION GAP SERPL CALCULATED.3IONS-SCNC: 12 MMOL/L (ref 4–16)
APTT: 26.2 SECONDS (ref 25.1–37.1)
APTT: 29 SECONDS (ref 25.1–37.1)
BACTERIA: ABNORMAL /HPF
BASE EXCESS MIXED: 0.3 (ref 0–1.2)
BASE EXCESS MIXED: 0.3 (ref 0–1.2)
BASE EXCESS MIXED: 1.1 (ref 0–1.2)
BASE EXCESS MIXED: 1.1 (ref 0–1.2)
BASE EXCESS MIXED: 1.7 (ref 0–1.2)
BASE EXCESS MIXED: 3.4 (ref 0–1.2)
BASE EXCESS MIXED: 3.5 (ref 0–1.2)
BASE EXCESS MIXED: 3.6 (ref 0–1.2)
BASE EXCESS MIXED: 3.7 (ref 0–1.2)
BASE EXCESS MIXED: 4.3 (ref 0–1.2)
BASE EXCESS MIXED: 4.6 (ref 0–1.2)
BASE EXCESS MIXED: 4.7 (ref 0–1.2)
BASE EXCESS MIXED: 5.2 (ref 0–1.2)
BASE EXCESS: ABNORMAL (ref 0–3.3)
BASOPHILS ABSOLUTE: 0.1 K/CU MM
BASOPHILS RELATIVE PERCENT: 0.9 % (ref 0–1)
BILIRUBIN URINE: NEGATIVE MG/DL
BLOOD, URINE: NEGATIVE
BUN BLDV-MCNC: 20 MG/DL (ref 6–23)
CALCIUM SERPL-MCNC: 8.3 MG/DL (ref 8.3–10.6)
CHLORIDE BLD-SCNC: 104 MMOL/L (ref 99–110)
CLARITY: CLEAR
CO2 CONTENT: 23.4 MMOL/L (ref 19–24)
CO2 CONTENT: 23.4 MMOL/L (ref 19–24)
CO2 CONTENT: 24 MMOL/L (ref 19–24)
CO2 CONTENT: 24.1 MMOL/L (ref 19–24)
CO2: 21 MMOL/L (ref 21–32)
CO2: 23 MMOL/L (ref 21–32)
CO2: 24 MMOL/L (ref 21–32)
CO2: 26 MMOL/L (ref 21–32)
CO2: 27 MMOL/L (ref 21–32)
CO2: 30 MMOL/L (ref 21–32)
COLOR: YELLOW
CREAT SERPL-MCNC: 1 MG/DL (ref 0.9–1.3)
DIFFERENTIAL TYPE: ABNORMAL
EOSINOPHILS ABSOLUTE: 0.4 K/CU MM
EOSINOPHILS RELATIVE PERCENT: 4.2 % (ref 0–3)
GFR AFRICAN AMERICAN: >60 ML/MIN/1.73M2
GFR NON-AFRICAN AMERICAN: >60 ML/MIN/1.73M2
GLUCOSE BLD-MCNC: 112 MG/DL (ref 70–99)
GLUCOSE BLD-MCNC: 121 MG/DL (ref 70–99)
GLUCOSE BLD-MCNC: 127 MG/DL (ref 70–99)
GLUCOSE BLD-MCNC: 135 MG/DL (ref 70–99)
GLUCOSE BLD-MCNC: 147 MG/DL (ref 70–99)
GLUCOSE BLD-MCNC: 148 MG/DL (ref 70–99)
GLUCOSE BLD-MCNC: 148 MG/DL (ref 70–99)
GLUCOSE BLD-MCNC: 150 MG/DL (ref 70–99)
GLUCOSE BLD-MCNC: 151 MG/DL (ref 70–99)
GLUCOSE BLD-MCNC: 160 MG/DL (ref 70–99)
GLUCOSE BLD-MCNC: 161 MG/DL (ref 70–99)
GLUCOSE BLD-MCNC: 161 MG/DL (ref 70–99)
GLUCOSE BLD-MCNC: 164 MG/DL (ref 70–99)
GLUCOSE BLD-MCNC: 170 MG/DL (ref 70–99)
GLUCOSE, URINE: NEGATIVE MG/DL
HCO3 ARTERIAL: 22 MMOL/L (ref 18–23)
HCO3 ARTERIAL: 22 MMOL/L (ref 18–23)
HCO3 ARTERIAL: 22.1 MMOL/L (ref 18–23)
HCO3 ARTERIAL: 22.2 MMOL/L (ref 18–23)
HCO3 ARTERIAL: 22.4 MMOL/L (ref 18–23)
HCO3 ARTERIAL: 22.5 MMOL/L (ref 18–23)
HCO3 ARTERIAL: 24.3 MMOL/L (ref 18–23)
HCO3 ARTERIAL: 24.5 MMOL/L (ref 18–23)
HCO3 ARTERIAL: 24.8 MMOL/L (ref 18–23)
HCO3 ARTERIAL: 25.3 MMOL/L (ref 18–23)
HCO3 ARTERIAL: 27.7 MMOL/L (ref 18–23)
HCT VFR BLD CALC: 36 % (ref 42–52)
HCT VFR BLD CALC: 37 % (ref 42–52)
HCT VFR BLD CALC: 38 % (ref 42–52)
HCT VFR BLD CALC: 39 % (ref 42–52)
HCT VFR BLD CALC: 40 % (ref 42–52)
HCT VFR BLD CALC: 41 % (ref 42–52)
HCT VFR BLD CALC: 43 % (ref 42–52)
HCT VFR BLD CALC: 44.3 % (ref 42–52)
HCT VFR BLD CALC: 46 % (ref 42–52)
HCT VFR BLD CALC: 49 % (ref 42–52)
HCT VFR BLD CALC: 51.6 % (ref 42–52)
HEMOGLOBIN: 12.1 GM/DL (ref 13.5–18)
HEMOGLOBIN: 12.5 GM/DL (ref 13.5–18)
HEMOGLOBIN: 12.9 GM/DL (ref 13.5–18)
HEMOGLOBIN: 13.2 GM/DL (ref 13.5–18)
HEMOGLOBIN: 13.4 GM/DL (ref 13.5–18)
HEMOGLOBIN: 13.4 GM/DL (ref 13.5–18)
HEMOGLOBIN: 13.5 GM/DL (ref 13.5–18)
HEMOGLOBIN: 13.5 GM/DL (ref 13.5–18)
HEMOGLOBIN: 13.8 GM/DL (ref 13.5–18)
HEMOGLOBIN: 13.9 GM/DL (ref 13.5–18)
HEMOGLOBIN: 14.1 GM/DL (ref 13.5–18)
HEMOGLOBIN: 14.7 GM/DL (ref 13.5–18)
HEMOGLOBIN: 15.8 GM/DL (ref 13.5–18)
HEMOGLOBIN: 16.4 GM/DL (ref 13.5–18)
HEMOGLOBIN: 16.6 GM/DL (ref 13.5–18)
IMMATURE NEUTROPHIL %: 0.2 % (ref 0–0.43)
INR BLD: 1 INDEX
INR BLD: 1.2 INDEX
KETONES, URINE: NEGATIVE MG/DL
LEUKOCYTE ESTERASE, URINE: NEGATIVE
LYMPHOCYTES ABSOLUTE: 2 K/CU MM
LYMPHOCYTES RELATIVE PERCENT: 21.7 % (ref 24–44)
MAGNESIUM: 2 MG/DL (ref 1.8–2.4)
MAGNESIUM: 2.5 MG/DL (ref 1.8–2.4)
MCH RBC QN AUTO: 28.1 PG (ref 27–31)
MCH RBC QN AUTO: 28.1 PG (ref 27–31)
MCHC RBC AUTO-ENTMCNC: 31.4 % (ref 32–36)
MCHC RBC AUTO-ENTMCNC: 31.8 % (ref 32–36)
MCV RBC AUTO: 88.4 FL (ref 78–100)
MCV RBC AUTO: 89.7 FL (ref 78–100)
MONOCYTES ABSOLUTE: 0.7 K/CU MM
MONOCYTES RELATIVE PERCENT: 7.9 % (ref 0–4)
MUCUS: ABNORMAL HPF
NITRITE URINE, QUANTITATIVE: NEGATIVE
NUCLEATED RBC %: 0 %
O2 SATURATION: 100 % (ref 96–97)
O2 SATURATION: 96.9 % (ref 96–97)
O2 SATURATION: 97.5 % (ref 96–97)
O2 SATURATION: 97.6 % (ref 96–97)
O2 SATURATION: 98 % (ref 96–97)
O2 SATURATION: 98.2 % (ref 96–97)
O2 SATURATION: 99.5 % (ref 96–97)
O2 SATURATION: 99.5 % (ref 96–97)
O2 SATURATION: 99.9 % (ref 96–97)
PCO2 ARTERIAL: 40 MMHG (ref 32–45)
PCO2 ARTERIAL: 41.3 MMHG (ref 32–45)
PCO2 ARTERIAL: 41.9 MMHG (ref 32–45)
PCO2 ARTERIAL: 42.3 MMHG (ref 32–45)
PCO2 ARTERIAL: 42.7 MMHG (ref 32–45)
PCO2 ARTERIAL: 43.1 MMHG (ref 32–45)
PCO2 ARTERIAL: 43.3 MMHG (ref 32–45)
PCO2 ARTERIAL: 43.9 MMHG (ref 32–45)
PCO2 ARTERIAL: 44.2 MMHG (ref 32–45)
PCO2 ARTERIAL: 45.5 MMHG (ref 32–45)
PCO2 ARTERIAL: 48.7 MMHG (ref 32–45)
PCO2 ARTERIAL: 51.2 MMHG (ref 32–45)
PCO2 ARTERIAL: 65.5 MMHG (ref 32–45)
PDW BLD-RTO: 14.8 % (ref 11.7–14.9)
PDW BLD-RTO: 15.2 % (ref 11.7–14.9)
PH BLOOD: 7.23 (ref 7.34–7.45)
PH BLOOD: 7.25 (ref 7.34–7.45)
PH BLOOD: 7.27 (ref 7.34–7.45)
PH BLOOD: 7.29 (ref 7.34–7.45)
PH BLOOD: 7.31 (ref 7.34–7.45)
PH BLOOD: 7.33 (ref 7.34–7.45)
PH BLOOD: 7.35 (ref 7.34–7.45)
PH BLOOD: 7.36 (ref 7.34–7.45)
PH BLOOD: 7.37 (ref 7.34–7.45)
PH BLOOD: 7.37 (ref 7.34–7.45)
PH BLOOD: 7.39 (ref 7.34–7.45)
PH, URINE: 6 (ref 5–8)
PLATELET # BLD: 201 K/CU MM (ref 140–440)
PLATELET # BLD: 242 K/CU MM (ref 140–440)
PMV BLD AUTO: 9.5 FL (ref 7.5–11.1)
PMV BLD AUTO: 9.5 FL (ref 7.5–11.1)
PO2 ARTERIAL: 103.9 MMHG (ref 75–100)
PO2 ARTERIAL: 115.2 MMHG (ref 75–100)
PO2 ARTERIAL: 115.5 MMHG (ref 75–100)
PO2 ARTERIAL: 116.4 MMHG (ref 75–100)
PO2 ARTERIAL: 189.3 MMHG (ref 75–100)
PO2 ARTERIAL: 191.2 MMHG (ref 75–100)
PO2 ARTERIAL: 377.3 MMHG (ref 75–100)
PO2 ARTERIAL: 382 MMHG (ref 75–100)
PO2 ARTERIAL: 405.7 MMHG (ref 75–100)
PO2 ARTERIAL: 474 MMHG (ref 75–100)
PO2 ARTERIAL: 500.2 MMHG (ref 75–100)
PO2 ARTERIAL: 509 MMHG (ref 75–100)
PO2 ARTERIAL: 96.8 MMHG (ref 75–100)
POC ACT PLUS: 110 SEC
POC ACT PLUS: 397 SEC
POC ACT PLUS: 447 SEC
POC ACT PLUS: 559 SEC
POC ACT PLUS: 811 SEC
POC ACT PLUS: 98 SEC
POC CALCIUM: 1.09 MMOL/L (ref 1.12–1.32)
POC CALCIUM: 1.1 MMOL/L (ref 1.12–1.32)
POC CALCIUM: 1.11 MMOL/L (ref 1.12–1.32)
POC CALCIUM: 1.13 MMOL/L (ref 1.12–1.32)
POC CALCIUM: 1.13 MMOL/L (ref 1.12–1.32)
POC CALCIUM: 1.14 MMOL/L (ref 1.12–1.32)
POC CALCIUM: 1.15 MMOL/L (ref 1.12–1.32)
POC CALCIUM: 1.16 MMOL/L (ref 1.12–1.32)
POC CALCIUM: 1.19 MMOL/L (ref 1.12–1.32)
POC CALCIUM: 1.21 MMOL/L (ref 1.12–1.32)
POC CALCIUM: 1.23 MMOL/L (ref 1.12–1.32)
POC CHLORIDE: 100 MMOL/L (ref 98–109)
POC CHLORIDE: 100 MMOL/L (ref 98–109)
POC CHLORIDE: 101 MMOL/L (ref 98–109)
POC CHLORIDE: 102 MMOL/L (ref 98–109)
POC CHLORIDE: 103 MMOL/L (ref 98–109)
POC CHLORIDE: 104 MMOL/L (ref 98–109)
POC CHLORIDE: 96 MMOL/L (ref 98–109)
POC CREATININE: 0.9 MG/DL (ref 0.9–1.3)
POC CREATININE: 1.1 MG/DL (ref 0.9–1.3)
POC CREATININE: 1.2 MG/DL (ref 0.9–1.3)
POC CREATININE: 1.3 MG/DL (ref 0.9–1.3)
POTASSIUM SERPL-SCNC: 3.8 MMOL/L (ref 3.5–5.1)
POTASSIUM SERPL-SCNC: 3.9 MMOL/L (ref 3.5–4.5)
POTASSIUM SERPL-SCNC: 4 MMOL/L (ref 3.5–4.5)
POTASSIUM SERPL-SCNC: 4.1 MMOL/L (ref 3.5–4.5)
POTASSIUM SERPL-SCNC: 4.1 MMOL/L (ref 3.5–5.1)
POTASSIUM SERPL-SCNC: 4.2 MMOL/L (ref 3.5–4.5)
POTASSIUM SERPL-SCNC: 4.3 MMOL/L (ref 3.5–4.5)
POTASSIUM SERPL-SCNC: 4.4 MMOL/L (ref 3.5–4.5)
POTASSIUM SERPL-SCNC: 4.7 MMOL/L (ref 3.5–4.5)
POTASSIUM SERPL-SCNC: 4.7 MMOL/L (ref 3.5–4.5)
POTASSIUM SERPL-SCNC: 5 MMOL/L (ref 3.5–4.5)
PROTEIN UA: NEGATIVE MG/DL
PROTHROMBIN TIME: 11.4 SECONDS (ref 11.7–14.5)
PROTHROMBIN TIME: 13.9 SECONDS (ref 11.7–14.5)
RBC # BLD: 4.94 M/CU MM (ref 4.6–6.2)
RBC # BLD: 5.84 M/CU MM (ref 4.6–6.2)
RBC URINE: <1 /HPF (ref 0–3)
SEGMENTED NEUTROPHILS ABSOLUTE COUNT: 6.1 K/CU MM
SEGMENTED NEUTROPHILS RELATIVE PERCENT: 65.1 % (ref 36–66)
SODIUM BLD-SCNC: 136 MMOL/L (ref 138–146)
SODIUM BLD-SCNC: 137 MMOL/L (ref 135–145)
SODIUM BLD-SCNC: 137 MMOL/L (ref 138–146)
SODIUM BLD-SCNC: 138 MMOL/L (ref 138–146)
SODIUM BLD-SCNC: 139 MMOL/L (ref 138–146)
SOURCE, BLOOD GAS: ABNORMAL
SPECIFIC GRAVITY UA: 1.01 (ref 1–1.03)
TOTAL IMMATURE NEUTOROPHIL: 0.02 K/CU MM
TOTAL NUCLEATED RBC: 0 K/CU MM
TRICHOMONAS: ABNORMAL /HPF
UROBILINOGEN, URINE: NORMAL MG/DL (ref 0.2–1)
WBC # BLD: 33.1 K/CU MM (ref 4–10.5)
WBC # BLD: 9.4 K/CU MM (ref 4–10.5)
WBC UA: ABNORMAL /HPF (ref 0–2)

## 2019-11-22 PROCEDURE — C1751 CATH, INF, PER/CENT/MIDLINE: HCPCS | Performed by: SURGERY

## 2019-11-22 PROCEDURE — 36415 COLL VENOUS BLD VENIPUNCTURE: CPT

## 2019-11-22 PROCEDURE — 6360000002 HC RX W HCPCS: Performed by: NURSE ANESTHETIST, CERTIFIED REGISTERED

## 2019-11-22 PROCEDURE — 94761 N-INVAS EAR/PLS OXIMETRY MLT: CPT

## 2019-11-22 PROCEDURE — 2100000000 HC CCU R&B

## 2019-11-22 PROCEDURE — 2580000003 HC RX 258: Performed by: THORACIC SURGERY (CARDIOTHORACIC VASCULAR SURGERY)

## 2019-11-22 PROCEDURE — 81001 URINALYSIS AUTO W/SCOPE: CPT

## 2019-11-22 PROCEDURE — C1894 INTRO/SHEATH, NON-LASER: HCPCS | Performed by: SURGERY

## 2019-11-22 PROCEDURE — 6360000002 HC RX W HCPCS: Performed by: THORACIC SURGERY (CARDIOTHORACIC VASCULAR SURGERY)

## 2019-11-22 PROCEDURE — 6370000000 HC RX 637 (ALT 250 FOR IP): Performed by: SURGERY

## 2019-11-22 PROCEDURE — 3600000008 HC SURGERY OHS BASE: Performed by: SURGERY

## 2019-11-22 PROCEDURE — 7100000000 HC PACU RECOVERY - FIRST 15 MIN

## 2019-11-22 PROCEDURE — 86922 COMPATIBILITY TEST ANTIGLOB: CPT

## 2019-11-22 PROCEDURE — 94640 AIRWAY INHALATION TREATMENT: CPT

## 2019-11-22 PROCEDURE — 2500000003 HC RX 250 WO HCPCS: Performed by: THORACIC SURGERY (CARDIOTHORACIC VASCULAR SURGERY)

## 2019-11-22 PROCEDURE — 6360000002 HC RX W HCPCS: Performed by: SURGERY

## 2019-11-22 PROCEDURE — 86900 BLOOD TYPING SEROLOGIC ABO: CPT

## 2019-11-22 PROCEDURE — 2700000000 HC OXYGEN THERAPY PER DAY

## 2019-11-22 PROCEDURE — 94002 VENT MGMT INPAT INIT DAY: CPT

## 2019-11-22 PROCEDURE — 3700000000 HC ANESTHESIA ATTENDED CARE: Performed by: SURGERY

## 2019-11-22 PROCEDURE — 3700000001 HC ADD 15 MINUTES (ANESTHESIA): Performed by: SURGERY

## 2019-11-22 PROCEDURE — 6370000000 HC RX 637 (ALT 250 FOR IP): Performed by: INTERNAL MEDICINE

## 2019-11-22 PROCEDURE — 82962 GLUCOSE BLOOD TEST: CPT

## 2019-11-22 PROCEDURE — P9016 RBC LEUKOCYTES REDUCED: HCPCS

## 2019-11-22 PROCEDURE — 85025 COMPLETE CBC W/AUTO DIFF WBC: CPT

## 2019-11-22 PROCEDURE — 85014 HEMATOCRIT: CPT

## 2019-11-22 PROCEDURE — 85347 COAGULATION TIME ACTIVATED: CPT

## 2019-11-22 PROCEDURE — 85018 HEMOGLOBIN: CPT

## 2019-11-22 PROCEDURE — P9045 ALBUMIN (HUMAN), 5%, 250 ML: HCPCS | Performed by: SURGERY

## 2019-11-22 PROCEDURE — 82803 BLOOD GASES ANY COMBINATION: CPT

## 2019-11-22 PROCEDURE — 84295 ASSAY OF SERUM SODIUM: CPT

## 2019-11-22 PROCEDURE — 85610 PROTHROMBIN TIME: CPT

## 2019-11-22 PROCEDURE — 02100Z9 BYPASS CORONARY ARTERY, ONE ARTERY FROM LEFT INTERNAL MAMMARY, OPEN APPROACH: ICD-10-PCS | Performed by: SURGERY

## 2019-11-22 PROCEDURE — 6370000000 HC RX 637 (ALT 250 FOR IP): Performed by: THORACIC SURGERY (CARDIOTHORACIC VASCULAR SURGERY)

## 2019-11-22 PROCEDURE — 94750 HC PULMONARY COMPLIANCE STUDY: CPT

## 2019-11-22 PROCEDURE — 85027 COMPLETE CBC AUTOMATED: CPT

## 2019-11-22 PROCEDURE — 2709999900 HC NON-CHARGEABLE SUPPLY: Performed by: SURGERY

## 2019-11-22 PROCEDURE — P9045 ALBUMIN (HUMAN), 5%, 250 ML: HCPCS | Performed by: NURSE ANESTHETIST, CERTIFIED REGISTERED

## 2019-11-22 PROCEDURE — 71045 X-RAY EXAM CHEST 1 VIEW: CPT

## 2019-11-22 PROCEDURE — 021109W BYPASS CORONARY ARTERY, TWO ARTERIES FROM AORTA WITH AUTOLOGOUS VENOUS TISSUE, OPEN APPROACH: ICD-10-PCS | Performed by: SURGERY

## 2019-11-22 PROCEDURE — 02K83ZZ MAP CONDUCTION MECHANISM, PERCUTANEOUS APPROACH: ICD-10-PCS | Performed by: SURGERY

## 2019-11-22 PROCEDURE — 2580000003 HC RX 258: Performed by: SURGERY

## 2019-11-22 PROCEDURE — 84132 ASSAY OF SERUM POTASSIUM: CPT

## 2019-11-22 PROCEDURE — 2580000003 HC RX 258: Performed by: NURSE ANESTHETIST, CERTIFIED REGISTERED

## 2019-11-22 PROCEDURE — 80048 BASIC METABOLIC PNL TOTAL CA: CPT

## 2019-11-22 PROCEDURE — 3600000018 HC SURGERY OHS ADDTL 15MIN: Performed by: SURGERY

## 2019-11-22 PROCEDURE — 6370000000 HC RX 637 (ALT 250 FOR IP)

## 2019-11-22 PROCEDURE — 94660 CPAP INITIATION&MGMT: CPT

## 2019-11-22 PROCEDURE — 02L70CK OCCLUSION OF LEFT ATRIAL APPENDAGE WITH EXTRALUMINAL DEVICE, OPEN APPROACH: ICD-10-PCS | Performed by: SURGERY

## 2019-11-22 PROCEDURE — 2720000010 HC SURG SUPPLY STERILE: Performed by: SURGERY

## 2019-11-22 PROCEDURE — 02583ZZ DESTRUCTION OF CONDUCTION MECHANISM, PERCUTANEOUS APPROACH: ICD-10-PCS | Performed by: SURGERY

## 2019-11-22 PROCEDURE — 2500000003 HC RX 250 WO HCPCS: Performed by: NURSE ANESTHETIST, CERTIFIED REGISTERED

## 2019-11-22 PROCEDURE — 83735 ASSAY OF MAGNESIUM: CPT

## 2019-11-22 PROCEDURE — 86901 BLOOD TYPING SEROLOGIC RH(D): CPT

## 2019-11-22 PROCEDURE — 86850 RBC ANTIBODY SCREEN: CPT

## 2019-11-22 PROCEDURE — 2500000003 HC RX 250 WO HCPCS: Performed by: SURGERY

## 2019-11-22 PROCEDURE — C1729 CATH, DRAINAGE: HCPCS | Performed by: SURGERY

## 2019-11-22 PROCEDURE — 85730 THROMBOPLASTIN TIME PARTIAL: CPT

## 2019-11-22 PROCEDURE — 82330 ASSAY OF CALCIUM: CPT

## 2019-11-22 RX ORDER — ALBUMIN, HUMAN INJ 5% 5 %
25 SOLUTION INTRAVENOUS PRN
Status: DISCONTINUED | OUTPATIENT
Start: 2019-11-22 | End: 2019-11-27 | Stop reason: HOSPADM

## 2019-11-22 RX ORDER — SODIUM CHLORIDE, SODIUM LACTATE, POTASSIUM CHLORIDE, CALCIUM CHLORIDE 600; 310; 30; 20 MG/100ML; MG/100ML; MG/100ML; MG/100ML
INJECTION, SOLUTION INTRAVENOUS CONTINUOUS PRN
Status: DISCONTINUED | OUTPATIENT
Start: 2019-11-22 | End: 2019-11-22 | Stop reason: SDUPTHER

## 2019-11-22 RX ORDER — MIDAZOLAM HYDROCHLORIDE 5 MG/ML
INJECTION INTRAMUSCULAR; INTRAVENOUS PRN
Status: DISCONTINUED | OUTPATIENT
Start: 2019-11-22 | End: 2019-11-22 | Stop reason: SDUPTHER

## 2019-11-22 RX ORDER — CEFAZOLIN SODIUM 2 G/50ML
SOLUTION INTRAVENOUS PRN
Status: DISCONTINUED | OUTPATIENT
Start: 2019-11-22 | End: 2019-11-22 | Stop reason: SDUPTHER

## 2019-11-22 RX ORDER — DEXTROSE MONOHYDRATE 25 G/50ML
12.5 INJECTION, SOLUTION INTRAVENOUS PRN
Status: DISCONTINUED | OUTPATIENT
Start: 2019-11-22 | End: 2019-11-27 | Stop reason: HOSPADM

## 2019-11-22 RX ORDER — NICOTINE POLACRILEX 4 MG
15 LOZENGE BUCCAL PRN
Status: DISCONTINUED | OUTPATIENT
Start: 2019-11-22 | End: 2019-11-27 | Stop reason: HOSPADM

## 2019-11-22 RX ORDER — METOPROLOL TARTRATE 5 MG/5ML
2.5 INJECTION INTRAVENOUS EVERY 10 MIN PRN
Status: DISCONTINUED | OUTPATIENT
Start: 2019-11-22 | End: 2019-11-27 | Stop reason: HOSPADM

## 2019-11-22 RX ORDER — VECURONIUM BROMIDE 1 MG/ML
INJECTION, POWDER, LYOPHILIZED, FOR SOLUTION INTRAVENOUS PRN
Status: DISCONTINUED | OUTPATIENT
Start: 2019-11-22 | End: 2019-11-22 | Stop reason: SDUPTHER

## 2019-11-22 RX ORDER — M-VIT,TX,IRON,MINS/CALC/FOLIC 27MG-0.4MG
1 TABLET ORAL
Status: DISCONTINUED | OUTPATIENT
Start: 2019-11-23 | End: 2019-11-27 | Stop reason: HOSPADM

## 2019-11-22 RX ORDER — GABAPENTIN 300 MG/1
300 CAPSULE ORAL 3 TIMES DAILY
Status: DISCONTINUED | OUTPATIENT
Start: 2019-11-22 | End: 2019-11-27 | Stop reason: HOSPADM

## 2019-11-22 RX ORDER — ACETAMINOPHEN 10 MG/ML
1000 INJECTION, SOLUTION INTRAVENOUS EVERY 6 HOURS SCHEDULED
Status: COMPLETED | OUTPATIENT
Start: 2019-11-22 | End: 2019-11-23

## 2019-11-22 RX ORDER — ACETAMINOPHEN 325 MG/1
650 TABLET ORAL EVERY 4 HOURS PRN
Status: DISCONTINUED | OUTPATIENT
Start: 2019-11-22 | End: 2019-11-27 | Stop reason: HOSPADM

## 2019-11-22 RX ORDER — SODIUM CHLORIDE 0.9 % (FLUSH) 0.9 %
10 SYRINGE (ML) INJECTION EVERY 12 HOURS SCHEDULED
Status: DISCONTINUED | OUTPATIENT
Start: 2019-11-22 | End: 2019-11-27 | Stop reason: HOSPADM

## 2019-11-22 RX ORDER — PROTAMINE SULFATE 10 MG/ML
INJECTION, SOLUTION INTRAVENOUS PRN
Status: DISCONTINUED | OUTPATIENT
Start: 2019-11-22 | End: 2019-11-22 | Stop reason: SDUPTHER

## 2019-11-22 RX ORDER — SODIUM PHOSPHATE, DIBASIC AND SODIUM PHOSPHATE, MONOBASIC 7; 19 G/133ML; G/133ML
1 ENEMA RECTAL DAILY PRN
Status: DISCONTINUED | OUTPATIENT
Start: 2019-11-22 | End: 2019-11-27 | Stop reason: HOSPADM

## 2019-11-22 RX ORDER — 0.9 % SODIUM CHLORIDE 0.9 %
250 INTRAVENOUS SOLUTION INTRAVENOUS ONCE
Status: DISCONTINUED | OUTPATIENT
Start: 2019-11-22 | End: 2019-11-22

## 2019-11-22 RX ORDER — PHENYLEPHRINE HYDROCHLORIDE 10 MG/ML
INJECTION INTRAVENOUS PRN
Status: DISCONTINUED | OUTPATIENT
Start: 2019-11-22 | End: 2019-11-22 | Stop reason: SDUPTHER

## 2019-11-22 RX ORDER — HEPARIN SODIUM 1000 [USP'U]/ML
INJECTION, SOLUTION INTRAVENOUS; SUBCUTANEOUS PRN
Status: DISCONTINUED | OUTPATIENT
Start: 2019-11-22 | End: 2019-11-22 | Stop reason: SDUPTHER

## 2019-11-22 RX ORDER — ONDANSETRON 2 MG/ML
INJECTION INTRAMUSCULAR; INTRAVENOUS PRN
Status: DISCONTINUED | OUTPATIENT
Start: 2019-11-22 | End: 2019-11-22 | Stop reason: SDUPTHER

## 2019-11-22 RX ORDER — HYDROCODONE BITARTRATE AND ACETAMINOPHEN 5; 325 MG/1; MG/1
1 TABLET ORAL EVERY 4 HOURS PRN
Status: DISCONTINUED | OUTPATIENT
Start: 2019-11-22 | End: 2019-11-24

## 2019-11-22 RX ORDER — CEFAZOLIN SODIUM 2 G/100ML
2 INJECTION, SOLUTION INTRAVENOUS EVERY 8 HOURS
Status: COMPLETED | OUTPATIENT
Start: 2019-11-22 | End: 2019-11-23

## 2019-11-22 RX ORDER — CARVEDILOL 3.12 MG/1
3.12 TABLET ORAL 2 TIMES DAILY
Status: DISCONTINUED | OUTPATIENT
Start: 2019-11-22 | End: 2019-11-27 | Stop reason: HOSPADM

## 2019-11-22 RX ORDER — LIDOCAINE HYDROCHLORIDE 20 MG/ML
INJECTION, SOLUTION INTRAVENOUS PRN
Status: DISCONTINUED | OUTPATIENT
Start: 2019-11-22 | End: 2019-11-22 | Stop reason: SDUPTHER

## 2019-11-22 RX ORDER — ASPIRIN 81 MG/1
81 TABLET ORAL DAILY
Status: DISCONTINUED | OUTPATIENT
Start: 2019-11-22 | End: 2019-11-27 | Stop reason: HOSPADM

## 2019-11-22 RX ORDER — KETOROLAC TROMETHAMINE 30 MG/ML
15 INJECTION, SOLUTION INTRAMUSCULAR; INTRAVENOUS EVERY 6 HOURS PRN
Status: DISCONTINUED | OUTPATIENT
Start: 2019-11-22 | End: 2019-11-24

## 2019-11-22 RX ORDER — ALBUMIN, HUMAN INJ 5% 5 %
SOLUTION INTRAVENOUS PRN
Status: DISCONTINUED | OUTPATIENT
Start: 2019-11-22 | End: 2019-11-22 | Stop reason: SDUPTHER

## 2019-11-22 RX ORDER — FENTANYL CITRATE 0.05 MG/ML
INJECTION, SOLUTION INTRAMUSCULAR; INTRAVENOUS PRN
Status: DISCONTINUED | OUTPATIENT
Start: 2019-11-22 | End: 2019-11-22 | Stop reason: SDUPTHER

## 2019-11-22 RX ORDER — IPRATROPIUM BROMIDE AND ALBUTEROL SULFATE 2.5; .5 MG/3ML; MG/3ML
1 SOLUTION RESPIRATORY (INHALATION)
Status: DISCONTINUED | OUTPATIENT
Start: 2019-11-22 | End: 2019-11-27 | Stop reason: HOSPADM

## 2019-11-22 RX ORDER — DOCUSATE SODIUM 100 MG/1
100 CAPSULE, LIQUID FILLED ORAL 2 TIMES DAILY
Status: DISCONTINUED | OUTPATIENT
Start: 2019-11-22 | End: 2019-11-27 | Stop reason: HOSPADM

## 2019-11-22 RX ORDER — AMIODARONE HYDROCHLORIDE 200 MG/1
200 TABLET ORAL 3 TIMES DAILY
Status: DISPENSED | OUTPATIENT
Start: 2019-11-22 | End: 2019-11-25

## 2019-11-22 RX ORDER — ROCURONIUM BROMIDE 10 MG/ML
INJECTION, SOLUTION INTRAVENOUS PRN
Status: DISCONTINUED | OUTPATIENT
Start: 2019-11-22 | End: 2019-11-22 | Stop reason: SDUPTHER

## 2019-11-22 RX ORDER — MAGNESIUM SULFATE IN WATER 40 MG/ML
2 INJECTION, SOLUTION INTRAVENOUS PRN
Status: DISCONTINUED | OUTPATIENT
Start: 2019-11-22 | End: 2019-11-27 | Stop reason: HOSPADM

## 2019-11-22 RX ORDER — POTASSIUM CHLORIDE 29.8 MG/ML
20 INJECTION INTRAVENOUS PRN
Status: DISCONTINUED | OUTPATIENT
Start: 2019-11-22 | End: 2019-11-27 | Stop reason: HOSPADM

## 2019-11-22 RX ORDER — HYDROMORPHONE HCL 110MG/55ML
PATIENT CONTROLLED ANALGESIA SYRINGE INTRAVENOUS PRN
Status: DISCONTINUED | OUTPATIENT
Start: 2019-11-22 | End: 2019-11-22 | Stop reason: SDUPTHER

## 2019-11-22 RX ORDER — ONDANSETRON 2 MG/ML
4 INJECTION INTRAMUSCULAR; INTRAVENOUS EVERY 8 HOURS PRN
Status: DISCONTINUED | OUTPATIENT
Start: 2019-11-22 | End: 2019-11-27 | Stop reason: HOSPADM

## 2019-11-22 RX ORDER — PANTOPRAZOLE SODIUM 40 MG/1
40 TABLET, DELAYED RELEASE ORAL DAILY
Status: DISCONTINUED | OUTPATIENT
Start: 2019-11-22 | End: 2019-11-27 | Stop reason: HOSPADM

## 2019-11-22 RX ORDER — PROPOFOL 10 MG/ML
INJECTION, EMULSION INTRAVENOUS PRN
Status: DISCONTINUED | OUTPATIENT
Start: 2019-11-22 | End: 2019-11-22 | Stop reason: SDUPTHER

## 2019-11-22 RX ORDER — FUROSEMIDE 10 MG/ML
20 INJECTION INTRAMUSCULAR; INTRAVENOUS
Status: ACTIVE | OUTPATIENT
Start: 2019-11-22 | End: 2019-11-22

## 2019-11-22 RX ORDER — HYDRALAZINE HYDROCHLORIDE 20 MG/ML
5 INJECTION INTRAMUSCULAR; INTRAVENOUS EVERY 5 MIN PRN
Status: DISCONTINUED | OUTPATIENT
Start: 2019-11-22 | End: 2019-11-27 | Stop reason: HOSPADM

## 2019-11-22 RX ORDER — SODIUM CHLORIDE 9 MG/ML
INJECTION, SOLUTION INTRAVENOUS CONTINUOUS PRN
Status: DISCONTINUED | OUTPATIENT
Start: 2019-11-22 | End: 2019-11-22 | Stop reason: SDUPTHER

## 2019-11-22 RX ORDER — HYDROCODONE BITARTRATE AND ACETAMINOPHEN 5; 325 MG/1; MG/1
2 TABLET ORAL EVERY 4 HOURS PRN
Status: DISCONTINUED | OUTPATIENT
Start: 2019-11-22 | End: 2019-11-24

## 2019-11-22 RX ORDER — FENTANYL CITRATE 50 UG/ML
25 INJECTION, SOLUTION INTRAMUSCULAR; INTRAVENOUS
Status: DISPENSED | OUTPATIENT
Start: 2019-11-22 | End: 2019-11-23

## 2019-11-22 RX ORDER — DEXTROSE MONOHYDRATE 50 MG/ML
100 INJECTION, SOLUTION INTRAVENOUS PRN
Status: DISCONTINUED | OUTPATIENT
Start: 2019-11-22 | End: 2019-11-27 | Stop reason: HOSPADM

## 2019-11-22 RX ORDER — NITROGLYCERIN 20 MG/100ML
10 INJECTION INTRAVENOUS CONTINUOUS PRN
Status: DISCONTINUED | OUTPATIENT
Start: 2019-11-22 | End: 2019-11-27 | Stop reason: HOSPADM

## 2019-11-22 RX ORDER — EPINEPHRINE 1 MG/ML
INJECTION, SOLUTION, CONCENTRATE INTRAVENOUS PRN
Status: DISCONTINUED | OUTPATIENT
Start: 2019-11-22 | End: 2019-11-22 | Stop reason: SDUPTHER

## 2019-11-22 RX ORDER — AMIODARONE HYDROCHLORIDE 200 MG/1
200 TABLET ORAL DAILY
Status: DISCONTINUED | OUTPATIENT
Start: 2019-11-26 | End: 2019-11-27 | Stop reason: HOSPADM

## 2019-11-22 RX ORDER — BISACODYL 10 MG
10 SUPPOSITORY, RECTAL RECTAL DAILY PRN
Status: DISCONTINUED | OUTPATIENT
Start: 2019-11-22 | End: 2019-11-27 | Stop reason: HOSPADM

## 2019-11-22 RX ORDER — ATORVASTATIN CALCIUM 20 MG/1
20 TABLET, FILM COATED ORAL NIGHTLY
Status: DISCONTINUED | OUTPATIENT
Start: 2019-11-23 | End: 2019-11-27 | Stop reason: HOSPADM

## 2019-11-22 RX ORDER — SODIUM CHLORIDE 450 MG/100ML
INJECTION, SOLUTION INTRAVENOUS
Status: DISCONTINUED
Start: 2019-11-22 | End: 2019-11-22

## 2019-11-22 RX ORDER — SODIUM CHLORIDE 0.9 % (FLUSH) 0.9 %
10 SYRINGE (ML) INJECTION PRN
Status: DISCONTINUED | OUTPATIENT
Start: 2019-11-22 | End: 2019-11-27 | Stop reason: HOSPADM

## 2019-11-22 RX ORDER — SODIUM CHLORIDE 450 MG/100ML
INJECTION, SOLUTION INTRAVENOUS CONTINUOUS
Status: DISCONTINUED | OUTPATIENT
Start: 2019-11-22 | End: 2019-11-25

## 2019-11-22 RX ADMIN — MIDAZOLAM 3 MG: 5 INJECTION INTRAMUSCULAR; INTRAVENOUS at 12:59

## 2019-11-22 RX ADMIN — HYDROMORPHONE HYDROCHLORIDE 1 MG: 2 INJECTION INTRAMUSCULAR; INTRAVENOUS; SUBCUTANEOUS at 15:17

## 2019-11-22 RX ADMIN — EPINEPHRINE 5 MCG: 1 INJECTION, SOLUTION INTRAMUSCULAR; SUBCUTANEOUS at 12:50

## 2019-11-22 RX ADMIN — FENTANYL CITRATE 100 MCG: 50 INJECTION, SOLUTION INTRAMUSCULAR; INTRAVENOUS at 12:53

## 2019-11-22 RX ADMIN — SODIUM CHLORIDE, POTASSIUM CHLORIDE, SODIUM LACTATE AND CALCIUM CHLORIDE: 600; 310; 30; 20 INJECTION, SOLUTION INTRAVENOUS at 10:49

## 2019-11-22 RX ADMIN — AMINOCAPROIC ACID 1 G/HR: 250 INJECTION, SOLUTION INTRAVENOUS at 11:40

## 2019-11-22 RX ADMIN — VECURONIUM BROMIDE FOR INJECTION 10 MG: 1 INJECTION, POWDER, LYOPHILIZED, FOR SOLUTION INTRAVENOUS at 11:49

## 2019-11-22 RX ADMIN — Medication 2 MCG/MIN: at 17:35

## 2019-11-22 RX ADMIN — ALBUMIN (HUMAN) 250 ML: 12.5 INJECTION, SOLUTION INTRAVENOUS at 15:30

## 2019-11-22 RX ADMIN — PHENYLEPHRINE HYDROCHLORIDE 100 MCG: 10 INJECTION INTRAVENOUS at 11:32

## 2019-11-22 RX ADMIN — PHENYLEPHRINE HYDROCHLORIDE 50 MCG: 10 INJECTION INTRAVENOUS at 13:18

## 2019-11-22 RX ADMIN — EPINEPHRINE 2 MCG/MIN: 1 INJECTION, SOLUTION, CONCENTRATE INTRAVENOUS at 15:07

## 2019-11-22 RX ADMIN — CALCIUM GLUCONATE 2 G: 98 INJECTION, SOLUTION INTRAVENOUS at 21:46

## 2019-11-22 RX ADMIN — EPINEPHRINE 5 MCG: 1 INJECTION, SOLUTION INTRAMUSCULAR; SUBCUTANEOUS at 12:59

## 2019-11-22 RX ADMIN — ALBUMIN (HUMAN) 12.5 G: 12.5 INJECTION, SOLUTION INTRAVENOUS at 18:17

## 2019-11-22 RX ADMIN — CEFAZOLIN SODIUM 2 G: 2 SOLUTION INTRAVENOUS at 15:47

## 2019-11-22 RX ADMIN — EPINEPHRINE 5 MCG: 1 INJECTION, SOLUTION INTRAMUSCULAR; SUBCUTANEOUS at 12:46

## 2019-11-22 RX ADMIN — LORAZEPAM 0.5 MG: 0.5 TABLET ORAL at 03:27

## 2019-11-22 RX ADMIN — ALBUMIN (HUMAN) 12.5 G: 12.5 INJECTION, SOLUTION INTRAVENOUS at 19:14

## 2019-11-22 RX ADMIN — ACETAMINOPHEN 1000 MG: 10 INJECTION, SOLUTION INTRAVENOUS at 17:53

## 2019-11-22 RX ADMIN — LORAZEPAM 0.5 MG: 0.5 TABLET ORAL at 09:30

## 2019-11-22 RX ADMIN — PROTAMINE SULFATE 400 MG: 10 INJECTION, SOLUTION INTRAVENOUS at 15:23

## 2019-11-22 RX ADMIN — CARVEDILOL 3.12 MG: 3.12 TABLET, FILM COATED ORAL at 07:26

## 2019-11-22 RX ADMIN — AMINOCAPROIC ACID 5 G: 250 INJECTION, SOLUTION INTRAVENOUS at 11:40

## 2019-11-22 RX ADMIN — EPINEPHRINE 5 MCG: 1 INJECTION, SOLUTION INTRAMUSCULAR; SUBCUTANEOUS at 12:53

## 2019-11-22 RX ADMIN — PROPOFOL 80 MG: 10 INJECTION, EMULSION INTRAVENOUS at 15:27

## 2019-11-22 RX ADMIN — PROPOFOL 20 MG: 10 INJECTION, EMULSION INTRAVENOUS at 12:51

## 2019-11-22 RX ADMIN — CEFAZOLIN SODIUM 2 G: 2 SOLUTION INTRAVENOUS at 11:32

## 2019-11-22 RX ADMIN — PROPOFOL 50 MG: 10 INJECTION, EMULSION INTRAVENOUS at 11:17

## 2019-11-22 RX ADMIN — IPRATROPIUM BROMIDE AND ALBUTEROL SULFATE 1 AMPULE: .5; 3 SOLUTION RESPIRATORY (INHALATION) at 07:35

## 2019-11-22 RX ADMIN — PHENYLEPHRINE HYDROCHLORIDE 100 MCG: 10 INJECTION INTRAVENOUS at 13:21

## 2019-11-22 RX ADMIN — HYDROMORPHONE HYDROCHLORIDE 1 MG: 2 INJECTION INTRAMUSCULAR; INTRAVENOUS; SUBCUTANEOUS at 15:22

## 2019-11-22 RX ADMIN — SODIUM CHLORIDE, PRESERVATIVE FREE 10 ML: 5 INJECTION INTRAVENOUS at 09:00

## 2019-11-22 RX ADMIN — CEFAZOLIN SODIUM 2 G: 2 INJECTION, SOLUTION INTRAVENOUS at 17:28

## 2019-11-22 RX ADMIN — FENTANYL CITRATE 250 MCG: 50 INJECTION, SOLUTION INTRAMUSCULAR; INTRAVENOUS at 11:48

## 2019-11-22 RX ADMIN — SODIUM CHLORIDE, PRESERVATIVE FREE 10 ML: 5 INJECTION INTRAVENOUS at 21:57

## 2019-11-22 RX ADMIN — EPINEPHRINE 5 MCG: 1 INJECTION, SOLUTION INTRAMUSCULAR; SUBCUTANEOUS at 12:56

## 2019-11-22 RX ADMIN — FENTANYL CITRATE 25 MCG: 50 INJECTION INTRAMUSCULAR; INTRAVENOUS at 20:56

## 2019-11-22 RX ADMIN — FENTANYL CITRATE 250 MCG: 50 INJECTION, SOLUTION INTRAMUSCULAR; INTRAVENOUS at 11:53

## 2019-11-22 RX ADMIN — SODIUM CHLORIDE, PRESERVATIVE FREE 10 ML: 5 INJECTION INTRAVENOUS at 18:09

## 2019-11-22 RX ADMIN — ALBUMIN (HUMAN) 12.5 G: 12.5 INJECTION, SOLUTION INTRAVENOUS at 22:01

## 2019-11-22 RX ADMIN — FENTANYL CITRATE 250 MCG: 50 INJECTION, SOLUTION INTRAMUSCULAR; INTRAVENOUS at 11:17

## 2019-11-22 RX ADMIN — PHENYLEPHRINE HYDROCHLORIDE 100 MCG: 10 INJECTION INTRAVENOUS at 11:20

## 2019-11-22 RX ADMIN — MIDAZOLAM 2 MG: 5 INJECTION INTRAMUSCULAR; INTRAVENOUS at 11:17

## 2019-11-22 RX ADMIN — PROPOFOL 20 MG: 10 INJECTION, EMULSION INTRAVENOUS at 12:46

## 2019-11-22 RX ADMIN — PROPOFOL 20 MG: 10 INJECTION, EMULSION INTRAVENOUS at 12:55

## 2019-11-22 RX ADMIN — ALBUMIN (HUMAN) 25 G: 12.5 INJECTION, SOLUTION INTRAVENOUS at 16:30

## 2019-11-22 RX ADMIN — Medication: at 21:57

## 2019-11-22 RX ADMIN — FENTANYL CITRATE 25 MCG: 50 INJECTION INTRAMUSCULAR; INTRAVENOUS at 17:55

## 2019-11-22 RX ADMIN — SODIUM CHLORIDE: 9 INJECTION, SOLUTION INTRAVENOUS at 15:30

## 2019-11-22 RX ADMIN — VECURONIUM BROMIDE FOR INJECTION 5 MG: 1 INJECTION, POWDER, LYOPHILIZED, FOR SOLUTION INTRAVENOUS at 13:14

## 2019-11-22 RX ADMIN — FENTANYL CITRATE 150 MCG: 50 INJECTION, SOLUTION INTRAMUSCULAR; INTRAVENOUS at 12:59

## 2019-11-22 RX ADMIN — ROCURONIUM BROMIDE 50 MG: 10 INJECTION INTRAVENOUS at 11:17

## 2019-11-22 RX ADMIN — ALBUMIN (HUMAN) 250 ML: 12.5 INJECTION, SOLUTION INTRAVENOUS at 15:37

## 2019-11-22 RX ADMIN — FENTANYL CITRATE 25 MCG: 50 INJECTION INTRAMUSCULAR; INTRAVENOUS at 23:29

## 2019-11-22 RX ADMIN — MIDAZOLAM 5 MG: 5 INJECTION INTRAMUSCULAR; INTRAVENOUS at 11:49

## 2019-11-22 RX ADMIN — PROPOFOL 50 MG: 10 INJECTION, EMULSION INTRAVENOUS at 15:51

## 2019-11-22 RX ADMIN — PHENYLEPHRINE HYDROCHLORIDE 100 MCG: 10 INJECTION INTRAVENOUS at 11:23

## 2019-11-22 RX ADMIN — PROPOFOL 10 MG: 10 INJECTION, EMULSION INTRAVENOUS at 12:59

## 2019-11-22 RX ADMIN — SODIUM CHLORIDE: 4.5 INJECTION, SOLUTION INTRAVENOUS at 16:59

## 2019-11-22 RX ADMIN — ONDANSETRON 4 MG: 2 INJECTION INTRAMUSCULAR; INTRAVENOUS at 16:07

## 2019-11-22 RX ADMIN — ALBUMIN (HUMAN) 12.5 G: 12.5 INJECTION, SOLUTION INTRAVENOUS at 22:39

## 2019-11-22 RX ADMIN — PROPOFOL 20 MG: 10 INJECTION, EMULSION INTRAVENOUS at 12:11

## 2019-11-22 RX ADMIN — AMIODARONE HYDROCHLORIDE 1 MG/MIN: 50 INJECTION, SOLUTION INTRAVENOUS at 17:40

## 2019-11-22 RX ADMIN — PHENYLEPHRINE HYDROCHLORIDE 100 MCG: 10 INJECTION INTRAVENOUS at 12:30

## 2019-11-22 RX ADMIN — PHENYLEPHRINE HYDROCHLORIDE 50 MCG: 10 INJECTION INTRAVENOUS at 13:20

## 2019-11-22 RX ADMIN — FENTANYL CITRATE 25 MCG: 50 INJECTION INTRAMUSCULAR; INTRAVENOUS at 16:53

## 2019-11-22 RX ADMIN — LIDOCAINE HYDROCHLORIDE 100 MG: 20 INJECTION, SOLUTION INTRAVENOUS at 11:16

## 2019-11-22 RX ADMIN — PROTAMINE SULFATE 25 MG: 10 INJECTION, SOLUTION INTRAVENOUS at 15:54

## 2019-11-22 RX ADMIN — SODIUM CHLORIDE: 9 INJECTION, SOLUTION INTRAVENOUS at 11:49

## 2019-11-22 RX ADMIN — HEPARIN SODIUM 35000 UNITS: 1000 INJECTION, SOLUTION INTRAVENOUS; SUBCUTANEOUS at 12:42

## 2019-11-22 ASSESSMENT — PULMONARY FUNCTION TESTS
PIF_VALUE: 13
PIF_VALUE: 20
PIF_VALUE: 0
PIF_VALUE: 19
PIF_VALUE: 0
PIF_VALUE: 22
PIF_VALUE: 22
PIF_VALUE: 15
PIF_VALUE: 3
PIF_VALUE: 21
PIF_VALUE: 26
PIF_VALUE: 18
PIF_VALUE: 23
PIF_VALUE: 21
PIF_VALUE: 19
PIF_VALUE: 22
PIF_VALUE: 0
PIF_VALUE: 19
PIF_VALUE: 2
PIF_VALUE: 0
PIF_VALUE: 20
PIF_VALUE: 14
PIF_VALUE: 19
PIF_VALUE: 22
PIF_VALUE: 1
PIF_VALUE: 0
PIF_VALUE: 19
PIF_VALUE: 23
PIF_VALUE: 1
PIF_VALUE: 22
PIF_VALUE: 0
PIF_VALUE: 19
PIF_VALUE: 0
PIF_VALUE: 20
PIF_VALUE: 0
PIF_VALUE: 0
PIF_VALUE: 19
PIF_VALUE: 0
PIF_VALUE: 18
PIF_VALUE: 0
PIF_VALUE: 21
PIF_VALUE: 20
PIF_VALUE: 21
PIF_VALUE: 21
PIF_VALUE: 0
PIF_VALUE: 0
PIF_VALUE: 20
PIF_VALUE: 21
PIF_VALUE: 0
PIF_VALUE: 21
PIF_VALUE: 23
PIF_VALUE: 13
PIF_VALUE: 22
PIF_VALUE: 2
PIF_VALUE: 20
PIF_VALUE: 19
PIF_VALUE: 0
PIF_VALUE: 0
PIF_VALUE: 21
PIF_VALUE: 0
PIF_VALUE: 14
PIF_VALUE: 0
PIF_VALUE: 0
PIF_VALUE: 13
PIF_VALUE: 22
PIF_VALUE: 0
PIF_VALUE: 1
PIF_VALUE: 19
PIF_VALUE: 21
PIF_VALUE: 21
PIF_VALUE: 1
PIF_VALUE: 1
PIF_VALUE: 15
PIF_VALUE: 21
PIF_VALUE: 20
PIF_VALUE: 21
PIF_VALUE: 18
PIF_VALUE: 18
PIF_VALUE: 19
PIF_VALUE: 20
PIF_VALUE: 0
PIF_VALUE: 21
PIF_VALUE: 21
PIF_VALUE: 24
PIF_VALUE: 18
PIF_VALUE: 20
PIF_VALUE: 4
PIF_VALUE: 1
PIF_VALUE: 0
PIF_VALUE: 22
PIF_VALUE: 0
PIF_VALUE: 20
PIF_VALUE: 20
PIF_VALUE: 21
PIF_VALUE: 19
PIF_VALUE: 0
PIF_VALUE: 0
PIF_VALUE: 6
PIF_VALUE: 22
PIF_VALUE: 20
PIF_VALUE: 1
PIF_VALUE: 0
PIF_VALUE: 0
PIF_VALUE: 20
PIF_VALUE: 21
PIF_VALUE: 19
PIF_VALUE: 22
PIF_VALUE: 21
PIF_VALUE: 22
PIF_VALUE: 0
PIF_VALUE: 17
PIF_VALUE: 0
PIF_VALUE: 19
PIF_VALUE: 14
PIF_VALUE: 18
PIF_VALUE: 1
PIF_VALUE: 21
PIF_VALUE: 1
PIF_VALUE: 20
PIF_VALUE: 20
PIF_VALUE: 0
PIF_VALUE: 20
PIF_VALUE: 21
PIF_VALUE: 0
PIF_VALUE: 24
PIF_VALUE: 0
PIF_VALUE: 13
PIF_VALUE: 19
PIF_VALUE: 0
PIF_VALUE: 21
PIF_VALUE: 0
PIF_VALUE: 21
PIF_VALUE: 20
PIF_VALUE: 22
PIF_VALUE: 0
PIF_VALUE: 21
PIF_VALUE: 21
PIF_VALUE: 22
PIF_VALUE: 0
PIF_VALUE: 0
PIF_VALUE: 1
PIF_VALUE: 19
PIF_VALUE: 20
PIF_VALUE: 21
PIF_VALUE: 22
PIF_VALUE: 22
PIF_VALUE: 0
PIF_VALUE: 1
PIF_VALUE: 0
PIF_VALUE: 21
PIF_VALUE: 0
PIF_VALUE: 21
PIF_VALUE: 20
PIF_VALUE: 1
PIF_VALUE: 21
PIF_VALUE: 0
PIF_VALUE: 21
PIF_VALUE: 0
PIF_VALUE: 1
PIF_VALUE: 0
PIF_VALUE: 21
PIF_VALUE: 22
PIF_VALUE: 0
PIF_VALUE: 1
PIF_VALUE: 21
PIF_VALUE: 22
PIF_VALUE: 22
PIF_VALUE: 20
PIF_VALUE: 20
PIF_VALUE: 21
PIF_VALUE: 13
PIF_VALUE: 23
PIF_VALUE: 0
PIF_VALUE: 22
PIF_VALUE: 0
PIF_VALUE: 20
PIF_VALUE: 20
PIF_VALUE: 21
PIF_VALUE: 19
PIF_VALUE: 23
PIF_VALUE: 0
PIF_VALUE: 1
PIF_VALUE: 0
PIF_VALUE: 20
PIF_VALUE: 21
PIF_VALUE: 21
PIF_VALUE: 19
PIF_VALUE: 23
PIF_VALUE: 1
PIF_VALUE: 0
PIF_VALUE: 0
PIF_VALUE: 13
PIF_VALUE: 20
PIF_VALUE: 0
PIF_VALUE: 3
PIF_VALUE: 19
PIF_VALUE: 20
PIF_VALUE: 0
PIF_VALUE: 19
PIF_VALUE: 1
PIF_VALUE: 21
PIF_VALUE: 0
PIF_VALUE: 1
PIF_VALUE: 30
PIF_VALUE: 21
PIF_VALUE: 0
PIF_VALUE: 15
PIF_VALUE: 22
PIF_VALUE: 3
PIF_VALUE: 21
PIF_VALUE: 14
PIF_VALUE: 21
PIF_VALUE: 21
PIF_VALUE: 0
PIF_VALUE: 21
PIF_VALUE: 0
PIF_VALUE: 2
PIF_VALUE: 0
PIF_VALUE: 0
PIF_VALUE: 2
PIF_VALUE: 0
PIF_VALUE: 13
PIF_VALUE: 21
PIF_VALUE: 19
PIF_VALUE: 0
PIF_VALUE: 0
PIF_VALUE: 19
PIF_VALUE: 20
PIF_VALUE: 21
PIF_VALUE: 20
PIF_VALUE: 22
PIF_VALUE: 0
PIF_VALUE: 0
PIF_VALUE: 22
PIF_VALUE: 20
PIF_VALUE: 18
PIF_VALUE: 37
PIF_VALUE: 21
PIF_VALUE: 14
PIF_VALUE: 23
PIF_VALUE: 19
PIF_VALUE: 20
PIF_VALUE: 1
PIF_VALUE: 0
PIF_VALUE: 18
PIF_VALUE: 20
PIF_VALUE: 21
PIF_VALUE: 22
PIF_VALUE: 13
PIF_VALUE: 20
PIF_VALUE: 20
PIF_VALUE: 0
PIF_VALUE: 0
PIF_VALUE: 1
PIF_VALUE: 0
PIF_VALUE: 22
PIF_VALUE: 26
PIF_VALUE: 22
PIF_VALUE: 16
PIF_VALUE: 13
PIF_VALUE: 19
PIF_VALUE: 20
PIF_VALUE: 1
PIF_VALUE: 22
PIF_VALUE: 21
PIF_VALUE: 20
PIF_VALUE: 2
PIF_VALUE: 0
PIF_VALUE: 20
PIF_VALUE: 13
PIF_VALUE: 21
PIF_VALUE: 28
PIF_VALUE: 0
PIF_VALUE: 1
PIF_VALUE: 0
PIF_VALUE: 21
PIF_VALUE: 24
PIF_VALUE: 14
PIF_VALUE: 0
PIF_VALUE: 19
PIF_VALUE: 0
PIF_VALUE: 20
PIF_VALUE: 0
PIF_VALUE: 0
PIF_VALUE: 2
PIF_VALUE: 0
PIF_VALUE: 20
PIF_VALUE: 21
PIF_VALUE: 22
PIF_VALUE: 0
PIF_VALUE: 9
PIF_VALUE: 19
PIF_VALUE: 20
PIF_VALUE: 0
PIF_VALUE: 21
PIF_VALUE: 14
PIF_VALUE: 0
PIF_VALUE: 20
PIF_VALUE: 0
PIF_VALUE: 21
PIF_VALUE: 13
PIF_VALUE: 20
PIF_VALUE: 0
PIF_VALUE: 20
PIF_VALUE: 20
PIF_VALUE: 0
PIF_VALUE: 1
PIF_VALUE: 18
PIF_VALUE: 0
PIF_VALUE: 27
PIF_VALUE: 22
PIF_VALUE: 23
PIF_VALUE: 21
PIF_VALUE: 22
PIF_VALUE: 21
PIF_VALUE: 19
PIF_VALUE: 23
PIF_VALUE: 22
PIF_VALUE: 14
PIF_VALUE: 0
PIF_VALUE: 6
PIF_VALUE: 20
PIF_VALUE: 22
PIF_VALUE: 0
PIF_VALUE: 20
PIF_VALUE: 14
PIF_VALUE: 0
PIF_VALUE: 19
PIF_VALUE: 14
PIF_VALUE: 14
PIF_VALUE: 21
PIF_VALUE: 13
PIF_VALUE: 1
PIF_VALUE: 20
PIF_VALUE: 22
PIF_VALUE: 20
PIF_VALUE: 0
PIF_VALUE: 0
PIF_VALUE: 13
PIF_VALUE: 6
PIF_VALUE: 0
PIF_VALUE: 20
PIF_VALUE: 21
PIF_VALUE: 0
PIF_VALUE: 22
PIF_VALUE: 1
PIF_VALUE: 4
PIF_VALUE: 24
PIF_VALUE: 20
PIF_VALUE: 17
PIF_VALUE: 14
PIF_VALUE: 20
PIF_VALUE: 22
PIF_VALUE: 0
PIF_VALUE: 19
PIF_VALUE: 21
PIF_VALUE: 0
PIF_VALUE: 0

## 2019-11-22 ASSESSMENT — PAIN - FUNCTIONAL ASSESSMENT
PAIN_FUNCTIONAL_ASSESSMENT: ACTIVITIES ARE NOT PREVENTED
PAIN_FUNCTIONAL_ASSESSMENT: ACTIVITIES ARE NOT PREVENTED

## 2019-11-22 ASSESSMENT — PAIN DESCRIPTION - LOCATION
LOCATION: CHEST
LOCATION: CHEST

## 2019-11-22 ASSESSMENT — PAIN DESCRIPTION - DIRECTION
RADIATING_TOWARDS: NO WHERE
RADIATING_TOWARDS: NO WHERE

## 2019-11-22 ASSESSMENT — PAIN SCALES - GENERAL
PAINLEVEL_OUTOF10: 0
PAINLEVEL_OUTOF10: 0
PAINLEVEL_OUTOF10: 7
PAINLEVEL_OUTOF10: 7
PAINLEVEL_OUTOF10: 0

## 2019-11-22 ASSESSMENT — PAIN DESCRIPTION - PROGRESSION
CLINICAL_PROGRESSION: GRADUALLY WORSENING
CLINICAL_PROGRESSION: GRADUALLY WORSENING

## 2019-11-22 ASSESSMENT — PAIN DESCRIPTION - PAIN TYPE
TYPE: SURGICAL PAIN
TYPE: SURGICAL PAIN

## 2019-11-22 ASSESSMENT — PAIN DESCRIPTION - ORIENTATION
ORIENTATION: MID
ORIENTATION: MID

## 2019-11-22 ASSESSMENT — PAIN DESCRIPTION - DESCRIPTORS
DESCRIPTORS: DISCOMFORT
DESCRIPTORS: DISCOMFORT

## 2019-11-22 ASSESSMENT — PAIN DESCRIPTION - ONSET
ONSET: ON-GOING
ONSET: ON-GOING

## 2019-11-22 ASSESSMENT — PAIN DESCRIPTION - FREQUENCY
FREQUENCY: CONTINUOUS
FREQUENCY: CONTINUOUS

## 2019-11-23 ENCOUNTER — APPOINTMENT (OUTPATIENT)
Dept: GENERAL RADIOLOGY | Age: 59
DRG: 234 | End: 2019-11-23
Attending: INTERNAL MEDICINE
Payer: COMMERCIAL

## 2019-11-23 LAB
ANION GAP SERPL CALCULATED.3IONS-SCNC: 13 MMOL/L (ref 4–16)
BASE EXCESS MIXED: 3.2 (ref 0–1.2)
BASE EXCESS MIXED: 3.3 (ref 0–1.2)
BASE EXCESS MIXED: 3.9 (ref 0–1.2)
BASE EXCESS: ABNORMAL (ref 0–3.3)
BUN BLDV-MCNC: 22 MG/DL (ref 6–23)
CALCIUM IONIZED: 4.52 MG/DL (ref 4.48–5.28)
CALCIUM SERPL-MCNC: 8.7 MG/DL (ref 8.3–10.6)
CHLORIDE BLD-SCNC: 101 MMOL/L (ref 99–110)
CO2: 21 MMOL/L (ref 21–32)
CO2: 23 MMOL/L (ref 21–32)
CO2: 23 MMOL/L (ref 21–32)
CO2: 24 MMOL/L (ref 21–32)
CREAT SERPL-MCNC: 1 MG/DL (ref 0.9–1.3)
GFR AFRICAN AMERICAN: >60 ML/MIN/1.73M2
GFR NON-AFRICAN AMERICAN: >60 ML/MIN/1.73M2
GLUCOSE BLD-MCNC: 103 MG/DL (ref 70–99)
GLUCOSE BLD-MCNC: 105 MG/DL (ref 70–99)
GLUCOSE BLD-MCNC: 108 MG/DL (ref 70–99)
GLUCOSE BLD-MCNC: 110 MG/DL (ref 70–99)
GLUCOSE BLD-MCNC: 110 MG/DL (ref 70–99)
GLUCOSE BLD-MCNC: 111 MG/DL (ref 70–99)
GLUCOSE BLD-MCNC: 112 MG/DL (ref 70–99)
GLUCOSE BLD-MCNC: 116 MG/DL (ref 70–99)
GLUCOSE BLD-MCNC: 123 MG/DL (ref 70–99)
GLUCOSE BLD-MCNC: 125 MG/DL (ref 70–99)
GLUCOSE BLD-MCNC: 127 MG/DL (ref 70–99)
GLUCOSE BLD-MCNC: 128 MG/DL (ref 70–99)
GLUCOSE BLD-MCNC: 132 MG/DL (ref 70–99)
GLUCOSE BLD-MCNC: 133 MG/DL (ref 70–99)
GLUCOSE BLD-MCNC: 134 MG/DL (ref 70–99)
GLUCOSE BLD-MCNC: 134 MG/DL (ref 70–99)
GLUCOSE BLD-MCNC: 135 MG/DL (ref 70–99)
GLUCOSE BLD-MCNC: 148 MG/DL (ref 70–99)
GLUCOSE BLD-MCNC: 151 MG/DL (ref 70–99)
HCO3 ARTERIAL: 21.6 MMOL/L (ref 18–23)
HCO3 ARTERIAL: 21.8 MMOL/L (ref 18–23)
HCO3 ARTERIAL: 22.3 MMOL/L (ref 18–23)
HCT VFR BLD CALC: 38 % (ref 42–52)
HCT VFR BLD CALC: 38 % (ref 42–52)
HCT VFR BLD CALC: 39 % (ref 42–52)
HCT VFR BLD CALC: 40.4 % (ref 42–52)
HEMOGLOBIN: 12.6 GM/DL (ref 13.5–18)
HEMOGLOBIN: 12.9 GM/DL (ref 13.5–18)
HEMOGLOBIN: 13.1 GM/DL (ref 13.5–18)
HEMOGLOBIN: 13.1 GM/DL (ref 13.5–18)
IONIZED CA: 1.13 MMOL/L (ref 1.12–1.32)
MAGNESIUM: 2.1 MG/DL (ref 1.8–2.4)
MCH RBC QN AUTO: 28 PG (ref 27–31)
MCHC RBC AUTO-ENTMCNC: 31.2 % (ref 32–36)
MCV RBC AUTO: 89.8 FL (ref 78–100)
O2 SATURATION: 95.8 % (ref 96–97)
O2 SATURATION: 96.2 % (ref 96–97)
O2 SATURATION: 97.1 % (ref 96–97)
PCO2 ARTERIAL: 38.5 MMHG (ref 32–45)
PCO2 ARTERIAL: 39.8 MMHG (ref 32–45)
PCO2 ARTERIAL: 40.9 MMHG (ref 32–45)
PDW BLD-RTO: 15 % (ref 11.7–14.9)
PH BLOOD: 7.34 (ref 7.34–7.45)
PH BLOOD: 7.35 (ref 7.34–7.45)
PH BLOOD: 7.36 (ref 7.34–7.45)
PLATELET # BLD: 152 K/CU MM (ref 140–440)
PMV BLD AUTO: 9.8 FL (ref 7.5–11.1)
PO2 ARTERIAL: 83.2 MMHG (ref 75–100)
PO2 ARTERIAL: 87.6 MMHG (ref 75–100)
PO2 ARTERIAL: 96.4 MMHG (ref 75–100)
POC CALCIUM: 1.14 MMOL/L (ref 1.12–1.32)
POC CALCIUM: 1.2 MMOL/L (ref 1.12–1.32)
POC CALCIUM: 1.22 MMOL/L (ref 1.12–1.32)
POC CHLORIDE: 103 MMOL/L (ref 98–109)
POC CHLORIDE: 103 MMOL/L (ref 98–109)
POC CHLORIDE: 104 MMOL/L (ref 98–109)
POC CREATININE: 1 MG/DL (ref 0.9–1.3)
POC CREATININE: 1.1 MG/DL (ref 0.9–1.3)
POC CREATININE: 1.1 MG/DL (ref 0.9–1.3)
POTASSIUM SERPL-SCNC: 4.1 MMOL/L (ref 3.5–4.5)
POTASSIUM SERPL-SCNC: 4.1 MMOL/L (ref 3.5–4.5)
POTASSIUM SERPL-SCNC: 4.1 MMOL/L (ref 3.5–5.1)
POTASSIUM SERPL-SCNC: 4.1 MMOL/L (ref 3.5–5.1)
POTASSIUM SERPL-SCNC: 4.2 MMOL/L (ref 3.5–4.5)
RBC # BLD: 4.5 M/CU MM (ref 4.6–6.2)
SODIUM BLD-SCNC: 135 MMOL/L (ref 135–145)
SODIUM BLD-SCNC: 137 MMOL/L (ref 138–146)
SOURCE, BLOOD GAS: ABNORMAL
WBC # BLD: 15.3 K/CU MM (ref 4–10.5)

## 2019-11-23 PROCEDURE — 97162 PT EVAL MOD COMPLEX 30 MIN: CPT

## 2019-11-23 PROCEDURE — 99232 SBSQ HOSP IP/OBS MODERATE 35: CPT | Performed by: INTERNAL MEDICINE

## 2019-11-23 PROCEDURE — 94640 AIRWAY INHALATION TREATMENT: CPT

## 2019-11-23 PROCEDURE — 6360000002 HC RX W HCPCS: Performed by: SURGERY

## 2019-11-23 PROCEDURE — 2500000003 HC RX 250 WO HCPCS

## 2019-11-23 PROCEDURE — 94761 N-INVAS EAR/PLS OXIMETRY MLT: CPT

## 2019-11-23 PROCEDURE — 82330 ASSAY OF CALCIUM: CPT

## 2019-11-23 PROCEDURE — 84132 ASSAY OF SERUM POTASSIUM: CPT

## 2019-11-23 PROCEDURE — 6370000000 HC RX 637 (ALT 250 FOR IP): Performed by: SURGERY

## 2019-11-23 PROCEDURE — 97166 OT EVAL MOD COMPLEX 45 MIN: CPT

## 2019-11-23 PROCEDURE — 2100000000 HC CCU R&B

## 2019-11-23 PROCEDURE — 97530 THERAPEUTIC ACTIVITIES: CPT

## 2019-11-23 PROCEDURE — 2580000003 HC RX 258: Performed by: SURGERY

## 2019-11-23 PROCEDURE — 97116 GAIT TRAINING THERAPY: CPT

## 2019-11-23 PROCEDURE — 2700000000 HC OXYGEN THERAPY PER DAY

## 2019-11-23 PROCEDURE — 94660 CPAP INITIATION&MGMT: CPT

## 2019-11-23 PROCEDURE — 71045 X-RAY EXAM CHEST 1 VIEW: CPT

## 2019-11-23 PROCEDURE — 2580000003 HC RX 258

## 2019-11-23 PROCEDURE — 83735 ASSAY OF MAGNESIUM: CPT

## 2019-11-23 PROCEDURE — 82962 GLUCOSE BLOOD TEST: CPT

## 2019-11-23 PROCEDURE — 85027 COMPLETE CBC AUTOMATED: CPT

## 2019-11-23 PROCEDURE — 80048 BASIC METABOLIC PNL TOTAL CA: CPT

## 2019-11-23 RX ORDER — LORAZEPAM 1 MG/1
1 TABLET ORAL EVERY 12 HOURS PRN
Status: DISCONTINUED | OUTPATIENT
Start: 2019-11-23 | End: 2019-11-24

## 2019-11-23 RX ORDER — 0.9 % SODIUM CHLORIDE 0.9 %
500 INTRAVENOUS SOLUTION INTRAVENOUS ONCE
Status: COMPLETED | OUTPATIENT
Start: 2019-11-23 | End: 2019-11-23

## 2019-11-23 RX ORDER — SODIUM CHLORIDE 9 MG/ML
INJECTION, SOLUTION INTRAVENOUS
Status: COMPLETED
Start: 2019-11-23 | End: 2019-11-23

## 2019-11-23 RX ADMIN — SODIUM CHLORIDE 500 ML: 9 INJECTION, SOLUTION INTRAVENOUS at 07:06

## 2019-11-23 RX ADMIN — IPRATROPIUM BROMIDE AND ALBUTEROL SULFATE 1 AMPULE: .5; 3 SOLUTION RESPIRATORY (INHALATION) at 07:50

## 2019-11-23 RX ADMIN — KETOROLAC TROMETHAMINE 15 MG: 30 INJECTION, SOLUTION INTRAMUSCULAR at 10:45

## 2019-11-23 RX ADMIN — SODIUM CHLORIDE: 4.5 INJECTION, SOLUTION INTRAVENOUS at 22:32

## 2019-11-23 RX ADMIN — GABAPENTIN 300 MG: 300 CAPSULE ORAL at 22:09

## 2019-11-23 RX ADMIN — FENTANYL CITRATE 25 MCG: 50 INJECTION INTRAMUSCULAR; INTRAVENOUS at 04:02

## 2019-11-23 RX ADMIN — Medication: at 22:09

## 2019-11-23 RX ADMIN — ATORVASTATIN CALCIUM 20 MG: 20 TABLET, FILM COATED ORAL at 22:09

## 2019-11-23 RX ADMIN — ACETAMINOPHEN 1000 MG: 10 INJECTION, SOLUTION INTRAVENOUS at 00:34

## 2019-11-23 RX ADMIN — IPRATROPIUM BROMIDE AND ALBUTEROL SULFATE 1 AMPULE: .5; 3 SOLUTION RESPIRATORY (INHALATION) at 04:13

## 2019-11-23 RX ADMIN — ACETAMINOPHEN 1000 MG: 10 INJECTION, SOLUTION INTRAVENOUS at 06:08

## 2019-11-23 RX ADMIN — ONDANSETRON 4 MG: 2 INJECTION INTRAMUSCULAR; INTRAVENOUS at 05:02

## 2019-11-23 RX ADMIN — CEFAZOLIN SODIUM 2 G: 2 INJECTION, SOLUTION INTRAVENOUS at 00:35

## 2019-11-23 RX ADMIN — GABAPENTIN 300 MG: 300 CAPSULE ORAL at 08:58

## 2019-11-23 RX ADMIN — Medication: at 08:53

## 2019-11-23 RX ADMIN — ACETAMINOPHEN 1000 MG: 10 INJECTION, SOLUTION INTRAVENOUS at 12:43

## 2019-11-23 RX ADMIN — PANTOPRAZOLE SODIUM 40 MG: 40 TABLET, DELAYED RELEASE ORAL at 08:54

## 2019-11-23 RX ADMIN — CARVEDILOL 3.12 MG: 3.12 TABLET, FILM COATED ORAL at 08:54

## 2019-11-23 RX ADMIN — HYDROCODONE BITARTRATE AND ACETAMINOPHEN 2 TABLET: 5; 325 TABLET ORAL at 13:39

## 2019-11-23 RX ADMIN — LORAZEPAM 1 MG: 1 TABLET ORAL at 08:54

## 2019-11-23 RX ADMIN — AMIODARONE HYDROCHLORIDE 0.5 MG/MIN: 50 INJECTION, SOLUTION INTRAVENOUS at 03:01

## 2019-11-23 RX ADMIN — AMIODARONE HYDROCHLORIDE 200 MG: 200 TABLET ORAL at 08:53

## 2019-11-23 RX ADMIN — DOCUSATE SODIUM 100 MG: 100 CAPSULE, LIQUID FILLED ORAL at 22:08

## 2019-11-23 RX ADMIN — MULTIPLE VITAMINS W/ MINERALS TAB 1 TABLET: TAB at 08:53

## 2019-11-23 RX ADMIN — IPRATROPIUM BROMIDE AND ALBUTEROL SULFATE 1 AMPULE: .5; 3 SOLUTION RESPIRATORY (INHALATION) at 16:12

## 2019-11-23 RX ADMIN — ASPIRIN 81 MG: 81 TABLET, COATED ORAL at 08:53

## 2019-11-23 RX ADMIN — IPRATROPIUM BROMIDE AND ALBUTEROL SULFATE 1 AMPULE: .5; 3 SOLUTION RESPIRATORY (INHALATION) at 19:36

## 2019-11-23 RX ADMIN — SODIUM CHLORIDE: 4.5 INJECTION, SOLUTION INTRAVENOUS at 09:05

## 2019-11-23 RX ADMIN — IPRATROPIUM BROMIDE AND ALBUTEROL SULFATE 1 AMPULE: .5; 3 SOLUTION RESPIRATORY (INHALATION) at 11:54

## 2019-11-23 RX ADMIN — FENTANYL CITRATE 25 MCG: 50 INJECTION INTRAMUSCULAR; INTRAVENOUS at 03:02

## 2019-11-23 RX ADMIN — GABAPENTIN 300 MG: 300 CAPSULE ORAL at 13:39

## 2019-11-23 RX ADMIN — HYDROCODONE BITARTRATE AND ACETAMINOPHEN 2 TABLET: 5; 325 TABLET ORAL at 07:39

## 2019-11-23 RX ADMIN — AMIODARONE HYDROCHLORIDE 200 MG: 200 TABLET ORAL at 13:39

## 2019-11-23 RX ADMIN — SODIUM CHLORIDE 2.5 UNITS/HR: 9 INJECTION, SOLUTION INTRAVENOUS at 22:31

## 2019-11-23 RX ADMIN — CARVEDILOL 3.12 MG: 3.12 TABLET, FILM COATED ORAL at 22:08

## 2019-11-23 RX ADMIN — Medication 500 ML: at 07:06

## 2019-11-23 RX ADMIN — DOCUSATE SODIUM 100 MG: 100 CAPSULE, LIQUID FILLED ORAL at 08:54

## 2019-11-23 RX ADMIN — AMIODARONE HYDROCHLORIDE 200 MG: 200 TABLET ORAL at 22:08

## 2019-11-23 RX ADMIN — HYDROCODONE BITARTRATE AND ACETAMINOPHEN 2 TABLET: 5; 325 TABLET ORAL at 23:11

## 2019-11-23 RX ADMIN — HYDROCODONE BITARTRATE AND ACETAMINOPHEN 2 TABLET: 5; 325 TABLET ORAL at 18:44

## 2019-11-23 RX ADMIN — SODIUM CHLORIDE, PRESERVATIVE FREE 10 ML: 5 INJECTION INTRAVENOUS at 08:53

## 2019-11-23 ASSESSMENT — PAIN DESCRIPTION - ONSET
ONSET: ON-GOING
ONSET: GRADUAL

## 2019-11-23 ASSESSMENT — PAIN - FUNCTIONAL ASSESSMENT
PAIN_FUNCTIONAL_ASSESSMENT: PREVENTS OR INTERFERES SOME ACTIVE ACTIVITIES AND ADLS
PAIN_FUNCTIONAL_ASSESSMENT: ACTIVITIES ARE NOT PREVENTED
PAIN_FUNCTIONAL_ASSESSMENT: PREVENTS OR INTERFERES SOME ACTIVE ACTIVITIES AND ADLS
PAIN_FUNCTIONAL_ASSESSMENT: ACTIVITIES ARE NOT PREVENTED
PAIN_FUNCTIONAL_ASSESSMENT: PREVENTS OR INTERFERES SOME ACTIVE ACTIVITIES AND ADLS
PAIN_FUNCTIONAL_ASSESSMENT: ACTIVITIES ARE NOT PREVENTED

## 2019-11-23 ASSESSMENT — PAIN DESCRIPTION - DIRECTION
RADIATING_TOWARDS: EVERYWHERE
RADIATING_TOWARDS: EVERYWHERE

## 2019-11-23 ASSESSMENT — PAIN DESCRIPTION - PROGRESSION
CLINICAL_PROGRESSION: GRADUALLY IMPROVING
CLINICAL_PROGRESSION: GRADUALLY WORSENING
CLINICAL_PROGRESSION: NOT CHANGED
CLINICAL_PROGRESSION: NOT CHANGED
CLINICAL_PROGRESSION: GRADUALLY WORSENING
CLINICAL_PROGRESSION: GRADUALLY IMPROVING
CLINICAL_PROGRESSION: GRADUALLY WORSENING
CLINICAL_PROGRESSION: NOT CHANGED
CLINICAL_PROGRESSION: GRADUALLY IMPROVING
CLINICAL_PROGRESSION: GRADUALLY IMPROVING
CLINICAL_PROGRESSION: GRADUALLY WORSENING
CLINICAL_PROGRESSION: GRADUALLY IMPROVING
CLINICAL_PROGRESSION: GRADUALLY WORSENING
CLINICAL_PROGRESSION: GRADUALLY WORSENING
CLINICAL_PROGRESSION: NOT CHANGED
CLINICAL_PROGRESSION: GRADUALLY WORSENING
CLINICAL_PROGRESSION: GRADUALLY IMPROVING

## 2019-11-23 ASSESSMENT — PAIN DESCRIPTION - ORIENTATION
ORIENTATION: MID;OTHER (COMMENT)
ORIENTATION: MID
ORIENTATION: MID;UPPER
ORIENTATION: MID;LOWER
ORIENTATION: MID;UPPER
ORIENTATION: MID
ORIENTATION: MID;UPPER

## 2019-11-23 ASSESSMENT — PAIN DESCRIPTION - FREQUENCY
FREQUENCY: INTERMITTENT
FREQUENCY: CONTINUOUS
FREQUENCY: INTERMITTENT
FREQUENCY: CONTINUOUS
FREQUENCY: INTERMITTENT
FREQUENCY: INTERMITTENT
FREQUENCY: CONTINUOUS

## 2019-11-23 ASSESSMENT — PAIN DESCRIPTION - LOCATION
LOCATION: BACK;CHEST
LOCATION: STERNUM
LOCATION: CHEST
LOCATION: CHEST;BACK
LOCATION: BACK;CHEST
LOCATION: CHEST;GENERALIZED

## 2019-11-23 ASSESSMENT — PAIN SCALES - GENERAL
PAINLEVEL_OUTOF10: 8
PAINLEVEL_OUTOF10: 8
PAINLEVEL_OUTOF10: 9
PAINLEVEL_OUTOF10: 4
PAINLEVEL_OUTOF10: 7
PAINLEVEL_OUTOF10: 8
PAINLEVEL_OUTOF10: 6
PAINLEVEL_OUTOF10: 9
PAINLEVEL_OUTOF10: 4
PAINLEVEL_OUTOF10: 6
PAINLEVEL_OUTOF10: 4
PAINLEVEL_OUTOF10: 9
PAINLEVEL_OUTOF10: 7
PAINLEVEL_OUTOF10: 0

## 2019-11-23 ASSESSMENT — PAIN DESCRIPTION - PAIN TYPE
TYPE: SURGICAL PAIN;CHRONIC PAIN
TYPE: CHRONIC PAIN;SURGICAL PAIN
TYPE: SURGICAL PAIN
TYPE: SURGICAL PAIN;CHRONIC PAIN
TYPE: CHRONIC PAIN;SURGICAL PAIN
TYPE: CHRONIC PAIN;SURGICAL PAIN
TYPE: SURGICAL PAIN
TYPE: SURGICAL PAIN;CHRONIC PAIN
TYPE: SURGICAL PAIN;CHRONIC PAIN
TYPE: CHRONIC PAIN;SURGICAL PAIN
TYPE: SURGICAL PAIN
TYPE: SURGICAL PAIN;CHRONIC PAIN
TYPE: CHRONIC PAIN;SURGICAL PAIN

## 2019-11-23 ASSESSMENT — PAIN DESCRIPTION - DESCRIPTORS
DESCRIPTORS: ACHING
DESCRIPTORS: ACHING;SORE
DESCRIPTORS: ACHING;DISCOMFORT
DESCRIPTORS: ACHING;DISCOMFORT
DESCRIPTORS: ACHING
DESCRIPTORS: ACHING;BURNING;CONSTANT
DESCRIPTORS: ACHING
DESCRIPTORS: ACHING;DISCOMFORT
DESCRIPTORS: DISCOMFORT
DESCRIPTORS: ACHING

## 2019-11-23 ASSESSMENT — PULMONARY FUNCTION TESTS
PIF_VALUE: 16
PIF_VALUE: 22
PIF_VALUE: 15
PIF_VALUE: 22

## 2019-11-23 ASSESSMENT — PAIN SCALES - WONG BAKER: WONGBAKER_NUMERICALRESPONSE: 0

## 2019-11-24 ENCOUNTER — APPOINTMENT (OUTPATIENT)
Dept: GENERAL RADIOLOGY | Age: 59
DRG: 234 | End: 2019-11-24
Attending: INTERNAL MEDICINE
Payer: COMMERCIAL

## 2019-11-24 LAB
ANION GAP SERPL CALCULATED.3IONS-SCNC: 11 MMOL/L (ref 4–16)
BUN BLDV-MCNC: 29 MG/DL (ref 6–23)
CALCIUM IONIZED: 4.52 MG/DL (ref 4.48–5.28)
CALCIUM SERPL-MCNC: 8.2 MG/DL (ref 8.3–10.6)
CHLORIDE BLD-SCNC: 102 MMOL/L (ref 99–110)
CO2: 23 MMOL/L (ref 21–32)
CREAT SERPL-MCNC: 1.3 MG/DL (ref 0.9–1.3)
GFR AFRICAN AMERICAN: >60 ML/MIN/1.73M2
GFR NON-AFRICAN AMERICAN: 57 ML/MIN/1.73M2
GLUCOSE BLD-MCNC: 100 MG/DL (ref 70–99)
GLUCOSE BLD-MCNC: 102 MG/DL (ref 70–99)
GLUCOSE BLD-MCNC: 102 MG/DL (ref 70–99)
GLUCOSE BLD-MCNC: 104 MG/DL (ref 70–99)
GLUCOSE BLD-MCNC: 111 MG/DL (ref 70–99)
GLUCOSE BLD-MCNC: 111 MG/DL (ref 70–99)
GLUCOSE BLD-MCNC: 115 MG/DL (ref 70–99)
GLUCOSE BLD-MCNC: 117 MG/DL (ref 70–99)
GLUCOSE BLD-MCNC: 142 MG/DL (ref 70–99)
GLUCOSE BLD-MCNC: 145 MG/DL (ref 70–99)
GLUCOSE BLD-MCNC: 155 MG/DL (ref 70–99)
GLUCOSE BLD-MCNC: 156 MG/DL (ref 70–99)
GLUCOSE BLD-MCNC: 179 MG/DL (ref 70–99)
GLUCOSE BLD-MCNC: 76 MG/DL (ref 70–99)
GLUCOSE BLD-MCNC: 97 MG/DL (ref 70–99)
HCT VFR BLD CALC: 38.2 % (ref 42–52)
HEMOGLOBIN: 11.7 GM/DL (ref 13.5–18)
IONIZED CA: 1.13 MMOL/L (ref 1.12–1.32)
MAGNESIUM: 2.1 MG/DL (ref 1.8–2.4)
MCH RBC QN AUTO: 28 PG (ref 27–31)
MCHC RBC AUTO-ENTMCNC: 30.6 % (ref 32–36)
MCV RBC AUTO: 91.4 FL (ref 78–100)
PDW BLD-RTO: 15.2 % (ref 11.7–14.9)
PLATELET # BLD: 146 K/CU MM (ref 140–440)
PMV BLD AUTO: 10.7 FL (ref 7.5–11.1)
POTASSIUM SERPL-SCNC: 4.4 MMOL/L (ref 3.5–5.1)
RBC # BLD: 4.18 M/CU MM (ref 4.6–6.2)
SODIUM BLD-SCNC: 136 MMOL/L (ref 135–145)
WBC # BLD: 16.3 K/CU MM (ref 4–10.5)

## 2019-11-24 PROCEDURE — 83735 ASSAY OF MAGNESIUM: CPT

## 2019-11-24 PROCEDURE — 99232 SBSQ HOSP IP/OBS MODERATE 35: CPT | Performed by: INTERNAL MEDICINE

## 2019-11-24 PROCEDURE — 2100000000 HC CCU R&B

## 2019-11-24 PROCEDURE — 94660 CPAP INITIATION&MGMT: CPT

## 2019-11-24 PROCEDURE — 6370000000 HC RX 637 (ALT 250 FOR IP): Performed by: SURGERY

## 2019-11-24 PROCEDURE — 6370000000 HC RX 637 (ALT 250 FOR IP): Performed by: THORACIC SURGERY (CARDIOTHORACIC VASCULAR SURGERY)

## 2019-11-24 PROCEDURE — 71045 X-RAY EXAM CHEST 1 VIEW: CPT

## 2019-11-24 PROCEDURE — 82330 ASSAY OF CALCIUM: CPT

## 2019-11-24 PROCEDURE — 82962 GLUCOSE BLOOD TEST: CPT

## 2019-11-24 PROCEDURE — 97110 THERAPEUTIC EXERCISES: CPT

## 2019-11-24 PROCEDURE — 2700000000 HC OXYGEN THERAPY PER DAY

## 2019-11-24 PROCEDURE — 94640 AIRWAY INHALATION TREATMENT: CPT

## 2019-11-24 PROCEDURE — 6360000002 HC RX W HCPCS: Performed by: SURGERY

## 2019-11-24 PROCEDURE — 6360000002 HC RX W HCPCS: Performed by: THORACIC SURGERY (CARDIOTHORACIC VASCULAR SURGERY)

## 2019-11-24 PROCEDURE — 85027 COMPLETE CBC AUTOMATED: CPT

## 2019-11-24 PROCEDURE — 2580000003 HC RX 258: Performed by: SURGERY

## 2019-11-24 PROCEDURE — 94761 N-INVAS EAR/PLS OXIMETRY MLT: CPT

## 2019-11-24 PROCEDURE — 80048 BASIC METABOLIC PNL TOTAL CA: CPT

## 2019-11-24 RX ORDER — TROLAMINE SALICYLATE 10 G/100G
CREAM TOPICAL 2 TIMES DAILY PRN
Status: DISCONTINUED | OUTPATIENT
Start: 2019-11-24 | End: 2019-11-24 | Stop reason: ALTCHOICE

## 2019-11-24 RX ORDER — TROLAMINE SALICYLATE 10 G/100G
CREAM TOPICAL 2 TIMES DAILY PRN
Status: DISCONTINUED | OUTPATIENT
Start: 2019-11-24 | End: 2019-11-27 | Stop reason: HOSPADM

## 2019-11-24 RX ORDER — OXYCODONE HYDROCHLORIDE AND ACETAMINOPHEN 5; 325 MG/1; MG/1
2 TABLET ORAL EVERY 4 HOURS PRN
Status: DISCONTINUED | OUTPATIENT
Start: 2019-11-24 | End: 2019-11-24 | Stop reason: CLARIF

## 2019-11-24 RX ORDER — OXYCODONE HYDROCHLORIDE AND ACETAMINOPHEN 5; 325 MG/1; MG/1
1 TABLET ORAL EVERY 4 HOURS PRN
Status: DISCONTINUED | OUTPATIENT
Start: 2019-11-24 | End: 2019-11-24 | Stop reason: CLARIF

## 2019-11-24 RX ORDER — BISACODYL 10 MG
10 SUPPOSITORY, RECTAL RECTAL DAILY PRN
Status: DISCONTINUED | OUTPATIENT
Start: 2019-11-24 | End: 2019-11-27 | Stop reason: HOSPADM

## 2019-11-24 RX ORDER — FUROSEMIDE 10 MG/ML
20 INJECTION INTRAMUSCULAR; INTRAVENOUS 2 TIMES DAILY
Status: DISCONTINUED | OUTPATIENT
Start: 2019-11-24 | End: 2019-11-26

## 2019-11-24 RX ORDER — OXYCODONE HYDROCHLORIDE AND ACETAMINOPHEN 5; 325 MG/1; MG/1
1 TABLET ORAL EVERY 4 HOURS PRN
Status: DISCONTINUED | OUTPATIENT
Start: 2019-11-24 | End: 2019-11-27 | Stop reason: HOSPADM

## 2019-11-24 RX ORDER — OXYCODONE HYDROCHLORIDE AND ACETAMINOPHEN 5; 325 MG/1; MG/1
2 TABLET ORAL EVERY 4 HOURS PRN
Status: DISCONTINUED | OUTPATIENT
Start: 2019-11-24 | End: 2019-11-27 | Stop reason: HOSPADM

## 2019-11-24 RX ADMIN — FUROSEMIDE 20 MG: 10 INJECTION, SOLUTION INTRAVENOUS at 14:01

## 2019-11-24 RX ADMIN — OXYCODONE AND ACETAMINOPHEN 2 TABLET: 5; 325 TABLET ORAL at 18:18

## 2019-11-24 RX ADMIN — IPRATROPIUM BROMIDE AND ALBUTEROL SULFATE 1 AMPULE: .5; 3 SOLUTION RESPIRATORY (INHALATION) at 16:07

## 2019-11-24 RX ADMIN — CARVEDILOL 3.12 MG: 3.12 TABLET, FILM COATED ORAL at 21:21

## 2019-11-24 RX ADMIN — PANTOPRAZOLE SODIUM 40 MG: 40 TABLET, DELAYED RELEASE ORAL at 09:31

## 2019-11-24 RX ADMIN — IPRATROPIUM BROMIDE AND ALBUTEROL SULFATE 1 AMPULE: .5; 3 SOLUTION RESPIRATORY (INHALATION) at 11:37

## 2019-11-24 RX ADMIN — OXYCODONE AND ACETAMINOPHEN 2 TABLET: 5; 325 TABLET ORAL at 14:11

## 2019-11-24 RX ADMIN — SODIUM CHLORIDE, PRESERVATIVE FREE 10 ML: 5 INJECTION INTRAVENOUS at 21:23

## 2019-11-24 RX ADMIN — Medication: at 21:22

## 2019-11-24 RX ADMIN — HYDROCODONE BITARTRATE AND ACETAMINOPHEN 2 TABLET: 5; 325 TABLET ORAL at 09:33

## 2019-11-24 RX ADMIN — AMIODARONE HYDROCHLORIDE 200 MG: 200 TABLET ORAL at 09:31

## 2019-11-24 RX ADMIN — SODIUM CHLORIDE, PRESERVATIVE FREE 10 ML: 5 INJECTION INTRAVENOUS at 09:31

## 2019-11-24 RX ADMIN — AMIODARONE HYDROCHLORIDE 200 MG: 200 TABLET ORAL at 14:01

## 2019-11-24 RX ADMIN — GABAPENTIN 300 MG: 300 CAPSULE ORAL at 21:21

## 2019-11-24 RX ADMIN — DOCUSATE SODIUM 100 MG: 100 CAPSULE, LIQUID FILLED ORAL at 09:31

## 2019-11-24 RX ADMIN — ATORVASTATIN CALCIUM 20 MG: 20 TABLET, FILM COATED ORAL at 21:21

## 2019-11-24 RX ADMIN — GABAPENTIN 300 MG: 300 CAPSULE ORAL at 14:39

## 2019-11-24 RX ADMIN — IPRATROPIUM BROMIDE AND ALBUTEROL SULFATE 1 AMPULE: .5; 3 SOLUTION RESPIRATORY (INHALATION) at 07:58

## 2019-11-24 RX ADMIN — AMIODARONE HYDROCHLORIDE 200 MG: 200 TABLET ORAL at 21:21

## 2019-11-24 RX ADMIN — FUROSEMIDE 20 MG: 10 INJECTION, SOLUTION INTRAVENOUS at 18:18

## 2019-11-24 RX ADMIN — DOCUSATE SODIUM 100 MG: 100 CAPSULE, LIQUID FILLED ORAL at 21:21

## 2019-11-24 RX ADMIN — GABAPENTIN 300 MG: 300 CAPSULE ORAL at 09:31

## 2019-11-24 RX ADMIN — IPRATROPIUM BROMIDE AND ALBUTEROL SULFATE 1 AMPULE: .5; 3 SOLUTION RESPIRATORY (INHALATION) at 19:53

## 2019-11-24 RX ADMIN — LORAZEPAM 1 MG: 1 TABLET ORAL at 02:41

## 2019-11-24 RX ADMIN — CARVEDILOL 3.12 MG: 3.12 TABLET, FILM COATED ORAL at 09:31

## 2019-11-24 RX ADMIN — ASPIRIN 81 MG: 81 TABLET, COATED ORAL at 09:31

## 2019-11-24 RX ADMIN — HYDROCODONE BITARTRATE AND ACETAMINOPHEN 2 TABLET: 5; 325 TABLET ORAL at 05:31

## 2019-11-24 RX ADMIN — Medication: at 09:30

## 2019-11-24 RX ADMIN — CALCIUM GLUCONATE 2 G: 98 INJECTION, SOLUTION INTRAVENOUS at 09:36

## 2019-11-24 ASSESSMENT — PAIN DESCRIPTION - PROGRESSION
CLINICAL_PROGRESSION: NOT CHANGED
CLINICAL_PROGRESSION: RAPIDLY WORSENING
CLINICAL_PROGRESSION: NOT CHANGED
CLINICAL_PROGRESSION: GRADUALLY IMPROVING
CLINICAL_PROGRESSION: NOT CHANGED

## 2019-11-24 ASSESSMENT — PAIN SCALES - GENERAL
PAINLEVEL_OUTOF10: 7
PAINLEVEL_OUTOF10: 2
PAINLEVEL_OUTOF10: 5
PAINLEVEL_OUTOF10: 5
PAINLEVEL_OUTOF10: 0
PAINLEVEL_OUTOF10: 0
PAINLEVEL_OUTOF10: 8
PAINLEVEL_OUTOF10: 7
PAINLEVEL_OUTOF10: 8
PAINLEVEL_OUTOF10: 0
PAINLEVEL_OUTOF10: 7
PAINLEVEL_OUTOF10: 7

## 2019-11-24 ASSESSMENT — PAIN DESCRIPTION - ORIENTATION: ORIENTATION: MID

## 2019-11-24 ASSESSMENT — PAIN DESCRIPTION - DESCRIPTORS
DESCRIPTORS: ACHING;SORE
DESCRIPTORS: ACHING;DISCOMFORT
DESCRIPTORS: ACHING;DISCOMFORT

## 2019-11-24 ASSESSMENT — PAIN DESCRIPTION - LOCATION
LOCATION: STERNUM
LOCATION: CHEST;INCISION
LOCATION: INCISION

## 2019-11-24 ASSESSMENT — PAIN DESCRIPTION - FREQUENCY
FREQUENCY: INTERMITTENT
FREQUENCY: CONTINUOUS

## 2019-11-24 ASSESSMENT — PAIN DESCRIPTION - PAIN TYPE
TYPE: SURGICAL PAIN

## 2019-11-25 ENCOUNTER — APPOINTMENT (OUTPATIENT)
Dept: GENERAL RADIOLOGY | Age: 59
DRG: 234 | End: 2019-11-25
Attending: INTERNAL MEDICINE
Payer: COMMERCIAL

## 2019-11-25 LAB
ANION GAP SERPL CALCULATED.3IONS-SCNC: 11 MMOL/L (ref 4–16)
BUN BLDV-MCNC: 34 MG/DL (ref 6–23)
CALCIUM IONIZED: 4.4 MG/DL (ref 4.48–5.28)
CALCIUM SERPL-MCNC: 8.4 MG/DL (ref 8.3–10.6)
CHLORIDE BLD-SCNC: 98 MMOL/L (ref 99–110)
CO2: 25 MMOL/L (ref 21–32)
CREAT SERPL-MCNC: 1.2 MG/DL (ref 0.9–1.3)
GFR AFRICAN AMERICAN: >60 ML/MIN/1.73M2
GFR NON-AFRICAN AMERICAN: >60 ML/MIN/1.73M2
GLUCOSE BLD-MCNC: 104 MG/DL (ref 70–99)
GLUCOSE BLD-MCNC: 110 MG/DL (ref 70–99)
GLUCOSE BLD-MCNC: 118 MG/DL (ref 70–99)
GLUCOSE BLD-MCNC: 127 MG/DL (ref 70–99)
GLUCOSE BLD-MCNC: 129 MG/DL (ref 70–99)
GLUCOSE BLD-MCNC: 132 MG/DL (ref 70–99)
GLUCOSE BLD-MCNC: 133 MG/DL (ref 70–99)
GLUCOSE BLD-MCNC: 173 MG/DL (ref 70–99)
GLUCOSE BLD-MCNC: 70 MG/DL (ref 70–99)
HCT VFR BLD CALC: 35.5 % (ref 42–52)
HEMOGLOBIN: 11 GM/DL (ref 13.5–18)
IONIZED CA: 1.1 MMOL/L (ref 1.12–1.32)
MAGNESIUM: 1.9 MG/DL (ref 1.8–2.4)
MCH RBC QN AUTO: 28.3 PG (ref 27–31)
MCHC RBC AUTO-ENTMCNC: 31 % (ref 32–36)
MCV RBC AUTO: 91.3 FL (ref 78–100)
PDW BLD-RTO: 15.2 % (ref 11.7–14.9)
PLATELET # BLD: 138 K/CU MM (ref 140–440)
PMV BLD AUTO: 10.7 FL (ref 7.5–11.1)
POTASSIUM SERPL-SCNC: 4 MMOL/L (ref 3.5–5.1)
RBC # BLD: 3.89 M/CU MM (ref 4.6–6.2)
SODIUM BLD-SCNC: 134 MMOL/L (ref 135–145)
WBC # BLD: 12.7 K/CU MM (ref 4–10.5)

## 2019-11-25 PROCEDURE — 6360000002 HC RX W HCPCS: Performed by: SURGERY

## 2019-11-25 PROCEDURE — 94660 CPAP INITIATION&MGMT: CPT

## 2019-11-25 PROCEDURE — 97110 THERAPEUTIC EXERCISES: CPT

## 2019-11-25 PROCEDURE — 6370000000 HC RX 637 (ALT 250 FOR IP): Performed by: THORACIC SURGERY (CARDIOTHORACIC VASCULAR SURGERY)

## 2019-11-25 PROCEDURE — 94761 N-INVAS EAR/PLS OXIMETRY MLT: CPT

## 2019-11-25 PROCEDURE — 82962 GLUCOSE BLOOD TEST: CPT

## 2019-11-25 PROCEDURE — 83735 ASSAY OF MAGNESIUM: CPT

## 2019-11-25 PROCEDURE — 94640 AIRWAY INHALATION TREATMENT: CPT

## 2019-11-25 PROCEDURE — 6370000000 HC RX 637 (ALT 250 FOR IP): Performed by: SURGERY

## 2019-11-25 PROCEDURE — 97530 THERAPEUTIC ACTIVITIES: CPT

## 2019-11-25 PROCEDURE — 2700000000 HC OXYGEN THERAPY PER DAY

## 2019-11-25 PROCEDURE — 2100000000 HC CCU R&B

## 2019-11-25 PROCEDURE — 80048 BASIC METABOLIC PNL TOTAL CA: CPT

## 2019-11-25 PROCEDURE — 82330 ASSAY OF CALCIUM: CPT

## 2019-11-25 PROCEDURE — 85027 COMPLETE CBC AUTOMATED: CPT

## 2019-11-25 PROCEDURE — 6360000002 HC RX W HCPCS: Performed by: THORACIC SURGERY (CARDIOTHORACIC VASCULAR SURGERY)

## 2019-11-25 PROCEDURE — 2580000003 HC RX 258: Performed by: SURGERY

## 2019-11-25 PROCEDURE — 97116 GAIT TRAINING THERAPY: CPT

## 2019-11-25 PROCEDURE — 6370000000 HC RX 637 (ALT 250 FOR IP): Performed by: INTERNAL MEDICINE

## 2019-11-25 PROCEDURE — 71046 X-RAY EXAM CHEST 2 VIEWS: CPT

## 2019-11-25 PROCEDURE — 94150 VITAL CAPACITY TEST: CPT

## 2019-11-25 RX ORDER — GLIPIZIDE 5 MG/1
10 TABLET ORAL
Status: DISCONTINUED | OUTPATIENT
Start: 2019-11-25 | End: 2019-11-27 | Stop reason: HOSPADM

## 2019-11-25 RX ORDER — SIMETHICONE 80 MG
80 TABLET,CHEWABLE ORAL EVERY 6 HOURS PRN
Status: DISCONTINUED | OUTPATIENT
Start: 2019-11-25 | End: 2019-11-27 | Stop reason: HOSPADM

## 2019-11-25 RX ORDER — SODIUM CHLORIDE 450 MG/100ML
INJECTION, SOLUTION INTRAVENOUS CONTINUOUS
Status: DISCONTINUED | OUTPATIENT
Start: 2019-11-24 | End: 2019-11-27 | Stop reason: HOSPADM

## 2019-11-25 RX ADMIN — CARVEDILOL 3.12 MG: 3.12 TABLET, FILM COATED ORAL at 08:40

## 2019-11-25 RX ADMIN — GABAPENTIN 300 MG: 300 CAPSULE ORAL at 08:37

## 2019-11-25 RX ADMIN — Medication: at 21:01

## 2019-11-25 RX ADMIN — SIMETHICONE CHEW TAB 80 MG 80 MG: 80 TABLET ORAL at 16:52

## 2019-11-25 RX ADMIN — SODIUM CHLORIDE, PRESERVATIVE FREE 10 ML: 5 INJECTION INTRAVENOUS at 21:00

## 2019-11-25 RX ADMIN — IPRATROPIUM BROMIDE AND ALBUTEROL SULFATE 1 AMPULE: .5; 3 SOLUTION RESPIRATORY (INHALATION) at 11:18

## 2019-11-25 RX ADMIN — Medication: at 08:37

## 2019-11-25 RX ADMIN — ASPIRIN 81 MG: 81 TABLET, COATED ORAL at 08:37

## 2019-11-25 RX ADMIN — OXYCODONE AND ACETAMINOPHEN 2 TABLET: 5; 325 TABLET ORAL at 08:36

## 2019-11-25 RX ADMIN — GABAPENTIN 300 MG: 300 CAPSULE ORAL at 21:00

## 2019-11-25 RX ADMIN — BISACODYL 10 MG: 10 SUPPOSITORY RECTAL at 17:50

## 2019-11-25 RX ADMIN — SODIUM CHLORIDE, PRESERVATIVE FREE 10 ML: 5 INJECTION INTRAVENOUS at 09:00

## 2019-11-25 RX ADMIN — OXYCODONE AND ACETAMINOPHEN 2 TABLET: 5; 325 TABLET ORAL at 17:00

## 2019-11-25 RX ADMIN — DOCUSATE SODIUM 100 MG: 100 CAPSULE, LIQUID FILLED ORAL at 08:40

## 2019-11-25 RX ADMIN — OXYCODONE AND ACETAMINOPHEN 2 TABLET: 5; 325 TABLET ORAL at 03:55

## 2019-11-25 RX ADMIN — IPRATROPIUM BROMIDE AND ALBUTEROL SULFATE 1 AMPULE: .5; 3 SOLUTION RESPIRATORY (INHALATION) at 19:54

## 2019-11-25 RX ADMIN — AMIODARONE HYDROCHLORIDE 200 MG: 200 TABLET ORAL at 08:40

## 2019-11-25 RX ADMIN — PANTOPRAZOLE SODIUM 40 MG: 40 TABLET, DELAYED RELEASE ORAL at 08:40

## 2019-11-25 RX ADMIN — GABAPENTIN 300 MG: 300 CAPSULE ORAL at 13:11

## 2019-11-25 RX ADMIN — CALCIUM GLUCONATE 2 G: 98 INJECTION, SOLUTION INTRAVENOUS at 13:05

## 2019-11-25 RX ADMIN — AMIODARONE HYDROCHLORIDE 200 MG: 200 TABLET ORAL at 13:11

## 2019-11-25 RX ADMIN — OXYCODONE AND ACETAMINOPHEN 2 TABLET: 5; 325 TABLET ORAL at 13:10

## 2019-11-25 RX ADMIN — ATORVASTATIN CALCIUM 20 MG: 20 TABLET, FILM COATED ORAL at 20:58

## 2019-11-25 RX ADMIN — CARVEDILOL 3.12 MG: 3.12 TABLET, FILM COATED ORAL at 21:00

## 2019-11-25 RX ADMIN — MULTIPLE VITAMINS W/ MINERALS TAB 1 TABLET: TAB at 08:40

## 2019-11-25 RX ADMIN — GLIPIZIDE 10 MG: 5 TABLET ORAL at 08:37

## 2019-11-25 RX ADMIN — IPRATROPIUM BROMIDE AND ALBUTEROL SULFATE 1 AMPULE: .5; 3 SOLUTION RESPIRATORY (INHALATION) at 07:36

## 2019-11-25 RX ADMIN — FUROSEMIDE 20 MG: 10 INJECTION, SOLUTION INTRAVENOUS at 16:53

## 2019-11-25 RX ADMIN — IPRATROPIUM BROMIDE AND ALBUTEROL SULFATE 1 AMPULE: .5; 3 SOLUTION RESPIRATORY (INHALATION) at 15:30

## 2019-11-25 RX ADMIN — METFORMIN HYDROCHLORIDE 500 MG: 500 TABLET ORAL at 16:59

## 2019-11-25 RX ADMIN — FUROSEMIDE 20 MG: 10 INJECTION, SOLUTION INTRAVENOUS at 08:37

## 2019-11-25 RX ADMIN — METFORMIN HYDROCHLORIDE 500 MG: 500 TABLET ORAL at 08:40

## 2019-11-25 RX ADMIN — OXYCODONE AND ACETAMINOPHEN 2 TABLET: 5; 325 TABLET ORAL at 20:59

## 2019-11-25 ASSESSMENT — PAIN DESCRIPTION - FREQUENCY
FREQUENCY: INTERMITTENT

## 2019-11-25 ASSESSMENT — PAIN DESCRIPTION - LOCATION
LOCATION: INCISION
LOCATION: CHEST;INCISION
LOCATION: CHEST;INCISION
LOCATION: INCISION
LOCATION: INCISION
LOCATION: STERNUM
LOCATION: INCISION

## 2019-11-25 ASSESSMENT — PAIN DESCRIPTION - PAIN TYPE
TYPE: SURGICAL PAIN

## 2019-11-25 ASSESSMENT — PAIN DESCRIPTION - DESCRIPTORS
DESCRIPTORS: ACHING;DISCOMFORT
DESCRIPTORS: ACHING;SORE
DESCRIPTORS: ACHING;DISCOMFORT;HEAVINESS

## 2019-11-25 ASSESSMENT — PAIN SCALES - GENERAL
PAINLEVEL_OUTOF10: 7
PAINLEVEL_OUTOF10: 5
PAINLEVEL_OUTOF10: 7
PAINLEVEL_OUTOF10: 5
PAINLEVEL_OUTOF10: 0
PAINLEVEL_OUTOF10: 7
PAINLEVEL_OUTOF10: 5
PAINLEVEL_OUTOF10: 7
PAINLEVEL_OUTOF10: 3

## 2019-11-25 ASSESSMENT — PAIN DESCRIPTION - ONSET: ONSET: GRADUAL

## 2019-11-25 ASSESSMENT — PAIN DESCRIPTION - PROGRESSION
CLINICAL_PROGRESSION: GRADUALLY WORSENING
CLINICAL_PROGRESSION: GRADUALLY IMPROVING

## 2019-11-25 ASSESSMENT — PAIN DESCRIPTION - ORIENTATION: ORIENTATION: MID;LEFT

## 2019-11-26 LAB
ANION GAP SERPL CALCULATED.3IONS-SCNC: 12 MMOL/L (ref 4–16)
BUN BLDV-MCNC: 36 MG/DL (ref 6–23)
CALCIUM IONIZED: 4.2 MG/DL (ref 4.48–5.28)
CALCIUM SERPL-MCNC: 8.2 MG/DL (ref 8.3–10.6)
CHLORIDE BLD-SCNC: 99 MMOL/L (ref 99–110)
CO2: 26 MMOL/L (ref 21–32)
CREAT SERPL-MCNC: 1.1 MG/DL (ref 0.9–1.3)
GFR AFRICAN AMERICAN: >60 ML/MIN/1.73M2
GFR NON-AFRICAN AMERICAN: >60 ML/MIN/1.73M2
GLUCOSE BLD-MCNC: 120 MG/DL (ref 70–99)
GLUCOSE BLD-MCNC: 123 MG/DL (ref 70–99)
GLUCOSE BLD-MCNC: 124 MG/DL (ref 70–99)
GLUCOSE BLD-MCNC: 142 MG/DL (ref 70–99)
GLUCOSE BLD-MCNC: 167 MG/DL (ref 70–99)
IONIZED CA: 1.05 MMOL/L (ref 1.12–1.32)
MAGNESIUM: 1.8 MG/DL (ref 1.8–2.4)
POTASSIUM SERPL-SCNC: 3.9 MMOL/L (ref 3.5–5.1)
SODIUM BLD-SCNC: 137 MMOL/L (ref 135–145)

## 2019-11-26 PROCEDURE — 94660 CPAP INITIATION&MGMT: CPT

## 2019-11-26 PROCEDURE — 97110 THERAPEUTIC EXERCISES: CPT

## 2019-11-26 PROCEDURE — 94640 AIRWAY INHALATION TREATMENT: CPT

## 2019-11-26 PROCEDURE — 80048 BASIC METABOLIC PNL TOTAL CA: CPT

## 2019-11-26 PROCEDURE — 82962 GLUCOSE BLOOD TEST: CPT

## 2019-11-26 PROCEDURE — 6360000002 HC RX W HCPCS: Performed by: SURGERY

## 2019-11-26 PROCEDURE — 6370000000 HC RX 637 (ALT 250 FOR IP): Performed by: SURGERY

## 2019-11-26 PROCEDURE — 83735 ASSAY OF MAGNESIUM: CPT

## 2019-11-26 PROCEDURE — 2700000000 HC OXYGEN THERAPY PER DAY

## 2019-11-26 PROCEDURE — 6370000000 HC RX 637 (ALT 250 FOR IP): Performed by: INTERNAL MEDICINE

## 2019-11-26 PROCEDURE — 82330 ASSAY OF CALCIUM: CPT

## 2019-11-26 PROCEDURE — 94150 VITAL CAPACITY TEST: CPT

## 2019-11-26 PROCEDURE — 2100000000 HC CCU R&B

## 2019-11-26 PROCEDURE — 97116 GAIT TRAINING THERAPY: CPT

## 2019-11-26 PROCEDURE — 2580000003 HC RX 258: Performed by: SURGERY

## 2019-11-26 PROCEDURE — 94761 N-INVAS EAR/PLS OXIMETRY MLT: CPT

## 2019-11-26 PROCEDURE — 6370000000 HC RX 637 (ALT 250 FOR IP): Performed by: THORACIC SURGERY (CARDIOTHORACIC VASCULAR SURGERY)

## 2019-11-26 RX ORDER — FUROSEMIDE 20 MG/1
20 TABLET ORAL 2 TIMES DAILY
Status: DISCONTINUED | OUTPATIENT
Start: 2019-11-26 | End: 2019-11-27 | Stop reason: HOSPADM

## 2019-11-26 RX ADMIN — SODIUM CHLORIDE, PRESERVATIVE FREE 10 ML: 5 INJECTION INTRAVENOUS at 21:00

## 2019-11-26 RX ADMIN — SODIUM CHLORIDE, PRESERVATIVE FREE 10 ML: 5 INJECTION INTRAVENOUS at 09:36

## 2019-11-26 RX ADMIN — IPRATROPIUM BROMIDE AND ALBUTEROL SULFATE 1 AMPULE: .5; 3 SOLUTION RESPIRATORY (INHALATION) at 19:58

## 2019-11-26 RX ADMIN — OXYCODONE AND ACETAMINOPHEN 2 TABLET: 5; 325 TABLET ORAL at 20:50

## 2019-11-26 RX ADMIN — Medication 10 ML: at 09:36

## 2019-11-26 RX ADMIN — DOCUSATE SODIUM 100 MG: 100 CAPSULE, LIQUID FILLED ORAL at 08:21

## 2019-11-26 RX ADMIN — MULTIPLE VITAMINS W/ MINERALS TAB 1 TABLET: TAB at 08:21

## 2019-11-26 RX ADMIN — GABAPENTIN 300 MG: 300 CAPSULE ORAL at 14:38

## 2019-11-26 RX ADMIN — OXYCODONE AND ACETAMINOPHEN 2 TABLET: 5; 325 TABLET ORAL at 12:12

## 2019-11-26 RX ADMIN — IPRATROPIUM BROMIDE AND ALBUTEROL SULFATE 1 AMPULE: .5; 3 SOLUTION RESPIRATORY (INHALATION) at 07:53

## 2019-11-26 RX ADMIN — DOCUSATE SODIUM 100 MG: 100 CAPSULE, LIQUID FILLED ORAL at 20:50

## 2019-11-26 RX ADMIN — AMIODARONE HYDROCHLORIDE 200 MG: 200 TABLET ORAL at 08:21

## 2019-11-26 RX ADMIN — OXYCODONE AND ACETAMINOPHEN 2 TABLET: 5; 325 TABLET ORAL at 03:43

## 2019-11-26 RX ADMIN — FUROSEMIDE 20 MG: 20 TABLET ORAL at 16:38

## 2019-11-26 RX ADMIN — Medication: at 08:28

## 2019-11-26 RX ADMIN — GABAPENTIN 300 MG: 300 CAPSULE ORAL at 08:21

## 2019-11-26 RX ADMIN — GABAPENTIN 300 MG: 300 CAPSULE ORAL at 20:50

## 2019-11-26 RX ADMIN — ASPIRIN 81 MG: 81 TABLET, COATED ORAL at 08:21

## 2019-11-26 RX ADMIN — FUROSEMIDE 20 MG: 20 TABLET ORAL at 09:35

## 2019-11-26 RX ADMIN — CARVEDILOL 3.12 MG: 3.12 TABLET, FILM COATED ORAL at 20:50

## 2019-11-26 RX ADMIN — MAGNESIUM SULFATE HEPTAHYDRATE 2 G: 40 INJECTION, SOLUTION INTRAVENOUS at 09:51

## 2019-11-26 RX ADMIN — METFORMIN HYDROCHLORIDE 500 MG: 500 TABLET ORAL at 16:38

## 2019-11-26 RX ADMIN — OXYCODONE AND ACETAMINOPHEN 2 TABLET: 5; 325 TABLET ORAL at 16:38

## 2019-11-26 RX ADMIN — OXYCODONE AND ACETAMINOPHEN 2 TABLET: 5; 325 TABLET ORAL at 08:20

## 2019-11-26 RX ADMIN — Medication: at 20:50

## 2019-11-26 RX ADMIN — IPRATROPIUM BROMIDE AND ALBUTEROL SULFATE 1 AMPULE: .5; 3 SOLUTION RESPIRATORY (INHALATION) at 14:09

## 2019-11-26 RX ADMIN — CARVEDILOL 3.12 MG: 3.12 TABLET, FILM COATED ORAL at 08:21

## 2019-11-26 RX ADMIN — ATORVASTATIN CALCIUM 20 MG: 20 TABLET, FILM COATED ORAL at 20:50

## 2019-11-26 RX ADMIN — PANTOPRAZOLE SODIUM 40 MG: 40 TABLET, DELAYED RELEASE ORAL at 08:21

## 2019-11-26 ASSESSMENT — PAIN SCALES - GENERAL
PAINLEVEL_OUTOF10: 2
PAINLEVEL_OUTOF10: 3
PAINLEVEL_OUTOF10: 4
PAINLEVEL_OUTOF10: 8
PAINLEVEL_OUTOF10: 2
PAINLEVEL_OUTOF10: 8
PAINLEVEL_OUTOF10: 8
PAINLEVEL_OUTOF10: 7
PAINLEVEL_OUTOF10: 0
PAINLEVEL_OUTOF10: 3
PAINLEVEL_OUTOF10: 4
PAINLEVEL_OUTOF10: 5
PAINLEVEL_OUTOF10: 8
PAINLEVEL_OUTOF10: 3

## 2019-11-26 ASSESSMENT — PAIN DESCRIPTION - ORIENTATION
ORIENTATION: MID

## 2019-11-26 ASSESSMENT — PAIN DESCRIPTION - FREQUENCY
FREQUENCY: CONTINUOUS

## 2019-11-26 ASSESSMENT — PAIN DESCRIPTION - LOCATION
LOCATION: INCISION
LOCATION: CHEST

## 2019-11-26 ASSESSMENT — PAIN DESCRIPTION - PAIN TYPE
TYPE: SURGICAL PAIN

## 2019-11-26 ASSESSMENT — PAIN - FUNCTIONAL ASSESSMENT
PAIN_FUNCTIONAL_ASSESSMENT: ACTIVITIES ARE NOT PREVENTED

## 2019-11-26 ASSESSMENT — PAIN DESCRIPTION - DESCRIPTORS
DESCRIPTORS: CONSTANT;DISCOMFORT
DESCRIPTORS: ACHING;DISCOMFORT
DESCRIPTORS: CONSTANT;DISCOMFORT

## 2019-11-26 ASSESSMENT — PAIN DESCRIPTION - PROGRESSION
CLINICAL_PROGRESSION: GRADUALLY WORSENING
CLINICAL_PROGRESSION: GRADUALLY WORSENING
CLINICAL_PROGRESSION: GRADUALLY IMPROVING

## 2019-11-26 ASSESSMENT — PAIN DESCRIPTION - ONSET
ONSET: PROGRESSIVE
ONSET: ON-GOING

## 2019-11-27 VITALS
HEART RATE: 93 BPM | BODY MASS INDEX: 35.15 KG/M2 | HEIGHT: 71 IN | WEIGHT: 251.1 LBS | SYSTOLIC BLOOD PRESSURE: 107 MMHG | OXYGEN SATURATION: 98 % | TEMPERATURE: 98 F | RESPIRATION RATE: 16 BRPM | DIASTOLIC BLOOD PRESSURE: 76 MMHG

## 2019-11-27 LAB
ANION GAP SERPL CALCULATED.3IONS-SCNC: 12 MMOL/L (ref 4–16)
BUN BLDV-MCNC: 34 MG/DL (ref 6–23)
CALCIUM IONIZED: 4 MG/DL (ref 4.48–5.28)
CALCIUM SERPL-MCNC: 8.2 MG/DL (ref 8.3–10.6)
CHLORIDE BLD-SCNC: 100 MMOL/L (ref 99–110)
CO2: 28 MMOL/L (ref 21–32)
CREAT SERPL-MCNC: 1.1 MG/DL (ref 0.9–1.3)
GFR AFRICAN AMERICAN: >60 ML/MIN/1.73M2
GFR NON-AFRICAN AMERICAN: >60 ML/MIN/1.73M2
GLUCOSE BLD-MCNC: 115 MG/DL (ref 70–99)
GLUCOSE BLD-MCNC: 122 MG/DL (ref 70–99)
GLUCOSE BLD-MCNC: 87 MG/DL (ref 70–99)
IONIZED CA: 1 MMOL/L (ref 1.12–1.32)
POTASSIUM SERPL-SCNC: 4 MMOL/L (ref 3.5–5.1)
SODIUM BLD-SCNC: 140 MMOL/L (ref 135–145)

## 2019-11-27 PROCEDURE — 6370000000 HC RX 637 (ALT 250 FOR IP): Performed by: THORACIC SURGERY (CARDIOTHORACIC VASCULAR SURGERY)

## 2019-11-27 PROCEDURE — 6370000000 HC RX 637 (ALT 250 FOR IP): Performed by: INTERNAL MEDICINE

## 2019-11-27 PROCEDURE — 97116 GAIT TRAINING THERAPY: CPT

## 2019-11-27 PROCEDURE — 82330 ASSAY OF CALCIUM: CPT

## 2019-11-27 PROCEDURE — 6370000000 HC RX 637 (ALT 250 FOR IP): Performed by: SURGERY

## 2019-11-27 PROCEDURE — 97110 THERAPEUTIC EXERCISES: CPT

## 2019-11-27 PROCEDURE — 80048 BASIC METABOLIC PNL TOTAL CA: CPT

## 2019-11-27 PROCEDURE — 94761 N-INVAS EAR/PLS OXIMETRY MLT: CPT

## 2019-11-27 PROCEDURE — 94640 AIRWAY INHALATION TREATMENT: CPT

## 2019-11-27 RX ORDER — ALPRAZOLAM 0.25 MG/1
0.25 TABLET ORAL 3 TIMES DAILY PRN
Qty: 10 TABLET | Refills: 0 | Status: SHIPPED | OUTPATIENT
Start: 2019-11-27 | End: 2019-12-27

## 2019-11-27 RX ORDER — ASPIRIN 81 MG/1
81 TABLET ORAL DAILY
Qty: 90 TABLET | Refills: 1 | Status: SHIPPED | OUTPATIENT
Start: 2019-11-27 | End: 2021-02-24

## 2019-11-27 RX ORDER — OXYCODONE HYDROCHLORIDE AND ACETAMINOPHEN 5; 325 MG/1; MG/1
1 TABLET ORAL EVERY 8 HOURS PRN
Qty: 20 TABLET | Refills: 0 | Status: SHIPPED | OUTPATIENT
Start: 2019-11-27 | End: 2019-11-27

## 2019-11-27 RX ORDER — OXYCODONE HYDROCHLORIDE AND ACETAMINOPHEN 5; 325 MG/1; MG/1
1 TABLET ORAL EVERY 8 HOURS PRN
Qty: 20 TABLET | Refills: 0 | Status: SHIPPED | OUTPATIENT
Start: 2019-11-27 | End: 2019-12-02

## 2019-11-27 RX ORDER — ALPRAZOLAM 0.25 MG/1
0.25 TABLET ORAL 3 TIMES DAILY PRN
Qty: 10 TABLET | Refills: 0 | Status: SHIPPED | OUTPATIENT
Start: 2019-11-27 | End: 2019-11-27 | Stop reason: HOSPADM

## 2019-11-27 RX ADMIN — IPRATROPIUM BROMIDE AND ALBUTEROL SULFATE 1 AMPULE: .5; 3 SOLUTION RESPIRATORY (INHALATION) at 11:54

## 2019-11-27 RX ADMIN — AMIODARONE HYDROCHLORIDE 200 MG: 200 TABLET ORAL at 09:03

## 2019-11-27 RX ADMIN — GABAPENTIN 300 MG: 300 CAPSULE ORAL at 09:01

## 2019-11-27 RX ADMIN — OXYCODONE AND ACETAMINOPHEN 2 TABLET: 5; 325 TABLET ORAL at 01:57

## 2019-11-27 RX ADMIN — GABAPENTIN 300 MG: 300 CAPSULE ORAL at 13:15

## 2019-11-27 RX ADMIN — OXYCODONE AND ACETAMINOPHEN 2 TABLET: 5; 325 TABLET ORAL at 13:15

## 2019-11-27 RX ADMIN — ASPIRIN 81 MG: 81 TABLET, COATED ORAL at 09:01

## 2019-11-27 RX ADMIN — Medication: at 09:03

## 2019-11-27 RX ADMIN — GLIPIZIDE 10 MG: 5 TABLET ORAL at 09:02

## 2019-11-27 RX ADMIN — OXYCODONE AND ACETAMINOPHEN 2 TABLET: 5; 325 TABLET ORAL at 09:02

## 2019-11-27 RX ADMIN — MULTIPLE VITAMINS W/ MINERALS TAB 1 TABLET: TAB at 09:03

## 2019-11-27 RX ADMIN — CARVEDILOL 3.12 MG: 3.12 TABLET, FILM COATED ORAL at 09:01

## 2019-11-27 RX ADMIN — FUROSEMIDE 20 MG: 20 TABLET ORAL at 09:03

## 2019-11-27 RX ADMIN — IPRATROPIUM BROMIDE AND ALBUTEROL SULFATE 1 AMPULE: .5; 3 SOLUTION RESPIRATORY (INHALATION) at 08:30

## 2019-11-27 RX ADMIN — METFORMIN HYDROCHLORIDE 500 MG: 500 TABLET ORAL at 09:02

## 2019-11-27 RX ADMIN — PANTOPRAZOLE SODIUM 40 MG: 40 TABLET, DELAYED RELEASE ORAL at 09:01

## 2019-11-27 ASSESSMENT — PAIN SCALES - GENERAL
PAINLEVEL_OUTOF10: 3
PAINLEVEL_OUTOF10: 7
PAINLEVEL_OUTOF10: 2
PAINLEVEL_OUTOF10: 3
PAINLEVEL_OUTOF10: 2
PAINLEVEL_OUTOF10: 7
PAINLEVEL_OUTOF10: 3
PAINLEVEL_OUTOF10: 2
PAINLEVEL_OUTOF10: 2
PAINLEVEL_OUTOF10: 4
PAINLEVEL_OUTOF10: 7
PAINLEVEL_OUTOF10: 3
PAINLEVEL_OUTOF10: 1

## 2019-11-27 ASSESSMENT — PAIN DESCRIPTION - PAIN TYPE
TYPE: SURGICAL PAIN

## 2019-11-27 ASSESSMENT — PAIN DESCRIPTION - PROGRESSION
CLINICAL_PROGRESSION: GRADUALLY WORSENING
CLINICAL_PROGRESSION: GRADUALLY WORSENING

## 2019-11-27 ASSESSMENT — PAIN DESCRIPTION - ORIENTATION
ORIENTATION: MID
ORIENTATION: MID

## 2019-11-27 ASSESSMENT — PAIN DESCRIPTION - DESCRIPTORS
DESCRIPTORS: DISCOMFORT
DESCRIPTORS: ACHING;DISCOMFORT

## 2019-11-27 ASSESSMENT — PAIN DESCRIPTION - LOCATION
LOCATION: CHEST

## 2019-11-27 ASSESSMENT — PAIN DESCRIPTION - FREQUENCY
FREQUENCY: CONTINUOUS
FREQUENCY: CONTINUOUS

## 2019-11-27 ASSESSMENT — PAIN DESCRIPTION - ONSET
ONSET: ON-GOING
ONSET: ON-GOING

## 2019-11-29 LAB
EKG ATRIAL RATE: 78 BPM
EKG DIAGNOSIS: NORMAL
EKG P AXIS: 48 DEGREES
EKG P-R INTERVAL: 206 MS
EKG Q-T INTERVAL: 380 MS
EKG QRS DURATION: 108 MS
EKG QTC CALCULATION (BAZETT): 433 MS
EKG R AXIS: 46 DEGREES
EKG T AXIS: 57 DEGREES
EKG VENTRICULAR RATE: 78 BPM

## 2019-12-03 LAB
ABO/RH: NORMAL
ANTIBODY SCREEN: NEGATIVE
COMPONENT: NORMAL
CROSSMATCH RESULT: NORMAL
STATUS: NORMAL
THERMAL AMPLITUDE STUDY: NORMAL
TRANSFUSION STATUS: NORMAL
UNIT DIVISION: 0
UNIT NUMBER: NORMAL

## 2019-12-04 ENCOUNTER — OFFICE VISIT (OUTPATIENT)
Dept: CARDIOLOGY CLINIC | Age: 59
End: 2019-12-04
Payer: COMMERCIAL

## 2019-12-04 VITALS
OXYGEN SATURATION: 96 % | WEIGHT: 225 LBS | HEART RATE: 100 BPM | BODY MASS INDEX: 31.5 KG/M2 | DIASTOLIC BLOOD PRESSURE: 60 MMHG | SYSTOLIC BLOOD PRESSURE: 102 MMHG | HEIGHT: 71 IN

## 2019-12-04 DIAGNOSIS — Z95.1 S/P CABG (CORONARY ARTERY BYPASS GRAFT): ICD-10-CM

## 2019-12-04 DIAGNOSIS — R07.9 CHEST PAIN, UNSPECIFIED TYPE: Primary | ICD-10-CM

## 2019-12-04 PROCEDURE — 93000 ELECTROCARDIOGRAM COMPLETE: CPT | Performed by: INTERNAL MEDICINE

## 2019-12-04 PROCEDURE — 99214 OFFICE O/P EST MOD 30 MIN: CPT | Performed by: INTERNAL MEDICINE

## 2019-12-04 RX ORDER — HYDROCHLOROTHIAZIDE 25 MG/1
25 TABLET ORAL DAILY
Qty: 90 TABLET | Refills: 3 | Status: SHIPPED | OUTPATIENT
Start: 2019-12-04 | End: 2020-11-16

## 2019-12-18 PROBLEM — I25.10 CORONARY ARTERY DISEASE INVOLVING NATIVE CORONARY ARTERY OF NATIVE HEART WITHOUT ANGINA PECTORIS: Status: ACTIVE | Noted: 2019-12-18

## 2019-12-19 ENCOUNTER — PROCEDURE VISIT (OUTPATIENT)
Dept: CARDIOLOGY CLINIC | Age: 59
End: 2019-12-19
Payer: COMMERCIAL

## 2019-12-19 DIAGNOSIS — R07.9 CHEST PAIN, UNSPECIFIED TYPE: Primary | ICD-10-CM

## 2019-12-19 DIAGNOSIS — Z95.1 S/P CABG (CORONARY ARTERY BYPASS GRAFT): ICD-10-CM

## 2019-12-19 PROCEDURE — 93015 CV STRESS TEST SUPVJ I&R: CPT | Performed by: INTERNAL MEDICINE

## 2019-12-20 ENCOUNTER — TELEPHONE (OUTPATIENT)
Dept: CARDIOLOGY CLINIC | Age: 59
End: 2019-12-20

## 2019-12-24 ENCOUNTER — HOSPITAL ENCOUNTER (OUTPATIENT)
Age: 59
Discharge: HOME | End: 2019-12-24
Payer: COMMERCIAL

## 2019-12-24 ENCOUNTER — HOSPITAL ENCOUNTER (OUTPATIENT)
Dept: GENERAL RADIOLOGY | Age: 59
Discharge: HOME | End: 2019-12-24
Payer: COMMERCIAL

## 2019-12-24 DIAGNOSIS — J18.9 PNEUMONIA DUE TO INFECTIOUS ORGANISM, UNSPECIFIED LATERALITY, UNSPECIFIED PART OF LUNG: ICD-10-CM

## 2019-12-24 PROCEDURE — 71046 X-RAY EXAM CHEST 2 VIEWS: CPT

## 2019-12-26 RX ORDER — DILTIAZEM HYDROCHLORIDE 120 MG/1
120 CAPSULE, COATED, EXTENDED RELEASE ORAL DAILY
Qty: 30 CAPSULE | Refills: 6 | Status: SHIPPED | OUTPATIENT
Start: 2019-12-26 | End: 2020-01-21

## 2019-12-26 RX ORDER — LISINOPRIL 10 MG/1
10 TABLET ORAL DAILY
Qty: 30 TABLET | Refills: 6 | Status: SHIPPED | OUTPATIENT
Start: 2019-12-26 | End: 2020-07-02 | Stop reason: SDUPTHER

## 2019-12-26 RX ORDER — CARVEDILOL 12.5 MG/1
12.5 TABLET ORAL 2 TIMES DAILY WITH MEALS
Qty: 60 TABLET | Refills: 6 | Status: SHIPPED | OUTPATIENT
Start: 2019-12-26 | End: 2020-07-02 | Stop reason: SDUPTHER

## 2020-01-08 ENCOUNTER — HOSPITAL ENCOUNTER (OUTPATIENT)
Dept: CARDIAC REHAB | Age: 60
Setting detail: THERAPIES SERIES
Discharge: HOME | End: 2020-01-08
Payer: COMMERCIAL

## 2020-01-08 LAB — GLUCOSE BLD-MCNC: 150 MG/DL (ref 70–99)

## 2020-01-08 PROCEDURE — 93798 PHYS/QHP OP CAR RHAB W/ECG: CPT

## 2020-01-08 NOTE — PROGRESS NOTES
Hospitalist Progress Note      Name:  Brody Kennedy /Age/Sex: 1960  (63 y.o. male)   MRN & CSN:  [id number] & [id number] Admission Date/Time: 5/3/2019  1:12 PM   Location:  [facility]/3112 PCP: Leroy Mujica MD         Hospital Day: 8    Assessment and Plan:   Brody Kennedy is a 63 y.o.  male  who presents with Pneumonia    1. Pneumonia: RVP +ve Adenovirus. CXR  with interval worsening of a left mid lung zone opacity and right infrahilar opacity. NC at 4 LPM. .6 19. Continue Cefepime and Vancomycin. Sputum culture polymicorbial Corynebacterium, Staphylococcus, and MRSA pending. Legionella and Strep urine negative. Pulmonology on consult  2. AF in RVR: on Cardizem infusion, therapeutic Lovenox, Eliquis started Cardiology on consult. EP on consult. Started on Cardizem, Digoxin. Will start PO.  3. Elevated Troponin: for Stress test once sepsis is resolved. 4. Type 2 DM: Last A1C. Lantus, Sliding scale. Hypoglycemia protocol. Accucheck. Hold oral hypoglycemic agents for now  5. Renal Cyst: outpatient follow up  6. Hypokalemia: replace accordingly. Magnesium  7. Leukopenia: could be from Adenovirus    Diet DIET CARB CONTROL;   DVT Prophylaxis [x] Lovenox, []  Heparin, [] SCDs, [] Warfarin  [] NOAC     GI Prophylaxis [] PPI,  [x] H2 Blocker,  [] Carafate,  [] Diet/Tube Feeds   Code Status Full Code   MDM [] Low, [] Moderate,[x]  High     History of Present Illness:     Chief Complaint: Pneumonia    He was seen and examined. SOB better. Still with cough. No fever. No chest pain. Ten point ROS reviewed negative, unless as noted above    Objective:        Intake/Output Summary (Last 24 hours) at 5/10/2019 1134  Last data filed at 5/10/2019 0600  Gross per 24 hour   Intake 692.04 ml   Output 1875 ml   Net -1182.96 ml      Vitals:   Vitals:    05/10/19 0900   BP: 121/83   Pulse:    Resp:    Temp: 97.9 °F (36.6 °C)   SpO2:      Physical Exam:   GEN Awake male, sitting upright in bed in no apparent distress. Appears given age. EYES Pupils are equally round. No scleral erythema, discharge, or conjunctivitis. HENT Mucous membranes are moist. Oral pharynx without exudates, no evidence of thrush. NECK Supple, no apparent thyromegaly or masses. RESP + crackles, bilateral, + wheezing, more on the right  CARDIO/VASC S1/S2 auscultated. Irregularly irregular. No JVD or carotid bruits. Peripheral pulses equal bilaterally and palpable. No peripheral edema. GI Abdomen is soft without significant tenderness, masses, or guarding. Bowel sounds are normoactive. Rectal exam deferred. MSK No gross joint deformities. SKIN Normal coloration, warm, dry. NEURO Cranial nerves appear grossly intact, normal speech, no lateralizing weakness. PSYCH Awake, alert, oriented x 4. Affect appropriate.     Medications:   Medications:    digoxin  125 mcg Oral Daily    albuterol  2.5 mg Nebulization Q6H WA    methylPREDNISolone  40 mg Intravenous Q6H    diltiazem  120 mg Oral Daily    insulin lispro  0-12 Units Subcutaneous TID WC    insulin lispro  0-6 Units Subcutaneous Nightly    pneumococcal 13-valent conjugate  0.5 mL Intramuscular Once    sodium chloride flush  10 mL Intravenous 2 times per day    vancomycin  1,750 mg Intravenous Q12H    guaiFENesin  600 mg Oral BID    enoxaparin  1 mg/kg Subcutaneous BID    sodium chloride flush  10 mL Intravenous BID    cyclobenzaprine  10 mg Oral TID    cefepime  2 g Intravenous Q12H    sodium chloride flush  10 mL Intravenous 2 times per day    docusate sodium  100 mg Oral BID    famotidine  20 mg Oral BID    aspirin  325 mg Oral Daily    nebivolol  5 mg Oral Daily    gabapentin  300 mg Oral TID      Infusions:    diltiazem (CARDIZEM) 125 mg in dextrose 5% 125 mL infusion 2.5 mg/hr (05/10/19 1124)    dextrose       PRN Meds:     levalbuterol 0.63 mg Q6H PRN   potassium chloride 40 mEq PRN   Or     potassium alternative oral replacement 40 mEq PRN   Or potassium chloride 10 mEq PRN   sodium chloride flush 10 mL PRN   ondansetron 4 mg Q30 Min PRN   sodium chloride flush 10 mL PRN   ondansetron 4 mg Q6H PRN   acetaminophen 650 mg Q4H PRN   benzonatate 200 mg TID PRN   glucose 15 g PRN   dextrose 12.5 g PRN   glucagon (rDNA) 1 mg PRN   dextrose 100 mL/hr PRN   promethazine 12.5 mg Q6H PRN   hydrOXYzine 50 mg Q6H PRN   oxyCODONE-acetaminophen 1 tablet Q4H PRN       Recent Labs     05/08/19  0935 05/09/19  0640 05/10/19  0425   WBC 2.2* 4.1 5.7   HGB 13.8 13.2* 13.0*   HCT 41.2* 40.4* 39.4*    228 265      Recent Labs     05/08/19 0935 05/09/19  0640 05/10/19  0425    139 144   K 3.3* 3.4* 3.7   CL 99 101 102   CO2 24 26 27   BUN 22 25* 27*   CREATININE 0.9 0.9 1.0     Recent Labs     05/08/19 0935 05/09/19  0640 05/10/19  0425   * 137* 94*   ALT 71* 105* 127*   BILITOT 0.4 0.4 0.4   ALKPHOS 72 83 95     No results for input(s): INR in the last 72 hours. No results for input(s): CKTOTAL, CKMB, CKMBINDEX, TROPONINT in the last 72 hours.      Imaging reviewed      Electronically signed by Kamran Oro MD on 5/10/2019 at 11:34 AM Xerosis Normal Treatment: I recommended application of Cetaphil or CeraVe numerous times a day going to bed to all dry areas.

## 2020-01-13 ENCOUNTER — HOSPITAL ENCOUNTER (OUTPATIENT)
Dept: CARDIAC REHAB | Age: 60
Setting detail: THERAPIES SERIES
Discharge: HOME | End: 2020-01-13
Payer: COMMERCIAL

## 2020-01-13 LAB — GLUCOSE BLD-MCNC: 93 MG/DL (ref 70–99)

## 2020-01-13 PROCEDURE — 82962 GLUCOSE BLOOD TEST: CPT

## 2020-01-13 PROCEDURE — 93798 PHYS/QHP OP CAR RHAB W/ECG: CPT

## 2020-01-15 ENCOUNTER — HOSPITAL ENCOUNTER (OUTPATIENT)
Dept: CARDIAC REHAB | Age: 60
Setting detail: THERAPIES SERIES
Discharge: HOME | End: 2020-01-15
Payer: COMMERCIAL

## 2020-01-15 LAB — GLUCOSE BLD-MCNC: 158 MG/DL (ref 70–99)

## 2020-01-15 PROCEDURE — 93798 PHYS/QHP OP CAR RHAB W/ECG: CPT

## 2020-01-15 PROCEDURE — 82962 GLUCOSE BLOOD TEST: CPT

## 2020-01-17 ENCOUNTER — HOSPITAL ENCOUNTER (OUTPATIENT)
Dept: CARDIAC REHAB | Age: 60
Setting detail: THERAPIES SERIES
Discharge: HOME | End: 2020-01-17
Payer: COMMERCIAL

## 2020-01-17 LAB — GLUCOSE BLD-MCNC: 162 MG/DL (ref 70–99)

## 2020-01-17 PROCEDURE — 93798 PHYS/QHP OP CAR RHAB W/ECG: CPT

## 2020-01-17 PROCEDURE — 82962 GLUCOSE BLOOD TEST: CPT

## 2020-01-21 ENCOUNTER — OFFICE VISIT (OUTPATIENT)
Dept: CARDIOLOGY CLINIC | Age: 60
End: 2020-01-21
Payer: COMMERCIAL

## 2020-01-21 ENCOUNTER — NURSE ONLY (OUTPATIENT)
Dept: CARDIOLOGY CLINIC | Age: 60
End: 2020-01-21
Payer: COMMERCIAL

## 2020-01-21 VITALS
BODY MASS INDEX: 32.73 KG/M2 | SYSTOLIC BLOOD PRESSURE: 120 MMHG | HEIGHT: 70 IN | DIASTOLIC BLOOD PRESSURE: 88 MMHG | WEIGHT: 228.6 LBS | HEART RATE: 104 BPM

## 2020-01-21 PROCEDURE — 99214 OFFICE O/P EST MOD 30 MIN: CPT | Performed by: INTERNAL MEDICINE

## 2020-01-21 PROCEDURE — 93000 ELECTROCARDIOGRAM COMPLETE: CPT | Performed by: INTERNAL MEDICINE

## 2020-01-21 PROCEDURE — 93225 XTRNL ECG REC<48 HRS REC: CPT | Performed by: INTERNAL MEDICINE

## 2020-01-21 RX ORDER — ALPRAZOLAM 0.5 MG/1
0.25 TABLET ORAL 2 TIMES DAILY PRN
Qty: 30 TABLET | Refills: 0 | Status: SHIPPED | OUTPATIENT
Start: 2020-01-21 | End: 2020-02-20

## 2020-01-21 RX ORDER — BUSPIRONE HYDROCHLORIDE 7.5 MG/1
1 TABLET ORAL PRN
COMMUNITY
Start: 2019-12-26 | End: 2021-07-23

## 2020-01-21 RX ORDER — TRAZODONE HYDROCHLORIDE 50 MG/1
100 TABLET ORAL NIGHTLY
COMMUNITY
Start: 2019-12-08 | End: 2021-12-08 | Stop reason: ALTCHOICE

## 2020-01-21 RX ORDER — DILTIAZEM HYDROCHLORIDE 120 MG/1
240 CAPSULE, COATED, EXTENDED RELEASE ORAL DAILY
Qty: 30 CAPSULE | Refills: 6 | Status: SHIPPED | OUTPATIENT
Start: 2020-01-21 | End: 2020-01-29

## 2020-01-21 NOTE — PROGRESS NOTES
24 hour holter monitor applied 1/21/20@ 5pm for tachycardia Serial # [id number] . Instructed patient on monitor and proper use. Instructed on diary. When to remove and bring it back. Must leave the holter monitor on  without removing for the duration of time ordered. Answered all questions the patient had. Instructed patient to call [facility] at [phone] with any questions or concerns with the monitor.

## 2020-01-21 NOTE — PROGRESS NOTES
needed      benzonatate (TESSALON) 200 MG capsule TAKE 1 CAPSULE BY MOUTH EVERY 8 HOURS AS NEEDED FOR COUGH  0     No current facility-administered medications for this visit. Allergies: Patient has no known allergies. Past Medical History:   Diagnosis Date    Diabetes mellitus ([address]     H/O cardiovascular stress test 2019    Normal lexiscan nuclear scintigraphic styd suggestive of normal myocardial perfusion. EF 60%    H/O echocardiogram 2019    Limited study-Left ventricular function is low normal. EF 50-55% Mild left ventricular hypertrophy.  H/O exercise stress test 2019    Submaximal ECG stress test. stress test stopped due to Shortness of breath. Proceed with cardiac rehab    Herniated lumbar intervertebral disc     Hypertension     Sleep apnea in adult 2019     Past Surgical History:   Procedure Laterality Date    CIRCUMCISION, NON-  2013    OTHER SURGICAL HISTORY Right     Transposition of Right Ulnar Nerve    PACEMAKER CHANGE N/A 2019    CABG CORONARY ARTERY BYPASS GRAFT X3, MAZE PROCEDURE, INTRAOPERATIVE WALDEMAR, INDUCED HYPOTHERMIA, ENDOSCOPIC VEIN HARVEST OF THE LEFT LEG performed by Ramon Oliveira MD at [place] [id number] reviewed. No pertinent family history.   Social History     Tobacco Use    Smoking status: Former Smoker     Packs/day: 0.25     Years: 30.00     Pack years: 7.50     Types: Cigarettes    Smokeless tobacco: Never Used   Substance Use Topics    Alcohol use: No     Alcohol/week: 0.0 standard drinks      [unfilled]  Review of Systems:   · Constitutional: No Fever or Weight Loss   · Eyes: No Decreased Vision  · ENT: No Headaches, Hearing Loss or Vertigo  · Cardiovascular: No chest pain, dyspnea on exertion, palpitations or loss of consciousness  · Respiratory: No cough or wheezing    · Gastrointestinal: No abdominal pain, appetite loss, blood in stools, constipation, diarrhea or heartburn  · Genitourinary: No dysuria, trouble voiding, or hematuria  · Musculoskeletal:  No gait disturbance, weakness or joint complaints  · Integumentary: No rash or pruritis  · Neurological: No TIA or stroke symptoms  · Psychiatric: No anxiety or depression  · Endocrine: No malaise, fatigue or temperature intolerance  · Hematologic/Lymphatic: No bleeding problems, blood clots or swollen lymph nodes  · Allergic/Immunologic: No nasal congestion or hives  All systems negative except as marked. Objective:  /88 (Site: Right Upper Arm, Position: Sitting, Cuff Size: Medium Adult)   Pulse 104   Ht 5' 10\" (1.778 m)   Wt 228 lb 9.6 oz (103.7 kg)   BMI 32.80 kg/m²   Wt Readings from Last 3 Encounters:   01/21/20 228 lb 9.6 oz (103.7 kg)   12/23/19 226 lb (102.5 kg)   12/18/19 219 lb (99.3 kg)     Body mass index is 32.8 kg/m². GENERAL - Alert, oriented, pleasant, in no apparent distress,normal grooming  HEENT - pupils are reactive to light and accomodation, cornea intact, conjunctive normal color, ears are normal in exam,throat exam in normal, teeth, gum and palate are normal, oral mucosa is normal without any notation of pallor or cyanosis  Neck - Supple. No jugular venous distention noted. No carotid bruits, no apical -carotid delay  Respiratory:  Normal breath sounds, No respiratory distress, No wheezing, No chest tenderness. ,no use of accessory muscles, diaphragm movement is normal  Cardiovascular: (PMI) apex non displaced,no lifts no thrills, no s3,no s4, Normal heart rate, Normal rhythm, No murmurs, No rubs, No gallops.  Carotid arteries pulse and amplitude are normal no bruit, no abdominal bruit noted ( normal abdominal aorta ausculation), femoral arteries pulse and amplitude are normal no bruit, pedal pulses are normal  Femoral pulses have normal amplitude, no bruits   Extremities - No cyanosis, clubbing, or significant edema, no varicose veins    Abdomen - No masses, tenderness, or organomegaly, no hepato-splenomegally, no bruits  Musculoskeletal - No significant edema, no kyphosis or scoliosis, no deformity in any extremity noted, muscle strength and tone are normal  Skin: no ulcer,no scar,no stasis dermatitis   Neurologic - alert oriented times 3,Cranial nerves II through XII are grossly intact. There were no gross focal neurologic abnormalities. All sensory and motor nerves examined and were normal  Psychiatric: ANXIETY    No results found for: CKTOTAL, CKMB, CKMBINDEX, TROPONINI  BNP:  No results found for: BNP  PT/INR:  No results found for: PTINR  Lab Results   Component Value Date    LABA1C 6.6 (H) 11/20/2019    LABA1C 7.1 (H) 05/04/2019     Lab Results   Component Value Date    CHOL 108 05/03/2019    TRIG 150 (H) 05/03/2019    HDL 32 (L) 05/03/2019    LDLDIRECT 56 05/03/2019     Lab Results   Component Value Date    ALT 64 (H) 05/16/2019    AST 26 05/16/2019     TSH:  No results found for: TSH    Impression:  Ciro Chapa is a 63 y. o.year old who  has a past medical history of Diabetes mellitus ([facility] H/O cardiovascular stress test, H/O echocardiogram, H/O exercise stress test, Herniated lumbar intervertebral disc, Hypertension, and Sleep apnea in adult. and presents with     Plan:  1. Tachycardia: its sinue, will get CBC and chem 7, he has black stool, will r/o GI bleeding, for now increase cardizem to 240mg daily, 24 hrs holter monitor ordered  2. Anxiety: Will refer to psychiatory, xanax added for now  3. CAD: severe multivessel, s/p CABG, maze and LOREN ligation, will start stress test and cardiac rehab after home PT  4. Paroxysmal afib: Continue eliquis  5. Dyslipidemia: continue statins  6. HTN: stable, continue COREG AND, lisinopril HCTZ medicatons  7. DM: stable: continue  insulin  8. Health maintenance: exerise and diet  All labs, medications and tests reviewed, continue all other medications of all above medical condition listed as is.     [unfilled]

## 2020-01-21 NOTE — PROGRESS NOTES
[id number] Score for Atrial Fibrillation Stroke Risk   Risk   Factors  Component Value   C CHF No 0   H HTN No 0   A2 Age >= 76 No,  (60 y.o.) 0   D DM No 0   S2 Prior Stroke/TIA No 0   V Vascular Disease Yes 1   A Age 74-69 No,  (60 y.o.) 0   Sc Sex male 0    [id number]  Score  1   Score last updated 2/01/26 9:71 PM    Click here for a link to the UpToDate guideline \"Atrial Fibrillation: Anticoagulation therapy to prevent embolization    Disclaimer: Risk Score calculation is dependent on accuracy of patient problem list and past encounter diagnosis.

## 2020-01-22 ENCOUNTER — TELEPHONE (OUTPATIENT)
Dept: CARDIOLOGY CLINIC | Age: 60
End: 2020-01-22

## 2020-01-22 ENCOUNTER — HOSPITAL ENCOUNTER (OUTPATIENT)
Age: 60
Discharge: HOME | End: 2020-01-22
Payer: COMMERCIAL

## 2020-01-22 LAB
ANION GAP SERPL CALCULATED.3IONS-SCNC: 13 MMOL/L (ref 4–16)
BUN BLDV-MCNC: 21 MG/DL (ref 6–23)
CALCIUM SERPL-MCNC: 9.9 MG/DL (ref 8.3–10.6)
CHLORIDE BLD-SCNC: 96 MMOL/L (ref 99–110)
CO2: 30 MMOL/L (ref 21–32)
CREAT SERPL-MCNC: 1.3 MG/DL (ref 0.9–1.3)
GFR AFRICAN AMERICAN: >60 ML/MIN/1.73M2
GFR NON-AFRICAN AMERICAN: 57 ML/MIN/1.73M2
GLUCOSE BLD-MCNC: 133 MG/DL (ref 70–99)
HCT VFR BLD CALC: 47.2 % (ref 42–52)
HEMOGLOBIN: 14.5 GM/DL (ref 13.5–18)
MCH RBC QN AUTO: 27 PG (ref 27–31)
MCHC RBC AUTO-ENTMCNC: 30.7 % (ref 32–36)
MCV RBC AUTO: 87.7 FL (ref 78–100)
PDW BLD-RTO: 13.8 % (ref 11.7–14.9)
PLATELET # BLD: 299 K/CU MM (ref 140–440)
PMV BLD AUTO: 8.7 FL (ref 7.5–11.1)
POTASSIUM SERPL-SCNC: 4.8 MMOL/L (ref 3.5–5.1)
RBC # BLD: 5.38 M/CU MM (ref 4.6–6.2)
SODIUM BLD-SCNC: 139 MMOL/L (ref 135–145)
WBC # BLD: 8.1 K/CU MM (ref 4–10.5)

## 2020-01-22 PROCEDURE — 85027 COMPLETE CBC AUTOMATED: CPT

## 2020-01-22 PROCEDURE — 80048 BASIC METABOLIC PNL TOTAL CA: CPT

## 2020-01-22 PROCEDURE — 36415 COLL VENOUS BLD VENIPUNCTURE: CPT

## 2020-01-22 NOTE — TELEPHONE ENCOUNTER
Spoke with Analisa at Cardiac rehab, patient did not exercise today d/t having the flu.   Checking with Dr Rai Hernandez if he can continue rehab - he has conpleted 4 sessions but OV notes states rehab after home PT.

## 2020-01-24 ENCOUNTER — HOSPITAL ENCOUNTER (OUTPATIENT)
Dept: CARDIAC REHAB | Age: 60
Setting detail: THERAPIES SERIES
Discharge: HOME | End: 2020-01-24
Payer: COMMERCIAL

## 2020-01-24 LAB — GLUCOSE BLD-MCNC: 140 MG/DL (ref 70–99)

## 2020-01-24 PROCEDURE — 82962 GLUCOSE BLOOD TEST: CPT

## 2020-01-24 PROCEDURE — 93798 PHYS/QHP OP CAR RHAB W/ECG: CPT

## 2020-01-27 ENCOUNTER — HOSPITAL ENCOUNTER (OUTPATIENT)
Dept: CARDIAC REHAB | Age: 60
Setting detail: THERAPIES SERIES
Discharge: HOME | End: 2020-01-27
Payer: COMMERCIAL

## 2020-01-27 LAB — GLUCOSE BLD-MCNC: 118 MG/DL (ref 70–99)

## 2020-01-27 PROCEDURE — 93798 PHYS/QHP OP CAR RHAB W/ECG: CPT

## 2020-01-27 PROCEDURE — 93227 XTRNL ECG REC<48 HR R&I: CPT | Performed by: INTERNAL MEDICINE

## 2020-01-27 PROCEDURE — 82962 GLUCOSE BLOOD TEST: CPT

## 2020-01-29 ENCOUNTER — HOSPITAL ENCOUNTER (OUTPATIENT)
Dept: CARDIAC REHAB | Age: 60
Setting detail: THERAPIES SERIES
Discharge: HOME | End: 2020-01-29
Payer: COMMERCIAL

## 2020-01-29 ENCOUNTER — OFFICE VISIT (OUTPATIENT)
Dept: CARDIOLOGY CLINIC | Age: 60
End: 2020-01-29
Payer: COMMERCIAL

## 2020-01-29 VITALS
SYSTOLIC BLOOD PRESSURE: 118 MMHG | HEIGHT: 70 IN | DIASTOLIC BLOOD PRESSURE: 88 MMHG | WEIGHT: 229 LBS | BODY MASS INDEX: 32.78 KG/M2 | RESPIRATION RATE: 16 BRPM | HEART RATE: 100 BPM

## 2020-01-29 LAB — GLUCOSE BLD-MCNC: 136 MG/DL (ref 70–99)

## 2020-01-29 PROCEDURE — 99214 OFFICE O/P EST MOD 30 MIN: CPT | Performed by: INTERNAL MEDICINE

## 2020-01-29 PROCEDURE — 82962 GLUCOSE BLOOD TEST: CPT

## 2020-01-29 PROCEDURE — 93798 PHYS/QHP OP CAR RHAB W/ECG: CPT

## 2020-01-29 RX ORDER — DILTIAZEM HYDROCHLORIDE 360 MG/1
360 CAPSULE, EXTENDED RELEASE ORAL DAILY
Qty: 90 CAPSULE | Refills: 3 | Status: SHIPPED | OUTPATIENT
Start: 2020-01-29 | End: 2021-01-11 | Stop reason: SDUPTHER

## 2020-01-29 NOTE — PROGRESS NOTES
Sammy Pinto MD        OFFICE  FOLLOWUP NOTE    Chief complaints: patient is here for management of CAD,s/p CABG, DM, HTN, AFIB, DYSLPIDEMIA    Subjective: mild leg swelling, no chest pain, no shortness of breath, no dizziness, +palpitations    HPI Rashida Sharif is a 63 y. o.year old who  has a past medical history of Diabetes mellitus ([address] H/O cardiovascular stress test, H/O echocardiogram, H/O exercise stress test, Herniated lumbar intervertebral disc, Hypertension, and Sleep apnea in adult. and presents for management of CAD,s/p CABG, DM, HTN, AFIB, DYSLPIDEMIAwhich are well controlled    Last time his cardizem was increased t0 240 mg and holter monitor was done which showed average heart rate was 98, he is not having any more GI bleeding issue, CBC is stable 14.5  Last time xanax was also added, he only used it once    Current Outpatient Medications   Medication Sig Dispense Refill    glycopyrrolate-formoterol (BEVESPI AEROSPHERE) 9-4.8 MCG/ACT AERO Inhale 2 puffs into the lungs 2 times daily 1 Inhaler 5    busPIRone (BUSPAR) 7.5 MG tablet 1 tablet 2 times daily      traZODone (DESYREL) 50 MG tablet nightly as needed      diltiazem (CARDIZEM CD) 120 MG extended release capsule Take 2 capsules by mouth daily 30 capsule 6    ALPRAZolam (XANAX) 0.5 MG tablet Take 0.5 tablets by mouth 2 times daily as needed for Sleep for up to 30 days.  30 tablet 0    apixaban (ELIQUIS) 5 MG TABS tablet Take 1 tablet by mouth 2 times daily 60 tablet 6    lisinopril (PRINIVIL;ZESTRIL) 10 MG tablet Take 1 tablet by mouth daily 30 tablet 6    carvedilol (COREG) 12.5 MG tablet Take 1 tablet by mouth 2 times daily (with meals) 60 tablet 6    hydrochlorothiazide (HYDRODIURIL) 25 MG tablet Take 1 tablet by mouth daily 90 tablet 3    aspirin EC 81 MG EC tablet Take 1 tablet by mouth daily 90 tablet 1    glipiZIDE (GLUCOTROL XL) 10 MG extended release tablet Take 10 mg by mouth daily      metFORMIN (GLUCOPHAGE-XR) Headaches, Hearing Loss or Vertigo  · Cardiovascular: No chest pain, dyspnea on exertion,+ palpitations or loss of consciousness  · Respiratory: No cough or wheezing    · Gastrointestinal: No abdominal pain, appetite loss, blood in stools, constipation, diarrhea or heartburn  · Genitourinary: No dysuria, trouble voiding, or hematuria  · Musculoskeletal:  No gait disturbance, weakness or joint complaints  · Integumentary: No rash or pruritis  · Neurological: No TIA or stroke symptoms  · Psychiatric: No anxiety or depression  · Endocrine: No malaise, fatigue or temperature intolerance  · Hematologic/Lymphatic: No bleeding problems, blood clots or swollen lymph nodes  · Allergic/Immunologic: No nasal congestion or hives  All systems negative except as marked. Objective:  /88   Pulse 100   Resp 16   Ht 5' 10\" (1.778 m)   Wt 229 lb (103.9 kg)   BMI 32.86 kg/m²   Wt Readings from Last 3 Encounters:   01/29/20 229 lb (103.9 kg)   01/23/20 230 lb (104.3 kg)   01/21/20 228 lb 9.6 oz (103.7 kg)     Body mass index is 32.86 kg/m². GENERAL - Alert, oriented, pleasant, in no apparent distress,normal grooming  HEENT - pupils are reactive to light and accomodation, cornea intact, conjunctive normal color, ears are normal in exam,throat exam in normal, teeth, gum and palate are normal, oral mucosa is normal without any notation of pallor or cyanosis  Neck - Supple. No jugular venous distention noted. No carotid bruits, no apical -carotid delay  Respiratory:  Normal breath sounds, No respiratory distress, No wheezing, No chest tenderness. ,no use of accessory muscles, diaphragm movement is normal  Cardiovascular: (PMI) apex non displaced,no lifts no thrills, no s3,no s4, Normal heart rate, Normal rhythm, No murmurs, No rubs, No gallops.  Carotid arteries pulse and amplitude are normal no bruit, no abdominal bruit noted ( normal abdominal aorta ausculation), femoral arteries pulse and amplitude are normal no bruit, pedal

## 2020-01-31 ENCOUNTER — HOSPITAL ENCOUNTER (OUTPATIENT)
Dept: CARDIAC REHAB | Age: 60
Setting detail: THERAPIES SERIES
Discharge: HOME | End: 2020-01-31
Payer: COMMERCIAL

## 2020-01-31 LAB
GLUCOSE BLD-MCNC: 106 MG/DL (ref 70–99)
GLUCOSE BLD-MCNC: 141 MG/DL (ref 70–99)

## 2020-01-31 PROCEDURE — 82962 GLUCOSE BLOOD TEST: CPT

## 2020-01-31 PROCEDURE — 93798 PHYS/QHP OP CAR RHAB W/ECG: CPT

## 2020-02-05 ENCOUNTER — HOSPITAL ENCOUNTER (OUTPATIENT)
Dept: CARDIAC REHAB | Age: 60
Setting detail: THERAPIES SERIES
Discharge: HOME | End: 2020-02-05
Payer: COMMERCIAL

## 2020-02-05 LAB — GLUCOSE BLD-MCNC: 89 MG/DL (ref 70–99)

## 2020-02-05 PROCEDURE — 82962 GLUCOSE BLOOD TEST: CPT

## 2020-02-05 PROCEDURE — 93798 PHYS/QHP OP CAR RHAB W/ECG: CPT

## 2020-02-07 ENCOUNTER — HOSPITAL ENCOUNTER (OUTPATIENT)
Dept: CARDIAC REHAB | Age: 60
Setting detail: THERAPIES SERIES
Discharge: HOME | End: 2020-02-07
Payer: COMMERCIAL

## 2020-02-07 LAB — GLUCOSE BLD-MCNC: 105 MG/DL (ref 70–99)

## 2020-02-07 PROCEDURE — 82962 GLUCOSE BLOOD TEST: CPT

## 2020-02-07 PROCEDURE — 93798 PHYS/QHP OP CAR RHAB W/ECG: CPT

## 2020-02-10 ENCOUNTER — HOSPITAL ENCOUNTER (OUTPATIENT)
Dept: CARDIAC REHAB | Age: 60
Setting detail: THERAPIES SERIES
Discharge: HOME | End: 2020-02-10
Payer: COMMERCIAL

## 2020-02-10 LAB — GLUCOSE BLD-MCNC: 102 MG/DL (ref 70–99)

## 2020-02-10 PROCEDURE — 93798 PHYS/QHP OP CAR RHAB W/ECG: CPT

## 2020-02-10 PROCEDURE — 82962 GLUCOSE BLOOD TEST: CPT

## 2020-02-12 ENCOUNTER — HOSPITAL ENCOUNTER (OUTPATIENT)
Dept: CARDIAC REHAB | Age: 60
Setting detail: THERAPIES SERIES
Discharge: HOME | End: 2020-02-12
Payer: COMMERCIAL

## 2020-02-12 LAB — GLUCOSE BLD-MCNC: 88 MG/DL (ref 70–99)

## 2020-02-12 PROCEDURE — 93798 PHYS/QHP OP CAR RHAB W/ECG: CPT

## 2020-02-12 PROCEDURE — 82962 GLUCOSE BLOOD TEST: CPT

## 2020-02-17 ENCOUNTER — HOSPITAL ENCOUNTER (OUTPATIENT)
Dept: CARDIAC REHAB | Age: 60
Setting detail: THERAPIES SERIES
Discharge: HOME | End: 2020-02-17
Payer: COMMERCIAL

## 2020-02-17 LAB — GLUCOSE BLD-MCNC: 129 MG/DL (ref 70–99)

## 2020-02-17 PROCEDURE — 93798 PHYS/QHP OP CAR RHAB W/ECG: CPT

## 2020-02-17 PROCEDURE — 82962 GLUCOSE BLOOD TEST: CPT

## 2020-02-18 ENCOUNTER — OFFICE VISIT (OUTPATIENT)
Dept: CARDIOLOGY CLINIC | Age: 60
End: 2020-02-18
Payer: COMMERCIAL

## 2020-02-18 VITALS
WEIGHT: 239 LBS | HEART RATE: 88 BPM | DIASTOLIC BLOOD PRESSURE: 74 MMHG | BODY MASS INDEX: 33.46 KG/M2 | SYSTOLIC BLOOD PRESSURE: 110 MMHG | HEIGHT: 71 IN

## 2020-02-18 PROCEDURE — 99214 OFFICE O/P EST MOD 30 MIN: CPT | Performed by: INTERNAL MEDICINE

## 2020-02-18 PROCEDURE — 93000 ELECTROCARDIOGRAM COMPLETE: CPT | Performed by: INTERNAL MEDICINE

## 2020-02-18 NOTE — PROGRESS NOTES
Sammy Pinto MD        OFFICE  FOLLOWUP NOTE    Chief complaints: patient is here for management of CAD,s/p CABG, DM, HTN, AFIB, DYSLPIDEMIA    Subjective: patient feels better, no chest pain, no shortness of breath, no dizziness, no palpitations    HPI Rashida Sharif is a 63 y. o.year old who  has a past medical history of Diabetes mellitus ([address] H/O cardiovascular stress test, H/O echocardiogram, H/O exercise stress test, Herniated lumbar intervertebral disc, Hypertension, and Sleep apnea in adult. and presents for management ofCAD,s/p CABG, DM, HTN, AFIB, DYSLPIDEMIAwhich are well controlled  Patient feels better on increased dose of caridizem, he had 2 episodes of anxiety only, he is learning meditation, he has not seen psychiatorist.    Current Outpatient Medications   Medication Sig Dispense Refill    diltiazem (CARDIZEM CD) 360 MG extended release capsule Take 1 capsule by mouth daily 90 capsule 3    glycopyrrolate-formoterol (BEVESPI AEROSPHERE) 9-4.8 MCG/ACT AERO Inhale 2 puffs into the lungs 2 times daily 1 Inhaler 5    busPIRone (BUSPAR) 7.5 MG tablet 1 tablet 2 times daily      traZODone (DESYREL) 50 MG tablet nightly as needed      ALPRAZolam (XANAX) 0.5 MG tablet Take 0.5 tablets by mouth 2 times daily as needed for Sleep for up to 30 days.  30 tablet 0    apixaban (ELIQUIS) 5 MG TABS tablet Take 1 tablet by mouth 2 times daily 60 tablet 6    lisinopril (PRINIVIL;ZESTRIL) 10 MG tablet Take 1 tablet by mouth daily 30 tablet 6    carvedilol (COREG) 12.5 MG tablet Take 1 tablet by mouth 2 times daily (with meals) 60 tablet 6    hydrochlorothiazide (HYDRODIURIL) 25 MG tablet Take 1 tablet by mouth daily 90 tablet 3    aspirin EC 81 MG EC tablet Take 1 tablet by mouth daily 90 tablet 1    glipiZIDE (GLUCOTROL XL) 10 MG extended release tablet Take 10 mg by mouth daily      metFORMIN (GLUCOPHAGE-XR) 500 MG extended release tablet TAKE 2 TABLETS BY MOUTH TWICE DAILY  5    rosuvastatin pain, dyspnea on exertion, palpitations or loss of consciousness  · Respiratory: No cough or wheezing    · Gastrointestinal: No abdominal pain, appetite loss, blood in stools, constipation, diarrhea or heartburn  · Genitourinary: No dysuria, trouble voiding, or hematuria  · Musculoskeletal:  No gait disturbance, weakness or joint complaints  · Integumentary: No rash or pruritis  · Neurological: No TIA or stroke symptoms  · Psychiatric: No anxiety or depression  · Endocrine: No malaise, fatigue or temperature intolerance  · Hematologic/Lymphatic: No bleeding problems, blood clots or swollen lymph nodes  · Allergic/Immunologic: No nasal congestion or hives  All systems negative except as marked. Objective:  /74 (Site: Left Upper Arm, Position: Sitting, Cuff Size: Large Adult)   Pulse 88   Ht 5' 11\" (1.803 m)   Wt 239 lb (108.4 kg)   BMI 33.33 kg/m²   Wt Readings from Last 3 Encounters:   02/18/20 239 lb (108.4 kg)   01/29/20 229 lb (103.9 kg)   01/23/20 230 lb (104.3 kg)     Body mass index is 33.33 kg/m². GENERAL - Alert, oriented, pleasant, in no apparent distress,normal grooming  HEENT - pupils are reactive to light and accomodation, cornea intact, conjunctive normal color, ears are normal in exam,throat exam in normal, teeth, gum and palate are normal, oral mucosa is normal without any notation of pallor or cyanosis  Neck - Supple. No jugular venous distention noted. No carotid bruits, no apical -carotid delay  Respiratory:  Normal breath sounds, No respiratory distress, No wheezing, No chest tenderness. ,no use of accessory muscles, diaphragm movement is normal  Cardiovascular: (PMI) apex non displaced,no lifts no thrills, no s3,no s4, Normal heart rate, Normal rhythm, No murmurs, No rubs, No gallops.  Carotid arteries pulse and amplitude are normal no bruit, no abdominal bruit noted ( normal abdominal aorta ausculation), femoral arteries pulse and amplitude are normal no bruit, pedal pulses are normal  Femoral pulses have normal amplitude, no bruits   Extremities - No cyanosis, clubbing, or significant edema, no varicose veins    Abdomen - No masses, tenderness, or organomegaly, no hepato-splenomegally, no bruits  Musculoskeletal - No significant edema, no kyphosis or scoliosis, no deformity in any extremity noted, muscle strength and tone are normal  Skin: no ulcer,no scar,no stasis dermatitis   Neurologic - alert oriented times 3,Cranial nerves II through XII are grossly intact. There were no gross focal neurologic abnormalities. All sensory and motor nerves examined and were normal  Psychiatric: +anxiety    No results found for: CKTOTAL, CKMB, CKMBINDEX, TROPONINI  BNP:  No results found for: BNP  PT/INR:  No results found for: PTINR  Lab Results   Component Value Date    LABA1C 6.6 (H) 11/20/2019    LABA1C 7.1 (H) 05/04/2019     Lab Results   Component Value Date    CHOL 108 05/03/2019    TRIG 150 (H) 05/03/2019    HDL 32 (L) 05/03/2019    LDLDIRECT 56 05/03/2019     Lab Results   Component Value Date    ALT 64 (H) 05/16/2019    AST 26 05/16/2019     TSH:  No results found for: TSH    Impression:  Yenny Smith is a 63 y. o.year old who  has a past medical history of Diabetes mellitus ([facility] [address] H/O cardiovascular stress test, H/O echocardiogram, H/O exercise stress test, Herniated lumbar intervertebral disc, Hypertension, and Sleep apnea in adult. and presents with     Plan:  1. Tachycardia:resolved on cardizem 360mg daily, labs checked, work release signed  2. Anxiety: not seen a psychiatory, xanax added,will not renew prescription without psychiatrist  3. Leg swelling: use compression socks  4. CAD: severe multivessel, s/p CABG, maze and LOREN ligation, will start stress test and cardiac rehab after home PT  5. Paroxysmal afib: Continue eliquis  6. Dyslipidemia: continue statins  7. HTN: stable, continue COREG AND, lisinopril HCTZ medicatons  8.  DM: stable: continue  insulinHealth maintenance: exerise and diet  All labs, medications and tests reviewed, continue all other medications of all above medical condition listed as is.     [unfilled]

## 2020-02-18 NOTE — PROGRESS NOTES
[id number] Score for Atrial Fibrillation Stroke Risk   Risk   Factors  Component Value   C CHF No 0   H HTN No 0   A2 Age >= 76 No,  (60 y.o.) 0   D DM No 0   S2 Prior Stroke/TIA No 0   V Vascular Disease Yes 1   A Age 74-69 No,  (60 y.o.) 0   Sc Sex male 0    [id number]  Score  1   Score last updated 2/29/23 0:17 AM    Click here for a link to the UpToDate guideline \"Atrial Fibrillation: Anticoagulation therapy to prevent embolization    Disclaimer: Risk Score calculation is dependent on accuracy of patient problem list and past encounter diagnosis.

## 2020-02-18 NOTE — LETTER
CLINICAL STAFF DOCUMENTATION    Kg Banda  1960  [id number]    Have you had any Chest Pain - No       Have you had any Shortness of Breath - Yes  If Yes - When on exertion    Have you had any dizziness - No       Have you had any palpitations - Yes  If Yes DO EKG - Do you feel your heart racing  How long does it last - minutes     Is the patient on any of the following medications - NONE  If Yes DO EKG    Do you have any edema - swelling in legs  To feet  If Yes - CHECK TO SEE IF THE EDEMA IS PITTING  How long have they been having edema - Years  If Yes - Have they worn compression stockings Yes    Check Venous \"LEG PROBLEM Questionnaire\"    Do you have a surgery or procedure scheduled in the near future - No  If Yes- DO EKG        Ask patient if they [facility] if they are not already signed [facility]    Check to see if we have an E-MAIL on file for the patient    Check medication list thoroughly!!!  BE SURE TO ASK PATIENT IF THEY NEED MEDICATION REFILLS

## 2020-02-19 ENCOUNTER — HOSPITAL ENCOUNTER (OUTPATIENT)
Dept: CARDIAC REHAB | Age: 60
Setting detail: THERAPIES SERIES
Discharge: HOME | End: 2020-02-19
Payer: COMMERCIAL

## 2020-02-19 LAB — GLUCOSE BLD-MCNC: 89 MG/DL (ref 70–99)

## 2020-02-19 PROCEDURE — 93798 PHYS/QHP OP CAR RHAB W/ECG: CPT

## 2020-02-19 PROCEDURE — 82962 GLUCOSE BLOOD TEST: CPT

## 2020-02-21 ENCOUNTER — HOSPITAL ENCOUNTER (OUTPATIENT)
Dept: CARDIAC REHAB | Age: 60
Setting detail: THERAPIES SERIES
Discharge: HOME | End: 2020-02-21
Payer: COMMERCIAL

## 2020-02-21 LAB — GLUCOSE BLD-MCNC: 109 MG/DL (ref 70–99)

## 2020-02-21 PROCEDURE — 82962 GLUCOSE BLOOD TEST: CPT

## 2020-02-21 PROCEDURE — 93798 PHYS/QHP OP CAR RHAB W/ECG: CPT

## 2020-03-03 ENCOUNTER — HOSPITAL ENCOUNTER (OUTPATIENT)
Age: 60
Discharge: HOME | End: 2020-03-03
Payer: COMMERCIAL

## 2020-03-03 ENCOUNTER — HOSPITAL ENCOUNTER (OUTPATIENT)
Dept: GENERAL RADIOLOGY | Age: 60
Discharge: HOME | End: 2020-03-03
Payer: COMMERCIAL

## 2020-03-03 PROCEDURE — 71046 X-RAY EXAM CHEST 2 VIEWS: CPT

## 2020-03-23 ENCOUNTER — TELEPHONE (OUTPATIENT)
Dept: CARDIOLOGY CLINIC | Age: 60
End: 2020-03-23

## 2020-05-04 LAB
ALBUMIN SERPL-MCNC: 4.8 G/DL
ALP BLD-CCNC: 89 U/L
ALT SERPL-CCNC: 21 U/L
ANION GAP SERPL CALCULATED.3IONS-SCNC: NORMAL MMOL/L
AST SERPL-CCNC: 19 U/L
BASOPHILS ABSOLUTE: 0 /ΜL
BASOPHILS RELATIVE PERCENT: 1 %
BILIRUB SERPL-MCNC: 0.2 MG/DL (ref 0.1–1.4)
BUN BLDV-MCNC: 24 MG/DL
CALCIUM SERPL-MCNC: 9.4 MG/DL
CHLORIDE BLD-SCNC: 102 MMOL/L
CHOLESTEROL, TOTAL: 137 MG/DL
CHOLESTEROL/HDL RATIO: 29
CO2: 22 MMOL/L
CREAT SERPL-MCNC: 1.3 MG/DL
EOSINOPHILS ABSOLUTE: 0.4 /ΜL
EOSINOPHILS RELATIVE PERCENT: 6 %
GFR CALCULATED: NORMAL
GLUCOSE BLD-MCNC: 128 MG/DL
HCT VFR BLD CALC: 43.2 % (ref 41–53)
HDLC SERPL-MCNC: 57 MG/DL (ref 35–70)
HEMOGLOBIN: 13.8 G/DL (ref 13.5–17.5)
LDL CHOLESTEROL CALCULATED: 51 MG/DL (ref 0–160)
LYMPHOCYTES ABSOLUTE: 2 /ΜL
LYMPHOCYTES RELATIVE PERCENT: 26 %
MCH RBC QN AUTO: 26.9 PG
MCHC RBC AUTO-ENTMCNC: 31.9 G/DL
MCV RBC AUTO: 84 FL
MONOCYTES ABSOLUTE: 0.6 /ΜL
MONOCYTES RELATIVE PERCENT: 8 %
NEUTROPHILS ABSOLUTE: 4.5 /ΜL
NEUTROPHILS RELATIVE PERCENT: 59 %
PLATELET # BLD: 235 K/ΜL
PMV BLD AUTO: NORMAL FL
POTASSIUM SERPL-SCNC: 4.3 MMOL/L
RBC # BLD: 5.13 10^6/ΜL
SODIUM BLD-SCNC: 142 MMOL/L
TOTAL PROTEIN: 6.6
TRIGL SERPL-MCNC: 147 MG/DL
VLDLC SERPL CALC-MCNC: NORMAL MG/DL
WBC # BLD: 7.6 10^3/ML

## 2020-06-12 ENCOUNTER — HOSPITAL ENCOUNTER (OUTPATIENT)
Age: 60
Discharge: HOME | End: 2020-06-12
Payer: COMMERCIAL

## 2020-06-12 ENCOUNTER — HOSPITAL ENCOUNTER (OUTPATIENT)
Dept: GENERAL RADIOLOGY | Age: 60
Discharge: HOME | End: 2020-06-12
Payer: COMMERCIAL

## 2020-06-12 PROCEDURE — 72110 X-RAY EXAM L-2 SPINE 4/>VWS: CPT

## 2020-06-17 ENCOUNTER — HOSPITAL ENCOUNTER (OUTPATIENT)
Age: 60
Discharge: HOME | End: 2020-06-17
Payer: COMMERCIAL

## 2020-06-17 ENCOUNTER — OFFICE VISIT (OUTPATIENT)
Dept: CARDIOLOGY CLINIC | Age: 60
End: 2020-06-17
Payer: COMMERCIAL

## 2020-06-17 ENCOUNTER — HOSPITAL ENCOUNTER (OUTPATIENT)
Dept: GENERAL RADIOLOGY | Age: 60
Discharge: HOME | End: 2020-06-17
Payer: COMMERCIAL

## 2020-06-17 VITALS
OXYGEN SATURATION: 98 % | BODY MASS INDEX: 34.72 KG/M2 | TEMPERATURE: 97.3 F | HEIGHT: 71 IN | DIASTOLIC BLOOD PRESSURE: 84 MMHG | WEIGHT: 248 LBS | HEART RATE: 122 BPM | SYSTOLIC BLOOD PRESSURE: 136 MMHG

## 2020-06-17 PROCEDURE — 99215 OFFICE O/P EST HI 40 MIN: CPT | Performed by: INTERNAL MEDICINE

## 2020-06-17 PROCEDURE — 71046 X-RAY EXAM CHEST 2 VIEWS: CPT

## 2020-06-17 RX ORDER — METHOCARBAMOL 500 MG/1
750 TABLET, FILM COATED ORAL 2 TIMES DAILY
COMMUNITY
End: 2021-12-08

## 2020-06-17 RX ORDER — POTASSIUM CHLORIDE 1.5 G/1.77G
20 POWDER, FOR SOLUTION ORAL 2 TIMES DAILY
COMMUNITY
End: 2020-06-17 | Stop reason: SDUPTHER

## 2020-06-17 RX ORDER — POTASSIUM CHLORIDE 1.5 G/1.77G
20 POWDER, FOR SOLUTION ORAL 2 TIMES DAILY
Qty: 90 EACH | Refills: 2 | Status: SHIPPED | OUTPATIENT
Start: 2020-06-17 | End: 2021-05-24 | Stop reason: SDUPTHER

## 2020-06-17 RX ORDER — FUROSEMIDE 20 MG/1
20 TABLET ORAL 2 TIMES DAILY
Qty: 90 TABLET | Refills: 3 | Status: SHIPPED | OUTPATIENT
Start: 2020-06-17 | End: 2020-12-04

## 2020-06-17 RX ORDER — FUROSEMIDE 20 MG/1
20 TABLET ORAL 2 TIMES DAILY
COMMUNITY
End: 2020-06-17 | Stop reason: SDUPTHER

## 2020-06-17 RX ORDER — NITROGLYCERIN 0.4 MG/1
0.4 TABLET SUBLINGUAL EVERY 5 MIN PRN
Qty: 25 TABLET | Refills: 3 | Status: SHIPPED | OUTPATIENT
Start: 2020-06-17 | End: 2022-04-04 | Stop reason: SDUPTHER

## 2020-06-17 NOTE — PROGRESS NOTES
Isaiah Gunter MD        OFFICE  FOLLOWUP NOTE    Chief complaints: patient is here for management of  CAD,s/p CABG, DM, HTN, AFIB, DYSLPIDEMIA    Subjective: + chest pain, + shortness of breath, no dizziness, no palpitations    HPI Max Green is a 62 y. o.year old who  has a past medical history of Diabetes mellitus ([address] H/O cardiovascular stress test, H/O echocardiogram, H/O exercise stress test, Herniated lumbar intervertebral disc, Hypertension, and Sleep apnea in adult.  and presents for management of  CAD,s/p CABG, DM, HTN, AFIB, DYSLPIDEMIA, which are well controlled  He has been feeling chest pain for last 6 weeks and feels like fluid build up in his chest, he has orthopnea andPND, his shortness of breath which is getting worse, for few weeks, intermittent, self limiting, not associated with cough or fever, gets worse with activity and better with rest,      Current Outpatient Medications   Medication Sig Dispense Refill    furosemide (LASIX) 20 MG tablet Take 20 mg by mouth 2 times daily      potassium chloride (KLOR-CON) 20 MEQ packet Take 20 mEq by mouth 2 times daily      methocarbamol (ROBAXIN) 500 MG tablet Take 500 mg by mouth 4 times daily      diltiazem (CARDIZEM CD) 360 MG extended release capsule Take 1 capsule by mouth daily 90 capsule 3    glycopyrrolate-formoterol (BEVESPI AEROSPHERE) 9-4.8 MCG/ACT AERO Inhale 2 puffs into the lungs 2 times daily 1 Inhaler 5    busPIRone (BUSPAR) 7.5 MG tablet 1 tablet 2 times daily      traZODone (DESYREL) 50 MG tablet nightly as needed      apixaban (ELIQUIS) 5 MG TABS tablet Take 1 tablet by mouth 2 times daily 60 tablet 6    lisinopril (PRINIVIL;ZESTRIL) 10 MG tablet Take 1 tablet by mouth daily 30 tablet 6    carvedilol (COREG) 12.5 MG tablet Take 1 tablet by mouth 2 times daily (with meals) 60 tablet 6    hydrochlorothiazide (HYDRODIURIL) 25 MG tablet Take 1 tablet by mouth daily 90 tablet 3    aspirin EC 81 MG EC tablet Take 1 Alcohol/week: 0.0 standard drinks      [unfilled]  Review of Systems:   · Constitutional: No Fever or Weight Loss   · Eyes: No Decreased Vision  · ENT: No Headaches, Hearing Loss or Vertigo  · Cardiovascular: + chest pain,+ dyspnea on exertion, palpitations or loss of consciousness  · Respiratory: No cough or wheezing    · Gastrointestinal: No abdominal pain, appetite loss, blood in stools, constipation, diarrhea or heartburn  · Genitourinary: No dysuria, trouble voiding, or hematuria  · Musculoskeletal:  No gait disturbance, weakness or joint complaints  · Integumentary: No rash or pruritis  · Neurological: No TIA or stroke symptoms  · Psychiatric: No anxiety or depression  · Endocrine: No malaise, fatigue or temperature intolerance  · Hematologic/Lymphatic: No bleeding problems, blood clots or swollen lymph nodes  · Allergic/Immunologic: No nasal congestion or hives  All systems negative except as marked. Objective:  /84   Pulse 122   Temp 97.3 °F (36.3 °C)   Ht 5' 11\" (1.803 m)   Wt 248 lb (112.5 kg)   SpO2 98%   BMI 34.59 kg/m²   Wt Readings from Last 3 Encounters:   06/17/20 248 lb (112.5 kg)   03/03/20 234 lb (106.1 kg)   02/18/20 239 lb (108.4 kg)     Body mass index is 34.59 kg/m². GENERAL - Alert, oriented, pleasant, in no apparent distress,normal grooming  HEENT - pupils are reactive to light and accomodation, cornea intact, conjunctive normal color, ears are normal in exam,throat exam in normal, teeth, gum and palate are normal, oral mucosa is normal without any notation of pallor or cyanosis  Neck - Supple. No jugular venous distention noted. No carotid bruits, no apical -carotid delay  Respiratory:  Normal breath sounds, No respiratory distress, No wheezing, No chest tenderness. ,no use of accessory muscles, diaphragm movement is normal  Cardiovascular: (PMI) apex non displaced,no lifts no thrills, no s3,no s4, Normal heart rate, Normal rhythm, No murmurs, No rubs, No gallops.  Carotid

## 2020-06-23 ENCOUNTER — TELEPHONE (OUTPATIENT)
Dept: CARDIOLOGY CLINIC | Age: 60
End: 2020-06-23

## 2020-06-30 ENCOUNTER — PROCEDURE VISIT (OUTPATIENT)
Dept: CARDIOLOGY CLINIC | Age: 60
End: 2020-06-30
Payer: COMMERCIAL

## 2020-06-30 LAB
LV EF: 40 %
LVEF MODALITY: NORMAL

## 2020-06-30 PROCEDURE — 78452 HT MUSCLE IMAGE SPECT MULT: CPT | Performed by: INTERNAL MEDICINE

## 2020-06-30 PROCEDURE — A9500 TC99M SESTAMIBI: HCPCS | Performed by: INTERNAL MEDICINE

## 2020-06-30 PROCEDURE — 93016 CV STRESS TEST SUPVJ ONLY: CPT | Performed by: INTERNAL MEDICINE

## 2020-06-30 PROCEDURE — 93018 CV STRESS TEST I&R ONLY: CPT | Performed by: INTERNAL MEDICINE

## 2020-06-30 PROCEDURE — 93017 CV STRESS TEST TRACING ONLY: CPT | Performed by: INTERNAL MEDICINE

## 2020-07-02 ENCOUNTER — TELEPHONE (OUTPATIENT)
Dept: CARDIOLOGY CLINIC | Age: 60
End: 2020-07-02

## 2020-07-02 RX ORDER — LISINOPRIL 10 MG/1
10 TABLET ORAL DAILY
Qty: 90 TABLET | Refills: 3 | Status: SHIPPED | OUTPATIENT
Start: 2020-07-02 | End: 2021-07-09 | Stop reason: SDUPTHER

## 2020-07-02 RX ORDER — CARVEDILOL 12.5 MG/1
12.5 TABLET ORAL 2 TIMES DAILY WITH MEALS
Qty: 180 TABLET | Refills: 3 | Status: SHIPPED | OUTPATIENT
Start: 2020-07-02 | End: 2021-06-28

## 2020-07-08 ENCOUNTER — OFFICE VISIT (OUTPATIENT)
Dept: CARDIOLOGY CLINIC | Age: 60
End: 2020-07-08
Payer: COMMERCIAL

## 2020-07-08 VITALS
HEART RATE: 96 BPM | HEIGHT: 71 IN | WEIGHT: 240 LBS | DIASTOLIC BLOOD PRESSURE: 82 MMHG | SYSTOLIC BLOOD PRESSURE: 128 MMHG | RESPIRATION RATE: 16 BRPM | TEMPERATURE: 97.6 F | BODY MASS INDEX: 33.6 KG/M2

## 2020-07-08 PROCEDURE — 99214 OFFICE O/P EST MOD 30 MIN: CPT | Performed by: INTERNAL MEDICINE

## 2020-07-08 NOTE — PROGRESS NOTES
Lina Zavala MD        OFFICE  FOLLOWUP NOTE    Chief complaints: patient is here for management of  CAD,s/p CABG, DM, HTN, AFIB, DYSLPIDEMIA      Subjective: + better, + chest pain, no shortness of breath, no dizziness, no palpitations    PRICILA Dodge is a 62 y. o.year old who  has a past medical history of Diabetes mellitus ([address] H/O cardiovascular stress test, H/O echocardiogram, H/O exercise stress test, Herniated lumbar intervertebral disc, Hypertension, and Sleep apnea in adult.  and presents for management of CAD, DM, HTN, AFIB, which are well controlled  HIS STRESS TEST REVIEWED WITH HIM    Current Outpatient Medications   Medication Sig Dispense Refill    carvedilol (COREG) 12.5 MG tablet Take 1 tablet by mouth 2 times daily (with meals) 180 tablet 3    apixaban (ELIQUIS) 5 MG TABS tablet Take 1 tablet by mouth 2 times daily 180 tablet 3    lisinopril (PRINIVIL;ZESTRIL) 10 MG tablet Take 1 tablet by mouth daily 90 tablet 3    methocarbamol (ROBAXIN) 500 MG tablet Take 500 mg by mouth 4 times daily      furosemide (LASIX) 20 MG tablet Take 1 tablet by mouth 2 times daily 90 tablet 3    potassium chloride (KLOR-CON) 20 MEQ packet Take 20 mEq by mouth 2 times daily 90 each 2    nitroGLYCERIN (NITROSTAT) 0.4 MG SL tablet Place 1 tablet under the tongue every 5 minutes as needed for Chest pain 25 tablet 3    diltiazem (CARDIZEM CD) 360 MG extended release capsule Take 1 capsule by mouth daily 90 capsule 3    glycopyrrolate-formoterol (BEVESPI AEROSPHERE) 9-4.8 MCG/ACT AERO Inhale 2 puffs into the lungs 2 times daily 1 Inhaler 5    busPIRone (BUSPAR) 7.5 MG tablet 1 tablet 2 times daily      traZODone (DESYREL) 50 MG tablet nightly as needed      hydrochlorothiazide (HYDRODIURIL) 25 MG tablet Take 1 tablet by mouth daily 90 tablet 3    aspirin EC 81 MG EC tablet Take 1 tablet by mouth daily 90 tablet 1    glipiZIDE (GLUCOTROL XL) 10 MG extended release tablet Take 10 mg by mouth daily      metFORMIN (GLUCOPHAGE-XR) 500 MG extended release tablet TAKE 2 TABLETS BY MOUTH TWICE DAILY  5    benzonatate (TESSALON) 200 MG capsule TAKE 1 CAPSULE BY MOUTH EVERY 8 HOURS AS NEEDED FOR COUGH  0    rosuvastatin (CRESTOR) 20 MG tablet TAKE 1 TABLET BY MOUTH ONCE DAILY AT BEDTIME  5    VENTOLIN  (90 Base) MCG/ACT inhaler INHALE 2 PUFFS BY MOUTH EVERY 4 TO 6 HOURS AS NEEDED  5    gabapentin (NEURONTIN) 600 MG tablet Take 600 mg by mouth 3 times daily. No current facility-administered medications for this visit. Allergies: Patient has no known allergies. Past Medical History:   Diagnosis Date    Diabetes mellitus ([address]     H/O cardiovascular stress test 2019; 2020    abnormal stress test, LVEF 40%, Myocardial perfusion scan shows moderate size, severe intensity, non reversible perfusion defect in inferoapical wall.  H/O echocardiogram 2019    Limited study-Left ventricular function is low normal. EF 50-55% Mild left ventricular hypertrophy.  H/O exercise stress test 2019    Submaximal ECG stress test. stress test stopped due to Shortness of breath. Proceed with cardiac rehab    Herniated lumbar intervertebral disc     Hypertension     Sleep apnea in adult 2019     Past Surgical History:   Procedure Laterality Date    CIRCUMCISION, NON-  2013    OTHER SURGICAL HISTORY Right     Transposition of Right Ulnar Nerve    PACEMAKER CHANGE N/A 2019    CABG CORONARY ARTERY BYPASS GRAFT X3, MAZE PROCEDURE, INTRAOPERATIVE WALDEMAR, INDUCED HYPOTHERMIA, ENDOSCOPIC VEIN HARVEST OF THE LEFT LEG performed by Emma Goetz MD at [place] [id number] reviewed. No pertinent family history.   Social History     Tobacco Use    Smoking status: Former Smoker     Packs/day: 0.25     Years: 30.00     Pack years: 7.50     Types: Cigarettes    Smokeless tobacco: Never Used   Substance Use Topics    Alcohol use: No     Alcohol/week: 0.0 standard drinks      [unfilled]  Review of Systems:   · Constitutional: No Fever or Weight Loss   · Eyes: No Decreased Vision  · ENT: No Headaches, Hearing Loss or Vertigo  · Cardiovascular: No chest pain, dyspnea on exertion, palpitations or loss of consciousness  · Respiratory: No cough or wheezing    · Gastrointestinal: No abdominal pain, appetite loss, blood in stools, constipation, diarrhea or heartburn  · Genitourinary: No dysuria, trouble voiding, or hematuria  · Musculoskeletal:  No gait disturbance, weakness or joint complaints  · Integumentary: No rash or pruritis  · Neurological: No TIA or stroke symptoms  · Psychiatric: No anxiety or depression  · Endocrine: No malaise, fatigue or temperature intolerance  · Hematologic/Lymphatic: No bleeding problems, blood clots or swollen lymph nodes  · Allergic/Immunologic: No nasal congestion or hives  All systems negative except as marked. Objective:  /82 (Site: Left Upper Arm, Position: Sitting, Cuff Size: Medium Adult)   Pulse 96   Temp 97.6 °F (36.4 °C)   Resp 16   Ht 5' 11\" (1.803 m)   Wt 240 lb (108.9 kg)   BMI 33.47 kg/m²   Wt Readings from Last 3 Encounters:   07/08/20 240 lb (108.9 kg)   06/17/20 248 lb (112.5 kg)   03/03/20 234 lb (106.1 kg)     Body mass index is 33.47 kg/m². GENERAL - Alert, oriented, pleasant, in no apparent distress,normal grooming  HEENT - pupils are reactive to light and accomodation, cornea intact, conjunctive normal color, ears are normal in exam,throat exam in normal, teeth, gum and palate are normal, oral mucosa is normal without any notation of pallor or cyanosis  Neck - Supple. No jugular venous distention noted. No carotid bruits, no apical -carotid delay  Respiratory:  Normal breath sounds, No respiratory distress, No wheezing, No chest tenderness. ,no use of accessory muscles, diaphragm movement is normal  Cardiovascular: (PMI) apex non displaced,no lifts no thrills, no s3,no s4, Normal heart rate, Normal rhythm, No murmurs, No rubs, No gallops. Carotid arteries pulse and amplitude are normal no bruit, no abdominal bruit noted ( normal abdominal aorta ausculation), femoral arteries pulse and amplitude are normal no bruit, pedal pulses are normal  Femoral pulses have normal amplitude, no bruits   Extremities - No cyanosis, clubbing, or significant edema, no varicose veins    Abdomen - No masses, tenderness, or organomegaly, no hepato-splenomegally, no bruits  Musculoskeletal - No significant edema, no kyphosis or scoliosis, no deformity in any extremity noted, muscle strength and tone are normal  Skin: no ulcer,no scar,no stasis dermatitis   Neurologic - alert oriented times 3,Cranial nerves II through XII are grossly intact. There were no gross focal neurologic abnormalities. All sensory and motor nerves examined and were normal  Psychiatric: normal mood and affect    No results found for: CKTOTAL, CKMB, CKMBINDEX, TROPONINI  BNP:  No results found for: BNP  PT/INR:  No results found for: PTINR  Lab Results   Component Value Date    LABA1C 6.6 (H) 11/20/2019    LABA1C 7.1 (H) 05/04/2019     Lab Results   Component Value Date    CHOL 137 05/04/2020    TRIG 147 05/04/2020    HDL 57 05/04/2020    LDLCALC 51 05/04/2020    LDLDIRECT 56 05/03/2019     Lab Results   Component Value Date    ALT 21 05/04/2020    AST 19 05/04/2020     TSH:  No results found for: TSH    Impression:  Geri Rodriguez is a 62 y. o.year old who  has a past medical history of Diabetes mellitus ([address] H/O cardiovascular stress test, H/O echocardiogram, H/O exercise stress test, Herniated lumbar intervertebral disc, Hypertension, and Sleep apnea in adult. and presents with     Plan:  1.  CAD h/o CABG, has stress test Lvef IS 40% inferior wall infarction noted with very trace liang-infarct ischemia noted, he had one chest pain during last 4 weeks, he had OM1, LAD AND RCA graft in 11/19, if he gets more chest pain,will get [facility], ECHO IS PENDING, HE WANTS TO WAIT FEW WEEK BEFORE [facility] AS HE WORKS  Continue aspirin  continue statins,ACE INHIBOTR , betablockers,Ntg   2. Orthopnea: TAKES 2 ;PILLOWS  TAKING lasix  3. Anxiety: not seen a psychiatory, xanax added,will not renew prescription without psychiatrist  4. Leg swelling: use compression socks  5. Paroxysmal afib: Continue eliquis, s/p  maze and LOREN ligation  6. Dyslipidemia: continue statins  7. HTN: stable, continue COREG AND, lisinopril HCTZ medicatons  8. DM: stable: continue  insulinHealth maintenance: exerise and diet  All labs, medications and tests reviewed, continue all other medications of all above medical condition listed as is.

## 2020-07-21 ENCOUNTER — PROCEDURE VISIT (OUTPATIENT)
Dept: CARDIOLOGY CLINIC | Age: 60
End: 2020-07-21
Payer: COMMERCIAL

## 2020-07-21 LAB
LV EF: 38 %
LVEF MODALITY: NORMAL

## 2020-07-21 PROCEDURE — 93306 TTE W/DOPPLER COMPLETE: CPT | Performed by: INTERNAL MEDICINE

## 2020-07-22 ENCOUNTER — TELEPHONE (OUTPATIENT)
Dept: CARDIOLOGY CLINIC | Age: 60
End: 2020-07-22

## 2020-07-22 ENCOUNTER — OFFICE VISIT (OUTPATIENT)
Dept: CARDIOLOGY CLINIC | Age: 60
End: 2020-07-22
Payer: COMMERCIAL

## 2020-07-22 VITALS
SYSTOLIC BLOOD PRESSURE: 116 MMHG | WEIGHT: 242 LBS | BODY MASS INDEX: 33.88 KG/M2 | TEMPERATURE: 97.9 F | RESPIRATION RATE: 16 BRPM | HEIGHT: 71 IN | HEART RATE: 92 BPM | DIASTOLIC BLOOD PRESSURE: 78 MMHG

## 2020-07-22 PROCEDURE — 99214 OFFICE O/P EST MOD 30 MIN: CPT | Performed by: INTERNAL MEDICINE

## 2020-07-22 NOTE — PROGRESS NOTES
extended release tablet Take 10 mg by mouth daily      metFORMIN (GLUCOPHAGE-XR) 500 MG extended release tablet TAKE 2 TABLETS BY MOUTH TWICE DAILY  5    benzonatate (TESSALON) 200 MG capsule TAKE 1 CAPSULE BY MOUTH EVERY 8 HOURS AS NEEDED FOR COUGH  0    rosuvastatin (CRESTOR) 20 MG tablet TAKE 1 TABLET BY MOUTH ONCE DAILY AT BEDTIME  5    VENTOLIN  (90 Base) MCG/ACT inhaler INHALE 2 PUFFS BY MOUTH EVERY 4 TO 6 HOURS AS NEEDED  5    gabapentin (NEURONTIN) 600 MG tablet Take 600 mg by mouth 3 times daily. No current facility-administered medications for this visit. Allergies: Patient has no known allergies. Past Medical History:   Diagnosis Date    Diabetes mellitus ([address]     H/O cardiovascular stress test 2019; 2020    abnormal stress test, LVEF 40%, Myocardial perfusion scan shows moderate size, severe intensity, non reversible perfusion defect in inferoapical wall.  H/O echocardiogram 2019    Limited study-Left ventricular function is low normal. EF 50-55% Mild left ventricular hypertrophy.  H/O exercise stress test 2019    Submaximal ECG stress test. stress test stopped due to Shortness of breath. Proceed with cardiac rehab    Herniated lumbar intervertebral disc     Hypertension     Sleep apnea in adult 2019     Past Surgical History:   Procedure Laterality Date    CIRCUMCISION, NON-  2013    OTHER SURGICAL HISTORY Right     Transposition of Right Ulnar Nerve    PACEMAKER CHANGE N/A 2019    CABG CORONARY ARTERY BYPASS GRAFT X3, MAZE PROCEDURE, INTRAOPERATIVE WALDEMAR, INDUCED HYPOTHERMIA, ENDOSCOPIC VEIN HARVEST OF THE LEFT LEG performed by Valentine Jones MD at [place] [address] reviewed. No pertinent family history.   Social History     Tobacco Use    Smoking status: Former Smoker     Packs/day: 0.25     Years: 30.00     Pack years: 7.50     Types: Cigarettes    Smokeless tobacco: Never Used   Substance Use Topics    Alcohol use: No     Alcohol/week: 0.0 standard drinks      [unfilled]  Review of Systems:   · Constitutional: No Fever or Weight Loss   · Eyes: No Decreased Vision  · ENT: No Headaches, Hearing Loss or Vertigo  · Cardiovascular: + chest pain,+ dyspnea on exertion, palpitations or loss of consciousness  · Respiratory: No cough or wheezing    · Gastrointestinal: No abdominal pain, appetite loss, blood in stools, constipation, diarrhea or heartburn  · Genitourinary: No dysuria, trouble voiding, or hematuria  · Musculoskeletal:  No gait disturbance, weakness or joint complaints  · Integumentary: No rash or pruritis  · Neurological: No TIA or stroke symptoms  · Psychiatric: No anxiety or depression  · Endocrine: No malaise, fatigue or temperature intolerance  · Hematologic/Lymphatic: No bleeding problems, blood clots or swollen lymph nodes  · Allergic/Immunologic: No nasal congestion or hives  All systems negative except as marked. Objective:  /78 (Site: Left Upper Arm, Position: Sitting, Cuff Size: Medium Adult)   Pulse 92   Temp 97.9 °F (36.6 °C)   Resp 16   Ht 5' 11\" (1.803 m)   Wt 242 lb (109.8 kg)   BMI 33.75 kg/m²   Wt Readings from Last 3 Encounters:   07/22/20 242 lb (109.8 kg)   07/08/20 240 lb (108.9 kg)   06/17/20 248 lb (112.5 kg)     Body mass index is 33.75 kg/m². GENERAL - Alert, oriented, pleasant, in no apparent distress,normal grooming  HEENT - pupils are reactive to light and accomodation, cornea intact, conjunctive normal color, ears are normal in exam,throat exam in normal, teeth, gum and palate are normal, oral mucosa is normal without any notation of pallor or cyanosis  Neck - Supple. No jugular venous distention noted. No carotid bruits, no apical -carotid delay  Respiratory:  Normal breath sounds, No respiratory distress, No wheezing, No chest tenderness. ,no use of accessory muscles, diaphragm movement is normal  Cardiovascular: (PMI) apex non displaced,no lifts no thrills, no s3,no s4, Normal heart rate, Normal rhythm, No murmurs, No rubs, No gallops. Carotid arteries pulse and amplitude are normal no bruit, no abdominal bruit noted ( normal abdominal aorta ausculation), femoral arteries pulse and amplitude are normal no bruit, pedal pulses are normal  Femoral pulses have normal amplitude, no bruits   Extremities - No cyanosis, clubbing, or significant edema, no varicose veins    Abdomen - No masses, tenderness, or organomegaly, no hepato-splenomegally, no bruits  Musculoskeletal - No significant edema, no kyphosis or scoliosis, no deformity in any extremity noted, muscle strength and tone are normal  Skin: no ulcer,no scar,no stasis dermatitis   Neurologic - alert oriented times 3,Cranial nerves II through XII are grossly intact. There were no gross focal neurologic abnormalities. All sensory and motor nerves examined and were normal  Psychiatric: normal mood and affect    No results found for: CKTOTAL, CKMB, CKMBINDEX, TROPONINI  BNP:  No results found for: BNP  PT/INR:  No results found for: PTINR  Lab Results   Component Value Date    LABA1C 6.6 (H) 11/20/2019    LABA1C 7.1 (H) 05/04/2019     Lab Results   Component Value Date    CHOL 137 05/04/2020    TRIG 147 05/04/2020    HDL 57 05/04/2020    LDLCALC 51 05/04/2020    LDLDIRECT 56 05/03/2019     Lab Results   Component Value Date    ALT 21 05/04/2020    AST 19 05/04/2020     TSH:  No results found for: TSH    Impression:  Manjit Landis is a 62 y. o.year old who  has a past medical history of Diabetes mellitus ([address] H/O cardiovascular stress test, H/O echocardiogram, H/O exercise stress test, Herniated lumbar intervertebral disc, Hypertension, and Sleep apnea in adult. and presents with     Plan:  1.  CAD h/o CABG, has stress test Lvef IS 40% inferior wall infarction noted with very trace liang-infarct ischemia noted, he had one chest pain during last 4 weeks, he had OM1, LAD AND RCA graft in 11/19, he had chest pain again at work ,will get [facility], ECHO IS PENDING, HE WANTS TO WAIT FEW WEEK BEFORE [facility] AS HE WORKS  Continue aspirin  continue statins,ACE INHIBOTR , betablockers,Ntg   2. Orthopnea: TAKES 2 ;PILLOWS  TAKING lasix  3. Anxiety: not seen a psychiatory, xanax added,will not renew prescription without psychiatrist  4. Leg swelling: use compression socks  5. Paroxysmal afib: Continue eliquis, s/p  maze and LOREN ligation  6. Dyslipidemia: continue statins  7. HTN: stable, continue COREG AND, lisinopril HCTZ medicatons  8. DM: stable: continue  insulinHealth maintenance: exerise and dietAll labs, medications and tests reviewed, continue all other medications of all above medical condition listed as is.     @To [employer]@

## 2020-07-22 NOTE — TELEPHONE ENCOUNTER
Patient scheduled for LHC on 7/31/2020 at 8:30am. Paperwork and instructions given. All questions answered.

## 2020-07-23 ENCOUNTER — TELEPHONE (OUTPATIENT)
Dept: CARDIOLOGY CLINIC | Age: 60
End: 2020-07-23

## 2020-07-23 NOTE — TELEPHONE ENCOUNTER
Notified Jason Curran in Cath Lab to schedule LHC with Dr. Marlo Boast 7/31/20 @ 830 AM.    Patient to have COVID testing done in [place].

## 2020-07-24 ENCOUNTER — HOSPITAL ENCOUNTER (OUTPATIENT)
Age: 60
Discharge: HOME | End: 2020-07-24
Payer: COMMERCIAL

## 2020-07-24 ENCOUNTER — HOSPITAL ENCOUNTER (OUTPATIENT)
Dept: GENERAL RADIOLOGY | Age: 60
Discharge: HOME | End: 2020-07-24
Payer: COMMERCIAL

## 2020-07-24 ENCOUNTER — HOSPITAL ENCOUNTER (OUTPATIENT)
Age: 60
Setting detail: SPECIMEN
Discharge: HOME | End: 2020-07-24
Payer: COMMERCIAL

## 2020-07-24 LAB
ABO/RH: NORMAL
ANION GAP SERPL CALCULATED.3IONS-SCNC: 9 MMOL/L (ref 4–16)
ANTIBODY SCREEN: NEGATIVE
APTT: 33.8 SECONDS (ref 25.1–37.1)
BASOPHILS ABSOLUTE: 0.1 K/CU MM
BASOPHILS RELATIVE PERCENT: 0.6 % (ref 0–1)
BUN BLDV-MCNC: 36 MG/DL (ref 6–23)
CALCIUM SERPL-MCNC: 8.9 MG/DL (ref 8.3–10.6)
CHLORIDE BLD-SCNC: 99 MMOL/L (ref 99–110)
CO2: 30 MMOL/L (ref 21–32)
COMMENT: NORMAL
CREAT SERPL-MCNC: 1.6 MG/DL (ref 0.9–1.3)
DIFFERENTIAL TYPE: ABNORMAL
EOSINOPHILS ABSOLUTE: 0.4 K/CU MM
EOSINOPHILS RELATIVE PERCENT: 3.9 % (ref 0–3)
GFR AFRICAN AMERICAN: 54 ML/MIN/1.73M2
GFR NON-AFRICAN AMERICAN: 44 ML/MIN/1.73M2
GLUCOSE FASTING: 229 MG/DL (ref 70–99)
HCT VFR BLD CALC: 47.6 % (ref 42–52)
HEMOGLOBIN: 14.5 GM/DL (ref 13.5–18)
IMMATURE NEUTROPHIL %: 0.3 % (ref 0–0.43)
INR BLD: 1.09 INDEX
LYMPHOCYTES ABSOLUTE: 1.7 K/CU MM
LYMPHOCYTES RELATIVE PERCENT: 19.2 % (ref 24–44)
MCH RBC QN AUTO: 28.2 PG (ref 27–31)
MCHC RBC AUTO-ENTMCNC: 30.5 % (ref 32–36)
MCV RBC AUTO: 92.6 FL (ref 78–100)
MONOCYTES ABSOLUTE: 0.6 K/CU MM
MONOCYTES RELATIVE PERCENT: 6.1 % (ref 0–4)
PDW BLD-RTO: 13.5 % (ref 11.7–14.9)
PLATELET # BLD: 264 K/CU MM (ref 140–440)
PMV BLD AUTO: 9.5 FL (ref 7.5–11.1)
POTASSIUM SERPL-SCNC: 4.8 MMOL/L (ref 3.5–5.1)
PROTHROMBIN TIME: 12.4 SECONDS (ref 11.7–14.5)
RBC # BLD: 5.14 M/CU MM (ref 4.6–6.2)
SEGMENTED NEUTROPHILS ABSOLUTE COUNT: 6.3 K/CU MM
SEGMENTED NEUTROPHILS RELATIVE PERCENT: 69.9 % (ref 36–66)
SODIUM BLD-SCNC: 138 MMOL/L (ref 135–145)
TOTAL IMMATURE NEUTOROPHIL: 0.03 K/CU MM
WBC # BLD: 9 K/CU MM (ref 4–10.5)

## 2020-07-24 PROCEDURE — 36415 COLL VENOUS BLD VENIPUNCTURE: CPT

## 2020-07-24 PROCEDURE — 85025 COMPLETE CBC W/AUTO DIFF WBC: CPT

## 2020-07-24 PROCEDURE — 86901 BLOOD TYPING SEROLOGIC RH(D): CPT

## 2020-07-24 PROCEDURE — 85610 PROTHROMBIN TIME: CPT

## 2020-07-24 PROCEDURE — 85730 THROMBOPLASTIN TIME PARTIAL: CPT

## 2020-07-24 PROCEDURE — 86850 RBC ANTIBODY SCREEN: CPT

## 2020-07-24 PROCEDURE — 80048 BASIC METABOLIC PNL TOTAL CA: CPT

## 2020-07-24 PROCEDURE — 71046 X-RAY EXAM CHEST 2 VIEWS: CPT

## 2020-07-24 PROCEDURE — 86900 BLOOD TYPING SEROLOGIC ABO: CPT

## 2020-07-24 PROCEDURE — U0002 COVID-19 LAB TEST NON-CDC: HCPCS

## 2020-07-26 LAB
SARS-COV-2: NOT DETECTED
SOURCE: NORMAL

## 2020-07-30 ENCOUNTER — TELEPHONE (OUTPATIENT)
Dept: CARDIOLOGY CLINIC | Age: 60
End: 2020-07-30

## 2020-07-30 NOTE — TELEPHONE ENCOUNTER
Per Dr. Keo Leon, needing to move patient's LHC. Patient states he had to take off work and has almost used all his days. Discussed with Dr. Keo Leon, can move LHC to 1PM, arrival @ 11 AM.    Patient voiced understanding. David Myers in scheduling to adjust procedure time. NSAID

## 2020-07-31 ENCOUNTER — HOSPITAL ENCOUNTER (INPATIENT)
Dept: CARDIAC CATH/INVASIVE PROCEDURES | Age: 60
LOS: 1 days | Discharge: HOME | DRG: 247 | End: 2020-08-01
Attending: INTERNAL MEDICINE | Admitting: INTERNAL MEDICINE
Payer: COMMERCIAL

## 2020-07-31 PROBLEM — Z98.62 S/P ANGIOPLASTY: Status: ACTIVE | Noted: 2020-07-31

## 2020-07-31 LAB
GLUCOSE BLD-MCNC: 103 MG/DL (ref 70–99)
GLUCOSE BLD-MCNC: 153 MG/DL (ref 70–99)

## 2020-07-31 PROCEDURE — 6360000004 HC RX CONTRAST MEDICATION

## 2020-07-31 PROCEDURE — 2500000003 HC RX 250 WO HCPCS

## 2020-07-31 PROCEDURE — 027035Z DILATION OF CORONARY ARTERY, ONE ARTERY WITH TWO DRUG-ELUTING INTRALUMINAL DEVICES, PERCUTANEOUS APPROACH: ICD-10-PCS | Performed by: INTERNAL MEDICINE

## 2020-07-31 PROCEDURE — 4A023N7 MEASUREMENT OF CARDIAC SAMPLING AND PRESSURE, LEFT HEART, PERCUTANEOUS APPROACH: ICD-10-PCS | Performed by: INTERNAL MEDICINE

## 2020-07-31 PROCEDURE — 92928 PRQ TCAT PLMT NTRAC ST 1 LES: CPT

## 2020-07-31 PROCEDURE — 2580000003 HC RX 258: Performed by: INTERNAL MEDICINE

## 2020-07-31 PROCEDURE — B2151ZZ FLUOROSCOPY OF LEFT HEART USING LOW OSMOLAR CONTRAST: ICD-10-PCS | Performed by: INTERNAL MEDICINE

## 2020-07-31 PROCEDURE — B2131ZZ FLUOROSCOPY OF MULTIPLE CORONARY ARTERY BYPASS GRAFTS USING LOW OSMOLAR CONTRAST: ICD-10-PCS | Performed by: INTERNAL MEDICINE

## 2020-07-31 PROCEDURE — 92928 PRQ TCAT PLMT NTRAC ST 1 LES: CPT | Performed by: INTERNAL MEDICINE

## 2020-07-31 PROCEDURE — 82962 GLUCOSE BLOOD TEST: CPT

## 2020-07-31 PROCEDURE — 2709999900 HC NON-CHARGEABLE SUPPLY

## 2020-07-31 PROCEDURE — B2181ZZ FLUOROSCOPY OF LEFT INTERNAL MAMMARY BYPASS GRAFT USING LOW OSMOLAR CONTRAST: ICD-10-PCS | Performed by: INTERNAL MEDICINE

## 2020-07-31 PROCEDURE — C1874 STENT, COATED/COV W/DEL SYS: HCPCS

## 2020-07-31 PROCEDURE — G0378 HOSPITAL OBSERVATION PER HR: HCPCS

## 2020-07-31 PROCEDURE — 93455 CORONARY ART/GRFT ANGIO S&I: CPT | Performed by: INTERNAL MEDICINE

## 2020-07-31 PROCEDURE — B2111ZZ FLUOROSCOPY OF MULTIPLE CORONARY ARTERIES USING LOW OSMOLAR CONTRAST: ICD-10-PCS | Performed by: INTERNAL MEDICINE

## 2020-07-31 PROCEDURE — C1894 INTRO/SHEATH, NON-LASER: HCPCS

## 2020-07-31 PROCEDURE — C1887 CATHETER, GUIDING: HCPCS

## 2020-07-31 PROCEDURE — 6370000000 HC RX 637 (ALT 250 FOR IP): Performed by: INTERNAL MEDICINE

## 2020-07-31 PROCEDURE — 6360000002 HC RX W HCPCS

## 2020-07-31 PROCEDURE — C1760 CLOSURE DEV, VASC: HCPCS

## 2020-07-31 PROCEDURE — C1769 GUIDE WIRE: HCPCS

## 2020-07-31 PROCEDURE — 6370000000 HC RX 637 (ALT 250 FOR IP)

## 2020-07-31 PROCEDURE — 93459 L HRT ART/GRFT ANGIO: CPT

## 2020-07-31 PROCEDURE — G0379 DIRECT REFER HOSPITAL OBSERV: HCPCS

## 2020-07-31 PROCEDURE — 2140000000 HC CCU INTERMEDIATE R&B

## 2020-07-31 PROCEDURE — 93005 ELECTROCARDIOGRAM TRACING: CPT | Performed by: INTERNAL MEDICINE

## 2020-07-31 RX ORDER — DEXTROSE MONOHYDRATE 25 G/50ML
12.5 INJECTION, SOLUTION INTRAVENOUS PRN
Status: DISCONTINUED | OUTPATIENT
Start: 2020-07-31 | End: 2020-08-01 | Stop reason: HOSPADM

## 2020-07-31 RX ORDER — CARVEDILOL 12.5 MG/1
12.5 TABLET ORAL 2 TIMES DAILY WITH MEALS
Status: DISCONTINUED | OUTPATIENT
Start: 2020-07-31 | End: 2020-08-01 | Stop reason: HOSPADM

## 2020-07-31 RX ORDER — NICOTINE POLACRILEX 4 MG
15 LOZENGE BUCCAL PRN
Status: DISCONTINUED | OUTPATIENT
Start: 2020-07-31 | End: 2020-08-01 | Stop reason: HOSPADM

## 2020-07-31 RX ORDER — ASPIRIN 81 MG/1
81 TABLET ORAL DAILY
Status: DISCONTINUED | OUTPATIENT
Start: 2020-07-31 | End: 2020-08-01 | Stop reason: HOSPADM

## 2020-07-31 RX ORDER — ACETAMINOPHEN 325 MG/1
650 TABLET ORAL EVERY 4 HOURS PRN
Status: DISCONTINUED | OUTPATIENT
Start: 2020-07-31 | End: 2020-08-01 | Stop reason: HOSPADM

## 2020-07-31 RX ORDER — DEXTROSE MONOHYDRATE 50 MG/ML
100 INJECTION, SOLUTION INTRAVENOUS PRN
Status: DISCONTINUED | OUTPATIENT
Start: 2020-07-31 | End: 2020-08-01 | Stop reason: HOSPADM

## 2020-07-31 RX ORDER — DIPHENHYDRAMINE HCL 25 MG
25 TABLET ORAL
Status: COMPLETED | OUTPATIENT
Start: 2020-07-31 | End: 2020-07-31

## 2020-07-31 RX ORDER — BUSPIRONE HYDROCHLORIDE 7.5 MG/1
7.5 TABLET ORAL 2 TIMES DAILY
Status: DISCONTINUED | OUTPATIENT
Start: 2020-07-31 | End: 2020-08-01 | Stop reason: HOSPADM

## 2020-07-31 RX ORDER — PRASUGREL 10 MG/1
10 TABLET, FILM COATED ORAL DAILY
Status: DISCONTINUED | OUTPATIENT
Start: 2020-08-01 | End: 2020-08-01 | Stop reason: HOSPADM

## 2020-07-31 RX ORDER — DIAZEPAM 5 MG/1
5 TABLET ORAL
Status: COMPLETED | OUTPATIENT
Start: 2020-07-31 | End: 2020-07-31

## 2020-07-31 RX ORDER — GABAPENTIN 300 MG/1
600 CAPSULE ORAL 3 TIMES DAILY
Status: DISCONTINUED | OUTPATIENT
Start: 2020-07-31 | End: 2020-08-01 | Stop reason: HOSPADM

## 2020-07-31 RX ORDER — BENZONATATE 100 MG/1
100 CAPSULE ORAL EVERY 4 HOURS PRN
Status: DISCONTINUED | OUTPATIENT
Start: 2020-07-31 | End: 2020-08-01 | Stop reason: HOSPADM

## 2020-07-31 RX ORDER — SODIUM CHLORIDE 0.9 % (FLUSH) 0.9 %
10 SYRINGE (ML) INJECTION PRN
Status: DISCONTINUED | OUTPATIENT
Start: 2020-07-31 | End: 2020-08-01 | Stop reason: HOSPADM

## 2020-07-31 RX ORDER — SODIUM CHLORIDE 0.9 % (FLUSH) 0.9 %
10 SYRINGE (ML) INJECTION EVERY 12 HOURS SCHEDULED
Status: DISCONTINUED | OUTPATIENT
Start: 2020-07-31 | End: 2020-08-01 | Stop reason: HOSPADM

## 2020-07-31 RX ORDER — SODIUM CHLORIDE 9 MG/ML
INJECTION, SOLUTION INTRAVENOUS CONTINUOUS
Status: DISCONTINUED | OUTPATIENT
Start: 2020-07-31 | End: 2020-08-01 | Stop reason: HOSPADM

## 2020-07-31 RX ORDER — TRAZODONE HYDROCHLORIDE 50 MG/1
50 TABLET ORAL NIGHTLY PRN
Status: DISCONTINUED | OUTPATIENT
Start: 2020-07-31 | End: 2020-08-01 | Stop reason: HOSPADM

## 2020-07-31 RX ORDER — GLIPIZIDE 5 MG/1
2.5 TABLET ORAL
Status: DISCONTINUED | OUTPATIENT
Start: 2020-08-01 | End: 2020-08-01 | Stop reason: HOSPADM

## 2020-07-31 RX ORDER — ROSUVASTATIN CALCIUM 20 MG/1
20 TABLET, COATED ORAL NIGHTLY
Status: DISCONTINUED | OUTPATIENT
Start: 2020-07-31 | End: 2020-08-01 | Stop reason: HOSPADM

## 2020-07-31 RX ORDER — METHOCARBAMOL 500 MG/1
500 TABLET, FILM COATED ORAL 4 TIMES DAILY
Status: DISCONTINUED | OUTPATIENT
Start: 2020-07-31 | End: 2020-08-01 | Stop reason: HOSPADM

## 2020-07-31 RX ORDER — DILTIAZEM HYDROCHLORIDE 180 MG/1
360 CAPSULE, COATED, EXTENDED RELEASE ORAL DAILY
Status: DISCONTINUED | OUTPATIENT
Start: 2020-08-01 | End: 2020-08-01 | Stop reason: HOSPADM

## 2020-07-31 RX ADMIN — DIAZEPAM 5 MG: 5 TABLET ORAL at 12:11

## 2020-07-31 RX ADMIN — BUSPIRONE HYDROCHLORIDE 7.5 MG: 7.5 TABLET ORAL at 20:12

## 2020-07-31 RX ADMIN — APIXABAN 5 MG: 5 TABLET, FILM COATED ORAL at 20:11

## 2020-07-31 RX ADMIN — SODIUM CHLORIDE: 9 INJECTION, SOLUTION INTRAVENOUS at 12:11

## 2020-07-31 RX ADMIN — ROSUVASTATIN CALCIUM 20 MG: 20 TABLET, COATED ORAL at 20:11

## 2020-07-31 RX ADMIN — GABAPENTIN 600 MG: 300 CAPSULE ORAL at 20:11

## 2020-07-31 RX ADMIN — TRAZODONE HYDROCHLORIDE 50 MG: 50 TABLET ORAL at 20:55

## 2020-07-31 RX ADMIN — DIPHENHYDRAMINE HYDROCHLORIDE 25 MG: 25 TABLET ORAL at 12:11

## 2020-07-31 RX ADMIN — SODIUM CHLORIDE: 9 INJECTION, SOLUTION INTRAVENOUS at 18:05

## 2020-07-31 RX ADMIN — METHOCARBAMOL TABLETS 500 MG: 500 TABLET, COATED ORAL at 20:11

## 2020-07-31 ASSESSMENT — PAIN SCALES - GENERAL: PAINLEVEL_OUTOF10: 0

## 2020-07-31 NOTE — H&P
Chief complaints: patient is here for management of CAD,s/p CABG, DM, HTN, AFIB, DYSLPIDEMIA     Subjective:,+ chest pain, + shortness of breath, no dizziness, no palpitations     HPI Bri Maxwell is a 62 y. o.year old who  has a past medical history of Diabetes mellitus ([address] H/O cardiovascular stress test, H/O echocardiogram, H/O exercise stress test, Herniated lumbar intervertebral disc, Hypertension, and Sleep apnea in adult.  and presents for management of CAD,s/p CABG, DM, HTN, AFIB, DYSLPIDEMIA which are well controlled     He had abnormal stress test and echo, continued to have chest pain     Current Facility-Administered Medications          Current Outpatient Medications   Medication Sig Dispense Refill    carvedilol (COREG) 12.5 MG tablet Take 1 tablet by mouth 2 times daily (with meals) 180 tablet 3    apixaban (ELIQUIS) 5 MG TABS tablet Take 1 tablet by mouth 2 times daily 180 tablet 3    lisinopril (PRINIVIL;ZESTRIL) 10 MG tablet Take 1 tablet by mouth daily 90 tablet 3    methocarbamol (ROBAXIN) 500 MG tablet Take 500 mg by mouth 4 times daily        furosemide (LASIX) 20 MG tablet Take 1 tablet by mouth 2 times daily 90 tablet 3    potassium chloride (KLOR-CON) 20 MEQ packet Take 20 mEq by mouth 2 times daily 90 each 2    nitroGLYCERIN (NITROSTAT) 0.4 MG SL tablet Place 1 tablet under the tongue every 5 minutes as needed for Chest pain 25 tablet 3    diltiazem (CARDIZEM CD) 360 MG extended release capsule Take 1 capsule by mouth daily 90 capsule 3    glycopyrrolate-formoterol (BEVESPI AEROSPHERE) 9-4.8 MCG/ACT AERO Inhale 2 puffs into the lungs 2 times daily 1 Inhaler 5    busPIRone (BUSPAR) 7.5 MG tablet 1 tablet 2 times daily        traZODone (DESYREL) 50 MG tablet nightly as needed        hydrochlorothiazide (HYDRODIURIL) 25 MG tablet Take 1 tablet by mouth daily 90 tablet 3    aspirin EC 81 MG EC tablet Take 1 tablet by mouth daily 90 tablet 1    glipiZIDE (GLUCOTROL XL) 10 MG extended release tablet Take 10 mg by mouth daily        metFORMIN (GLUCOPHAGE-XR) 500 MG extended release tablet TAKE 2 TABLETS BY MOUTH TWICE DAILY   5    benzonatate (TESSALON) 200 MG capsule TAKE 1 CAPSULE BY MOUTH EVERY 8 HOURS AS NEEDED FOR COUGH   0    rosuvastatin (CRESTOR) 20 MG tablet TAKE 1 TABLET BY MOUTH ONCE DAILY AT BEDTIME   5    VENTOLIN  (90 Base) MCG/ACT inhaler INHALE 2 PUFFS BY MOUTH EVERY 4 TO 6 HOURS AS NEEDED   5    gabapentin (NEURONTIN) 600 MG tablet Take 600 mg by mouth 3 times daily.           No current facility-administered medications for this visit. Allergies: Patient has no known allergies. Past Medical History        Past Medical History:   Diagnosis Date    Diabetes mellitus ([address]      H/O cardiovascular stress test 2019; 2020     abnormal stress test, LVEF 40%, Myocardial perfusion scan shows moderate size, severe intensity, non reversible perfusion defect in inferoapical wall.  H/O echocardiogram 2019     Limited study-Left ventricular function is low normal. EF 50-55% Mild left ventricular hypertrophy.  H/O exercise stress test 2019     Submaximal ECG stress test. stress test stopped due to Shortness of breath. Proceed with cardiac rehab    Herniated lumbar intervertebral disc      Hypertension      Sleep apnea in adult 2019        Past Surgical History         Past Surgical History:   Procedure Laterality Date    CIRCUMCISION, NON-   2013    OTHER SURGICAL HISTORY Right       Transposition of Right Ulnar Nerve    PACEMAKER CHANGE N/A 2019     CABG CORONARY ARTERY BYPASS GRAFT X3, MAZE PROCEDURE, INTRAOPERATIVE WALDEMAR, INDUCED HYPOTHERMIA, ENDOSCOPIC VEIN HARVEST OF THE LEFT LEG performed by Brittany Stokes MD at [facility] OR        Family History   History reviewed. No pertinent family history.      Social History            Tobacco Use    Smoking status: Former Smoker       Packs/day: 0.25       Years: 30.00       Pack years: 7.50       Types: Cigarettes    Smokeless tobacco: Never Used   Substance Use Topics    Alcohol use: No       Alcohol/week: 0.0 standard drinks      [unfilled]  Review of Systems:   · Constitutional: No Fever or Weight Loss   · Eyes: No Decreased Vision  · ENT: No Headaches, Hearing Loss or Vertigo  · Cardiovascular: + chest pain,+ dyspnea on exertion, palpitations or loss of consciousness  · Respiratory: No cough or wheezing    · Gastrointestinal: No abdominal pain, appetite loss, blood in stools, constipation, diarrhea or heartburn  · Genitourinary: No dysuria, trouble voiding, or hematuria  · Musculoskeletal:  No gait disturbance, weakness or joint complaints  · Integumentary: No rash or pruritis  · Neurological: No TIA or stroke symptoms  · Psychiatric: No anxiety or depression  · Endocrine: No malaise, fatigue or temperature intolerance  · Hematologic/Lymphatic: No bleeding problems, blood clots or swollen lymph nodes  · Allergic/Immunologic: No nasal congestion or hives  All systems negative except as marked. Objective:  /78 (Site: Left Upper Arm, Position: Sitting, Cuff Size: Medium Adult)   Pulse 92   Temp 97.9 °F (36.6 °C)   Resp 16   Ht 5' 11\" (1.803 m)   Wt 242 lb (109.8 kg)   BMI 33.75 kg/m²       Wt Readings from Last 3 Encounters:   07/22/20 242 lb (109.8 kg)   07/08/20 240 lb (108.9 kg)   06/17/20 248 lb (112.5 kg)     Body mass index is 33.75 kg/m². GENERAL - Alert, oriented, pleasant, in no apparent distress,normal grooming  HEENT - pupils are reactive to light and accomodation, cornea intact, conjunctive normal color, ears are normal in exam,throat exam in normal, teeth, gum and palate are normal, oral mucosa is normal without any notation of pallor or cyanosis  Neck - Supple. No jugular venous distention noted. No carotid bruits, no apical -carotid delay  Respiratory:  Normal breath sounds, No respiratory distress, No wheezing, No chest tenderness. ,no use of accessory muscles, diaphragm movement is normal  Cardiovascular: (PMI) apex non displaced,no lifts no thrills, no s3,no s4, Normal heart rate, Normal rhythm, No murmurs, No rubs, No gallops. Carotid arteries pulse and amplitude are normal no bruit, no abdominal bruit noted ( normal abdominal aorta ausculation), femoral arteries pulse and amplitude are normal no bruit, pedal pulses are normal  Femoral pulses have normal amplitude, no bruits   Extremities - No cyanosis, clubbing, or significant edema, no varicose veins    Abdomen - No masses, tenderness, or organomegaly, no hepato-splenomegally, no bruits  Musculoskeletal - No significant edema, no kyphosis or scoliosis, no deformity in any extremity noted, muscle strength and tone are normal  Skin: no ulcer,no scar,no stasis dermatitis   Neurologic - alert oriented times 3,Cranial nerves II through XII are grossly intact. There were no gross focal neurologic abnormalities. All sensory and motor nerves examined and were normal  Psychiatric: normal mood and affect     No results found for: CKTOTAL, CKMB, CKMBINDEX, TROPONINI  BNP:  No results found for: BNP  PT/INR:  No results found for: PTINR        Lab Results   Component Value Date     LABA1C 6.6 (H) 11/20/2019     LABA1C 7.1 (H) 05/04/2019           Lab Results   Component Value Date     CHOL 137 05/04/2020     TRIG 147 05/04/2020     HDL 57 05/04/2020     LDLCALC 51 05/04/2020     LDLDIRECT 56 05/03/2019           Lab Results   Component Value Date     ALT 21 05/04/2020     AST 19 05/04/2020     TSH:  No results found for: TSH     Impression:  Vonnie Gunderson is a 62 y. o.year old who  has a past medical history of Diabetes mellitus [address] [address] H/O cardiovascular stress test, H/O echocardiogram, H/O exercise stress test, Herniated lumbar intervertebral disc, Hypertension, and Sleep apnea in adult. and presents with      Plan:  1.  CAD h/o CABG, has stress test Lvef IS 40% inferior wall infarction noted with very trace liang-infarct ischemia noted, he had one chest pain during last 4 weeks, he had OM1, LAD AND RCA graft in 11/19, he had chest pain again at work ,will get [employer], ECHO IS PENDING, HE WANTS TO WAIT FEW WEEK BEFORE [facility] AS HE WORKS  Continue aspirin  continue statins,ACE INHIBOTR , betablockers,Ntg   2. Orthopnea: TAKES 2 ;PILLOWS  TAKING lasix  3. Anxiety: not seen a psychiatory, xanax added,will not renew prescription without psychiatrist  4. Leg swelling: use compression socks  5. Paroxysmal afib: Continue eliquis, s/p  maze and LOREN ligation  6. Dyslipidemia: continue statins  7. HTN: stable, continue COREG AND, lisinopril HCTZ medicatons  8.  DM: stable: continue  insulinHealth maintenance: exerise and dietAll labs, medications and tests reviewed, continue all other medications of all above medical condition listed as is.

## 2020-08-01 VITALS
TEMPERATURE: 98.2 F | SYSTOLIC BLOOD PRESSURE: 108 MMHG | HEART RATE: 96 BPM | RESPIRATION RATE: 19 BRPM | HEIGHT: 71 IN | OXYGEN SATURATION: 98 % | DIASTOLIC BLOOD PRESSURE: 68 MMHG | WEIGHT: 232 LBS | BODY MASS INDEX: 32.48 KG/M2

## 2020-08-01 PROBLEM — I25.10 ASCVD (ARTERIOSCLEROTIC CARDIOVASCULAR DISEASE): Status: ACTIVE | Noted: 2020-08-01

## 2020-08-01 LAB
ANION GAP SERPL CALCULATED.3IONS-SCNC: 13 MMOL/L (ref 4–16)
BUN BLDV-MCNC: 27 MG/DL (ref 6–23)
CALCIUM SERPL-MCNC: 9 MG/DL (ref 8.3–10.6)
CHLORIDE BLD-SCNC: 101 MMOL/L (ref 99–110)
CO2: 24 MMOL/L (ref 21–32)
CREAT SERPL-MCNC: 1.2 MG/DL (ref 0.9–1.3)
EKG ATRIAL RATE: 101 BPM
EKG ATRIAL RATE: 97 BPM
EKG DIAGNOSIS: NORMAL
EKG DIAGNOSIS: NORMAL
EKG P AXIS: 43 DEGREES
EKG P AXIS: 75 DEGREES
EKG P-R INTERVAL: 198 MS
EKG P-R INTERVAL: 220 MS
EKG Q-T INTERVAL: 348 MS
EKG Q-T INTERVAL: 374 MS
EKG QRS DURATION: 102 MS
EKG QRS DURATION: 104 MS
EKG QTC CALCULATION (BAZETT): 451 MS
EKG QTC CALCULATION (BAZETT): 474 MS
EKG R AXIS: 65 DEGREES
EKG R AXIS: 70 DEGREES
EKG T AXIS: 74 DEGREES
EKG T AXIS: 84 DEGREES
EKG VENTRICULAR RATE: 101 BPM
EKG VENTRICULAR RATE: 97 BPM
GFR AFRICAN AMERICAN: >60 ML/MIN/1.73M2
GFR NON-AFRICAN AMERICAN: >60 ML/MIN/1.73M2
GLUCOSE BLD-MCNC: 123 MG/DL (ref 70–99)
GLUCOSE BLD-MCNC: 135 MG/DL (ref 70–99)
HCT VFR BLD CALC: 46.7 % (ref 42–52)
HEMOGLOBIN: 14.4 GM/DL (ref 13.5–18)
MCH RBC QN AUTO: 29 PG (ref 27–31)
MCHC RBC AUTO-ENTMCNC: 30.8 % (ref 32–36)
MCV RBC AUTO: 94 FL (ref 78–100)
PDW BLD-RTO: 13.2 % (ref 11.7–14.9)
PLATELET # BLD: 238 K/CU MM (ref 140–440)
PMV BLD AUTO: 9.5 FL (ref 7.5–11.1)
POTASSIUM SERPL-SCNC: 4.6 MMOL/L (ref 3.5–5.1)
RBC # BLD: 4.97 M/CU MM (ref 4.6–6.2)
SODIUM BLD-SCNC: 138 MMOL/L (ref 135–145)
WBC # BLD: 11.4 K/CU MM (ref 4–10.5)

## 2020-08-01 PROCEDURE — 93010 ELECTROCARDIOGRAM REPORT: CPT | Performed by: INTERNAL MEDICINE

## 2020-08-01 PROCEDURE — 6370000000 HC RX 637 (ALT 250 FOR IP): Performed by: INTERNAL MEDICINE

## 2020-08-01 PROCEDURE — 80048 BASIC METABOLIC PNL TOTAL CA: CPT

## 2020-08-01 PROCEDURE — 2580000003 HC RX 258: Performed by: INTERNAL MEDICINE

## 2020-08-01 PROCEDURE — G0378 HOSPITAL OBSERVATION PER HR: HCPCS

## 2020-08-01 PROCEDURE — 85027 COMPLETE CBC AUTOMATED: CPT

## 2020-08-01 PROCEDURE — 82962 GLUCOSE BLOOD TEST: CPT

## 2020-08-01 PROCEDURE — 36415 COLL VENOUS BLD VENIPUNCTURE: CPT

## 2020-08-01 PROCEDURE — 99238 HOSP IP/OBS DSCHRG MGMT 30/<: CPT | Performed by: INTERNAL MEDICINE

## 2020-08-01 PROCEDURE — 93005 ELECTROCARDIOGRAM TRACING: CPT | Performed by: INTERNAL MEDICINE

## 2020-08-01 RX ORDER — PRASUGREL 10 MG/1
10 TABLET, FILM COATED ORAL DAILY
Qty: 90 TABLET | Refills: 3 | Status: SHIPPED | OUTPATIENT
Start: 2020-08-01 | End: 2021-07-09 | Stop reason: SDUPTHER

## 2020-08-01 RX ADMIN — METHOCARBAMOL TABLETS 500 MG: 500 TABLET, COATED ORAL at 08:06

## 2020-08-01 RX ADMIN — DILTIAZEM HYDROCHLORIDE 360 MG: 180 CAPSULE, COATED, EXTENDED RELEASE ORAL at 08:06

## 2020-08-01 RX ADMIN — BUSPIRONE HYDROCHLORIDE 7.5 MG: 7.5 TABLET ORAL at 08:07

## 2020-08-01 RX ADMIN — GABAPENTIN 600 MG: 300 CAPSULE ORAL at 08:06

## 2020-08-01 RX ADMIN — ASPIRIN 81 MG: 81 TABLET, COATED ORAL at 08:07

## 2020-08-01 RX ADMIN — PRASUGREL 10 MG: 10 TABLET, FILM COATED ORAL at 08:06

## 2020-08-01 RX ADMIN — GLIPIZIDE 2.5 MG: 5 TABLET ORAL at 08:10

## 2020-08-01 RX ADMIN — SODIUM CHLORIDE, PRESERVATIVE FREE 10 ML: 5 INJECTION INTRAVENOUS at 08:07

## 2020-08-01 RX ADMIN — APIXABAN 5 MG: 5 TABLET, FILM COATED ORAL at 08:06

## 2020-08-01 RX ADMIN — CARVEDILOL 12.5 MG: 12.5 TABLET, FILM COATED ORAL at 08:07

## 2020-08-01 ASSESSMENT — PAIN SCALES - GENERAL: PAINLEVEL_OUTOF10: 0

## 2020-08-01 NOTE — DISCHARGE SUMMARY
DISCHARGE SUMMARY      Patient ID:  Alfonzo Allen  [id number] 62 y.o. 1960    Admit date: 2020    Discharge date:      Admitting Physician: Boom Calixto MD     Discharge Physician: Jacqui Hightower     Admission Diagnoses: Abnormal result of other cardiovascular function study [R94.39]  S/P angioplasty [Z98.62]  ASCVD (arteriosclerotic cardiovascular disease) [I25.10]    Discharge Diagnoses:   Patient Active Problem List   Diagnosis    Phimosis    PLMD (periodic limb movement disorder)    RLS (restless legs syndrome)    Pneumonia    Nausea vomiting and diarrhea    New onset atrial fibrillation (HCC)    Stable angina pectoris ([address]    Chest pain    Paroxysmal atrial fibrillation ([address]    NSTEMI (non-ST elevated myocardial infarction) ([address]    Coronary artery disease involving native coronary artery of native heart without angina pectoris    S/P angioplasty    ASCVD (arteriosclerotic cardiovascular disease)        Discharged Condition: good    Hospital Course: Alfonzo Allen who  is a 62 y. o.year  Old male with past medical  history of  cad       admitted for   [facility]  Underwent  pci of RCA     Past medical history:    has a past medical history of CAD (coronary artery disease), Diabetes mellitus ([address] H/O cardiovascular stress test, H/O echocardiogram, H/O echocardiogram, H/O exercise stress test, Herniated lumbar intervertebral disc, Hypertension, and Sleep apnea in adult. Past surgical history:   has a past surgical history that includes other surgical history (Right); Circumcision, non- (2013); and Pacemaker insertion (N/A, 2019). Social History:   reports that he has quit smoking. His smoking use included cigarettes. He has a 7.50 pack-year smoking history. He has never used smokeless tobacco. He reports that he does not drink alcohol or use drugs. Family history:  family history is not on file.     Consults: none    Significant Diagnostic Studies:   Echocardiography: GLUCOPHAGE-XR     methocarbamol 500 MG tablet  Commonly known as:  ROBAXIN     nitroGLYCERIN 0.4 MG SL tablet  Commonly known as:  Nitrostat  Place 1 tablet under the tongue every 5 minutes as needed for Chest pain     potassium chloride 20 MEQ packet  Commonly known as:  KLOR-CON  Take 20 mEq by mouth 2 times daily     rosuvastatin 20 MG tablet  Commonly known as:  CRESTOR     traZODone 50 MG tablet  Commonly known as:  DESYREL     Ventolin  (90 Base) MCG/ACT inhaler  Generic drug:  albuterol sulfate HFA           Where to Get Your Medications      These medications were sent to [address] [place] [address] [phone]  [address] [place] [address]., [facility] [address]    Phone:  [phone]   · prasugrel 10 MG Tabs         Follow-up with dr Narayan Narvaez in 2 weeks.     Last Glez, 8/1/2020, 7:59 AM

## 2020-08-01 NOTE — CARE COORDINATION
Patient screened for discharge needs with no needs identified at this time. Pt is from home with SO, has PCP, insurance with RX coverage and was independent prior to admission. However, CM available if needs arise.

## 2020-08-12 ENCOUNTER — TELEPHONE (OUTPATIENT)
Dept: CARDIOLOGY CLINIC | Age: 60
End: 2020-08-12

## 2020-08-12 ENCOUNTER — OFFICE VISIT (OUTPATIENT)
Dept: CARDIOLOGY CLINIC | Age: 60
End: 2020-08-12
Payer: COMMERCIAL

## 2020-08-12 VITALS
WEIGHT: 241 LBS | TEMPERATURE: 98.8 F | HEART RATE: 100 BPM | HEIGHT: 71 IN | RESPIRATION RATE: 16 BRPM | SYSTOLIC BLOOD PRESSURE: 108 MMHG | BODY MASS INDEX: 33.74 KG/M2 | DIASTOLIC BLOOD PRESSURE: 76 MMHG

## 2020-08-12 PROCEDURE — 99214 OFFICE O/P EST MOD 30 MIN: CPT | Performed by: INTERNAL MEDICINE

## 2020-08-12 NOTE — PROGRESS NOTES
tablet Take 1 tablet by mouth daily 90 tablet 3    aspirin EC 81 MG EC tablet Take 1 tablet by mouth daily 90 tablet 1    glipiZIDE (GLUCOTROL XL) 10 MG extended release tablet Take 10 mg by mouth daily      metFORMIN (GLUCOPHAGE-XR) 500 MG extended release tablet TAKE 2 TABLETS BY MOUTH TWICE DAILY  5    benzonatate (TESSALON) 200 MG capsule TAKE 1 CAPSULE BY MOUTH EVERY 8 HOURS AS NEEDED FOR COUGH  0    rosuvastatin (CRESTOR) 20 MG tablet TAKE 1 TABLET BY MOUTH ONCE DAILY AT BEDTIME  5    VENTOLIN  (90 Base) MCG/ACT inhaler INHALE 2 PUFFS BY MOUTH EVERY 4 TO 6 HOURS AS NEEDED  5    gabapentin (NEURONTIN) 600 MG tablet Take 600 mg by mouth 3 times daily. No current facility-administered medications for this visit. Allergies: Patient has no known allergies. Past Medical History:   Diagnosis Date    CAD (coronary artery disease)     s/p CABG, sees Dr. Andrzej Rasheed Diabetes mellitus ([facility] [address]     H/O cardiovascular stress test 05/16/2019; 6/30/2020    abnormal stress test, LVEF 40%, Myocardial perfusion scan shows moderate size, severe intensity, non reversible perfusion defect in inferoapical wall.  H/O echocardiogram 05/06/2019    Limited study-Left ventricular function is low normal. EF 50-55% Mild left ventricular hypertrophy.  H/O echocardiogram 07/21/2020    EF 35-40%, Mod MR, Mild TR & LVH.    H/O exercise stress test 12/19/2019    Submaximal ECG stress test. stress test stopped due to Shortness of breath.  Proceed with cardiac rehab    Herniated lumbar intervertebral disc     History of cardiac cath 07/31/2020    PATENT 2/3 GRAFTS, SEVERE NATIVE RCA stenosis at mentioned above, required PCI with YAMILE as mentioned    Hypertension     Sleep apnea in adult 11/20/2019     Past Surgical History:   Procedure Laterality Date    Florinda Rivasthers  8/1/2013    OTHER SURGICAL HISTORY Right     Transposition of Right Ulnar Nerve    PACEMAKER CHANGE N/A 11/22/2019    CABG CORONARY ARTERY BYPASS GRAFT X3, MAZE PROCEDURE, INTRAOPERATIVE WALDEMAR, INDUCED HYPOTHERMIA, ENDOSCOPIC VEIN HARVEST OF THE LEFT LEG performed by Aaron Bryant MD at [facility] reviewed. No pertinent family history. Social History     Tobacco Use    Smoking status: Former Smoker     Packs/day: 0.25     Years: 30.00     Pack years: 7.50     Types: Cigarettes    Smokeless tobacco: Never Used   Substance Use Topics    Alcohol use: No     Alcohol/week: 0.0 standard drinks      [unfilled]  Review of Systems:   · Constitutional: No Fever or Weight Loss   · Eyes: No Decreased Vision  · ENT: No Headaches, Hearing Loss or Vertigo  · Cardiovascular: No chest pain, dyspnea on exertion, palpitations or loss of consciousness  · Respiratory: No cough or wheezing    · Gastrointestinal: No abdominal pain, appetite loss, blood in stools, constipation, diarrhea or heartburn  · Genitourinary: No dysuria, trouble voiding, or hematuria  · Musculoskeletal:  No gait disturbance, weakness or joint complaints  · Integumentary: No rash or pruritis  · Neurological: No TIA or stroke symptoms  · Psychiatric: No anxiety or depression  · Endocrine: No malaise, fatigue or temperature intolerance  · Hematologic/Lymphatic: No bleeding problems, blood clots or swollen lymph nodes  · Allergic/Immunologic: No nasal congestion or hives  All systems negative except as marked. Objective:  /76 (Site: Left Upper Arm, Position: Sitting, Cuff Size: Large Adult)   Pulse 100   Temp 98.8 °F (37.1 °C)   Resp 16   Ht 5' 11\" (1.803 m)   Wt 241 lb (109.3 kg)   BMI 33.61 kg/m²   Wt Readings from Last 3 Encounters:   08/12/20 241 lb (109.3 kg)   07/31/20 232 lb (105.2 kg)   07/22/20 242 lb (109.8 kg)     Body mass index is 33.61 kg/m².   GENERAL - Alert, oriented, pleasant, in no apparent distress,normal grooming  HEENT - pupils are reactive to light and accomodation, cornea intact, conjunctive normal color, ears are normal in exam,throat exam in normal, teeth, gum and palate are normal, oral mucosa is normal without any notation of pallor or cyanosis  Neck - Supple. No jugular venous distention noted. No carotid bruits, no apical -carotid delay  Respiratory:  Normal breath sounds, No respiratory distress, No wheezing, No chest tenderness. ,no use of accessory muscles, diaphragm movement is normal  Cardiovascular: (PMI) apex non displaced,no lifts no thrills, no s3,no s4, Normal heart rate, Normal rhythm, No murmurs, No rubs, No gallops. Carotid arteries pulse and amplitude are normal no bruit, no abdominal bruit noted ( normal abdominal aorta ausculation), femoral arteries pulse and amplitude are normal no bruit, pedal pulses are normal  Femoral pulses have normal amplitude, no bruits   Extremities - No cyanosis, clubbing, or significant edema, no varicose veins    Abdomen - No masses, tenderness, or organomegaly, no hepato-splenomegally, no bruits  Musculoskeletal - No significant edema, no kyphosis or scoliosis, no deformity in any extremity noted, muscle strength and tone are normal  Skin: no ulcer,no scar,no stasis dermatitis   Neurologic - alert oriented times 3,Cranial nerves II through XII are grossly intact. There were no gross focal neurologic abnormalities. All sensory and motor nerves examined and were normal  Psychiatric: normal mood and affect    No results found for: CKTOTAL, CKMB, CKMBINDEX, TROPONINI  BNP:  No results found for: BNP  PT/INR:  No results found for: PTINR  Lab Results   Component Value Date    LABA1C 6.6 (H) 11/20/2019    LABA1C 7.1 (H) 05/04/2019     Lab Results   Component Value Date    CHOL 137 05/04/2020    TRIG 147 05/04/2020    HDL 57 05/04/2020    LDLCALC 51 05/04/2020    LDLDIRECT 56 05/03/2019     Lab Results   Component Value Date    ALT 21 05/04/2020    AST 19 05/04/2020     TSH:  No results found for: TSH    Impression:  Ada Farrar is a 62 y. o.year old who  has a past medical history of CAD (coronary artery disease), Diabetes mellitus ([facility] [address] H/O cardiovascular stress test, H/O echocardiogram, H/O echocardiogram, H/O exercise stress test, Herniated lumbar intervertebral disc, History of cardiac cath, Hypertension, and Sleep apnea in adult. and presents with     Plan:  1. CADs/p PCI of native RCA,his SVG to RCA was occluded, LIMA was patent, will get stress test and cardiac rehab, and will continue aspirin, statins, bb, effient and eliquis for one month after that stop aspirin and continue effient and eliquis  2. ICM: LVEF 35-40%, WILL continue to optimize medication,   3. Continue aspirin  continue statins,ACE INHIBOTR , betablockers,Ntg   4. Orthopnea: TAKES 2 ;PILLOWS  TAKING lasix  5. Anxiety: not seen a psychiatory, continue xanax   6. Leg swelling: use compression socks  7. Paroxysmal afib: Continue eliquis, s/p  maze and LOREN ligation  8. Dyslipidemia: continue statins  9. HTN: stable, continue COREG AND, lisinopril HCTZ medications  10. DM: stable: continue  [facility] All labs, medications and tests reviewed, continue all other medications of all above medical condition listed as is.     [unfilled]

## 2020-08-18 ENCOUNTER — TELEPHONE (OUTPATIENT)
Dept: CARDIOLOGY CLINIC | Age: 60
End: 2020-08-18

## 2020-08-18 NOTE — TELEPHONE ENCOUNTER
Called to schedule treadmill stress test for the end of August as patient had stent placed 7/31/20. Left message for return call. Authorization not required. Weight 241 lbs.

## 2020-08-20 ENCOUNTER — PROCEDURE VISIT (OUTPATIENT)
Dept: CARDIOLOGY CLINIC | Age: 60
End: 2020-08-20
Payer: COMMERCIAL

## 2020-08-20 PROCEDURE — 93015 CV STRESS TEST SUPVJ I&R: CPT | Performed by: INTERNAL MEDICINE

## 2020-11-06 ENCOUNTER — HOSPITAL ENCOUNTER (OUTPATIENT)
Age: 60
Discharge: HOME | End: 2020-11-06
Payer: COMMERCIAL

## 2020-11-06 ENCOUNTER — HOSPITAL ENCOUNTER (OUTPATIENT)
Dept: GENERAL RADIOLOGY | Age: 60
Discharge: HOME | End: 2020-11-06
Payer: COMMERCIAL

## 2020-11-06 PROCEDURE — 72040 X-RAY EXAM NECK SPINE 2-3 VW: CPT

## 2020-11-11 ENCOUNTER — TELEMEDICINE (OUTPATIENT)
Dept: CARDIOLOGY CLINIC | Age: 60
End: 2020-11-11
Payer: COMMERCIAL

## 2020-11-11 PROCEDURE — 99441 PR PHYS/QHP TELEPHONE EVALUATION 5-10 MIN: CPT | Performed by: INTERNAL MEDICINE

## 2020-11-11 NOTE — PROGRESS NOTES
Darylene Carpen is a 62 y.o. male evaluated via telephone on 11/11/2020. Consent:  He and/or health care decision maker is aware that that he may receive a bill for this telephone service, depending on his insurance coverage, and has provided verbal consent to proceed: Yes      Documentation:  I communicated with the patient and/or health care decision maker about . Details of this discussion including any medical advice provided:       I affirm this is a Patient Initiated Episode with a Patient who has not had a related appointment within my department in the past 7 days or scheduled within the next 24 hours. Patient identification was verified at the start of the visit: Yes    Total Time: minutes: 5-10 minutes    Note: not billable if this call serves to triage the patient into an appointment for the relevant concern      Kimberley Fournier MD    TELEHEALTH EVALUATION -- Audio/Visual (During [facility] public health emergency)      Chief complaints: patient is here for management of CAD,s/p CABG, DM, HTN, AFIB, DYSLPIDEMIA    Subjective:+ chest pain, no shortness of breath, no dizziness, no palpitations    HPI Eugenia Waller is a 62 y. o.year old who  has a past medical history of CAD (coronary artery disease), Diabetes mellitus ([address] H/O cardiovascular stress test, H/O echocardiogram, H/O echocardiogram, H/O exercise stress test, Herniated lumbar intervertebral disc, History of cardiac cath, History of exercise stress test, Hypertension, and Sleep apnea in adult.  and presents for management of CAD,s/p CABG, DM, HTN, AFIB, DYSLPIDEMIA , which are well controlled    Patient had NTG 2 days ago for chest pain, he had PCI or RCA IN 7/20    Current Outpatient Medications   Medication Sig Dispense Refill    prasugrel (EFFIENT) 10 MG TABS Take 1 tablet by mouth daily 90 tablet 3    carvedilol (COREG) 12.5 MG tablet Take 1 tablet by mouth 2 times daily (with meals) 180 tablet 3    apixaban (ELIQUIS) 5 MG TABS tablet Take 1 tablet by mouth 2 times daily 180 tablet 3    lisinopril (PRINIVIL;ZESTRIL) 10 MG tablet Take 1 tablet by mouth daily 90 tablet 3    methocarbamol (ROBAXIN) 500 MG tablet Take 500 mg by mouth 4 times daily      furosemide (LASIX) 20 MG tablet Take 1 tablet by mouth 2 times daily 90 tablet 3    potassium chloride (KLOR-CON) 20 MEQ packet Take 20 mEq by mouth 2 times daily 90 each 2    nitroGLYCERIN (NITROSTAT) 0.4 MG SL tablet Place 1 tablet under the tongue every 5 minutes as needed for Chest pain 25 tablet 3    diltiazem (CARDIZEM CD) 360 MG extended release capsule Take 1 capsule by mouth daily 90 capsule 3    glycopyrrolate-formoterol (BEVESPI AEROSPHERE) 9-4.8 MCG/ACT AERO Inhale 2 puffs into the lungs 2 times daily 1 Inhaler 5    busPIRone (BUSPAR) 7.5 MG tablet 1 tablet 2 times daily      traZODone (DESYREL) 50 MG tablet nightly as needed      hydrochlorothiazide (HYDRODIURIL) 25 MG tablet Take 1 tablet by mouth daily 90 tablet 3    aspirin EC 81 MG EC tablet Take 1 tablet by mouth daily 90 tablet 1    glipiZIDE (GLUCOTROL XL) 10 MG extended release tablet Take 10 mg by mouth daily      metFORMIN (GLUCOPHAGE-XR) 500 MG extended release tablet TAKE 2 TABLETS BY MOUTH TWICE DAILY  5    benzonatate (TESSALON) 200 MG capsule TAKE 1 CAPSULE BY MOUTH EVERY 8 HOURS AS NEEDED FOR COUGH  0    rosuvastatin (CRESTOR) 20 MG tablet TAKE 1 TABLET BY MOUTH ONCE DAILY AT BEDTIME  5    VENTOLIN  (90 Base) MCG/ACT inhaler INHALE 2 PUFFS BY MOUTH EVERY 4 TO 6 HOURS AS NEEDED  5    gabapentin (NEURONTIN) 600 MG tablet Take 600 mg by mouth 3 times daily. No current facility-administered medications for this visit. Allergies: Patient has no known allergies.   Past Medical History:   Diagnosis Date    CAD (coronary artery disease)     s/p CABG, sees Dr. Nasreen Gutierrez    Diabetes mellitus ([facility]     H/O cardiovascular stress test 05/16/2019; 6/30/2020 abnormal stress test, LVEF 40%, Myocardial perfusion scan shows moderate size, severe intensity, non reversible perfusion defect in inferoapical wall.  H/O echocardiogram 05/06/2019    Limited study-Left ventricular function is low normal. EF 50-55% Mild left ventricular hypertrophy.  H/O echocardiogram 07/21/2020    EF 35-40%, Mod MR, Mild TR & LVH.    H/O exercise stress test 12/19/2019    Submaximal ECG stress test. stress test stopped due to Shortness of breath. Proceed with cardiac rehab    Herniated lumbar intervertebral disc     History of cardiac cath 07/31/2020    PATENT 2/3 GRAFTS, SEVERE NATIVE RCA stenosis at mentioned above, required PCI with YAMILE as mentioned    History of exercise stress test 08/20/2020    Treadmill, normal stress test, THR achieved    Hypertension     Sleep apnea in adult 11/20/2019     Past Surgical History:   Procedure Laterality Date    CIRCUMCISION, [address],  [address]  8/1/2013    CORONARY ARTERY BYPASS GRAFT MAZE PROCEDURE N/A 11/22/2019    CABG CORONARY ARTERY BYPASS GRAFT X3, MAZE PROCEDURE, INTRAOPERATIVE WALDEMAR, INDUCED HYPOTHERMIA, ENDOSCOPIC VEIN HARVEST OF THE LEFT LEG performed by Johana Juárez MD at [place] [address] Right     Transposition of Right Ulnar Nerve     History reviewed. No pertinent family history.   Social History     Tobacco Use    Smoking status: Former Smoker     Packs/day: 0.25     Years: 30.00     Pack years: 7.50     Types: Cigarettes    Smokeless tobacco: Never Used   Substance Use Topics    Alcohol use: No     Alcohol/week: 0.0 standard drinks      [unfilled]  Review of Systems:   · Constitutional: No Fever or Weight Loss   · Eyes: No Decreased Vision  · ENT: No Headaches, Hearing Loss or Vertigo  · Cardiovascular: No chest pain, dyspnea on exertion, palpitations or loss of consciousness  · Respiratory: No cough or wheezing    · Gastrointestinal: No abdominal pain, appetite loss, blood in stools, constipation, diarrhea or heartburn  · Genitourinary: No dysuria, trouble voiding, or hematuria  · Musculoskeletal:  No gait disturbance, weakness or joint complaints  · Integumentary: No rash or pruritis  · Neurological: No TIA or stroke symptoms  · Psychiatric: No anxiety or depression  · Endocrine: No malaise, fatigue or temperature intolerance  · Hematologic/Lymphatic: No bleeding problems, blood clots or swollen lymph nodes  · Allergic/Immunologic: No nasal congestion or hives  All systems negative except as marked. Objective: There were no vitals taken for this visit. Wt Readings from Last 3 Encounters:   08/12/20 241 lb (109.3 kg)   07/31/20 232 lb (105.2 kg)   07/22/20 242 lb (109.8 kg)     There is no height or weight on file to calculate BMI. PHYSICAL EXAMINATION:  [ INSTRUCTIONS:  \"[x]\" Indicates a positive item  \"[]\" Indicates a negative item  -- DELETE ALL ITEMS NOT EXAMINED]  Vital Signs: (As obtained by patient/caregiver or practitioner observation)    Blood pressure-  Heart rate-    Respiratory rate-    Temperature-  Pulse oximetry-     Constitutional: [] Appears well-developed and well-nourished [] No apparent distress      [] Abnormal-   Mental status  [] Alert and awake  [] Oriented to person/place/time []Able to follow commands      Eyes:  EOM    []  Normal  [] Abnormal-  Sclera  []  Normal  [] Abnormal -         Discharge []  None visible  [] Abnormal -    HENT:   [] Normocephalic, atraumatic.   [] Abnormal   [] Mouth/Throat: Mucous membranes are moist.     External Ears [] Normal  [] Abnormal-     Neck: [] No visualized mass     Pulmonary/Chest: [] Respiratory effort normal.  [] No visualized signs of difficulty breathing or respiratory distress        [] Abnormal-      Musculoskeletal:   [] Normal gait with no signs of ataxia         [] Normal range of motion of neck        [] Abnormal-       Neurological:        [] No Facial Asymmetry (Cranial nerve 7 motor function) (limited exam to video visit)          [] No gaze palsy        [] Abnormal-         Skin:        [] No significant exanthematous lesions or discoloration noted on facial skin         [] Abnormal-            Psychiatric:       [] Normal Affect [] No Hallucinations        [] Abnormal-     Other pertinent observable physical exam findings-     No results found for: CKTOTAL, CKMB, CKMBINDEX, TROPONINI  BNP:  No results found for: BNP  PT/INR:  No results found for: PTINR  Lab Results   Component Value Date    LABA1C 6.6 (H) 11/20/2019    LABA1C 7.1 (H) 05/04/2019     Lab Results   Component Value Date    CHOL 137 05/04/2020    TRIG 147 05/04/2020    HDL 57 05/04/2020    LDLCALC 51 05/04/2020    LDLDIRECT 56 05/03/2019     Lab Results   Component Value Date    ALT 21 05/04/2020    AST 19 05/04/2020     TSH:  No results found for: TSH    Impression:  Maddi Shultz is a 62 y. o.year old who  has a past medical history of CAD (coronary artery disease), Diabetes mellitus ([facility] [address] H/O cardiovascular stress test, H/O echocardiogram, H/O echocardiogram, H/O exercise stress test, Herniated lumbar intervertebral disc, History of cardiac cath, History of exercise stress test, Hypertension, and Sleep apnea in adult. and presents with     Plan:  1. RECENT CHEST PAIN AND NTG USE WITH CADs/p PCI of native RCA,his SVG to RCA was occluded, LIMA was patent, will get stress test  and will continue  statins, bb, effient and eliquis stop aspirin and continue effient and eliquis  2. ICM: LVEF 35-40%, WILL continue to optimize medication,   3. Continue aspirin  continue statins,ACE INHIBOTR , betablockers,Ntg   4. Orthopnea: TAKES 2 ;PILLOWS  TAKING lasix  5. Anxiety: not seen a psychiatory, continue xanax   6. Leg swelling: use compression socks  7. Paroxysmal afib: Continue eliquis, s/p  maze and LOREN ligation  8. Dyslipidemia: continue statins  9.  HTN: stable, continue COREG AND, lisinopril HCTZ medications  DM: stable: continue  [facility] All labs, medications and tests reviewedAll labs, medications and tests reviewed, continue all other medications of all above medical condition listed as is.                   Suzanna Franks is a 62 y.o. male being evaluated by a Virtual Visit (AUDIO visit) encounter to address concerns as mentioned above. A caregiver was present when appropriate. Due to this being a TeleHealth encounter (During [facility]-27 public health emergency), evaluation of the following organ systems was limited: Vitals/Constitutional/EENT/Resp/CV/GI//MS/Neuro/Skin/Heme-Lymph-Imm. Pursuant to the emergency declaration under the [facility], [address] [place] [address] authority and the [employer] Act, this Virtual Visit was conducted with patient's (and/or legal guardian's) consent, to reduce the patient's risk of exposure to COVID-19 and provide necessary medical care. The patient (and/or legal guardian) has also been advised to contact this office for worsening conditions or problems, and seek emergency medical treatment and/or call 911 if deemed necessary. Services were provided through a AUDIO synchronous discussion virtually to substitute for in-person clinic visit. Patient and provider were located at their individual homes. 1.   I confirm that this visit was completed in a telehealth setting ,using synchronous audiovisual technology for real time patient interaction . The patient identity with name and date of birth was confirmed . This evaluation of patient was done by telehealth in the setting of [address] emergency , which precluded assurance of safe in person visit at the time of service. The patient consented to and accepts potential risks associated with telemedical evaluation and care was taken to assess sandra presence of any medical issues that would be more  appropriate for expedited in -person care. Pursuant to the emergency declaration under the [address] Emergencies Act, 1135 waiver authority and the Coronavirus Preparedness and Response Supplemental Appropriations Act, this Virtual  Visit was conducted, with patient's consent, to reduce the patient's risk of exposure to COVID-19 and provide continuity of care for an established patient. Services were provided through a video synchronous discussion virtually to substitute for in-person clinic visit. 2. I Affirm this is a Patient Initiated Episode with an Established Patient who has not had a related appointment within my department in the past 7 days or scheduled within the next 24 hours. --Hortencia Alaniz MD on 11/11/2020 at 4:31 PM    An electronic signature was used to authenticate this note.

## 2020-11-16 RX ORDER — HYDROCHLOROTHIAZIDE 25 MG/1
TABLET ORAL
Qty: 90 TABLET | Refills: 3 | Status: SHIPPED | OUTPATIENT
Start: 2020-11-16 | End: 2021-07-29 | Stop reason: CLARIF

## 2020-11-16 NOTE — TELEPHONE ENCOUNTER
[facility]  H[facility]Z 25 mg qd #90 Rx3 pended and routed to Lillian MARCH for approval.

## 2020-11-20 ENCOUNTER — PROCEDURE VISIT (OUTPATIENT)
Dept: CARDIOLOGY CLINIC | Age: 60
End: 2020-11-20
Payer: COMMERCIAL

## 2020-11-20 LAB
LV EF: 54 %
LVEF MODALITY: NORMAL

## 2020-11-20 PROCEDURE — A9500 TC99M SESTAMIBI: HCPCS | Performed by: INTERNAL MEDICINE

## 2020-11-20 PROCEDURE — 93017 CV STRESS TEST TRACING ONLY: CPT | Performed by: INTERNAL MEDICINE

## 2020-11-20 PROCEDURE — 93018 CV STRESS TEST I&R ONLY: CPT | Performed by: INTERNAL MEDICINE

## 2020-11-20 PROCEDURE — 78452 HT MUSCLE IMAGE SPECT MULT: CPT | Performed by: INTERNAL MEDICINE

## 2020-11-20 PROCEDURE — 93016 CV STRESS TEST SUPVJ ONLY: CPT | Performed by: INTERNAL MEDICINE

## 2020-11-23 ENCOUNTER — TELEPHONE (OUTPATIENT)
Dept: CARDIOLOGY CLINIC | Age: 60
End: 2020-11-23

## 2020-11-23 NOTE — TELEPHONE ENCOUNTER
Left message for pt to call.  He has OV 12/2/20 @ 3 PM in [place] physician Dr. Ina Whitman .    [place] Call stress test, normal LVEF, increased EDV, Myocardial perfusion scan     shows moderatel size, severe intensity, non reversible perfusion defect in     apical wall with trace perinfarct ischemia.          Recommendation     OPTIMIZE MEDICATION

## 2020-12-04 RX ORDER — FUROSEMIDE 20 MG/1
TABLET ORAL
Qty: 90 TABLET | Refills: 0 | Status: SHIPPED | OUTPATIENT
Start: 2020-12-04 | End: 2020-12-28

## 2020-12-11 ENCOUNTER — TELEPHONE (OUTPATIENT)
Dept: CARDIOLOGY CLINIC | Age: 60
End: 2020-12-11

## 2020-12-11 NOTE — TELEPHONE ENCOUNTER
Patient called is having a epidural [facility] in [place]  12/18 in his back they are asking him to malinda jackson his   Blood thinners, Patient to have office fax the request

## 2020-12-14 NOTE — TELEPHONE ENCOUNTER
Please see note below; we have not received any requests. Patient takes Eliquiis for PAF and is also on Effient due to PCI on 7/31/20. I called and spoke w/Korin WRIGHT at [facility]. She states patient is scheduled for transforaminal lumber injection and they usually ask that patients hold any blood thinning meds for 5 days prior. However, if patient is not able to do this due to unacceptable risk of being off these medications, they can work around this and injection could still be done. She asks that we check to see if it would be OK for patient to hold either or both meds for any period of time prior to procedure and communicate this to the patient. Will route to Madhuri MARCH to review/advise.

## 2020-12-16 NOTE — TELEPHONE ENCOUNTER
Revised Cardiac Risk Index:   High risk type of surgery no   H/O ischemic heart disease  (h/o MI, or a positive stress test, current complaint of chest pain considered to be secondary to myocardial ischemia,  Use of nitrate therapy or ECG with pathological Q waves; do not count prior coronary revascularization procedure unless one of the other criteria for ischemic heart disease is present) yes     H/O heart failure yes  H/O CVA yes   Patient is  able to walk up a flight of stairs or walk around the block  H/O DM treated with insulin no  Preoperative serum creatinine >2.0 mg/dl (177 micromol/L) no   Has a history of atrial fibrillation Yes  Has a history of VHD No  EKG ( 8/3/2020) does not have changes from EKG ( 2/18/2020)    The calculated rate of cardiac death, nonfatal myocardial infarction, and nonfatal cardiac arrest according to the number of predictors is; Three or more risk factors  5.4% (95% CI: 2.8-7.9)     he is considered a moderate risk for surgery. Anticoagulation or Antiplatelet therapy can be held prior to surgery at the discretion of the surgeon. It should be resumed as soon as possible if held.

## 2020-12-28 RX ORDER — FUROSEMIDE 20 MG/1
TABLET ORAL
Qty: 180 TABLET | Refills: 0 | Status: SHIPPED | OUTPATIENT
Start: 2020-12-28 | End: 2021-02-24 | Stop reason: SDUPTHER

## 2020-12-31 ENCOUNTER — HOSPITAL ENCOUNTER (OUTPATIENT)
Dept: MRI IMAGING | Age: 60
Discharge: HOME | End: 2020-12-31
Payer: COMMERCIAL

## 2020-12-31 DIAGNOSIS — M51.86 CALCIFICATION OF INTERVERTEBRAL CARTILAGE OR DISC OF LUMBAR REGION: ICD-10-CM

## 2020-12-31 DIAGNOSIS — M54.40 LOW BACK PAIN WITH SCIATICA, SCIATICA LATERALITY UNSPECIFIED, UNSPECIFIED BACK PAIN LATERALITY, UNSPECIFIED CHRONICITY: ICD-10-CM

## 2020-12-31 PROCEDURE — 72148 MRI LUMBAR SPINE W/O DYE: CPT

## 2021-01-11 RX ORDER — DILTIAZEM HYDROCHLORIDE 360 MG/1
360 CAPSULE, EXTENDED RELEASE ORAL DAILY
Qty: 90 CAPSULE | Refills: 3 | Status: SHIPPED | OUTPATIENT
Start: 2021-01-11 | End: 2022-02-01 | Stop reason: SDUPTHER

## 2021-02-24 ENCOUNTER — HOSPITAL ENCOUNTER (OUTPATIENT)
Age: 61
Discharge: HOME | End: 2021-02-24
Payer: COMMERCIAL

## 2021-02-24 ENCOUNTER — OFFICE VISIT (OUTPATIENT)
Dept: CARDIOLOGY CLINIC | Age: 61
End: 2021-02-24
Payer: COMMERCIAL

## 2021-02-24 VITALS
HEIGHT: 71 IN | WEIGHT: 250 LBS | BODY MASS INDEX: 35 KG/M2 | HEART RATE: 75 BPM | DIASTOLIC BLOOD PRESSURE: 72 MMHG | SYSTOLIC BLOOD PRESSURE: 126 MMHG

## 2021-02-24 DIAGNOSIS — I50.20 SYSTOLIC CONGESTIVE HEART FAILURE, UNSPECIFIED HF CHRONICITY (HCC): Primary | ICD-10-CM

## 2021-02-24 LAB
ANION GAP SERPL CALCULATED.3IONS-SCNC: 13 MMOL/L (ref 4–16)
BUN BLDV-MCNC: 39 MG/DL (ref 6–23)
CALCIUM SERPL-MCNC: 9.2 MG/DL (ref 8.3–10.6)
CHLORIDE BLD-SCNC: 101 MMOL/L (ref 99–110)
CO2: 25 MMOL/L (ref 21–32)
CREAT SERPL-MCNC: 1.8 MG/DL (ref 0.9–1.3)
GFR AFRICAN AMERICAN: 47 ML/MIN/1.73M2
GFR NON-AFRICAN AMERICAN: 39 ML/MIN/1.73M2
GLUCOSE BLD-MCNC: 97 MG/DL (ref 70–99)
POTASSIUM SERPL-SCNC: 4.5 MMOL/L (ref 3.5–5.1)
SODIUM BLD-SCNC: 139 MMOL/L (ref 135–145)

## 2021-02-24 PROCEDURE — 99214 OFFICE O/P EST MOD 30 MIN: CPT | Performed by: INTERNAL MEDICINE

## 2021-02-24 PROCEDURE — 80048 BASIC METABOLIC PNL TOTAL CA: CPT

## 2021-02-24 PROCEDURE — 36415 COLL VENOUS BLD VENIPUNCTURE: CPT

## 2021-02-24 RX ORDER — FUROSEMIDE 40 MG/1
TABLET ORAL
Qty: 90 TABLET | Refills: 3 | Status: SHIPPED | OUTPATIENT
Start: 2021-02-24 | End: 2021-08-17

## 2021-02-24 NOTE — PROGRESS NOTES
Shameka Madrigal MD        OFFICE  FOLLOWUP NOTE    Chief complaints: patient is here for management of  CAD,s/p CABG, DM, HTN, AFIB, DYSLPIDEMIA    Subjective: patient feels better, no chest pain, + shortness of breath, no dizziness, no palpitations    HPI Isela Tuttle is a 62 y. o.year old who  has a past medical history of CAD (coronary artery disease), Diabetes mellitus ([address] H/O cardiovascular stress test, H/O echocardiogram, H/O echocardiogram, H/O exercise stress test, Herniated lumbar intervertebral disc, History of cardiac cath, History of exercise stress test, History of nuclear stress test, Hypertension, and Sleep apnea in adult.  and presents for management of  CAD,s/p CABG, DM, HTN, AFIB, DYSLPIDEMIA, which are well controlled    he had PCI or RCA IN 7/20  He gained 15 lbs,HAS LEGswellilng and shortness of breath  Current Outpatient Medications   Medication Sig Dispense Refill    dilTIAZem (CARDIZEM CD) 360 MG extended release capsule Take 1 capsule by mouth daily 90 capsule 3    furosemide (LASIX) 20 MG tablet Take 1 tablet by mouth twice daily 180 tablet 0    glycopyrrolate-formoterol (BEVESPI AEROSPHERE) 9-4.8 MCG/ACT AERO Inhale 2 puffs into the lungs 2 times daily 1 Inhaler 5    hydroCHLOROthiazide (HYDRODIURIL) 25 MG tablet Take 1 tablet by mouth once daily 90 tablet 3    prasugrel (EFFIENT) 10 MG TABS Take 1 tablet by mouth daily 90 tablet 3    carvedilol (COREG) 12.5 MG tablet Take 1 tablet by mouth 2 times daily (with meals) 180 tablet 3    apixaban (ELIQUIS) 5 MG TABS tablet Take 1 tablet by mouth 2 times daily 180 tablet 3    lisinopril (PRINIVIL;ZESTRIL) 10 MG tablet Take 1 tablet by mouth daily 90 tablet 3    methocarbamol (ROBAXIN) 500 MG tablet Take 500 mg by mouth 4 times daily      potassium chloride (KLOR-CON) 20 MEQ packet Take 20 mEq by mouth 2 times daily 90 each 2  nitroGLYCERIN (NITROSTAT) 0.4 MG SL tablet Place 1 tablet under the tongue every 5 minutes as needed for Chest pain 25 tablet 3    busPIRone (BUSPAR) 7.5 MG tablet 1 tablet 2 times daily      traZODone (DESYREL) 50 MG tablet nightly as needed      glipiZIDE (GLUCOTROL XL) 10 MG extended release tablet Take 10 mg by mouth daily      metFORMIN (GLUCOPHAGE-XR) 500 MG extended release tablet TAKE 2 TABLETS BY MOUTH TWICE DAILY  5    rosuvastatin (CRESTOR) 20 MG tablet TAKE 1 TABLET BY MOUTH ONCE DAILY AT BEDTIME  5    VENTOLIN  (90 Base) MCG/ACT inhaler INHALE 2 PUFFS BY MOUTH EVERY 4 TO 6 HOURS AS NEEDED  5    gabapentin (NEURONTIN) 600 MG tablet Take 600 mg by mouth 3 times daily. No current facility-administered medications for this visit. Allergies: Patient has no known allergies. Past Medical History:   Diagnosis Date    CAD (coronary artery disease)     s/p CABG, sees Dr. Deepali Mallory [facility]     H/O cardiovascular stress test 05/16/2019; 6/30/2020    abnormal stress test, LVEF 40%, Myocardial perfusion scan shows moderate size, severe intensity, non reversible perfusion defect in inferoapical wall.  H/O echocardiogram 05/06/2019    Limited study-Left ventricular function is low normal. EF 50-55% Mild left ventricular hypertrophy.  H/O echocardiogram 07/21/2020    EF 35-40%, Mod MR, Mild TR & LVH.    H/O exercise stress test 12/19/2019    Submaximal ECG stress test. stress test stopped due to Shortness of breath. Proceed with cardiac rehab    Herniated lumbar intervertebral disc     History of cardiac cath 07/31/2020    PATENT 2/3 GRAFTS, SEVERE NATIVE RCA stenosis at mentioned above, required PCI with YAMILE as mentioned    History of exercise stress test 08/20/2020    Treadmill, normal stress test, THR achieved    History of nuclear stress test 11/20/2020     Scan shows moderatel size, severe intensity, non reversible perfusion defect.      Hypertension  Sleep apnea in adult 2019     Past Surgical History:   Procedure Laterality Date    CIRCUMCISION, NON-  2013    CORONARY ARTERY BYPASS GRAFT MAZE PROCEDURE N/A 2019    CABG CORONARY ARTERY BYPASS GRAFT X3, MAZE PROCEDURE, INTRAOPERATIVE WALDEMAR, INDUCED HYPOTHERMIA, ENDOSCOPIC VEIN HARVEST OF THE LEFT LEG performed by Sotero Bahena MD at [address] [place] [address] Right     Transposition of Right Ulnar Nerve     History reviewed. No pertinent family history. Social History     Tobacco Use    Smoking status: Former Smoker     Packs/day: 0.25     Years: 30.00     Pack years: 7.50     Types: Cigarettes    Smokeless tobacco: Never Used   Substance Use Topics    Alcohol use: No     Alcohol/week: 0.0 standard drinks      @[facility]  Review of Systems:   · Constitutional: No Fever or Weight Loss   · Eyes: No Decreased Vision  · ENT: No Headaches, Hearing Loss or Vertigo  · Cardiovascular: No chest pain, dyspnea on exertion, palpitations or loss of consciousness  · Respiratory: No cough or wheezing    · Gastrointestinal: No abdominal pain, appetite loss, blood in stools, constipation, diarrhea or heartburn  · Genitourinary: No dysuria, trouble voiding, or hematuria  · Musculoskeletal:  No gait disturbance, weakness or joint complaints  · Integumentary: No rash or pruritis  · Neurological: No TIA or stroke symptoms  · Psychiatric: No anxiety or depression  · Endocrine: No malaise, fatigue or temperature intolerance  · Hematologic/Lymphatic: No bleeding problems, blood clots or swollen lymph nodes  · Allergic/Immunologic: No nasal congestion or hives  All systems negative except as marked. Objective:  /72   Pulse 75   Ht 5' 11\" (1.803 m)   Wt 250 lb (113.4 kg)   BMI 34.87 kg/m²   Wt Readings from Last 3 Encounters:   21 250 lb (113.4 kg)   20 235 lb (106.6 kg)   20 241 lb (109.3 kg)     Body mass index is 34.87 kg/m². GENERAL - Alert, oriented, pleasant, in no apparent distress,normal grooming  HEENT  pupils are intact, cornea intact, conjunctive normal color, ears are normal in exam,  Neck - Supple. No jugular venous distention noted. No carotid bruits, no apical -carotid delay  Respiratory:  Normal breath sounds, No respiratory distress, No wheezing, No chest tenderness. ,no use of accessory muscles, diaphragm movement is normal  Cardiovascular: (PMI) apex non displaced,no lifts no thrills, no s3,no s4, Normal heart rate, Normal rhythm, No murmurs, No rubs, No gallops. Carotid arteries pulse and amplitude are normal no bruit, no abdominal bruit noted ( normal abdominal aorta ausculation),   Extremities - No cyanosis, clubbing, or significant edema, no varicose veins    Abdomen  No masses, tenderness, or organomegaly, no hepato-splenomegally, no bruits  Musculoskeletal  No significant edema, no kyphosis or scoliosis, no deformity in any extremity noted, muscle strength and tone are normal  Skin: no ulcer,no scar,no stasis dermatitis   Neurologic  alert oriented times 3,Cranial nerves II through XII are grossly intact. There were no gross focal neurologic abnormalities.    Psychiatric: normal mood and affect    No results found for: CKTOTAL, CKMB, CKMBINDEX, TROPONINI  BNP:  No results found for: BNP  PT/INR:  No results found for: PTINR  Lab Results   Component Value Date    LABA1C 6.6 (H) 11/20/2019    LABA1C 7.1 (H) 05/04/2019     Lab Results   Component Value Date    CHOL 137 05/04/2020    TRIG 147 05/04/2020    HDL 57 05/04/2020    LDLCALC 51 05/04/2020    LDLDIRECT 56 05/03/2019     Lab Results   Component Value Date    ALT 21 05/04/2020    AST 19 05/04/2020     TSH:  No results found for: TSH Impression:  Lalita Savage is a 62 y. o.year old who  has a past medical history of CAD (coronary artery disease), Diabetes mellitus ([address] H/O cardiovascular stress test, H/O echocardiogram, H/O echocardiogram, H/O exercise stress test, Herniated lumbar intervertebral disc, History of cardiac cath, History of exercise stress test, History of nuclear stress test, Hypertension, and Sleep apnea in adult. and presents with     Plan:  1. RECENT CHEST PAIN AND NTG USE WITH CADs/p PCI of native RCA,his SVG to RCA was occluded, LIMA was patent, will get stress test  and will continue  statins, bb, effient and eliquis offaspirin and continue effient and eliquis  2. ICM: LVEF 35-40%, WILL continue to optimize medication, recheck echo  3. Continue aspirin  continue statins,ACE INHIBOTR , betablockers,Ntg   4. CHF\" Orthopnea: TAKES 2 ;PILLOWS  TAKING lasix, increase lasix to 40mg bid and recheck lans in 2 weeks, continue Kdur  5. Anxiety: not seen a psychiatory, continue xanax   6. Leg swelling: use compression socks  7. Paroxysmal afib: Continue eliquis, s/p  maze and LOREN ligation  8. Dyslipidemia: continue statins  9. HTN: stable, continue COREG AND, lisinopril HCTZ medications  10. DM: stable: continue  insulin  All labs, medications and tests reviewed, continue all other medications of all above medical condition listed as is.

## 2021-02-24 NOTE — LETTER
Dr. Turner Pouch  1960  [id number]    Have you had any Chest Pain that is not new? - No        Have you had any Shortness of Breath - Yes  If Yes - When at rest and on exertion    Have you had any dizziness - No    Have you had any palpitations that are not new?  - No  Do you have any edema - swelling in ankles      How long have they been having edema - Months  If Yes - Have they worn compression stockings Yes    Do you have a surgery or procedure scheduled in the near future - No

## 2021-03-04 ENCOUNTER — PROCEDURE VISIT (OUTPATIENT)
Dept: CARDIOLOGY CLINIC | Age: 61
End: 2021-03-04
Payer: COMMERCIAL

## 2021-03-04 DIAGNOSIS — I50.20 SYSTOLIC CONGESTIVE HEART FAILURE, UNSPECIFIED HF CHRONICITY (HCC): ICD-10-CM

## 2021-03-04 LAB
LV EF: 43 %
LVEF MODALITY: NORMAL

## 2021-03-04 PROCEDURE — 93306 TTE W/DOPPLER COMPLETE: CPT | Performed by: INTERNAL MEDICINE

## 2021-03-18 ENCOUNTER — TELEPHONE (OUTPATIENT)
Dept: CARDIOLOGY CLINIC | Age: 61
End: 2021-03-18

## 2021-03-18 NOTE — TELEPHONE ENCOUNTER
Limited study due to patients body habitus. Left ventricular function is mildly abnormal, EF is estimated at 40-45%. Diastolic dysfunction could not be evaluated due to arrhythmia. Bi atrial enlargement noted. Mitral annular calcification is present. Mild mitral and tricuspid regurgitation is present. Mild pulmonary hypertension with RVSP of 44mmHg. No evidence of pericardial effusion. Pt notified of ECHO test results and f/u; pt voiced understanding.

## 2021-03-24 ENCOUNTER — OFFICE VISIT (OUTPATIENT)
Dept: CARDIOLOGY CLINIC | Age: 61
End: 2021-03-24
Payer: COMMERCIAL

## 2021-03-24 ENCOUNTER — HOSPITAL ENCOUNTER (OUTPATIENT)
Age: 61
Discharge: HOME | End: 2021-03-24
Payer: COMMERCIAL

## 2021-03-24 VITALS
DIASTOLIC BLOOD PRESSURE: 88 MMHG | SYSTOLIC BLOOD PRESSURE: 138 MMHG | HEART RATE: 110 BPM | BODY MASS INDEX: 34.65 KG/M2 | WEIGHT: 242 LBS | HEIGHT: 70 IN | TEMPERATURE: 97.3 F

## 2021-03-24 DIAGNOSIS — I50.20 SYSTOLIC CONGESTIVE HEART FAILURE, UNSPECIFIED HF CHRONICITY (HCC): Primary | ICD-10-CM

## 2021-03-24 LAB
ANION GAP SERPL CALCULATED.3IONS-SCNC: 16 MMOL/L (ref 4–16)
BUN BLDV-MCNC: 37 MG/DL (ref 6–23)
CALCIUM SERPL-MCNC: 9.5 MG/DL (ref 8.3–10.6)
CHLORIDE BLD-SCNC: 95 MMOL/L (ref 99–110)
CO2: 25 MMOL/L (ref 21–32)
CREAT SERPL-MCNC: 2 MG/DL (ref 0.9–1.3)
GFR AFRICAN AMERICAN: 42 ML/MIN/1.73M2
GFR NON-AFRICAN AMERICAN: 34 ML/MIN/1.73M2
GLUCOSE BLD-MCNC: 132 MG/DL (ref 70–99)
POTASSIUM SERPL-SCNC: 4 MMOL/L (ref 3.5–5.1)
SODIUM BLD-SCNC: 136 MMOL/L (ref 135–145)

## 2021-03-24 PROCEDURE — 99214 OFFICE O/P EST MOD 30 MIN: CPT | Performed by: INTERNAL MEDICINE

## 2021-03-24 PROCEDURE — 36415 COLL VENOUS BLD VENIPUNCTURE: CPT

## 2021-03-24 PROCEDURE — 80048 BASIC METABOLIC PNL TOTAL CA: CPT

## 2021-03-24 NOTE — PROGRESS NOTES
Toni Narvaez MD        OFFICE  FOLLOWUP NOTE    Chief complaints: patient is here for management of  CAD,s/p CABG, DM, HTN, AFIB, DYSLPIDEMIA     F/u on echo and lasix use  Subjective: patient feels better, no chest pain, no shortness of breath, no dizziness, no palpitations    HPI Taylor Andersen is a 62 y. o.year old who  has a past medical history of CAD (coronary artery disease), Diabetes mellitus ([address] H/O cardiovascular stress test, H/O echocardiogram, H/O echocardiogram, H/O exercise stress test, Herniated lumbar intervertebral disc, History of cardiac cath, History of exercise stress test, History of nuclear stress test, Hypertension, and Sleep apnea in adult.  and presents for management of  CAD,s/p CABG, DM, HTN, AFIB, DYSLPIDEMIA which are well controlled    HIS LVEF HAS IMPROVED TO 40-45% , IT WAS 30-35%, he has increased lasix to 40mg bid, had lost 8 lbs and feels better, his creatine on last visit was 1.8  Current Outpatient Medications   Medication Sig Dispense Refill    furosemide (LASIX) 40 MG tablet Take 1 tablet by mouth twice daily 90 tablet 3    dilTIAZem (CARDIZEM CD) 360 MG extended release capsule Take 1 capsule by mouth daily 90 capsule 3    glycopyrrolate-formoterol (BEVESPI AEROSPHERE) 9-4.8 MCG/ACT AERO Inhale 2 puffs into the lungs 2 times daily 1 Inhaler 5    hydroCHLOROthiazide (HYDRODIURIL) 25 MG tablet Take 1 tablet by mouth once daily 90 tablet 3    prasugrel (EFFIENT) 10 MG TABS Take 1 tablet by mouth daily 90 tablet 3    carvedilol (COREG) 12.5 MG tablet Take 1 tablet by mouth 2 times daily (with meals) 180 tablet 3    apixaban (ELIQUIS) 5 MG TABS tablet Take 1 tablet by mouth 2 times daily 180 tablet 3    lisinopril (PRINIVIL;ZESTRIL) 10 MG tablet Take 1 tablet by mouth daily 90 tablet 3    methocarbamol (ROBAXIN) 500 MG tablet Take 500 mg by mouth 4 times daily      potassium chloride (KLOR-CON) 20 MEQ packet Take 20 mEq by mouth 2 times daily 90 each 2    nitroGLYCERIN (NITROSTAT) 0.4 MG SL tablet Place 1 tablet under the tongue every 5 minutes as needed for Chest pain 25 tablet 3    busPIRone (BUSPAR) 7.5 MG tablet 1 tablet 2 times daily      traZODone (DESYREL) 50 MG tablet nightly as needed      glipiZIDE (GLUCOTROL XL) 10 MG extended release tablet Take 10 mg by mouth daily      metFORMIN (GLUCOPHAGE-XR) 500 MG extended release tablet TAKE 2 TABLETS BY MOUTH TWICE DAILY  5    rosuvastatin (CRESTOR) 20 MG tablet TAKE 1 TABLET BY MOUTH ONCE DAILY AT BEDTIME  5    VENTOLIN  (90 Base) MCG/ACT inhaler INHALE 2 PUFFS BY MOUTH EVERY 4 TO 6 HOURS AS NEEDED  5    gabapentin (NEURONTIN) 600 MG tablet Take 600 mg by mouth 3 times daily. No current facility-administered medications for this visit. Allergies: Patient has no known allergies. Past Medical History:   Diagnosis Date    CAD (coronary artery disease)     s/p CABG, sees Dr. Eugenio Tubbs [facility]     H/O cardiovascular stress test 05/16/2019; 6/30/2020    abnormal stress test, LVEF 40%, Myocardial perfusion scan shows moderate size, severe intensity, non reversible perfusion defect in inferoapical wall.  H/O echocardiogram 05/06/2019    Limited study-Left ventricular function is low normal. EF 50-55% Mild left ventricular hypertrophy.  H/O echocardiogram 03/04/2021    Left ventricular function is mildly abnormal, estimated EF 40-45%. Mild MR & TR.    H/O exercise stress test 12/19/2019    Submaximal ECG stress test. stress test stopped due to Shortness of breath.  Proceed with cardiac rehab    Herniated lumbar intervertebral disc     History of cardiac cath 07/31/2020    PATENT 2/3 GRAFTS, SEVERE NATIVE RCA stenosis at mentioned above, required PCI with YAMILE as mentioned    History of exercise stress test 08/20/2020    Treadmill, normal stress test, THR achieved    History of nuclear stress test 11/20/2020     Scan shows moderatel size, severe intensity, non reversible perfusion defect.  Hypertension     Sleep apnea in adult 2019     Past Surgical History:   Procedure Laterality Date    CIRCUMCISION, NON-  2013    CORONARY ARTERY BYPASS GRAFT MAZE PROCEDURE N/A 2019    CABG CORONARY ARTERY BYPASS GRAFT X3, MAZE PROCEDURE, INTRAOPERATIVE WALDEMAR, INDUCED HYPOTHERMIA, ENDOSCOPIC VEIN HARVEST OF THE LEFT LEG performed by Keeley Martinez MD at [facility] Right     Transposition of Right Ulnar Nerve     No family history on file. Social History     Tobacco Use    Smoking status: Former Smoker     Packs/day: 0.25     Years: 30.00     Pack years: 7.50     Types: Cigarettes    Smokeless tobacco: Never Used   Substance Use Topics    Alcohol use: No     Alcohol/week: 0.0 standard drinks      @[facility]@  Review of Systems:   · Constitutional: No Fever or Weight Loss   · Eyes: No Decreased Vision  · ENT: No Headaches, Hearing Loss or Vertigo  · Cardiovascular: No chest pain, dyspnea on exertion, palpitations or loss of consciousness  · Respiratory: No cough or wheezing    · Gastrointestinal: No abdominal pain, appetite loss, blood in stools, constipation, diarrhea or heartburn  · Genitourinary: No dysuria, trouble voiding, or hematuria  · Musculoskeletal:  No gait disturbance, weakness or joint complaints  · Integumentary: No rash or pruritis  · Neurological: No TIA or stroke symptoms  · Psychiatric: No anxiety or depression  · Endocrine: No malaise, fatigue or temperature intolerance  · Hematologic/Lymphatic: No bleeding problems, blood clots or swollen lymph nodes  · Allergic/Immunologic: No nasal congestion or hives  All systems negative except as marked.    Objective:  /88   Pulse 110   Temp 97.3 °F (36.3 °C)   Ht 5' 10\" (1.778 m)   Wt 242 lb (109.8 kg)   BMI 34.72 kg/m²   Wt Readings from Last 3 Encounters:   21 242 lb (109.8 kg)   21 250 lb (113.4 kg)   20 235 lb (106.6 kg)     Body mass H/O exercise stress test, Herniated lumbar intervertebral disc, History of cardiac cath, History of exercise stress test, History of nuclear stress test, Hypertension, and Sleep apnea in adult. and presents with     Plan:  1. RECENT CHEST PAIN AND NTG USE WITH CADs/p PCI of native RCA,his SVG to RCA was occluded, LIMA was patent, will get stress test  and will continue  statins, bb, effient and eliquis offaspirin and continue effient and eliquis  2. ICM: LVEF 40-45%, LVEF IS IMPROVED NOW WILL continue to optimize medication,Continue aspirin  continue statins,ACE INHIBOTR , betablockers,Ntg   3. CHF\" Orthopnea:HE FEELS better and lost 8 lbs,  TAKES 2 ;PILLOWS  TAKING lasix, recheck chem 7 and if creatinine is trending up, will decrease lasix again , continue Kdur  4. Anxiety: not seen a psychiatory, continue xanax   5. Leg swelling: use compression socks  6. Paroxysmal afib: Continue eliquis, s/p  maze and LOREN ligation  7. Dyslipidemia: continue statins  8. HTN: stable, continue COREG AND, lisinopril HCTZ medications  9. DM: stable: continue  insulin  All labs, medications and tests reviewed, continue all other medications of all above medical condition listed as is.     [unfilled]

## 2021-04-12 ENCOUNTER — HOSPITAL ENCOUNTER (OUTPATIENT)
Dept: CT IMAGING | Age: 61
Discharge: HOME | End: 2021-04-12
Payer: COMMERCIAL

## 2021-04-12 DIAGNOSIS — R91.1 LUNG NODULE: ICD-10-CM

## 2021-04-12 PROCEDURE — 71250 CT THORAX DX C-: CPT

## 2021-04-21 ENCOUNTER — HOSPITAL ENCOUNTER (OUTPATIENT)
Age: 61
Discharge: HOME | End: 2021-04-21
Payer: COMMERCIAL

## 2021-04-21 ENCOUNTER — OFFICE VISIT (OUTPATIENT)
Dept: CARDIOLOGY CLINIC | Age: 61
End: 2021-04-21
Payer: COMMERCIAL

## 2021-04-21 VITALS
BODY MASS INDEX: 34.07 KG/M2 | HEART RATE: 92 BPM | DIASTOLIC BLOOD PRESSURE: 74 MMHG | HEIGHT: 70 IN | SYSTOLIC BLOOD PRESSURE: 118 MMHG | WEIGHT: 238 LBS

## 2021-04-21 DIAGNOSIS — N18.2 STAGE 2 CHRONIC KIDNEY DISEASE: Primary | ICD-10-CM

## 2021-04-21 LAB
ANION GAP SERPL CALCULATED.3IONS-SCNC: 16 MMOL/L (ref 4–16)
BUN BLDV-MCNC: 39 MG/DL (ref 6–23)
CALCIUM SERPL-MCNC: 9.5 MG/DL (ref 8.3–10.6)
CHLORIDE BLD-SCNC: 97 MMOL/L (ref 99–110)
CO2: 24 MMOL/L (ref 21–32)
CREAT SERPL-MCNC: 1.8 MG/DL (ref 0.9–1.3)
GFR AFRICAN AMERICAN: 47 ML/MIN/1.73M2
GFR NON-AFRICAN AMERICAN: 39 ML/MIN/1.73M2
GLUCOSE BLD-MCNC: 142 MG/DL (ref 70–99)
POTASSIUM SERPL-SCNC: 3.7 MMOL/L (ref 3.5–5.1)
SODIUM BLD-SCNC: 137 MMOL/L (ref 135–145)

## 2021-04-21 PROCEDURE — 99214 OFFICE O/P EST MOD 30 MIN: CPT | Performed by: INTERNAL MEDICINE

## 2021-04-21 PROCEDURE — 80048 BASIC METABOLIC PNL TOTAL CA: CPT

## 2021-04-21 PROCEDURE — 36415 COLL VENOUS BLD VENIPUNCTURE: CPT

## 2021-04-21 RX ORDER — DULAGLUTIDE 1.5 MG/.5ML
1.5 INJECTION, SOLUTION SUBCUTANEOUS WEEKLY
COMMUNITY
End: 2022-08-02 | Stop reason: DRUGHIGH

## 2021-04-21 NOTE — PROGRESS NOTES
Donis Clemons MD        OFFICE  FOLLOWUP NOTE    Chief complaints: patient is here for management of CAD,s/p CABG, DM, HTN, AFIB, DYSLPIDEMIA    Subjective: patient feels better, no chest pain, no shortness of breath, no dizziness, no palpitations    HPI Abeba Claudio is a 62 y. o.year old who  has a past medical history of CAD (coronary artery disease), Diabetes mellitus ([address] H/O cardiovascular stress test, H/O echocardiogram, H/O echocardiogram, H/O exercise stress test, Herniated lumbar intervertebral disc, History of cardiac cath, History of exercise stress test, History of nuclear stress test, Hypertension, and Sleep apnea in adult.  and presents for management of CAD,s/p CABG, DM, HTN, AFIB, DYSLPIDEMIA which are well controlled  His lasix was increased to 40mg bid and in 2 months lost 12 lbs and his creatinine increased from 1.8 to 2.0, HE FEELS better    Current Outpatient Medications   Medication Sig Dispense Refill    Dulaglutide (TRULICITY) 1.5 AH/5.7XX SOPN Inject 1.5 mg into the skin once a week      traMADol (ULTRAM) 50 MG tablet TAKE 1 TABLET BY MOUTH TWICE DAILY FOR 25 DAYS      furosemide (LASIX) 40 MG tablet Take 1 tablet by mouth twice daily 90 tablet 3    dilTIAZem (CARDIZEM CD) 360 MG extended release capsule Take 1 capsule by mouth daily 90 capsule 3    glycopyrrolate-formoterol (BEVESPI AEROSPHERE) 9-4.8 MCG/ACT AERO Inhale 2 puffs into the lungs 2 times daily 1 Inhaler 5    hydroCHLOROthiazide (HYDRODIURIL) 25 MG tablet Take 1 tablet by mouth once daily 90 tablet 3    prasugrel (EFFIENT) 10 MG TABS Take 1 tablet by mouth daily 90 tablet 3    carvedilol (COREG) 12.5 MG tablet Take 1 tablet by mouth 2 times daily (with meals) 180 tablet 3    apixaban (ELIQUIS) 5 MG TABS tablet Take 1 tablet by mouth 2 times daily 180 tablet 3    lisinopril (PRINIVIL;ZESTRIL) 10 MG tablet Take 1 tablet by mouth daily 90 tablet 3    methocarbamol (ROBAXIN) 500 MG tablet Take 500 mg by mouth 4 times daily      potassium chloride (KLOR-CON) 20 MEQ packet Take 20 mEq by mouth 2 times daily 90 each 2    nitroGLYCERIN (NITROSTAT) 0.4 MG SL tablet Place 1 tablet under the tongue every 5 minutes as needed for Chest pain 25 tablet 3    traZODone (DESYREL) 50 MG tablet nightly as needed      metFORMIN (GLUCOPHAGE-XR) 500 MG extended release tablet TAKE 2 TABLETS BY MOUTH TWICE DAILY  5    rosuvastatin (CRESTOR) 20 MG tablet TAKE 1 TABLET BY MOUTH ONCE DAILY AT BEDTIME  5    VENTOLIN  (90 Base) MCG/ACT inhaler INHALE 2 PUFFS BY MOUTH EVERY 4 TO 6 HOURS AS NEEDED  5    gabapentin (NEURONTIN) 600 MG tablet Take 600 mg by mouth 3 times daily.  busPIRone (BUSPAR) 7.5 MG tablet 1 tablet 2 times daily       No current facility-administered medications for this visit. Allergies: Patient has no known allergies. Past Medical History:   Diagnosis Date    CAD (coronary artery disease)     s/p CABG, sees Dr. Genaro Moy [facility]     H/O cardiovascular stress test 05/16/2019; 6/30/2020    abnormal stress test, LVEF 40%, Myocardial perfusion scan shows moderate size, severe intensity, non reversible perfusion defect in inferoapical wall.  H/O echocardiogram 05/06/2019    Limited study-Left ventricular function is low normal. EF 50-55% Mild left ventricular hypertrophy.  H/O echocardiogram 03/04/2021    Left ventricular function is mildly abnormal, estimated EF 40-45%. Mild MR & TR.    H/O exercise stress test 12/19/2019    Submaximal ECG stress test. stress test stopped due to Shortness of breath.  Proceed with cardiac rehab    Herniated lumbar intervertebral disc     History of cardiac cath 07/31/2020    PATENT 2/3 GRAFTS, SEVERE NATIVE RCA stenosis at mentioned above, required PCI with YAMILE as mentioned    History of exercise stress test 08/20/2020    Treadmill, normal stress test, THR achieved    History of nuclear stress test 11/20/2020     Scan shows moderatel size, severe intensity, non reversible perfusion defect.  Hypertension     Sleep apnea in adult 2019     Past Surgical History:   Procedure Laterality Date    CIRCUMCISION, NON-  2013    CORONARY ARTERY BYPASS GRAFT MAZE PROCEDURE N/A 2019    CABG CORONARY ARTERY BYPASS GRAFT X3, MAZE PROCEDURE, INTRAOPERATIVE WALDEMAR, INDUCED HYPOTHERMIA, ENDOSCOPIC VEIN HARVEST OF THE LEFT LEG performed by Reid Miles MD at [place] [address] Right     Transposition of Right Ulnar Nerve     No family history on file. Social History     Tobacco Use    Smoking status: Former Smoker     Packs/day: 0.25     Years: 30.00     Pack years: 7.50     Types: Cigarettes    Smokeless tobacco: Never Used   Substance Use Topics    Alcohol use: No     Alcohol/week: 0.0 standard drinks      @[facility]  Review of Systems:   · Constitutional: No Fever or Weight Loss   · Eyes: No Decreased Vision  · ENT: No Headaches, Hearing Loss or Vertigo  · Cardiovascular: No chest pain, dyspnea on exertion, palpitations or loss of consciousness  · Respiratory: No cough or wheezing    · Gastrointestinal: No abdominal pain, appetite loss, blood in stools, constipation, diarrhea or heartburn  · Genitourinary: No dysuria, trouble voiding, or hematuria  · Musculoskeletal:  No gait disturbance, weakness or joint complaints  · Integumentary: No rash or pruritis  · Neurological: No TIA or stroke symptoms  · Psychiatric: No anxiety or depression  · Endocrine: No malaise, fatigue or temperature intolerance  · Hematologic/Lymphatic: No bleeding problems, blood clots or swollen lymph nodes  · Allergic/Immunologic: No nasal congestion or hives  All systems negative except as marked.    Objective:  /74   Pulse 92   Ht 5' 10\" (1.778 m)   Wt 238 lb (108 kg)   BMI 34.15 kg/m²   Wt Readings from Last 3 Encounters:   21 238 lb (108 kg)   21 242 lb (109.8 kg)   21 242 lb (109.8 kg)     Body mass index is 34.15 kg/m². GENERAL - Alert, oriented, pleasant, in no apparent distress,normal grooming  HEENT - pupils are intact, cornea intact, conjunctive normal color, ears are normal in exam,  Neck - Supple. No jugular venous distention noted. No carotid bruits, no apical -carotid delay  Respiratory:  Normal breath sounds, No respiratory distress, No wheezing, No chest tenderness. ,no use of accessory muscles, diaphragm movement is normal  Cardiovascular: (PMI) apex non displaced,no lifts no thrills, no s3,no s4, Normal heart rate, Normal rhythm, No murmurs, No rubs, No gallops. Carotid arteries pulse and amplitude are normal no bruit, no abdominal bruit noted ( normal abdominal aorta ausculation),   Extremities - No cyanosis, clubbing, or significant edema, no varicose veins    Abdomen - No masses, tenderness, or organomegaly, no hepato-splenomegally, no bruits  Musculoskeletal - No significant edema, no kyphosis or scoliosis, no deformity in any extremity noted, muscle strength and tone are normal  Skin: no ulcer,no scar,no stasis dermatitis   Neurologic - alert oriented times 3,Cranial nerves II through XII are grossly intact. There were no gross focal neurologic abnormalities. Psychiatric: normal mood and affect    No results found for: CKTOTAL, CKMB, CKMBINDEX, TROPONINI  BNP:  No results found for: BNP  PT/INR:  No results found for: PTINR  Lab Results   Component Value Date    LABA1C 6.6 (H) 11/20/2019    LABA1C 7.1 (H) 05/04/2019     Lab Results   Component Value Date    CHOL 137 05/04/2020    TRIG 147 05/04/2020    HDL 57 05/04/2020    LDLCALC 51 05/04/2020    LDLDIRECT 56 05/03/2019     Lab Results   Component Value Date    ALT 21 05/04/2020    AST 19 05/04/2020     TSH:  No results found for: TSH    Impression:  Farhan Hardy is a 62 y. o.year old who  has a past medical history of CAD (coronary artery disease), Diabetes mellitus ([address] H/O cardiovascular stress test, H/O echocardiogram, H/O echocardiogram, H/O exercise stress test, Herniated lumbar intervertebral disc, History of cardiac cath, History of exercise stress test, History of nuclear stress test, Hypertension, and Sleep apnea in adult. and presents with     Plan:  1. renail insufficieny:refer to nephrolohy,he is reluctant to stop lasix and ace inhibitor, will repeat chem 7 and refer to Ada Marcelino  2. RECENT CHEST PAIN AND NTG USE WITH CADs/p PCI of native RCA,his SVG to RCA was occluded, LIMA was patent, will get stress test  and will continue  statins, bb, effient and eliquis offaspirin and continue effient and eliquis  3. ICM: LVEF 40-45%, LVEF IS IMPROVED NOW WILL continue to optimize medication,Continue aspirin  continue statins,ACE INHIBOTR , betablockers,Ntg   4. CHF\" Orthopnea:HE FEELS better and lost 12 lbs,  TAKES 2 ;PILLOWS  TAKING lasix, recheck chem 7 and if creatinine is trending up, will decrease lasix again , continue Kdur  5. Anxiety: not seen a psychiatory, continue xanax   6. Leg swelling: use compression socks  7. Paroxysmal afib: Continue eliquis, s/p  maze and LOREN ligation  8. Dyslipidemia: continue statins  9. HTN: stable, continue COREG AND, lisinopril HCTZ medications  10. DM: stable: continue  insulin  1. Health maintenance: exerise and diet  All labs, medications and tests reviewed, continue all other medications of all above medical condition listed as is.     [unfilled]

## 2021-05-21 PROBLEM — G47.01 INSOMNIA DUE TO MEDICAL CONDITION: Status: ACTIVE | Noted: 2021-05-21

## 2021-05-21 PROBLEM — E66.811 CLASS 1 OBESITY DUE TO EXCESS CALORIES WITHOUT SERIOUS COMORBIDITY WITH BODY MASS INDEX (BMI) OF 33.0 TO 33.9 IN ADULT: Status: ACTIVE | Noted: 2021-05-21

## 2021-05-21 PROBLEM — N18.32 STAGE 3B CHRONIC KIDNEY DISEASE (HCC): Status: ACTIVE | Noted: 2021-05-21

## 2021-05-21 PROBLEM — E66.09 CLASS 1 OBESITY DUE TO EXCESS CALORIES WITHOUT SERIOUS COMORBIDITY WITH BODY MASS INDEX (BMI) OF 33.0 TO 33.9 IN ADULT: Status: ACTIVE | Noted: 2021-05-21

## 2021-05-21 PROBLEM — E11.9 TYPE 2 DIABETES MELLITUS WITHOUT COMPLICATION, WITHOUT LONG-TERM CURRENT USE OF INSULIN (HCC): Status: ACTIVE | Noted: 2021-05-21

## 2021-05-21 PROBLEM — I48.0 PAF (PAROXYSMAL ATRIAL FIBRILLATION) (HCC): Status: ACTIVE | Noted: 2021-05-21

## 2021-05-21 PROBLEM — I50.20 SYSTOLIC CONGESTIVE HEART FAILURE (HCC): Status: ACTIVE | Noted: 2021-05-21

## 2021-05-24 RX ORDER — POTASSIUM CHLORIDE 1.5 G/1.77G
20 POWDER, FOR SOLUTION ORAL 2 TIMES DAILY
Qty: 60 EACH | Refills: 6 | Status: SHIPPED | OUTPATIENT
Start: 2021-05-24 | End: 2021-11-10 | Stop reason: SDUPTHER

## 2021-05-26 ENCOUNTER — OFFICE VISIT (OUTPATIENT)
Dept: CARDIOLOGY CLINIC | Age: 61
End: 2021-05-26
Payer: COMMERCIAL

## 2021-05-26 VITALS
HEIGHT: 71 IN | HEART RATE: 96 BPM | WEIGHT: 235.4 LBS | BODY MASS INDEX: 32.96 KG/M2 | DIASTOLIC BLOOD PRESSURE: 88 MMHG | SYSTOLIC BLOOD PRESSURE: 136 MMHG

## 2021-05-26 DIAGNOSIS — I25.10 CORONARY ARTERIOSCLEROSIS AFTER PERCUTANEOUS TRANSLUMINAL CORONARY ANGIOPLASTY (PTCA): Primary | ICD-10-CM

## 2021-05-26 DIAGNOSIS — Z98.61 CORONARY ARTERIOSCLEROSIS AFTER PERCUTANEOUS TRANSLUMINAL CORONARY ANGIOPLASTY (PTCA): Primary | ICD-10-CM

## 2021-05-26 PROCEDURE — 99214 OFFICE O/P EST MOD 30 MIN: CPT | Performed by: INTERNAL MEDICINE

## 2021-06-28 RX ORDER — CARVEDILOL 12.5 MG/1
12.5 TABLET ORAL 2 TIMES DAILY WITH MEALS
Qty: 180 TABLET | Refills: 3 | Status: SHIPPED | OUTPATIENT
Start: 2021-06-28 | End: 2021-12-08

## 2021-07-09 RX ORDER — PRASUGREL 10 MG/1
10 TABLET, FILM COATED ORAL DAILY
Qty: 90 TABLET | Refills: 3 | OUTPATIENT
Start: 2021-07-09

## 2021-07-09 RX ORDER — PRASUGREL 10 MG/1
10 TABLET, FILM COATED ORAL DAILY
Qty: 90 TABLET | Refills: 3 | Status: SHIPPED | OUTPATIENT
Start: 2021-07-09 | End: 2022-07-26 | Stop reason: SDUPTHER

## 2021-07-09 RX ORDER — LISINOPRIL 10 MG/1
10 TABLET ORAL DAILY
Qty: 90 TABLET | Refills: 3 | OUTPATIENT
Start: 2021-07-09

## 2021-07-09 RX ORDER — APIXABAN 5 MG/1
TABLET, FILM COATED ORAL
Qty: 60 TABLET | Refills: 0 | OUTPATIENT
Start: 2021-07-09

## 2021-07-09 RX ORDER — LISINOPRIL 10 MG/1
10 TABLET ORAL DAILY
Qty: 90 TABLET | Refills: 3 | Status: SHIPPED | OUTPATIENT
Start: 2021-07-09 | End: 2021-12-08

## 2021-07-12 ENCOUNTER — HOSPITAL ENCOUNTER (OUTPATIENT)
Dept: CT IMAGING | Age: 61
Discharge: HOME | End: 2021-07-12
Payer: COMMERCIAL

## 2021-07-12 DIAGNOSIS — Q61.00 CONGENITAL RENAL CYST OF LEFT KIDNEY: ICD-10-CM

## 2021-07-12 LAB
GFR AFRICAN AMERICAN: ABNORMAL ML/MIN/1.73M2
GFR NON-AFRICAN AMERICAN: ABNORMAL ML/MIN/1.73M2
POC CREATININE: 1.5 MG/DL (ref 0.9–1.3)

## 2021-07-12 PROCEDURE — 6360000004 HC RX CONTRAST MEDICATION: Performed by: PHYSICIAN ASSISTANT

## 2021-07-12 PROCEDURE — 74170 CT ABD WO CNTRST FLWD CNTRST: CPT

## 2021-07-12 RX ADMIN — IOPAMIDOL 100 ML: 755 INJECTION, SOLUTION INTRAVENOUS at 11:18

## 2021-07-19 ENCOUNTER — HOSPITAL ENCOUNTER (OUTPATIENT)
Age: 61
Discharge: HOME | End: 2021-07-19
Payer: COMMERCIAL

## 2021-07-19 LAB
ALBUMIN SERPL-MCNC: 4.5 GM/DL (ref 3.4–5)
ANION GAP SERPL CALCULATED.3IONS-SCNC: 12 MMOL/L (ref 4–16)
BACTERIA: NEGATIVE /HPF
BASOPHILS ABSOLUTE: 0.1 K/CU MM
BASOPHILS RELATIVE PERCENT: 0.8 % (ref 0–1)
BILIRUBIN URINE: NEGATIVE MG/DL
BLOOD, URINE: NEGATIVE
BUN BLDV-MCNC: 25 MG/DL (ref 6–23)
CALCIUM SERPL-MCNC: 9.7 MG/DL (ref 8.3–10.6)
CAST TYPE: NORMAL /HPF
CHLORIDE BLD-SCNC: 95 MMOL/L (ref 99–110)
CLARITY: CLEAR
CO2: 25 MMOL/L (ref 21–32)
COLOR: YELLOW
CREAT SERPL-MCNC: 1.5 MG/DL (ref 0.9–1.3)
CREATININE URINE: 158.9 MG/DL (ref 39–259)
CRYSTAL TYPE: NEGATIVE /HPF
DIFFERENTIAL TYPE: ABNORMAL
EOSINOPHILS ABSOLUTE: 0.6 K/CU MM
EOSINOPHILS RELATIVE PERCENT: 5.6 % (ref 0–3)
EPITHELIAL CELLS, UA: <1 /HPF
GFR AFRICAN AMERICAN: 58 ML/MIN/1.73M2
GFR NON-AFRICAN AMERICAN: 48 ML/MIN/1.73M2
GLUCOSE BLD-MCNC: 123 MG/DL (ref 70–99)
GLUCOSE, URINE: NEGATIVE MG/DL
HCT VFR BLD CALC: 45.4 % (ref 42–52)
HEMOGLOBIN: 14.9 GM/DL (ref 13.5–18)
IMMATURE NEUTROPHIL %: 0.2 % (ref 0–0.43)
KETONES, URINE: NEGATIVE MG/DL
LEUKOCYTE ESTERASE, URINE: NEGATIVE
LYMPHOCYTES ABSOLUTE: 2 K/CU MM
LYMPHOCYTES RELATIVE PERCENT: 20.4 % (ref 24–44)
MCH RBC QN AUTO: 30 PG (ref 27–31)
MCHC RBC AUTO-ENTMCNC: 32.8 % (ref 32–36)
MCV RBC AUTO: 91.5 FL (ref 78–100)
MONOCYTES ABSOLUTE: 0.9 K/CU MM
MONOCYTES RELATIVE PERCENT: 9 % (ref 0–4)
NITRITE URINE, QUANTITATIVE: NEGATIVE
PDW BLD-RTO: 13.5 % (ref 11.7–14.9)
PH, URINE: 6 (ref 5–8)
PHOSPHORUS: 2.9 MG/DL (ref 2.5–4.9)
PLATELET # BLD: 247 K/CU MM (ref 140–440)
PMV BLD AUTO: 9.9 FL (ref 7.5–11.1)
POTASSIUM SERPL-SCNC: 3.4 MMOL/L (ref 3.5–5.1)
PROT/CREAT RATIO, UR: 0.2
PROTEIN UA: NEGATIVE MG/DL
RBC # BLD: 4.96 M/CU MM (ref 4.6–6.2)
RBC URINE: NEGATIVE /HPF (ref 0–3)
SEGMENTED NEUTROPHILS ABSOLUTE COUNT: 6.3 K/CU MM
SEGMENTED NEUTROPHILS RELATIVE PERCENT: 64 % (ref 36–66)
SODIUM BLD-SCNC: 132 MMOL/L (ref 135–145)
SPECIFIC GRAVITY UA: 1.02 (ref 1–1.03)
TOTAL IMMATURE NEUTOROPHIL: 0.02 K/CU MM
URINE TOTAL PROTEIN: 31.4 MG/DL
UROBILINOGEN, URINE: 0.2 MG/DL (ref 0.2–1)
WBC # BLD: 9.9 K/CU MM (ref 4–10.5)
WBC UA: NEGATIVE /HPF (ref 0–2)

## 2021-07-19 PROCEDURE — 85025 COMPLETE CBC W/AUTO DIFF WBC: CPT

## 2021-07-19 PROCEDURE — 82040 ASSAY OF SERUM ALBUMIN: CPT

## 2021-07-19 PROCEDURE — 81001 URINALYSIS AUTO W/SCOPE: CPT

## 2021-07-19 PROCEDURE — 84156 ASSAY OF PROTEIN URINE: CPT

## 2021-07-19 PROCEDURE — 80048 BASIC METABOLIC PNL TOTAL CA: CPT

## 2021-07-19 PROCEDURE — 84100 ASSAY OF PHOSPHORUS: CPT

## 2021-07-19 PROCEDURE — 36415 COLL VENOUS BLD VENIPUNCTURE: CPT

## 2021-07-19 PROCEDURE — 82570 ASSAY OF URINE CREATININE: CPT

## 2021-07-23 PROBLEM — N28.9 RENAL LESION: Status: ACTIVE | Noted: 2021-07-23

## 2021-07-28 ENCOUNTER — HOSPITAL ENCOUNTER (OUTPATIENT)
Dept: CT IMAGING | Age: 61
Discharge: HOME | End: 2021-07-28
Payer: COMMERCIAL

## 2021-07-28 DIAGNOSIS — R91.1 LUNG NODULE: ICD-10-CM

## 2021-07-28 PROCEDURE — 71250 CT THORAX DX C-: CPT

## 2021-08-17 RX ORDER — FUROSEMIDE 40 MG/1
TABLET ORAL
Qty: 60 TABLET | Refills: 0 | Status: SHIPPED | OUTPATIENT
Start: 2021-08-17 | End: 2021-12-15 | Stop reason: SDUPTHER

## 2021-09-01 RX ORDER — POTASSIUM CHLORIDE 20 MEQ/1
20 TABLET, EXTENDED RELEASE ORAL 2 TIMES DAILY
Qty: 180 TABLET | Refills: 1 | Status: SHIPPED | OUTPATIENT
Start: 2021-09-01

## 2021-11-10 ENCOUNTER — OFFICE VISIT (OUTPATIENT)
Dept: CARDIOLOGY CLINIC | Age: 61
End: 2021-11-10
Payer: COMMERCIAL

## 2021-11-10 VITALS
DIASTOLIC BLOOD PRESSURE: 72 MMHG | BODY MASS INDEX: 32.5 KG/M2 | SYSTOLIC BLOOD PRESSURE: 136 MMHG | HEIGHT: 70 IN | HEART RATE: 101 BPM | WEIGHT: 227 LBS

## 2021-11-10 DIAGNOSIS — I48.0 PAF (PAROXYSMAL ATRIAL FIBRILLATION) (HCC): ICD-10-CM

## 2021-11-10 DIAGNOSIS — I25.10 ASCVD (ARTERIOSCLEROTIC CARDIOVASCULAR DISEASE): Primary | ICD-10-CM

## 2021-11-10 DIAGNOSIS — I20.0 UNSTABLE ANGINA PECTORIS (HCC): ICD-10-CM

## 2021-11-10 DIAGNOSIS — E78.5 DYSLIPIDEMIA: ICD-10-CM

## 2021-11-10 DIAGNOSIS — I50.22 CHRONIC SYSTOLIC CONGESTIVE HEART FAILURE (HCC): ICD-10-CM

## 2021-11-10 DIAGNOSIS — I10 PRIMARY HYPERTENSION: ICD-10-CM

## 2021-11-10 PROCEDURE — 93000 ELECTROCARDIOGRAM COMPLETE: CPT | Performed by: NURSE PRACTITIONER

## 2021-11-10 PROCEDURE — 99214 OFFICE O/P EST MOD 30 MIN: CPT | Performed by: NURSE PRACTITIONER

## 2021-11-10 RX ORDER — ISOSORBIDE MONONITRATE 30 MG/1
30 TABLET, EXTENDED RELEASE ORAL DAILY
Qty: 30 TABLET | Refills: 3 | Status: SHIPPED | OUTPATIENT
Start: 2021-11-10 | End: 2022-03-25 | Stop reason: SDUPTHER

## 2021-11-10 ASSESSMENT — ENCOUNTER SYMPTOMS: SHORTNESS OF BREATH: 0

## 2021-11-10 NOTE — ASSESSMENT & PLAN NOTE
-At or near goal Yes    -He is to continue current medications (Crestor 20 mg) Hepatic function panel WNL. No abdominal pain. No myalgias.     -The nature of cardiac risk has been fully discussed with this patient. I have made him aware of his LDL target goal given his cardiovascular risk analysis. I have discussed the appropriate diet. The need for lifelong compliance in order to reduce risk is stressed. A regular exercise program is recommended to help achieve and maintain normal body weight, fitness and improve lipid balance. A written copy of a low fat, low cholesterol diet has been given to the patient.

## 2021-11-10 NOTE — ASSESSMENT & PLAN NOTE
-Stable, continue with Cardizem 360 mg daily, Coreg 12.5 mg twice daily, lisinopril 10 mg daily, Lasix 10 mg daily

## 2021-11-10 NOTE — ASSESSMENT & PLAN NOTE
-Chads-Vas is 3, continue with Eliquis for anticoagulation and Cardizem for rate control. Detail Level: Detailed Size Of Lesion In Cm (Optional): 0

## 2021-11-10 NOTE — PROGRESS NOTES
[facility] [address] [facility] [address] [facility],[address]  Phone: [phone]    Fax [phone]                  Milad Kulkarni MD, Bharti Urban MD, Shakira Almeida MD, MD Ambreen Turner MD, Dalila Warren MD, Alda Sexton MD, Nicole Gonzalez, ASMITA Delcid, ASMITA Dubois, ASMITA Lugo, APRELDON        Cardiology Progress Note      11/10/2021    RE: Mackenzie   (1960)                             Primary cardiologist: Dr. Ambreen Munoz       Subjective:  CC:   1. ASCVD (arteriosclerotic cardiovascular disease)    2. Chronic systolic congestive heart failure ([address]    3. PAF (paroxysmal atrial fibrillation) ([address]    4. Unstable angina pectoris ([address]    5. Primary hypertension    6. Dyslipidemia        HPI: Mackenzie , who is a  62y.o. year old male with a past medical history as listed below. Patient presents to the office for follow up on CAD (CABG x 3 2019), HTN, Afib, and hyperlipidemia. LHC in 2020 showed patent GOLDSTEINLAD, SVG to RCA was occluded, patient had PCI of RCA. Patient complains of intermittent chest pain which is relieved with nitroglycerin tablet approximately 3 times this last month. He has intermittent shortness of breath which is relieved at rest.  Patient is  an active male who walks regularly. Patient is  compliant with medications. Past Medical History:   Diagnosis Date    CAD (coronary artery disease)     s/p CABG, sees Dr. Lina Cortez Diabetes mellitus ([facility]     H/O cardiovascular stress test 05/16/2019; 6/30/2020    abnormal stress test, LVEF 40%, Myocardial perfusion scan shows moderate size, severe intensity, non reversible perfusion defect in inferoapical wall.  H/O echocardiogram 05/06/2019    Limited study-Left ventricular function is low normal. EF 50-55% Mild left ventricular hypertrophy.      H/O echocardiogram 03/04/2021    Left ventricular function is mildly abnormal, estimated EF 40-45%. Mild MR & TR.    H/O exercise stress test 12/19/2019    Submaximal ECG stress test. stress test stopped due to Shortness of breath. Proceed with cardiac rehab    Herniated lumbar intervertebral disc     History of cardiac cath 07/31/2020    PATENT 2/3 GRAFTS, SEVERE NATIVE RCA stenosis at mentioned above, required PCI with YAMILE as mentioned    History of exercise stress test 08/20/2020    Treadmill, normal stress test, THR achieved    History of nuclear stress test 11/20/2020     Scan shows moderatel size, severe intensity, non reversible perfusion defect.      Hypertension     Sleep apnea in adult 11/20/2019       Current Outpatient Medications   Medication Sig Dispense Refill    isosorbide mononitrate (IMDUR) 30 MG extended release tablet Take 1 tablet by mouth daily 30 tablet 3    glycopyrrolate-formoterol (BEVESPI AEROSPHERE) 9-4.8 MCG/ACT AERO Inhale 2 puffs by mouth twice daily 1 each 5    azithromycin (ZITHROMAX Z-VAHID) 250 MG tablet 2 tabs day 1, 1 tab day 2 to 5 6 tablet 0    potassium chloride (KLOR-CON M) 20 MEQ extended release tablet Take 1 tablet by mouth 2 times daily 180 tablet 1    furosemide (LASIX) 40 MG tablet Take 1 tablet by mouth twice daily 60 tablet 0    lisinopril (PRINIVIL;ZESTRIL) 10 MG tablet Take 1 tablet by mouth daily 90 tablet 3    prasugrel (EFFIENT) 10 MG TABS Take 1 tablet by mouth daily 90 tablet 3    apixaban (ELIQUIS) 5 MG TABS tablet Take 1 tablet by mouth 2 times daily 180 tablet 3    carvedilol (COREG) 12.5 MG tablet Take 1 tablet by mouth 2 times daily (with meals) 180 tablet 3    acetaminophen (TYLENOL) 500 MG tablet Take 1,000 mg by mouth 2 times daily      Dulaglutide (TRULICITY) 1.5 NI/8.9SE SOPN Inject 1.5 mg into the skin once a week      dilTIAZem (CARDIZEM CD) 360 MG extended release capsule Take 1 capsule by mouth daily 90 capsule 3    methocarbamol (ROBAXIN) 500 MG tablet Take 750 mg by mouth 2 times daily       nitroGLYCERIN (NITROSTAT) 0.4 MG SL tablet Place 1 tablet under the tongue every 5 minutes as needed for Chest pain 25 tablet 3    traZODone (DESYREL) 50 MG tablet Take 100 mg by mouth nightly       metFORMIN (GLUCOPHAGE-XR) 500 MG extended release tablet TAKE 2 TABLETS BY MOUTH TWICE DAILY  5    rosuvastatin (CRESTOR) 20 MG tablet TAKE 1 TABLET BY MOUTH ONCE DAILY AT BEDTIME  5    VENTOLIN  (90 Base) MCG/ACT inhaler INHALE 2 PUFFS BY MOUTH EVERY 4 TO 6 HOURS AS NEEDED  5    gabapentin (NEURONTIN) 600 MG tablet Take 600 mg by mouth 3 times daily. No current facility-administered medications for this visit. Review of Systems:  Review of Systems   Respiratory: Negative for shortness of breath. Cardiovascular: Positive for chest pain and leg swelling. Negative for palpitations. Neurological: Positive for dizziness. Negative for weakness. Objective:      Physical Exam:  /72   Pulse 101   Ht 5' 10\" (1.778 m)   Wt 227 lb (103 kg)   BMI 32.57 kg/m²   Wt Readings from Last 3 Encounters:   11/10/21 227 lb (103 kg)   11/02/21 229 lb (103.9 kg)   07/29/21 229 lb (103.9 kg)     Body mass index is 32.57 kg/m². Physical exam:  Physical Exam  Constitutional:       Appearance: He is well-developed. Cardiovascular:      Rate and Rhythm: Normal rate and regular rhythm. Pulses: Intact distal pulses. Dorsalis pedis pulses are 2+ on the right side and 2+ on the left side. Posterior tibial pulses are 2+ on the right side and 2+ on the left side. Heart sounds: Normal heart sounds, S1 normal and S2 normal.   Pulmonary:      Effort: Pulmonary effort is normal.      Breath sounds: Normal breath sounds. Musculoskeletal:         General: Normal range of motion. Skin:     General: Skin is warm and dry. Neurological:      Mental Status: He is alert and oriented to person, place, and time.         DATA:  No results found for: CKTOTAL, CKMB, CKMBINDEX, TROPONINI  BNP:  No results found for: BNP  PT/INR:  No results found for: PTINR  Lab Results   Component Value Date    LABA1C 6.6 (H) 11/20/2019    LABA1C 7.1 (H) 05/04/2019     Lab Results   Component Value Date    CHOL 137 05/04/2020    TRIG 147 05/04/2020    HDL 57 05/04/2020    LDLCALC 51 05/04/2020    LDLDIRECT 56 05/03/2019     Lab Results   Component Value Date    ALT 21 05/04/2020    AST 19 05/04/2020     TSH:  No results found for: TSH    Vitals:    11/10/21 1527   BP: 136/72   Pulse: 101       Echo:3/4/21  Limited study due to patients body habitus. Left ventricular function is mildly abnormal, EF is estimated at 40-45%. Diastolic dysfunction could not be evaluated due to arrhythmia. Bi atrial enlargement noted. Mitral annular calcification is present. Mild mitral and tricuspid regurgitation is present. Mild pulmonary hypertension with RVSP of 44mmHg. No evidence of pericardial effusion. Cath:7/31/20   PATENT 2/3 GRAFTS, SEVERE NATIVE RCA stenosis at mentioned   above, required PCI with YAMILE as mentioned. There is a Free LIMA graft that originates at the LIMA and attaches to      the Mid LAD ( patent, however, has large intercostal artery which can steel LIMA blood to LAD ). The ASCVD Risk score (Marleen Oconnell, et al., 2013) failed to calculate for the following reasons: The patient has a prior MI or stroke diagnosis      Assessment/ Plan:     ASCVD (arteriosclerotic cardiovascular disease)   LHC showed patent LIMALAD, SVG to RCA was occluded, patient had PCI of RCA. Stress test 6 months after intervention showed abnormal perfusion, severe intensity, nonreversible perfusion defect in apical wall. Primary and secondary prevention discussed with patient:   -Low sodium cardiac diet   -exercise 30 min a day three days a week    Continue current medications Cardizem, Coreg, Eliquis, Lasix, lisinopril, Effient, Crestor.     PAF (paroxysmal atrial fibrillation) ([facility] -Chads-Vas is 3, continue with Eliquis for anticoagulation and Cardizem for rate control. Chest pain  - Patient complains of intermittent chest pain which is relieved with nitroglycerin tablet approximately 3 times this last month. He has intermittent shortness of breath which is relieved at rest.  Patient, however, has large intercostal artery which can steel LIMA blood to LAD. Start Imdur 30 mg daily. CHF  -EF has slightly improved now 405%. Patient appears euvolemic at this time. Continue with current medications. Primary hypertension   -Stable, continue with Cardizem 360 mg daily, Coreg 12.5 mg twice daily, lisinopril 10 mg daily, Lasix 40 mg daily. Dyslipidemia   -At or near goal Yes    -He is to continue current medications (Crestor 20 mg) Hepatic function panel WNL. No abdominal pain. No myalgias.     -The nature of cardiac risk has been fully discussed with this patient. I have made him aware of his LDL target goal given his cardiovascular risk analysis. I have discussed the appropriate diet. The need for lifelong compliance in order to reduce risk is stressed. A regular exercise program is recommended to help achieve and maintain normal body weight, fitness and improve lipid balance. A written copy of a low fat, low cholesterol diet has been given to the patient. Patient seen, interviewed and examined. Testing was reviewed. Patient is encouraged to exercise even a brisk walk for 30 minutes at least 3 to 4 times a week. Lifestyle and risk factor modificatons discussed. Various goals are discussed and questions answered. Continue current medications. Appropriate prescriptions are addressed. Questions answered and patient verbalizes understanding. Call for any problems, questions, or concerns. Pt is to follow up in 1 months for Cardiac management    Electronically signed by Maisha Sky.  ASMITA Guzman - CNP on 11/10/2021 at 4:04 PM

## 2021-11-10 NOTE — ASSESSMENT & PLAN NOTE
LHC showed patent LIMALAD, SVG to RCA was occluded, patient had PCI of RCA.     Primary and secondary prevention discussed with patient:   -Low sodium cardiac diet   -exercise 30 min a day three days a week    Continue current medications Cardizem, Coreg, Eliquis, Lasix, lisinopril, Effient, Crestor

## 2021-12-07 ENCOUNTER — HOSPITAL ENCOUNTER (OUTPATIENT)
Age: 61
Discharge: HOME | End: 2021-12-07
Payer: COMMERCIAL

## 2021-12-07 LAB
ALBUMIN SERPL-MCNC: 4.3 GM/DL (ref 3.4–5)
ANION GAP SERPL CALCULATED.3IONS-SCNC: 11 MMOL/L (ref 4–16)
BACTERIA: NEGATIVE /HPF
BASOPHILS ABSOLUTE: 0.1 K/CU MM
BASOPHILS RELATIVE PERCENT: 0.8 % (ref 0–1)
BILIRUBIN URINE: NEGATIVE MG/DL
BLOOD, URINE: NEGATIVE
BUN BLDV-MCNC: 20 MG/DL (ref 6–23)
CALCIUM SERPL-MCNC: 9 MG/DL (ref 8.3–10.6)
CAST TYPE: NORMAL /HPF
CHLORIDE BLD-SCNC: 98 MMOL/L (ref 99–110)
CLARITY: CLEAR
CO2: 26 MMOL/L (ref 21–32)
COLOR: YELLOW
CREAT SERPL-MCNC: 1.2 MG/DL (ref 0.9–1.3)
CREATININE URINE: 60.9 MG/DL (ref 39–259)
CRYSTAL TYPE: NEGATIVE /HPF
DIFFERENTIAL TYPE: ABNORMAL
EOSINOPHILS ABSOLUTE: 1.3 K/CU MM
EOSINOPHILS RELATIVE PERCENT: 12.2 % (ref 0–3)
EPITHELIAL CELLS, UA: 1 /HPF
GFR AFRICAN AMERICAN: >60 ML/MIN/1.73M2
GFR NON-AFRICAN AMERICAN: >60 ML/MIN/1.73M2
GLUCOSE FASTING: 127 MG/DL (ref 70–99)
GLUCOSE, URINE: NEGATIVE MG/DL
HCT VFR BLD CALC: 43.7 % (ref 42–52)
HEMOGLOBIN: 14.2 GM/DL (ref 13.5–18)
IMMATURE NEUTROPHIL %: 0.2 % (ref 0–0.43)
KETONES, URINE: NEGATIVE MG/DL
LEUKOCYTE ESTERASE, URINE: NEGATIVE
LYMPHOCYTES ABSOLUTE: 1.9 K/CU MM
LYMPHOCYTES RELATIVE PERCENT: 17.9 % (ref 24–44)
MAGNESIUM: 1.9 MG/DL (ref 1.8–2.4)
MCH RBC QN AUTO: 29.8 PG (ref 27–31)
MCHC RBC AUTO-ENTMCNC: 32.5 % (ref 32–36)
MCV RBC AUTO: 91.8 FL (ref 78–100)
MONOCYTES ABSOLUTE: 0.9 K/CU MM
MONOCYTES RELATIVE PERCENT: 8.5 % (ref 0–4)
NITRITE URINE, QUANTITATIVE: NEGATIVE
PDW BLD-RTO: 12.8 % (ref 11.7–14.9)
PH, URINE: 5 (ref 5–8)
PHOSPHORUS: 3.3 MG/DL (ref 2.5–4.9)
PLATELET # BLD: 250 K/CU MM (ref 140–440)
PMV BLD AUTO: 9.2 FL (ref 7.5–11.1)
POTASSIUM SERPL-SCNC: 4.2 MMOL/L (ref 3.5–5.1)
PROT/CREAT RATIO, UR: 0.1
PROTEIN UA: NEGATIVE MG/DL
RBC # BLD: 4.76 M/CU MM (ref 4.6–6.2)
RBC URINE: NEGATIVE /HPF (ref 0–3)
SEGMENTED NEUTROPHILS ABSOLUTE COUNT: 6.3 K/CU MM
SEGMENTED NEUTROPHILS RELATIVE PERCENT: 60.4 % (ref 36–66)
SODIUM BLD-SCNC: 135 MMOL/L (ref 135–145)
SPECIFIC GRAVITY UA: 1.02 (ref 1–1.03)
TOTAL IMMATURE NEUTOROPHIL: 0.02 K/CU MM
URINE TOTAL PROTEIN: 5.5 MG/DL
UROBILINOGEN, URINE: 0.2 MG/DL (ref 0.2–1)
WBC # BLD: 10.3 K/CU MM (ref 4–10.5)
WBC UA: NEGATIVE /HPF (ref 0–2)

## 2021-12-07 PROCEDURE — 80048 BASIC METABOLIC PNL TOTAL CA: CPT

## 2021-12-07 PROCEDURE — 82040 ASSAY OF SERUM ALBUMIN: CPT

## 2021-12-07 PROCEDURE — 83735 ASSAY OF MAGNESIUM: CPT

## 2021-12-07 PROCEDURE — 84156 ASSAY OF PROTEIN URINE: CPT

## 2021-12-07 PROCEDURE — 82570 ASSAY OF URINE CREATININE: CPT

## 2021-12-07 PROCEDURE — 84100 ASSAY OF PHOSPHORUS: CPT

## 2021-12-07 PROCEDURE — 36415 COLL VENOUS BLD VENIPUNCTURE: CPT

## 2021-12-07 PROCEDURE — 85025 COMPLETE CBC W/AUTO DIFF WBC: CPT

## 2021-12-07 PROCEDURE — 81001 URINALYSIS AUTO W/SCOPE: CPT

## 2021-12-08 ENCOUNTER — OFFICE VISIT (OUTPATIENT)
Dept: CARDIOLOGY CLINIC | Age: 61
End: 2021-12-08
Payer: COMMERCIAL

## 2021-12-08 VITALS
BODY MASS INDEX: 32.23 KG/M2 | SYSTOLIC BLOOD PRESSURE: 110 MMHG | HEIGHT: 71 IN | HEART RATE: 96 BPM | WEIGHT: 230.2 LBS | DIASTOLIC BLOOD PRESSURE: 70 MMHG

## 2021-12-08 DIAGNOSIS — I50.22 CHRONIC SYSTOLIC CONGESTIVE HEART FAILURE (HCC): ICD-10-CM

## 2021-12-08 DIAGNOSIS — I25.10 ASCVD (ARTERIOSCLEROTIC CARDIOVASCULAR DISEASE): Primary | ICD-10-CM

## 2021-12-08 DIAGNOSIS — I48.0 PAF (PAROXYSMAL ATRIAL FIBRILLATION) (HCC): ICD-10-CM

## 2021-12-08 DIAGNOSIS — I20.0 UNSTABLE ANGINA PECTORIS (HCC): ICD-10-CM

## 2021-12-08 DIAGNOSIS — E78.5 DYSLIPIDEMIA: ICD-10-CM

## 2021-12-08 DIAGNOSIS — I10 PRIMARY HYPERTENSION: ICD-10-CM

## 2021-12-08 PROCEDURE — 99214 OFFICE O/P EST MOD 30 MIN: CPT | Performed by: NURSE PRACTITIONER

## 2021-12-08 PROCEDURE — 93000 ELECTROCARDIOGRAM COMPLETE: CPT | Performed by: NURSE PRACTITIONER

## 2021-12-08 RX ORDER — ICOSAPENT ETHYL 1000 MG/1
2 CAPSULE ORAL 2 TIMES DAILY
COMMUNITY
End: 2022-03-17

## 2021-12-08 RX ORDER — CARVEDILOL 25 MG/1
25 TABLET ORAL 2 TIMES DAILY WITH MEALS
Qty: 60 TABLET | Refills: 1 | Status: SHIPPED | OUTPATIENT
Start: 2021-12-08 | End: 2022-02-21 | Stop reason: SDUPTHER

## 2021-12-08 RX ORDER — LISINOPRIL 5 MG/1
5 TABLET ORAL DAILY
Qty: 30 TABLET | Refills: 1 | Status: SHIPPED | OUTPATIENT
Start: 2021-12-08 | End: 2022-02-21 | Stop reason: SDUPTHER

## 2021-12-08 ASSESSMENT — ENCOUNTER SYMPTOMS: SHORTNESS OF BREATH: 0

## 2021-12-08 NOTE — PROGRESS NOTES
[address], [address] [facility],Third Floor  Phone: [phone]    Fax [phone]                  Olga Pfeiffer MD, Jalen Chiang MD, Neno Henning MD, MD Rick Carrillo MD, Artemio Villa MD, Vinod Bateman MD, Harika Maher, ASMITA Bey, APRELDON Buchanan, APRELDON Lenzsinjessica, APRN        Cardiology Progress Note      12/8/2021    RE: Nuha Muhammad  (1960)                             Primary cardiologist: Dr. Rick Layne       Subjective:  CC:   1. ASCVD (arteriosclerotic cardiovascular disease)    2. Unstable angina pectoris ([address]    3. PAF (paroxysmal atrial fibrillation) ([address]    4. Chronic systolic congestive heart failure ([address]    5. Primary hypertension    6. Dyslipidemia        HPI: Nuha Muhammad, who is a  62y.o. year old male with a past medical history as listed below. Patient presents to the office for follow up on CAD (CABG x 3 2019), HTN, Afib, and hyperlipidemia. LHC in 2020 showed patent GOLDSTEINLAD, SVG to RCA was occluded,then had PCI of RCA. Patient complains of intermittent chest pain which is relieved with nitroglycerin tablet approximately 3 times this last month. He has intermittent shortness of breath which is relieved at rest.  Patient is  an active male who walks regularly. Patient is  compliant with medications. Past Medical History:   Diagnosis Date    CAD (coronary artery disease)     s/p CABG, sees Dr. Morgan Lerner Diabetes mellitus ([facility]     H/O cardiovascular stress test 05/16/2019; 6/30/2020    abnormal stress test, LVEF 40%, Myocardial perfusion scan shows moderate size, severe intensity, non reversible perfusion defect in inferoapical wall.  H/O echocardiogram 05/06/2019    Limited study-Left ventricular function is low normal. EF 50-55% Mild left ventricular hypertrophy.      H/O echocardiogram 03/04/2021    Left ventricular function is mildly abnormal, estimated EF 40-45%. Mild MR & TR.    H/O exercise stress test 12/19/2019    Submaximal ECG stress test. stress test stopped due to Shortness of breath. Proceed with cardiac rehab    Herniated lumbar intervertebral disc     History of cardiac cath 07/31/2020    PATENT 2/3 GRAFTS, SEVERE NATIVE RCA stenosis at mentioned above, required PCI with YAMILE as mentioned    History of exercise stress test 08/20/2020    Treadmill, normal stress test, THR achieved    History of nuclear stress test 11/20/2020     Scan shows moderatel size, severe intensity, non reversible perfusion defect.      Hypertension     Sleep apnea in adult 11/20/2019       Current Outpatient Medications   Medication Sig Dispense Refill    isosorbide mononitrate (IMDUR) 30 MG extended release tablet Take 1 tablet by mouth daily 30 tablet 3    glycopyrrolate-formoterol (BEVESPI AEROSPHERE) 9-4.8 MCG/ACT AERO Inhale 2 puffs by mouth twice daily 1 each 5    azithromycin (ZITHROMAX Z-VAHID) 250 MG tablet 2 tabs day 1, 1 tab day 2 to 5 6 tablet 0    potassium chloride (KLOR-CON M) 20 MEQ extended release tablet Take 1 tablet by mouth 2 times daily 180 tablet 1    furosemide (LASIX) 40 MG tablet Take 1 tablet by mouth twice daily 60 tablet 0    lisinopril (PRINIVIL;ZESTRIL) 10 MG tablet Take 1 tablet by mouth daily 90 tablet 3    prasugrel (EFFIENT) 10 MG TABS Take 1 tablet by mouth daily 90 tablet 3    apixaban (ELIQUIS) 5 MG TABS tablet Take 1 tablet by mouth 2 times daily 180 tablet 3    carvedilol (COREG) 12.5 MG tablet Take 1 tablet by mouth 2 times daily (with meals) 180 tablet 3    acetaminophen (TYLENOL) 500 MG tablet Take 1,000 mg by mouth 2 times daily      Dulaglutide (TRULICITY) 1.5 CS/7.4ML SOPN Inject 1.5 mg into the skin once a week      dilTIAZem (CARDIZEM CD) 360 MG extended release capsule Take 1 capsule by mouth daily 90 capsule 3    methocarbamol (ROBAXIN) 500 MG tablet Take 750 mg by mouth 2 times daily  nitroGLYCERIN (NITROSTAT) 0.4 MG SL tablet Place 1 tablet under the tongue every 5 minutes as needed for Chest pain 25 tablet 3    traZODone (DESYREL) 50 MG tablet Take 100 mg by mouth nightly       metFORMIN (GLUCOPHAGE-XR) 500 MG extended release tablet TAKE 2 TABLETS BY MOUTH TWICE DAILY  5    rosuvastatin (CRESTOR) 20 MG tablet TAKE 1 TABLET BY MOUTH ONCE DAILY AT BEDTIME  5    VENTOLIN  (90 Base) MCG/ACT inhaler INHALE 2 PUFFS BY MOUTH EVERY 4 TO 6 HOURS AS NEEDED  5    gabapentin (NEURONTIN) 600 MG tablet Take 600 mg by mouth 3 times daily. No current facility-administered medications for this visit. Review of Systems:  Review of Systems   Respiratory: Negative for shortness of breath. Cardiovascular: Positive for chest pain and leg swelling. Negative for palpitations. Neurological: Positive for dizziness. Negative for weakness. Objective:      Physical Exam:  There were no vitals taken for this visit. Wt Readings from Last 3 Encounters:   11/10/21 227 lb (103 kg)   11/02/21 229 lb (103.9 kg)   07/29/21 229 lb (103.9 kg)     There is no height or weight on file to calculate BMI. Physical exam:  Physical Exam  Constitutional:       Appearance: He is well-developed. Cardiovascular:      Rate and Rhythm: Normal rate and regular rhythm. Pulses: Intact distal pulses. Dorsalis pedis pulses are 2+ on the right side and 2+ on the left side. Posterior tibial pulses are 2+ on the right side and 2+ on the left side. Heart sounds: Normal heart sounds, S1 normal and S2 normal.   Pulmonary:      Effort: Pulmonary effort is normal.      Breath sounds: Normal breath sounds. Musculoskeletal:         General: Normal range of motion. Skin:     General: Skin is warm and dry. Neurological:      Mental Status: He is alert and oriented to person, place, and time.         DATA:  No results found for: CKTOTAL, CKMB, CKMBINDEX, TROPONINI  BNP:  No results found for: BNP  PT/INR:  No results found for: PTINR  Lab Results   Component Value Date    LABA1C 6.6 (H) 11/20/2019    LABA1C 7.1 (H) 05/04/2019     Lab Results   Component Value Date    CHOL 137 05/04/2020    TRIG 147 05/04/2020    HDL 57 05/04/2020    LDLCALC 51 05/04/2020    LDLDIRECT 56 05/03/2019     Lab Results   Component Value Date    ALT 21 05/04/2020    AST 19 05/04/2020     TSH:  No results found for: TSH    There were no vitals filed for this visit. Echo:3/4/21  Limited study due to patients body habitus. Left ventricular function is mildly abnormal, EF is estimated at 40-45%. Diastolic dysfunction could not be evaluated due to arrhythmia. Bi atrial enlargement noted. Mitral annular calcification is present. Mild mitral and tricuspid regurgitation is present. Mild pulmonary hypertension with RVSP of 44mmHg. No evidence of pericardial effusion. Cath:7/31/20   PATENT 2/3 GRAFTS, SEVERE NATIVE RCA stenosis at mentioned   above, required PCI with YAMILE as mentioned. There is a Free LIMA graft that originates at the LIMA and attaches to      the Mid LAD ( patent, however, has large intercostal artery which can steel LIMA blood to LAD ). Stress test:11/20/2020  Abnormal perfusion test, moderate size, severe intensity, nonreversible perfusion defect in apical wall. The ASCVD Risk score (Miles Mcconnell., et al., 2013) failed to calculate for the following reasons: The patient has a prior MI or stroke diagnosis      Assessment/ Plan:     ASCVD (arteriosclerotic cardiovascular disease)   -LHC in 2020 showed patent GOLDSTEINLAD, SVG to RCA was occluded, patient had PCI of RCA. Stress test 6 months after intervention showed abnormal perfusion, severe intensity, nonreversible perfusion defect in apical wall.     Primary and secondary prevention discussed with patient:   -Low sodium cardiac diet   -exercise 30 min a day three days a week    Continue current medications Cardizem, Coreg, Eliquis, Lasix, lisinopril, Effient, Crestor. PAF (paroxysmal atrial fibrillation) ([facility]   -Chads-Vas is 3, continue with Eliquis for anticoagulation and Cardizem for rate control. Palpitations occasionally, recommend to refrain for caffeine. Increase Coreg to treat palpitations. EKG obtained today showed sinus rhythm with no acute ST or T wave abnormality. Chest pain  -Patient previously complained of intermittent chest pain and shortness of breath and started on Imdur. Patient, has large intercostal artery which can steel LIMA blood to LAD. Reports chest pain has now resolved. CHF  -EF has slightly improved now 4050%. Patient appears euvolemic at this time. Continue with current medications. Primary hypertension   -Stable, continue with Cardizem 360 mg daily, increase Coreg 25 mg twice daily and decrease lisinopril to 5 mg daily, Lasix 40 mg daily. Dyslipidemia   -At or near goal Yes    -He is to continue current medications (Crestor 20 mg) Hepatic function panel WNL. No abdominal pain. No myalgias.     -The nature of cardiac risk has been fully discussed with this patient. I have made him aware of his LDL target goal given his cardiovascular risk analysis. I have discussed the appropriate diet. The need for lifelong compliance in order to reduce risk is stressed. A regular exercise program is recommended to help achieve and maintain normal body weight, fitness and improve lipid balance. A written copy of a low fat, low cholesterol diet has been given to the patient. Patient seen, interviewed and examined. Testing was reviewed. Patient is encouraged to exercise even a brisk walk for 30 minutes at least 3 to 4 times a week. Lifestyle and risk factor modificatons discussed. Various goals are discussed and questions answered. Continue current medications. Appropriate prescriptions are addressed. Questions answered and patient verbalizes understanding.      Call for any problems, questions, or concerns. Pt is to follow up in 1 week for Cardiac management    Electronically signed by Daniel Jane.  ASMITA Napier CNP on 12/8/2021 at 11:16 AM

## 2021-12-15 ENCOUNTER — OFFICE VISIT (OUTPATIENT)
Dept: CARDIOLOGY CLINIC | Age: 61
End: 2021-12-15
Payer: COMMERCIAL

## 2021-12-15 VITALS
DIASTOLIC BLOOD PRESSURE: 78 MMHG | BODY MASS INDEX: 31.08 KG/M2 | WEIGHT: 222 LBS | HEART RATE: 80 BPM | HEIGHT: 71 IN | SYSTOLIC BLOOD PRESSURE: 126 MMHG

## 2021-12-15 DIAGNOSIS — I10 ESSENTIAL HYPERTENSION: Primary | ICD-10-CM

## 2021-12-15 PROCEDURE — 99211 OFF/OP EST MAY X REQ PHY/QHP: CPT | Performed by: NURSE PRACTITIONER

## 2021-12-15 RX ORDER — FUROSEMIDE 40 MG/1
40 TABLET ORAL 2 TIMES DAILY
Qty: 60 TABLET | Refills: 3 | Status: SHIPPED | OUTPATIENT
Start: 2021-12-15 | End: 2022-06-02 | Stop reason: SDUPTHER

## 2021-12-15 NOTE — PROGRESS NOTES
[address], [address]  Phone: [phone]    Fax [phone]                  Irasema Alcala MD, Adilene Carolina MD, Philly Villegas MD, MD Benny Lin MD,[facility]    Fabrice Brown MD, Deborah Vora MD, [address] [facility], ASMITA Sheehan, ASMITA Chavez, ASMITA Shelton      Blood pressure check     Pt is here for a 2 week BP check. Patient had complained of increased palpitations and Coreg was increased to 25 mg daily and lisinopril decreased to 5 mg daily to compensate for BP change. Chest pain and palpitations have resolved. Vitals:    12/15/21 1304   BP: 126/78   Pulse: 80   Weight: 222 lb (100.7 kg)   Height: 5' 11\" (1.803 m)        Wt Readings from Last 3 Encounters:   12/15/21 222 lb (100.7 kg)   12/08/21 230 lb 3.2 oz (104.4 kg)   11/10/21 227 lb (103 kg)        Continue with Coreg 25 mg BID, diltiazem 360 mg daily, Lisinopril 5 mg daily, and Lasix 40 mg BID. Follow up in 6 months. Pt is to report any dizziness or syncope to the office. Electronically signed by Chin Pelaez.  ASMITA De La Rosa CNP on 12/15/2021 at 1:15 PM

## 2021-12-16 PROBLEM — N18.2 CHRONIC KIDNEY DISEASE, STAGE II (MILD): Status: ACTIVE | Noted: 2021-12-16

## 2022-02-01 RX ORDER — DILTIAZEM HYDROCHLORIDE 360 MG/1
360 CAPSULE, EXTENDED RELEASE ORAL DAILY
Qty: 90 CAPSULE | Refills: 3 | Status: SHIPPED | OUTPATIENT
Start: 2022-02-01

## 2022-02-21 RX ORDER — LISINOPRIL 5 MG/1
5 TABLET ORAL DAILY
Qty: 30 TABLET | Refills: 6 | Status: SHIPPED | OUTPATIENT
Start: 2022-02-21 | End: 2022-03-17

## 2022-02-21 RX ORDER — CARVEDILOL 25 MG/1
25 TABLET ORAL 2 TIMES DAILY WITH MEALS
Qty: 60 TABLET | Refills: 6 | Status: SHIPPED | OUTPATIENT
Start: 2022-02-21 | End: 2022-09-06 | Stop reason: SDUPTHER

## 2022-03-16 ENCOUNTER — HOSPITAL ENCOUNTER (OUTPATIENT)
Age: 62
Discharge: HOME | End: 2022-03-16
Payer: COMMERCIAL

## 2022-03-16 LAB
ALBUMIN SERPL-MCNC: 4.8 GM/DL (ref 3.4–5)
ANION GAP SERPL CALCULATED.3IONS-SCNC: 17 MMOL/L (ref 4–16)
BACTERIA: NEGATIVE /HPF
BILIRUBIN URINE: NEGATIVE MG/DL
BLOOD, URINE: NEGATIVE
BUN BLDV-MCNC: 40 MG/DL (ref 6–23)
CALCIUM SERPL-MCNC: 10.8 MG/DL (ref 8.3–10.6)
CAST TYPE: NORMAL /HPF
CHLORIDE BLD-SCNC: 93 MMOL/L (ref 99–110)
CLARITY: CLEAR
CO2: 21 MMOL/L (ref 21–32)
COLOR: YELLOW
CREAT SERPL-MCNC: 1.7 MG/DL (ref 0.9–1.3)
CREATININE URINE: 171.9 MG/DL (ref 39–259)
CRYSTAL TYPE: NEGATIVE /HPF
EPITHELIAL CELLS, UA: NEGATIVE /HPF
GFR AFRICAN AMERICAN: 50 ML/MIN/1.73M2
GFR NON-AFRICAN AMERICAN: 41 ML/MIN/1.73M2
GLUCOSE FASTING: 124 MG/DL (ref 70–99)
GLUCOSE, URINE: NEGATIVE MG/DL
KETONES, URINE: NEGATIVE MG/DL
LEUKOCYTE ESTERASE, URINE: NEGATIVE
MAGNESIUM: 1.8 MG/DL (ref 1.8–2.4)
NITRITE URINE, QUANTITATIVE: NEGATIVE
PH, URINE: 5 (ref 5–8)
PHOSPHORUS: 4.2 MG/DL (ref 2.5–4.9)
POTASSIUM SERPL-SCNC: 4.3 MMOL/L (ref 3.5–5.1)
PROT/CREAT RATIO, UR: 0.1
PROTEIN UA: NEGATIVE MG/DL
RBC URINE: NORMAL /HPF (ref 0–3)
SODIUM BLD-SCNC: 131 MMOL/L (ref 135–145)
SPECIFIC GRAVITY UA: 1.01 (ref 1–1.03)
URINE TOTAL PROTEIN: 18.4 MG/DL
UROBILINOGEN, URINE: 0.2 MG/DL (ref 0.2–1)
WBC UA: NORMAL /HPF (ref 0–2)

## 2022-03-16 PROCEDURE — 82570 ASSAY OF URINE CREATININE: CPT

## 2022-03-16 PROCEDURE — 36415 COLL VENOUS BLD VENIPUNCTURE: CPT

## 2022-03-16 PROCEDURE — 82040 ASSAY OF SERUM ALBUMIN: CPT

## 2022-03-16 PROCEDURE — 84100 ASSAY OF PHOSPHORUS: CPT

## 2022-03-16 PROCEDURE — 80048 BASIC METABOLIC PNL TOTAL CA: CPT

## 2022-03-16 PROCEDURE — 81001 URINALYSIS AUTO W/SCOPE: CPT

## 2022-03-16 PROCEDURE — 84156 ASSAY OF PROTEIN URINE: CPT

## 2022-03-16 PROCEDURE — 83735 ASSAY OF MAGNESIUM: CPT

## 2022-03-17 ENCOUNTER — OFFICE VISIT (OUTPATIENT)
Dept: CARDIOLOGY CLINIC | Age: 62
End: 2022-03-17
Payer: COMMERCIAL

## 2022-03-17 VITALS — DIASTOLIC BLOOD PRESSURE: 56 MMHG | SYSTOLIC BLOOD PRESSURE: 90 MMHG | HEART RATE: 110 BPM

## 2022-03-17 DIAGNOSIS — I50.22 CHRONIC SYSTOLIC CONGESTIVE HEART FAILURE (HCC): ICD-10-CM

## 2022-03-17 DIAGNOSIS — I10 PRIMARY HYPERTENSION: ICD-10-CM

## 2022-03-17 DIAGNOSIS — R07.9 CHEST PAIN, UNSPECIFIED TYPE: ICD-10-CM

## 2022-03-17 DIAGNOSIS — I48.0 PAF (PAROXYSMAL ATRIAL FIBRILLATION) (HCC): Primary | ICD-10-CM

## 2022-03-17 DIAGNOSIS — E78.5 DYSLIPIDEMIA: ICD-10-CM

## 2022-03-17 DIAGNOSIS — I25.10 ASCVD (ARTERIOSCLEROTIC CARDIOVASCULAR DISEASE): ICD-10-CM

## 2022-03-17 PROCEDURE — 93000 ELECTROCARDIOGRAM COMPLETE: CPT | Performed by: NURSE PRACTITIONER

## 2022-03-17 PROCEDURE — 99214 OFFICE O/P EST MOD 30 MIN: CPT | Performed by: NURSE PRACTITIONER

## 2022-03-17 RX ORDER — RANOLAZINE 500 MG/1
500 TABLET, EXTENDED RELEASE ORAL 2 TIMES DAILY
Qty: 60 TABLET | Refills: 3 | Status: SHIPPED | OUTPATIENT
Start: 2022-03-17 | End: 2022-08-03 | Stop reason: SDUPTHER

## 2022-03-17 RX ORDER — NALOXONE HYDROCHLORIDE 4 MG/.1ML
SPRAY NASAL PRN
COMMUNITY
Start: 2022-02-14

## 2022-03-17 RX ORDER — ACETAMINOPHEN AND CODEINE PHOSPHATE 60; 300 MG/1; MG/1
TABLET ORAL
COMMUNITY
Start: 2022-03-15

## 2022-03-17 RX ORDER — TRAZODONE HYDROCHLORIDE 50 MG/1
TABLET ORAL PRN
COMMUNITY
Start: 2022-03-01

## 2022-03-17 ASSESSMENT — ENCOUNTER SYMPTOMS: SHORTNESS OF BREATH: 0

## 2022-03-17 NOTE — PATIENT INSTRUCTIONS
**It is YOUR responsibilty to bring medication bottles and/or updated medication list to [address] [place] [address]. This will allow us to better serve you and all your healthcare needs**  Please be informed that if you contact our office outside of normal business hours the physician on call cannot help with any scheduling or rescheduling issues, procedure instruction questions or any type of medication issue. We advise you for any urgent/emergency that you go to the nearest emergency room! PLEASE CALL OUR OFFICE DURING NORMAL BUSINESS HOURS    Monday - Friday   8 am to 5 pm    [place]: [phone]    [place]:  [phone]  Please hold on to these instructions the  will call you within 1-9 business days when we receive authorization from your insurance. Echocardiogram    WHAT TO EXPECT:   ? This test will take approximately 45 minutes. ? It is an ultrasound of the heart. ? It can look at the valves and chambers inside the heart   ? There is no special instructions for this test.     If you are unable to keep this appointment, please notify us 24 hours prior to test at [phone]. Please hold on to these instructions the  will call you within 1-9 business days when we receive authorization from your insurance. Nuclear Stress Test    WHAT TO EXPECT:   ? You will need to confirm the test or it could be cancelled. ? This test will take approximately 2 hours: 1 hour in the AM &    1 hour in the PM. You will be given a time by the Technologist after the first part is completed to come back. ? You will be given a medication, through an IV in the hand, this will safely simulate exercise. This IV is also needed to inject the radioactive isotope unless you are able toe walk the treadmill. ? You will receive an injection in the AM & PM before the pictures. ?  Using a special camera, you will have one set of pictures of your heart taken in the AM and a set of pictures in the PM.     PREPARATION FOR TEST:  ? Eat a light breakfast such as water or juice and toast.  ? If you are DIABETIC: Eat a normal breakfast with NO CAFFEINE and take your insulin as normal.   ? AVOID ALL FOODS & DRINKS containing CAFFEINE 12 HOURS PRIOR TO THE TEST: Including coffee, Tea, Julianna and other soft drinks even those labeled  caffeine free or decaffeinated.

## 2022-03-17 NOTE — PROGRESS NOTES
[facility] ([facility] PHYSICAL [facility]HABILITATION [facility] [address], [address] [facility],Third Floor  Phone: [phone]    Fax [phone]                  Jared Barrett MD, Doug Stahl MD, Cortez Hooper MD, Darlis Blamer, MD Arita Lefort, MD, Ryland Cushing, MD, Bryce Paredes MD, [address] [facility], [facility]        Cardiology Progress Note      3/17/2022    RE: Russ Junior  (1960)                             Primary cardiologist: Dr. Arita Lefort       Subjective:  CC:   1. PAF (paroxysmal atrial fibrillation) ([facility]    2. Chronic systolic congestive heart failure ([facility]    3. ASCVD (arteriosclerotic cardiovascular disease)    4. Primary hypertension    5. Dyslipidemia    6. Chest pain, unspecified type        HPI: Russ Junior, who is a  62y.o. year old male with a past medical history as listed below. Patient presents to the office for follow up on CAD (CABG x 3 2019), HTN, Afib, and hyperlipidemia. LHC in 2020 showed patent LIMA-LAD, SVG to RCA was occluded,then had PCI of RCA. Patient complains of intermittent chest pain which is relieved with nitroglycerin tablet approximately 3 times this last month. Yesterday had significant chest pain with pain radiating to left arm then had nausea and dizziness. He has intermittent shortness of breath which is relieved at rest.  Patient is  an active male who walks regularly. Patient is  compliant with medications. Past Medical History:   Diagnosis Date    CAD (coronary artery disease)     s/p CABG, sees Dr. Divine Guidry Diabetes mellitus ([facility]     H/O cardiovascular stress test 05/16/2019; 6/30/2020    abnormal stress test, LVEF 40%, Myocardial perfusion scan shows moderate size, severe intensity, non reversible perfusion defect in inferoapical wall.     H/O echocardiogram 05/06/2019    Limited study-Left ventricular function is low normal. EF 50-55% chloride (KLOR-CON M) 20 MEQ extended release tablet Take 1 tablet by mouth 2 times daily 180 tablet 1    prasugrel (EFFIENT) 10 MG TABS Take 1 tablet by mouth daily 90 tablet 3    acetaminophen (TYLENOL) 500 MG tablet Take 1,000 mg by mouth 2 times daily      Dulaglutide (TRULICITY) 1.5 BH/3.0RW SOPN Inject 1.5 mg into the skin once a week      nitroGLYCERIN (NITROSTAT) 0.4 MG SL tablet Place 1 tablet under the tongue every 5 minutes as needed for Chest pain 25 tablet 3    metFORMIN (GLUCOPHAGE-XR) 500 MG extended release tablet TAKE 2 TABLETS BY MOUTH TWICE DAILY  5    VENTOLIN  (90 Base) MCG/ACT inhaler INHALE 2 PUFFS BY MOUTH EVERY 4 TO 6 HOURS AS NEEDED  5    gabapentin (NEURONTIN) 600 MG tablet Take 800 mg by mouth 3 times daily.  Icosapent Ethyl (VASCEPA) 1 g CAPS capsule Take 2 capsules by mouth 2 times daily (Patient not taking: Reported on 3/17/2022)       No current facility-administered medications for this visit. Review of Systems:  Review of Systems   Respiratory: Negative for shortness of breath. Cardiovascular: Positive for chest pain and leg swelling. Negative for palpitations. Neurological: Positive for dizziness. Negative for weakness. Objective:      Physical Exam:  BP (!) 90/56 (Site: Right Upper Arm, Position: Sitting, Cuff Size: Medium Adult)   Pulse 110   Wt Readings from Last 3 Encounters:   12/16/21 222 lb 9.6 oz (101 kg)   12/15/21 222 lb (100.7 kg)   12/08/21 230 lb 3.2 oz (104.4 kg)     There is no height or weight on file to calculate BMI. Physical exam:  Physical Exam  Constitutional:       Appearance: He is well-developed. Cardiovascular:      Rate and Rhythm: Normal rate and regular rhythm. Pulses: Intact distal pulses. Dorsalis pedis pulses are 2+ on the right side and 2+ on the left side. Posterior tibial pulses are 2+ on the right side and 2+ on the left side.       Heart sounds: Normal heart sounds, S1 normal and S2 normal.   Pulmonary:      Effort: Pulmonary effort is normal.      Breath sounds: Normal breath sounds. Musculoskeletal:         General: Normal range of motion. Skin:     General: Skin is warm and dry. Neurological:      Mental Status: He is alert and oriented to person, place, and time. DATA:  No results found for: CKTOTAL, CKMB, CKMBINDEX, TROPONINI  BNP:  No results found for: BNP  PT/INR:  No results found for: PTINR  Lab Results   Component Value Date    LABA1C 6.6 (H) 11/20/2019    LABA1C 7.1 (H) 05/04/2019     Lab Results   Component Value Date    CHOL 137 05/04/2020    TRIG 147 05/04/2020    HDL 57 05/04/2020    LDLCALC 51 05/04/2020    LDLDIRECT 56 05/03/2019     Lab Results   Component Value Date    ALT 21 05/04/2020    AST 19 05/04/2020     TSH:  No results found for: TSH    Vitals:    03/17/22 1009   BP: (!) 90/56   Pulse:        Echo:3/4/21  Limited study due to patients body habitus. Left ventricular function is mildly abnormal, EF is estimated at 40-45%. Diastolic dysfunction could not be evaluated due to arrhythmia. Bi atrial enlargement noted. Mitral annular calcification is present. Mild mitral and tricuspid regurgitation is present. Mild pulmonary hypertension with RVSP of 44mmHg. No evidence of pericardial effusion. Cath:7/31/20   PATENT 2/3 GRAFTS, SEVERE NATIVE RCA stenosis at mentioned   above, required PCI with YAMILE as mentioned. There is a Free LIMA graft that originates at the LIMA and attaches to      the Mid LAD ( patent, however, has large intercostal artery which can steel LIMA blood to LAD ). Stress test:11/20/2020  Abnormal perfusion test, moderate size, severe intensity, nonreversible perfusion defect in apical wall. The ASCVD Risk score (Jeannette Zarate, et al., 2013) failed to calculate for the following reasons:     The patient has a prior MI or stroke diagnosis      Assessment/ Plan:     ASCVD (arteriosclerotic cardiovascular disease)   -[facility] in 2020 showed patent LIMA-LAD, SVG to RCA was occluded, patient had PCI of RCA. Stress test 6 months after intervention showed abnormal perfusion, severe intensity, nonreversible perfusion defect in apical wall. Primary and secondary prevention discussed with patient:   -Low sodium cardiac diet   -exercise 30 min a day three days a week    Continue current medications Cardizem, Coreg, Eliquis, Lasix, Imdur, Effient, Crestor. PAF (paroxysmal atrial fibrillation) ([facility] [address]   -Chads-Vas is 3, continue with Eliquis for anticoagulation. Continue with Coreg and Cardizem for rate control. Palpitations occasionally, recommend to refrain for caffeine. Chest pain  -Patient previously complained of intermittent chest pain and shortness of breath and started on Imdur. Patient, has large intercostal artery which can steel LIMA blood to LAD. Get stress test, echo, and start Ranexa continue with imdur. CHF  -EF has slightly improved now 40-50%. Patient appears euvolemic at this time. Continue with current medications. Primary hypertension   -symptomatic hypotension, stop lisinopril. Continue with Cardizem 360 mg daily, Coreg 25 mg twice daily, and Lasix 40 mg BID. Dyslipidemia   -At or near goal Yes    -He is to continue current medications (Crestor 20 mg) Hepatic function panel WNL. No abdominal pain. No myalgias.     -The nature of cardiac risk has been fully discussed with this patient. I have made him aware of his LDL target goal given his cardiovascular risk analysis. I have discussed the appropriate diet. The need for lifelong compliance in order to reduce risk is stressed. A regular exercise program is recommended to help achieve and maintain normal body weight, fitness and improve lipid balance. A written copy of a low fat, low cholesterol diet has been given to the patient. Patient seen, interviewed and examined. Testing was reviewed.       Patient is encouraged to exercise even a brisk walk for 30 minutes at least 3 to 4 times a week. Lifestyle and risk factor modificatons discussed. Various goals are discussed and questions answered. Continue current medications. Appropriate prescriptions are addressed. Questions answered and patient verbalizes understanding. Call for any problems, questions, or concerns. Pt is to follow up in 1 month for Cardiac management    Electronically signed by ASMITA Ivan CNP on 3/17/2022 at 11:17 AM

## 2022-03-22 PROBLEM — I95.0 IDIOPATHIC HYPOTENSION: Status: ACTIVE | Noted: 2022-03-22

## 2022-03-25 RX ORDER — ISOSORBIDE MONONITRATE 30 MG/1
30 TABLET, EXTENDED RELEASE ORAL DAILY
Qty: 30 TABLET | Refills: 3 | Status: SHIPPED | OUTPATIENT
Start: 2022-03-25

## 2022-04-04 RX ORDER — NITROGLYCERIN 0.4 MG/1
0.4 TABLET SUBLINGUAL EVERY 5 MIN PRN
Qty: 25 TABLET | Refills: 3 | Status: SHIPPED | OUTPATIENT
Start: 2022-04-04

## 2022-04-05 ENCOUNTER — PROCEDURE VISIT (OUTPATIENT)
Dept: CARDIOLOGY CLINIC | Age: 62
End: 2022-04-05
Payer: COMMERCIAL

## 2022-04-05 DIAGNOSIS — I50.22 CHRONIC SYSTOLIC CONGESTIVE HEART FAILURE (HCC): ICD-10-CM

## 2022-04-05 DIAGNOSIS — I48.0 PAF (PAROXYSMAL ATRIAL FIBRILLATION) (HCC): ICD-10-CM

## 2022-04-05 DIAGNOSIS — I10 PRIMARY HYPERTENSION: ICD-10-CM

## 2022-04-05 DIAGNOSIS — R07.9 CHEST PAIN, UNSPECIFIED TYPE: ICD-10-CM

## 2022-04-05 DIAGNOSIS — E78.5 DYSLIPIDEMIA: ICD-10-CM

## 2022-04-05 DIAGNOSIS — I25.10 ASCVD (ARTERIOSCLEROTIC CARDIOVASCULAR DISEASE): ICD-10-CM

## 2022-04-05 DIAGNOSIS — R06.02 SOB (SHORTNESS OF BREATH): Primary | ICD-10-CM

## 2022-04-05 LAB
LV EF: 53 %
LV EF: 53 %
LVEF MODALITY: NORMAL
LVEF MODALITY: NORMAL

## 2022-04-05 PROCEDURE — 93017 CV STRESS TEST TRACING ONLY: CPT | Performed by: INTERNAL MEDICINE

## 2022-04-05 PROCEDURE — 78452 HT MUSCLE IMAGE SPECT MULT: CPT | Performed by: INTERNAL MEDICINE

## 2022-04-05 PROCEDURE — 93306 TTE W/DOPPLER COMPLETE: CPT | Performed by: INTERNAL MEDICINE

## 2022-04-05 PROCEDURE — 93016 CV STRESS TEST SUPVJ ONLY: CPT | Performed by: INTERNAL MEDICINE

## 2022-04-05 PROCEDURE — 93018 CV STRESS TEST I&R ONLY: CPT | Performed by: INTERNAL MEDICINE

## 2022-04-05 PROCEDURE — A9500 TC99M SESTAMIBI: HCPCS | Performed by: INTERNAL MEDICINE

## 2022-04-06 ENCOUNTER — TELEPHONE (OUTPATIENT)
Dept: CARDIOLOGY CLINIC | Age: 62
End: 2022-04-06

## 2022-04-27 ENCOUNTER — HOSPITAL ENCOUNTER (OUTPATIENT)
Age: 62
Discharge: HOME | End: 2022-04-27
Payer: COMMERCIAL

## 2022-04-27 LAB
ALBUMIN SERPL-MCNC: 4.7 GM/DL (ref 3.4–5)
ANION GAP SERPL CALCULATED.3IONS-SCNC: 14 MMOL/L (ref 4–16)
BACTERIA: NEGATIVE /HPF
BASOPHILS ABSOLUTE: 0.1 K/CU MM
BASOPHILS RELATIVE PERCENT: 0.6 % (ref 0–1)
BILIRUBIN URINE: NEGATIVE MG/DL
BLOOD, URINE: NEGATIVE
BUN BLDV-MCNC: 24 MG/DL (ref 6–23)
CALCIUM SERPL-MCNC: 9.6 MG/DL (ref 8.3–10.6)
CAST TYPE: NORMAL /HPF
CHLORIDE BLD-SCNC: 95 MMOL/L (ref 99–110)
CLARITY: CLEAR
CO2: 31 MMOL/L (ref 21–32)
COLOR: YELLOW
CREAT SERPL-MCNC: 1.2 MG/DL (ref 0.9–1.3)
CRYSTAL TYPE: NEGATIVE /HPF
DIFFERENTIAL TYPE: ABNORMAL
EOSINOPHILS ABSOLUTE: 0.7 K/CU MM
EOSINOPHILS RELATIVE PERCENT: 6.2 % (ref 0–3)
EPITHELIAL CELLS, UA: NORMAL /HPF
GFR AFRICAN AMERICAN: >60 ML/MIN/1.73M2
GFR NON-AFRICAN AMERICAN: >60 ML/MIN/1.73M2
GLUCOSE FASTING: 181 MG/DL (ref 70–99)
GLUCOSE, URINE: NEGATIVE MG/DL
HCT VFR BLD CALC: 42.8 % (ref 42–52)
HEMOGLOBIN: 14.2 GM/DL (ref 13.5–18)
IMMATURE NEUTROPHIL %: 0.2 % (ref 0–0.43)
KETONES, URINE: NEGATIVE MG/DL
LEUKOCYTE ESTERASE, URINE: NEGATIVE
LYMPHOCYTES ABSOLUTE: 2.7 K/CU MM
LYMPHOCYTES RELATIVE PERCENT: 23.5 % (ref 24–44)
MCH RBC QN AUTO: 30.4 PG (ref 27–31)
MCHC RBC AUTO-ENTMCNC: 33.2 % (ref 32–36)
MCV RBC AUTO: 91.6 FL (ref 78–100)
MONOCYTES ABSOLUTE: 0.9 K/CU MM
MONOCYTES RELATIVE PERCENT: 7.5 % (ref 0–4)
NITRITE URINE, QUANTITATIVE: NEGATIVE
PDW BLD-RTO: 13.4 % (ref 11.7–14.9)
PH, URINE: 6.5 (ref 5–8)
PHOSPHORUS: 3.2 MG/DL (ref 2.5–4.9)
PLATELET # BLD: 326 K/CU MM (ref 140–440)
PMV BLD AUTO: 9.2 FL (ref 7.5–11.1)
POTASSIUM SERPL-SCNC: 4.3 MMOL/L (ref 3.5–5.1)
PROTEIN UA: NEGATIVE MG/DL
RBC # BLD: 4.67 M/CU MM (ref 4.6–6.2)
RBC URINE: NORMAL /HPF (ref 0–3)
SEGMENTED NEUTROPHILS ABSOLUTE COUNT: 7.2 K/CU MM
SEGMENTED NEUTROPHILS RELATIVE PERCENT: 62 % (ref 36–66)
SODIUM BLD-SCNC: 140 MMOL/L (ref 135–145)
SPECIFIC GRAVITY UA: 1.02 (ref 1–1.03)
TOTAL IMMATURE NEUTOROPHIL: 0.02 K/CU MM
UROBILINOGEN, URINE: 0.2 MG/DL (ref 0.2–1)
WBC # BLD: 11.7 K/CU MM (ref 4–10.5)
WBC UA: NORMAL /HPF (ref 0–2)

## 2022-04-27 PROCEDURE — 84100 ASSAY OF PHOSPHORUS: CPT

## 2022-04-27 PROCEDURE — 82570 ASSAY OF URINE CREATININE: CPT

## 2022-04-27 PROCEDURE — 81001 URINALYSIS AUTO W/SCOPE: CPT

## 2022-04-27 PROCEDURE — 85025 COMPLETE CBC W/AUTO DIFF WBC: CPT

## 2022-04-27 PROCEDURE — 84156 ASSAY OF PROTEIN URINE: CPT

## 2022-04-27 PROCEDURE — 82040 ASSAY OF SERUM ALBUMIN: CPT

## 2022-04-27 PROCEDURE — 36415 COLL VENOUS BLD VENIPUNCTURE: CPT

## 2022-04-27 PROCEDURE — 80048 BASIC METABOLIC PNL TOTAL CA: CPT

## 2022-04-28 LAB
CREATININE URINE: 97.9 MG/DL (ref 39–259)
PROT/CREAT RATIO, UR: 0.1
URINE TOTAL PROTEIN: 10.8 MG/DL

## 2022-05-19 ENCOUNTER — TELEPHONE (OUTPATIENT)
Dept: CARDIOLOGY CLINIC | Age: 62
End: 2022-05-19

## 2022-05-19 NOTE — TELEPHONE ENCOUNTER
Patient came to [place] office at 3:10,  5/19/2022 for ov, but was supposed to be at [facility] office @ 3:30. I told pt he was at wrong office, but he had enough time to get to [facility] office because I would call and tell them he is running a little late. Pt wanted to cancel appt in [facility], stating several reasons why. I advised pt, he was being seen for an abnormal NM Stress test and the doctors like to see patients as soon as possible. Pt was offered an appt tomorrow in [facility] with Dr. Holland Wild, but stated he could not go to that appt. He was then offered an appt next week and week after with Santiago Marte NP. Pt stated he only wanted to see doctor. Pt already had an appt in [place] 6/15/2022 which he wanted to keep, stating he didn't want to miss work and gas was too expensive. I again advised him not to wait so long to be seen, but he insisted he only had brief, mild chest pain. I advised pt to go to ER if he continued to experience chest pain.

## 2022-06-02 RX ORDER — FUROSEMIDE 40 MG/1
40 TABLET ORAL 2 TIMES DAILY
Qty: 60 TABLET | Refills: 3 | Status: SHIPPED | OUTPATIENT
Start: 2022-06-02 | End: 2022-09-26 | Stop reason: SDUPTHER

## 2022-06-15 ENCOUNTER — OFFICE VISIT (OUTPATIENT)
Dept: CARDIOLOGY CLINIC | Age: 62
End: 2022-06-15
Payer: COMMERCIAL

## 2022-06-15 VITALS
DIASTOLIC BLOOD PRESSURE: 70 MMHG | HEART RATE: 62 BPM | WEIGHT: 217 LBS | HEIGHT: 70 IN | BODY MASS INDEX: 31.07 KG/M2 | SYSTOLIC BLOOD PRESSURE: 118 MMHG

## 2022-06-15 DIAGNOSIS — R07.9 CHEST PAIN, UNSPECIFIED TYPE: Primary | ICD-10-CM

## 2022-06-15 DIAGNOSIS — I48.0 PAF (PAROXYSMAL ATRIAL FIBRILLATION) (HCC): ICD-10-CM

## 2022-06-15 PROCEDURE — 99214 OFFICE O/P EST MOD 30 MIN: CPT | Performed by: INTERNAL MEDICINE

## 2022-06-15 NOTE — PROGRESS NOTES
Johnna De Paz MD        OFFICE  FOLLOWUP NOTE    Chief complaints: patient is here for management of CAD,s/p CABG, DM, HTN, AFIB, DYSLPIDEMIA    Subjective: occasional chest pain, no shortness of breath, no dizziness, no palpitations    HPI Eugenia Waller is a 65 y. o.year old who  has a past medical history of CAD (coronary artery disease), Diabetes mellitus ([address] H/O cardiovascular stress test, H/O Doppler echocardiogram, H/O echocardiogram, H/O echocardiogram, H/O exercise stress test, Herniated lumbar intervertebral disc, History of cardiac cath, History of exercise stress test, History of nuclear stress test, History of nuclear stress test, Hypertension, and Sleep apnea in adult.  and presents for management of CAD,s/p CABG, DM, HTN, AFIB, DYSLPIDEMIA which are well controlled      He has occasional chest pains, not related with activity,    Current Outpatient Medications   Medication Sig Dispense Refill    furosemide (LASIX) 40 MG tablet Take 1 tablet by mouth 2 times daily 60 tablet 3    gabapentin (NEURONTIN) 800 MG tablet TAKE 1 TABLET BY MOUTH THREE TIMES DAILY      VASCEPA 1 g CAPS capsule TAKE 1 CAPSULE BY MOUTH 4 TIMES DAILY      methocarbamol (ROBAXIN) 750 MG tablet TAKE 1 TABLET BY MOUTH TWICE DAILY      rosuvastatin (CRESTOR) 20 MG tablet TAKE 1 TABLET BY MOUTH ONCE DAILY AT BEDTIME      nitroGLYCERIN (NITROSTAT) 0.4 MG SL tablet Place 1 tablet under the tongue every 5 minutes as needed for Chest pain 25 tablet 3    isosorbide mononitrate (IMDUR) 30 MG extended release tablet Take 1 tablet by mouth daily 30 tablet 3    lisinopril (PRINIVIL;ZESTRIL) 5 MG tablet Take 5 mg by mouth daily       traZODone (DESYREL) 50 MG tablet as needed      naloxone 4 MG/0.1ML LIQD nasal spray as needed      acetaminophen-codeine (TYLENOL #4) 300-60 MG per tablet TAKE 1 TABLET BY MOUTH THREE TIMES DAILY AS NEEDED      ranolazine (RANEXA) 500 MG extended release tablet Take 1 tablet by mouth 2 times daily 60 tablet 3    carvedilol (COREG) 25 MG tablet Take 1 tablet by mouth 2 times daily (with meals) 60 tablet 6    apixaban (ELIQUIS) 5 MG TABS tablet Take 1 tablet by mouth 2 times daily 28 tablet 3    dilTIAZem (CARDIZEM CD) 360 MG extended release capsule Take 1 capsule by mouth daily 90 capsule 3    metOLazone (ZAROXOLYN) 2.5 MG tablet Take 1 tablet by mouth three times a week 45 tablet 3    glycopyrrolate-formoterol (BEVESPI AEROSPHERE) 9-4.8 MCG/ACT AERO Inhale 2 puffs by mouth twice daily 1 each 5    potassium chloride (KLOR-CON M) 20 MEQ extended release tablet Take 1 tablet by mouth 2 times daily 180 tablet 1    prasugrel (EFFIENT) 10 MG TABS Take 1 tablet by mouth daily 90 tablet 3    acetaminophen (TYLENOL) 500 MG tablet Take 1,000 mg by mouth 2 times daily      Dulaglutide (TRULICITY) 1.5 HI/8.3CZ SOPN Inject 1.5 mg into the skin once a week      metFORMIN (GLUCOPHAGE-XR) 500 MG extended release tablet TAKE 2 TABLETS BY MOUTH TWICE DAILY  5    VENTOLIN  (90 Base) MCG/ACT inhaler INHALE 2 PUFFS BY MOUTH EVERY 4 TO 6 HOURS AS NEEDED  5     No current facility-administered medications for this visit. Allergies: Patient has no known allergies. Past Medical History:   Diagnosis Date    CAD (coronary artery disease)     s/p CABG, sees Dr. Trang Elena [facility]     H/O cardiovascular stress test 05/16/2019; 6/30/2020    abnormal stress test, LVEF 40%, Myocardial perfusion scan shows moderate size, severe intensity, non reversible perfusion defect in inferoapical wall.  H/O Doppler echocardiogram 04/05/2022    EF 50-55. Mild septal wall asymmetrical LV hypertrophy. Sclerotic nonstenotic aortic valve. Mitral annualr calcification present. RVSP 28.    H/O echocardiogram 05/06/2019    Limited study-Left ventricular function is low normal. EF 50-55% Mild left ventricular hypertrophy.      H/O echocardiogram 03/04/2021    Left ventricular function is mildly abnormal, estimated EF 40-45%. Mild MR & TR.    H/O exercise stress test 12/19/2019    Submaximal ECG stress test. stress test stopped due to Shortness of breath. Proceed with cardiac rehab    Herniated lumbar intervertebral disc     History of cardiac cath 07/31/2020    PATENT 2/3 GRAFTS, SEVERE NATIVE RCA stenosis at mentioned above, required PCI with YAMILE as mentioned    History of exercise stress test 08/20/2020    Treadmill, normal stress test, THR achieved    History of nuclear stress test 11/20/2020     Scan shows moderatel size, severe intensity, non reversible perfusion defect.  History of nuclear stress test 04/05/2022    Small size, moderate intensity, non reversible perfusion defect on inferior apical wall.     Hypertension     Sleep apnea in adult 11/20/2019     Past Surgical History:   Procedure Laterality Date    CIRCUMCISION, Russell Murphy  8/1/2013    CORONARY ARTERY BYPASS GRAFT MAZE PROCEDURE N/A 11/22/2019    CABG CORONARY ARTERY BYPASS GRAFT X3, MAZE PROCEDURE, INTRAOPERATIVE WALDEMAR, INDUCED HYPOTHERMIA, ENDOSCOPIC VEIN HARVEST OF THE LEFT LEG performed by Thomas Venegas MD at [facility] Right     Transposition of Right Ulnar Nerve     Family History   Problem Relation Age of Onset   [place] Hypertension Mother     Hypertension Father     Hypertension Sister     Hypertension Brother     Cancer Maternal Grandmother      Social History     Tobacco Use    Smoking status: Former Smoker     Packs/day: 0.25     Years: 30.00     Pack years: 7.50     Types: Cigarettes    Smokeless tobacco: Never Used   Substance Use Topics    Alcohol use: No     Alcohol/week: 0.0 standard drinks      [unfilled]  Review of Systems:   · Constitutional: No Fever or Weight Loss   · Eyes: No Decreased Vision  · ENT: No Headaches, Hearing Loss or Vertigo  · Cardiovascular: occadional chest pain, dyspnea on exertion, palpitations or loss of consciousness  · Respiratory: No cough or wheezing · Gastrointestinal: No abdominal pain, appetite loss, blood in stools, constipation, diarrhea or heartburn  · Genitourinary: No dysuria, trouble voiding, or hematuria  · Musculoskeletal:  No gait disturbance, weakness or joint complaints  · Integumentary: No rash or pruritis  · Neurological: No TIA or stroke symptoms  · Psychiatric: No anxiety or depression  · Endocrine: No malaise, fatigue or temperature intolerance  · Hematologic/Lymphatic: No bleeding problems, blood clots or swollen lymph nodes  · Allergic/Immunologic: No nasal congestion or hives  All systems negative except as marked. Objective:  /70   Pulse 62   Ht 5' 10\" (1.778 m)   Wt 217 lb (98.4 kg)   BMI 31.14 kg/m²   Wt Readings from Last 3 Encounters:   06/15/22 217 lb (98.4 kg)   05/03/22 217 lb 12.8 oz (98.8 kg)   03/22/22 225 lb 6.4 oz (102.2 kg)     Body mass index is 31.14 kg/m². GENERAL - Alert, oriented, pleasant, in no apparent distress,normal grooming  HEENT  pupils are intact, cornea intact, conjunctive normal color, ears are normal in exam,  Neck - Supple. No jugular venous distention noted. No carotid bruits, no apical -carotid delay  Respiratory:  Normal breath sounds, No respiratory distress, No wheezing, No chest tenderness. ,no use of accessory muscles, diaphragm movement is normal  Cardiovascular: (PMI) apex non displaced,no lifts no thrills, no s3,no s4, Normal heart rate, Normal rhythm, No murmurs, No rubs, No gallops.  Carotid arteries pulse and amplitude are normal no bruit, no abdominal bruit noted ( normal abdominal aorta ausculation),   Extremities - No cyanosis, clubbing, or significant edema, no varicose veins    Abdomen  No masses, tenderness, or organomegaly, no hepato-splenomegally, no bruits  Musculoskeletal  No significant edema, no kyphosis or scoliosis, no deformity in any extremity noted, muscle strength and tone are normal  Skin: no ulcer,no scar,no stasis dermatitis   Neurologic  alert oriented times 3,Cranial nerves II through XII are grossly intact. There were no gross focal neurologic abnormalities. Psychiatric: normal mood and affect    No results found for: CKTOTAL, CKMB, CKMBINDEX, TROPONINI  BNP:  No results found for: BNP  PT/INR:  No results found for: PTINR  Lab Results   Component Value Date    LABA1C 6.6 (H) 11/20/2019    LABA1C 7.1 (H) 05/04/2019     Lab Results   Component Value Date    CHOL 137 05/04/2020    TRIG 147 05/04/2020    HDL 57 05/04/2020    LDLCALC 51 05/04/2020    LDLDIRECT 56 05/03/2019     Lab Results   Component Value Date    ALT 21 05/04/2020    AST 19 05/04/2020     TSH:  No results found for: TSH    Impression:  Chandana Smith is a 65 y. o.year old who  has a past medical history of CAD (coronary artery disease), Diabetes mellitus ([address] H/O cardiovascular stress test, H/O Doppler echocardiogram, H/O echocardiogram, H/O echocardiogram, H/O exercise stress test, Herniated lumbar intervertebral disc, History of cardiac cath, History of exercise stress test, History of nuclear stress test, History of nuclear stress test, Hypertension, and Sleep apnea in adult. and presents with     Plan:  1. Occasional chest pains\": ;last stress test 2 months ago showed small apical infarction, which is unchanged from 2020,  2. renail insufficieny: patient is seen by Dr. Angie Dhillon present care,   3. RECENT CHEST PAIN AND NTG USE WITH CADs/p PCI of native RCA,his SVG to RCA was occluded, LIMA was patent, will get stress test  and will continue  statins, bb, effient and eliquis off aspirin and continue effient and eliquis  4. ICM: LVEF 40-45%, LVEF IS IMPROVED NOW WILL continue to optimize medication,Continue aspirin  continue statins,ACE INHIBOTR , betablockers,Ntg   5. CHF\" Orthopnea:HE FEELS better and lost 12 lbs,  TAKES 2 ;PILLOWS  TAKING lasix, recheck chem 7 and if creatinine is trending up, will decrease lasix again , continue Kdur  6.  Anxiety: not seen a psychiatory, continue xanax 7. Leg swelling: use compression socks  8. Paroxysmal afib: Continue eliquis, s/p  maze and LOREN ligation  9. Dyslipidemia: continue statins  10. HTN: stable, continue COREG AND, lisinopril medication  11. DM: stable: continue  insulinAll labs, medications   All labs, medications and tests reviewed, continue all other medications of all above medical condition listed as is.     @[facility]@

## 2022-07-07 ENCOUNTER — HOSPITAL ENCOUNTER (OUTPATIENT)
Age: 62
Discharge: HOME | End: 2022-07-07
Payer: COMMERCIAL

## 2022-07-07 LAB
ALBUMIN SERPL-MCNC: 4.9 GM/DL (ref 3.4–5)
ALP BLD-CCNC: 97 IU/L (ref 40–129)
ALT SERPL-CCNC: 30 U/L (ref 10–40)
ANION GAP SERPL CALCULATED.3IONS-SCNC: 10 MMOL/L (ref 4–16)
AST SERPL-CCNC: 38 IU/L (ref 15–37)
BASOPHILS ABSOLUTE: 0.1 K/CU MM
BASOPHILS RELATIVE PERCENT: 0.7 % (ref 0–1)
BILIRUB SERPL-MCNC: 0.3 MG/DL (ref 0–1)
BUN BLDV-MCNC: 33 MG/DL (ref 6–23)
CALCIUM SERPL-MCNC: 10 MG/DL (ref 8.3–10.6)
CHLORIDE BLD-SCNC: 92 MMOL/L (ref 99–110)
CO2: 36 MMOL/L (ref 21–32)
CREAT SERPL-MCNC: 1.7 MG/DL (ref 0.9–1.3)
DIFFERENTIAL TYPE: ABNORMAL
EOSINOPHILS ABSOLUTE: 0.3 K/CU MM
EOSINOPHILS RELATIVE PERCENT: 3.2 % (ref 0–3)
ESTIMATED AVERAGE GLUCOSE: 137 MG/DL
GFR AFRICAN AMERICAN: 50 ML/MIN/1.73M2
GFR NON-AFRICAN AMERICAN: 41 ML/MIN/1.73M2
GLUCOSE BLD-MCNC: 102 MG/DL (ref 70–99)
HBA1C MFR BLD: 6.4 % (ref 4.2–6.3)
HCT VFR BLD CALC: 43.6 % (ref 42–52)
HEMOGLOBIN: 15 GM/DL (ref 13.5–18)
IMMATURE NEUTROPHIL %: 0.3 % (ref 0–0.43)
LYMPHOCYTES ABSOLUTE: 2.8 K/CU MM
LYMPHOCYTES RELATIVE PERCENT: 27.3 % (ref 24–44)
MCH RBC QN AUTO: 29.2 PG (ref 27–31)
MCHC RBC AUTO-ENTMCNC: 34.4 % (ref 32–36)
MCV RBC AUTO: 84.8 FL (ref 78–100)
MONOCYTES ABSOLUTE: 0.9 K/CU MM
MONOCYTES RELATIVE PERCENT: 8.8 % (ref 0–4)
PDW BLD-RTO: 12.4 % (ref 11.7–14.9)
PLATELET # BLD: 299 K/CU MM (ref 140–440)
PMV BLD AUTO: 9.7 FL (ref 7.5–11.1)
POTASSIUM SERPL-SCNC: 3.4 MMOL/L (ref 3.5–5.1)
RBC # BLD: 5.14 M/CU MM (ref 4.6–6.2)
SEGMENTED NEUTROPHILS ABSOLUTE COUNT: 6.1 K/CU MM
SEGMENTED NEUTROPHILS RELATIVE PERCENT: 59.7 % (ref 36–66)
SODIUM BLD-SCNC: 138 MMOL/L (ref 135–145)
TOTAL IMMATURE NEUTOROPHIL: 0.03 K/CU MM
TOTAL PROTEIN: 7.5 GM/DL (ref 6.4–8.2)
WBC # BLD: 10.1 K/CU MM (ref 4–10.5)

## 2022-07-07 PROCEDURE — 84403 ASSAY OF TOTAL TESTOSTERONE: CPT

## 2022-07-07 PROCEDURE — 83036 HEMOGLOBIN GLYCOSYLATED A1C: CPT

## 2022-07-07 PROCEDURE — 84270 ASSAY OF SEX HORMONE GLOBUL: CPT

## 2022-07-07 PROCEDURE — 85025 COMPLETE CBC W/AUTO DIFF WBC: CPT

## 2022-07-07 PROCEDURE — 36415 COLL VENOUS BLD VENIPUNCTURE: CPT

## 2022-07-07 PROCEDURE — 84153 ASSAY OF PSA TOTAL: CPT

## 2022-07-07 PROCEDURE — 80061 LIPID PANEL: CPT

## 2022-07-07 PROCEDURE — 80053 COMPREHEN METABOLIC PANEL: CPT

## 2022-07-07 PROCEDURE — 84154 ASSAY OF PSA FREE: CPT

## 2022-07-08 LAB
CHOLESTEROL: 124 MG/DL
HDLC SERPL-MCNC: 50 MG/DL
LDL CHOLESTEROL CALCULATED: 31 MG/DL
SEX HORMONE BINDING GLOBULIN: 17 NMOL/L (ref 19–76)
TESTOSTERONE FREE PERCENT: 2.3 % (ref 1.6–2.9)
TESTOSTERONE FREE: 31 PG/ML (ref 47–244)
TESTOSTERONE TOTAL-MALE: 132 NG/DL (ref 300–720)
TRIGL SERPL-MCNC: 216 MG/DL

## 2022-07-09 LAB
PROSTATE SPECIFIC ANTIGEN FREE: 0.2 NG/ML
PROSTATE SPECIFIC ANTIGEN PERCENT FREE: 40 %
PROSTATE SPECIFIC ANTIGEN: 0.5 NG/ML (ref 0–4)

## 2022-07-26 RX ORDER — PRASUGREL 10 MG/1
10 TABLET, FILM COATED ORAL DAILY
Qty: 90 TABLET | Refills: 3 | Status: SHIPPED | OUTPATIENT
Start: 2022-07-26

## 2022-08-03 RX ORDER — RANOLAZINE 500 MG/1
500 TABLET, EXTENDED RELEASE ORAL 2 TIMES DAILY
Qty: 60 TABLET | Refills: 3 | Status: SHIPPED | OUTPATIENT
Start: 2022-08-03

## 2022-08-12 NOTE — PROGRESS NOTES
-- DO NOT REPLY / DO NOT REPLY ALL --  -- Message is from Engagement Center Operations (ECO) --    General Patient Message Is calling asking for a call back will like to know if patient was treated for testing positive for Chlamydia      Caller Information       Type Contact Phone/Fax    08/12/2022 12:19 PM CDT Phone (Incoming) [facility]  [phone]        Alternative phone number: none    Can a detailed message be left? Yes    Message Turnaround:     Did the caller agree that this message can wait until the office reopens in the morning? YES - The Message Can Wait - Send a message to the provider's clinical support pool     [place]:    Please give this turnaround time to the caller:   \"This message will be sent to [state Provider's name]. The clinical team will fulfill your request as soon as they review your message.\"                 Hospitalist Progress Note      Name:  Shaheen Taylor /Age/Sex: 1960  (63 y.o. male)   MRN & CSN:  [id number] & [id number] Admission Date/Time: 5/3/2019  1:12 PM   Location:  [address] [place] [address] PCP: Katheryn Aguilar MD         Hospital Day: 14    Assessment and Plan:   Shaheen Taylor is a 63 y.o.  male  who presents with Pneumonia    1. Pneumonia: RVP +ve Adenovirus. CXR  with interval worsening of a left mid lung zone opacity and right infrahilar opacity. NC at 4 LPM. .6 19. Continue Cefepime. Sputum culture polymicorbial Corynebacterium, Staphylococcus, and MRSA pending. Legionella and Strep urine negative. Cefepime D # 12 discontinue. Taper Steroid. Repeat CXR OP recommended  2. AF in RVR : on Cardizem infusion D/C, therapeutic Lovenox, Eliquis started. Cardiology on consult. EP on consult. Started on Cardizem, Digoxin. Started PO. Still with episodes of RVR. Medication being adjusted by cardiology, EP.   3. Elevated Troponin: sec to above, Stress test neg  4. Type 2 DM:  Lantus , Sliding scale. Hypoglycemia protocol. 5.  Renal Cyst: outpatient follow up  6. Hypokalemia: replace accordingly. Magnesium  7. Leukopenia: could be from Adenovirus, resolved  8. Low TSH: T3 and T4 normal. Could be from sick euthyroid syndrome, OP Fu TSH with PCP    Diet Diet NPO, After Midnight Exceptions are: Sips with Meds   DVT Prophylaxis [x] Lovenox, []  Heparin, [] SCDs, [] Warfarin  [] NOAC     Code Status Full Code   Disp Pending cardiac/EP clearance. History of Present Illness:     Chief Complaint: Pneumonia    No chest pain, improved dyspnea, no abd pain, no n/V, no F/C. Ten point ROS reviewed negative, unless as noted above    Objective:        Intake/Output Summary (Last 24 hours) at 2019 1223  Last data filed at 2019 0513  Gross per 24 hour   Intake --   Output 850 ml   Net -850 ml      Vitals:   Vitals:    19 0728   BP:    Pulse:    Resp:    Temp:    SpO2: 98%     Physical Exam:   GEN Awake male, sitting upright in bed in no apparent distress. Appears given age. RESP  wheezing. improving   CARDIO/VASC S1/S2 auscultated. Lexis Esposito GI Abdomen is soft without significant tenderness, or guarding. Bowel sounds are normoactive. MSK No gross joint deformities. SKIN Normal coloration, warm, dry. NEURO  normal speech, no lateralizing weakness. PSYCH Awake, alert, oriented x 4. Affect appropriate.     Medications:   Medications:    carvedilol  12.5 mg Oral BID WC    digoxin  250 mcg Oral Daily    methylPREDNISolone  40 mg Intravenous Daily    insulin glargine  25 Units Subcutaneous Nightly    insulin lispro  10 Units Subcutaneous TID WC    insulin lispro  0-18 Units Subcutaneous TID WC    insulin lispro  0-9 Units Subcutaneous Nightly    apixaban  5 mg Oral BID    albuterol  2.5 mg Nebulization Q6H WA    diltiazem  120 mg Oral Daily    pneumococcal 13-valent conjugate  0.5 mL Intramuscular Once    sodium chloride flush  10 mL Intravenous 2 times per day    guaiFENesin  600 mg Oral BID    sodium chloride flush  10 mL Intravenous BID    cyclobenzaprine  10 mg Oral TID    cefepime  2 g Intravenous Q12H    sodium chloride flush  10 mL Intravenous 2 times per day    docusate sodium  100 mg Oral BID    famotidine  20 mg Oral BID    aspirin  325 mg Oral Daily    gabapentin  300 mg Oral TID      Infusions:    diltiazem (CARDIZEM) 125 mg in dextrose 5% 125 mL infusion Stopped (05/10/19 1326)    dextrose       PRN Meds:     levalbuterol 0.63 mg Q6H PRN   potassium chloride 40 mEq PRN   Or     potassium alternative oral replacement 40 mEq PRN   Or     potassium chloride 10 mEq PRN   sodium chloride flush 10 mL PRN   ondansetron 4 mg Q30 Min PRN   sodium chloride flush 10 mL PRN   ondansetron 4 mg Q6H PRN   acetaminophen 650 mg Q4H PRN   benzonatate 200 mg TID PRN   glucose 15 g PRN   dextrose 12.5 g PRN   glucagon (rDNA) 1 mg PRN   dextrose 100 mL/hr PRN   promethazine 12.5 mg Q6H PRN   hydrOXYzine 50 mg Q6H PRN   oxyCODONE-acetaminophen 1 tablet Q4H PRN       Recent Labs     05/14/19  0510 05/15/19  0448 05/16/19  0541   WBC 16.4* 15.4* 16.2*   HGB 14.1 13.4* 13.9   HCT 43.2 41.9* 43.4    411 395      Recent Labs     05/14/19  0510 05/15/19  0448 05/16/19  0541    144 144   K 3.9 4.1 4.8    103 101   CO2 31 31 30   BUN 29* 29* 29*   CREATININE 1.0 1.1 1.1     Recent Labs     05/14/19  0510 05/15/19  0448 05/16/19  0541   AST 32 26 26   ALT 82* 68* 64*   BILITOT 0.4 0.4 0.4   ALKPHOS 92 89 99     Imaging reviewed    Electronically signed by Neeta Sigala MD on 5/16/2019 at 12:23 PM

## 2022-08-30 ENCOUNTER — HOSPITAL ENCOUNTER (OUTPATIENT)
Dept: GENERAL RADIOLOGY | Age: 62
Discharge: HOME | End: 2022-08-30
Payer: COMMERCIAL

## 2022-08-30 ENCOUNTER — HOSPITAL ENCOUNTER (OUTPATIENT)
Age: 62
Discharge: HOME | End: 2022-08-30
Payer: COMMERCIAL

## 2022-08-30 DIAGNOSIS — M51.86 OTHER INTERVERTEBRAL DISC DISORDERS, LUMBAR REGION: ICD-10-CM

## 2022-08-30 PROCEDURE — 72050 X-RAY EXAM NECK SPINE 4/5VWS: CPT

## 2022-09-06 RX ORDER — CARVEDILOL 25 MG/1
25 TABLET ORAL 2 TIMES DAILY WITH MEALS
Qty: 60 TABLET | Refills: 6 | Status: SHIPPED | OUTPATIENT
Start: 2022-09-06

## 2022-09-26 RX ORDER — FUROSEMIDE 40 MG/1
40 TABLET ORAL 2 TIMES DAILY
Qty: 60 TABLET | Refills: 3 | Status: SHIPPED | OUTPATIENT
Start: 2022-09-26

## 2022-10-04 ENCOUNTER — HOSPITAL ENCOUNTER (OUTPATIENT)
Dept: PHYSICAL THERAPY | Age: 62
Setting detail: THERAPIES SERIES
Discharge: HOME | End: 2022-10-04
Payer: COMMERCIAL

## 2022-10-04 PROCEDURE — 97110 THERAPEUTIC EXERCISES: CPT

## 2022-10-04 PROCEDURE — 97162 PT EVAL MOD COMPLEX 30 MIN: CPT

## 2022-10-04 ASSESSMENT — PAIN SCALES - GENERAL: PAINLEVEL_OUTOF10: 5

## 2022-10-04 ASSESSMENT — PAIN DESCRIPTION - PAIN TYPE: TYPE: CHRONIC PAIN

## 2022-10-04 NOTE — PLAN OF CARE
Outpatient Physical Therapy           [place]           [] Phone: [phone]   Fax: [phone]  Jordyn murillo           [x] Phone: [phone]   Fax: [phone]     To: LEORA Dye Agent   From: Latanya Garg PT,    Patient: Ina Nyhan       : 1960  Diagnosis: DDD (degenerative disc disease), cervical [M50.30] Diagnosis: cervical DDD  Treatment Diagnosis: neck pain  Date: 10/5/2022    Physical Therapy Certification/Re-Certification Form    The following patient has been evaluated for physical therapy services and for therapy to continue, insurance requires physician review of the treatment plan initially and every 90 days. Please review the attached evaluation and/or summary of the patient's plan of care, and verify that you agree therapy should continue by signing the attached document and sending it back to our office.     Assessment:    Assessment: NDI     Plan of Care/Treatment to date:  [x] Therapeutic Exercise  [] Modalities:  [x] Therapeutic Activity     [] Ultrasound  [x] Electrical Stimulation  [] Gait Training      [] Cervical Traction [] Lumbar Traction  [x] Neuromuscular Re-education    [x] Cold/hotpack [] Iontophoresis   [x] Instruction in HEP      [] Vasopneumatic    [] Dry Needling  [x] Manual Therapy               [] Aquatic Therapy     Frequency/Duration:  # Days per week: [] 1 day # Weeks: [] 1 week [] 5 weeks     [x] 2 days   [] 2 weeks [] 6 weeks     [] 3 days   [] 3 weeks [] 7 weeks     [] 4 days   [] 4 weeks [] 8 weeks         [] 9 weeks [] 10 weeks         [] 11 weeks [] 12 weeks    Rehab Potential/Progress: [] Excellent [] Good [] Fair  [] Poor     Goals:    Patient goals: pain relief  Long Term Goals  Time Frame for Long Term Goals: 4 weeks plan expires 22  patient will be indepedent with HEP  patient will score 10/50 on NDI and will report a score of 1 regarding pain with daily task of driving   Electronically signed by:  Latanya Garg PT,, 10/5/2022, 11:57 AM  If you have any questions or concerns, please don't hesitate to call.   Thank you for your referral.      Physician Signature:________________________________Date:_________ TIME: _____  By signing above, therapists plan is approved by physician

## 2022-10-05 NOTE — FLOWSHEET NOTE
Outpatient Physical Therapy  [place]           [] Phone: [phone]   Fax: [phone]  Jordyn murillo           [x] Phone: [phone]   Fax: [phone]        Physical Therapy Daily Treatment Note  Date:  10/5/2022    Patient Name:  Caitie Nguyen    :  1960  MRN: [id number]  Restrictions/Precautions: No data recorded   Position Activity Restriction  Other position/activity restrictions: none  Diagnosis:   DDD (degenerative disc disease), cervical [M50.30] Diagnosis: cervical DDD  Date of Injury/Surgery:   Treatment Diagnosis:  neck pain  Insurance/Certification information: [employer] [place] BOMN/ no pre cert  Referring Physician:  Valentino Harper, PA-C C. Nathalie Pippins   PCP: LEORA Ruff Doctor Visit:    Plan of care signed (Y/N):    Outcome Measure: NDI   Visit# / total visits:    then PN  Pain level: 5/10   Goals:     Patient goals: pain relief  Long Term Goals  Time Frame for Long Term Goals: 4 weeks plan expires 22  patient will be indepedent with HEP  patient will score 10/50 on NDI and will report a score of 1 regarding pain with daily task of driving               Summary of Evaluation:  Assessment: NDI         Subjective:  See eval         Any changes in Ambulatory Summary Sheet?   None        Objective:  See eval   COVID screening questions were asked and patient attested that there had been no contact or symptoms        Exercises: (No more than 4 columns)   Exercise/Equipment Date 10/4/22 Date Date           WARM UP         ube BWD x3'           TABLE      B shoulder ER strengthening GTB x10 reps                                 STANDING      Resisted neck EXT/chin tuck Forearms on wall GTB x10 reps     Lat rows  trial    LAE  trial                                 PROPRIOCEPTION                                    MODALITIES                      Other Therapeutic Activities/Education:        Home Exercise Program:        Manual Treatments:        Modalities: Communication with other providers:        Assessment:  (Response towards treatment session) (Pain Rating)  Pt tolerated  treatment without any adverse reactions or complications this date. . Pt would continue to benefit from skilled therapy interventions to address remaining impairments, improve mobility and strength,  and progress toward goal completion and prepare for d/c including finalizing HEP ;  while reducing risk for re-injury or further decline. ..   Pain complaints after session 0/10       Plan for Next Session:        Time In / Time Out:    see eval         Timed Code/Total Treatment Minutes:  10' TEx1/ 35' includes eval      Next Progress Note due:        Plan of Care Interventions:  [x] Therapeutic Exercise  [] Modalities:  [x] Therapeutic Activity     [] Ultrasound  [x] Estim  [] Gait Training      [] Cervical Traction [] Lumbar Traction  [x] Neuromuscular Re-education    [x] Cold/hotpack [] Iontophoresis   [x] Instruction in HEP      [] Vasopneumatic   [] Dry Needling    [x] Manual Therapy               [] Aquatic Therapy              Electronically signed by:  Lisbeth Coffman PT, 10/5/2022, 12:00 PM

## 2022-10-05 NOTE — PROGRESS NOTES
Physical Therapy: Initial Evaluation    Patient: Sheppard Snellen (48 y.o. male)   Examination Date:   Plan of Care Certification Period: 10/5/2022 to        :  1960 ;    Confirmed:  MRN: [id number]  CSN: [id number]   Insurance: Payor: Jaquan Spear / Plan: [address] [place] [address] / Product Type: *No Product type* /   Insurance ID: [id number] - (Commercial) Secondary Insurance (if applicable):    Referring Physician: LEORA Neal   PCP: Haven Machado PA-C Visits to Date/Visits Approved:   /      No Show/Cancelled Appts:   /       Medical Diagnosis: DDD (degenerative disc disease), cervical [M50.30] cervical DDD  Treatment Diagnosis: neck pain     PERTINENT MEDICAL HISTORY   Patient Assessed for Rehabilitation Services: Yes       Medical History: Chart Reviewed: Yes   Past Medical History:   Diagnosis Date    CAD (coronary artery disease)     s/p CABG, sees Dr. Estelita Robles    [facility]     H/O cardiovascular stress test 2019; 2020    abnormal stress test, LVEF 40%, Myocardial perfusion scan shows moderate size, severe intensity, non reversible perfusion defect in inferoapical wall. H/O Doppler echocardiogram 2022    EF 50-55. Mild septal wall asymmetrical LV hypertrophy. Sclerotic nonstenotic aortic valve. Mitral annualr calcification present. RVSP 28. H/O echocardiogram 2019    Limited study-Left ventricular function is low normal. EF 50-55% Mild left ventricular hypertrophy. H/O echocardiogram 2021    Left ventricular function is mildly abnormal, estimated EF 40-45%. Mild MR & TR.    H/O exercise stress test 2019    Submaximal ECG stress test. stress test stopped due to Shortness of breath.  Proceed with cardiac rehab    Herniated lumbar intervertebral disc     History of cardiac cath 2020    PATENT 2/3 GRAFTS, SEVERE NATIVE RCA stenosis at mentioned above, required PCI with YAMILE as mentioned    History of exercise stress test 2020    Treadmill, normal stress test, THR achieved    History of nuclear stress test 2020     Scan shows moderatel size, severe intensity, non reversible perfusion defect. History of nuclear stress test 2022    Small size, moderate intensity, non reversible perfusion defect on inferior apical wall. Hypertension     Sleep apnea in adult 2019     Surgical History:   Past Surgical History:   Procedure Laterality Date    CIRCUMCISION, NON-  2013    CORONARY ARTERY BYPASS GRAFT MAZE PROCEDURE N/A 2019    CABG CORONARY ARTERY BYPASS GRAFT X3, MAZE PROCEDURE, INTRAOPERATIVE WALDEMAR, INDUCED HYPOTHERMIA, ENDOSCOPIC VEIN HARVEST OF THE LEFT LEG performed by Ally Vega MD at [facility] Right     Transposition of Right Ulnar Nerve       Medications:   Current Outpatient Medications:     furosemide (LASIX) 40 MG tablet, Take 1 tablet by mouth 2 times daily, Disp: 60 tablet, Rfl: 3    carvedilol (COREG) 25 MG tablet, Take 1 tablet by mouth 2 times daily (with meals), Disp: 60 tablet, Rfl: 6    ranolazine (RANEXA) 500 MG extended release tablet, Take 1 tablet by mouth in the morning and 1 tablet before bedtime. , Disp: 60 tablet, Rfl: 3    TRULICITY 3 CB/9.4NV SOPN, INJECT 1 SYRINGE SUBCUTANEOUSLY ONCE A WEEK, Disp: , Rfl:     Omega-3 Fatty Acids (FISH OIL OMEGA-3 PO), Take 2 capsules by mouth daily, Disp: , Rfl:     prasugrel (EFFIENT) 10 MG TABS, Take 1 tablet by mouth in the morning., Disp: 90 tablet, Rfl: 3    apixaban (ELIQUIS) 5 MG TABS tablet, Take 1 tablet by mouth in the morning and 1 tablet before bedtime. , Disp: 60 tablet, Rfl: 6    gabapentin (NEURONTIN) 800 MG tablet, TAKE 1 TABLET BY MOUTH THREE TIMES DAILY, Disp: , Rfl:     methocarbamol (ROBAXIN) 750 MG tablet, TAKE 1 TABLET BY MOUTH TWICE DAILY, Disp: , Rfl:     rosuvastatin (CRESTOR) 20 MG tablet, TAKE 1 TABLET BY MOUTH ONCE DAILY AT BEDTIME, Disp: , Rfl:     nitroGLYCERIN (NITROSTAT) 0.4 MG SL tablet, Place 1 tablet under the tongue every 5 minutes as needed for Chest pain, Disp: 25 tablet, Rfl: 3    isosorbide mononitrate (IMDUR) 30 MG extended release tablet, Take 1 tablet by mouth daily, Disp: 30 tablet, Rfl: 3    lisinopril (PRINIVIL;ZESTRIL) 5 MG tablet, Take 5 mg by mouth daily , Disp: , Rfl:     traZODone (DESYREL) 50 MG tablet, as needed, Disp: , Rfl:     naloxone 4 MG/0.1ML LIQD nasal spray, as needed, Disp: , Rfl:     acetaminophen-codeine (TYLENOL #4) 300-60 MG per tablet, TAKE 1 TABLET BY MOUTH THREE TIMES DAILY AS NEEDED, Disp: , Rfl:     dilTIAZem (CARDIZEM CD) 360 MG extended release capsule, Take 1 capsule by mouth daily, Disp: 90 capsule, Rfl: 3    metOLazone (ZAROXOLYN) 2.5 MG tablet, Take 1 tablet by mouth three times a week, Disp: 45 tablet, Rfl: 3    glycopyrrolate-formoterol (BEVESPI AEROSPHERE) 9-4.8 MCG/ACT AERO, Inhale 2 puffs by mouth twice daily, Disp: 1 each, Rfl: 5    potassium chloride (KLOR-CON M) 20 MEQ extended release tablet, Take 1 tablet by mouth 2 times daily, Disp: 180 tablet, Rfl: 1    acetaminophen (TYLENOL) 500 MG tablet, Take 1,000 mg by mouth 2 times daily, Disp: , Rfl:     metFORMIN (GLUCOPHAGE-XR) 500 MG extended release tablet, TAKE 2 TABLETS BY MOUTH TWICE DAILY, Disp: , Rfl: 5    VENTOLIN  (90 Base) MCG/ACT inhaler, INHALE 2 PUFFS BY MOUTH EVERY 4 TO 6 HOURS AS NEEDED, Disp: , Rfl: 5  Allergies: Patient has no known allergies.       SUBJECTIVE EXAMINATION      ,           Subjective History:    Subjective: ongoing neck pain for over 10 years; no prior treatment; has difficulty swallowing for past 4-5 months; reports B neck pain along with R arm numbness;difficulty with R neck rotation  Additional Pertinent Hx (if applicable):            Learning/Language:       Pain Screening   Pain Screening  Patient Currently in Pain: Yes  Pain Assessment: 0-10  Pain Level: 5  Best Pain Level: 5  Worst Pain Level: 8  Pain Type: Chronic pain    Functional Status         Social History:  Social History  Lives With: Significant other  Type of Home: House    Occupation/Interests:  Occupation: Full time employment  Type of Occupation: parts repair  Job Duties: Prolonged sitting    Prior Level of Function:    Independent        Current Level of Function:         Receives Help From: Family  ADL Assistance: Independent  Homemaking Assistance: Independent  Homemaking Responsibilities: No  Ambulation Assistance: Independent  Transfer Assistance: Independent  Active : Yes  Mode of Transportation: Car    OBJECTIVE EXAMINATION   Restrictions:        Position Activity Restriction  Other position/activity restrictions: none    Review of Systems:  Vision: Impaired  Visual Deficits: Wears glasses  Hearing: Within functional limits  Overall Orientation Status: Within Normal Limits  Patient affect[de-identified] Normal  Follows Commands: Within Functional Limits    VBI Screening / Lumbar Screening:        Regional Screen:         Observations:   Marked fwd head /upper thoracic kyphosis    Palpation:        Ambulation/Gait (if applicable):       Balance Screen:       Neuro Screen:    Left AROM  Right AROM                    Left PROM  Right PROM                    Left Strength  Right Strength       Shoulder global 4/5    86#  Shoulder global 4/5    66#     Cervical Assessment    AROM - ROT 20* to R/ 30* to L; R/L SB 10*         Thoracic Assessment            Lumbar Assessment           Trunk Strength           Muscle Length/Flexibility:  R UT ti    Joint Mobility (if applicable):        Special Tests:        Balance/Gait Assessment(s) Performed:       Additional Finding(s) (if applicable):           ASSESSMENT     Impression: Assessment: NDI 22/55    Body Structures, Functions, Activity Limitations Requiring Skilled Therapeutic Intervention: Decreased ROM, Decreased functional mobility , Increased pain, Decreased strength    Statement of Medical Necessity: Physical Therapy is both indicated and medically necessary as outlined in the POC to increase the likelihood of meeting the functionally related goals stated below. Patient's Activity Tolerance:        Patient's rehabilitation potential/prognosis is considered to be: Factors which may impact rehabilitation potential include:          GOALS   Patient Goal(s): pain reief  Short Term Goals Completed by   Goal Status                                                                   Long Term Goals Completed by 4 weeks plan expires 11/11/22 Goal Status   patient will be indepedent with HEP     patient will score 10/50 on NDI and will report a score of 1 regarding pain with daily task of driving                                                          TREATMENT PLAN            Pt. actively involved in establishing Plan of Care and Goals: Yes  Patient/ Caregiver education and instruction:               Treatment may include any combination of the following:       Frequency / Duration:  Patient to be seen   for   weeks      Eval Complexity:    Decision Making: Medium Complexity  History: PF- chronic pain  Exam: NDI/ROM  Clinical Presentation: evolving    PT Treatment Completed: Therapy Time  Individual Time In: 4265       Individual Time Out: 1625  Minutes: 35  Timed Code Treatment Minutes: 10 Minutes     Therapist Signature: Vini Marr PT    Date: 16/1/5208     I certify that the above Therapy Services are being furnished while the patient is under my care. I agree with the treatment plan and certify that this therapy is necessary. [de-identified] Signature:  ___________________________   Date:_______                                                                   Adrianne Gomez PA-C        Physician Comments: _______________________________________________    Please sign and return to [address] [facility]. Please fax to the location listed below.  [address] YOU for this referral!    [address] [facility] [address] [place] [address]  Dept: [phone]  Loc: [phone]       POC NOTE

## 2022-10-10 ENCOUNTER — HOSPITAL ENCOUNTER (OUTPATIENT)
Dept: PHYSICAL THERAPY | Age: 62
Setting detail: THERAPIES SERIES
Discharge: HOME | End: 2022-10-10
Payer: COMMERCIAL

## 2022-10-10 PROCEDURE — 97110 THERAPEUTIC EXERCISES: CPT

## 2022-10-10 NOTE — FLOWSHEET NOTE
Outpatient Physical Therapy  [place]           [] Phone: [phone]   Fax: [phone]  [facility] [place]           [x] Phone: [phone]   Fax: [phone]        Physical Therapy Daily Treatment Note  Date:  10/10/2022    Patient Name:  Ulises Trotter    :  1960  MRN: [id number]  Restrictions/Precautions: No data recorded   Position Activity Restriction  Other position/activity restrictions: none  Diagnosis:   DDD (degenerative disc disease), cervical [M50.30] Diagnosis: cervical DDD  Date of Injury/Surgery:   Treatment Diagnosis:  neck pain  Insurance/Certification information: Med [place] BOMN/ no pre cert  Referring Physician:  LEORA Escudero   PCP: Junior Chamberlain PA-C  Next Doctor Visit:    Plan of care signed (Y/N):    Outcome Measure: NDI   Visit# / total visits:   2/6 then PN  Pain level: 4/10   Goals:     Patient goals: pain relief  Long Term Goals  Time Frame for Long Term Goals: 4 weeks plan expires 22  patient will be indepedent with HEP  patient will score 10/50 on NDI and will report a score of 1 regarding pain with daily task of driving               Summary of Evaluation:  Assessment: NDI         Subjective:  Neck and back have been bothering him all day today. Feels like it is easier to hold his head up. Any changes in Ambulatory Summary Sheet? None        Objective:  Continues to demonstrate forward head posture. Good form noted with exercises.      COVID screening questions were asked and patient attested that there had been no contact or symptoms        Exercises: (No more than 4 columns)   Exercise/Equipment Date 10/4/22 Date  10/10/2022 Date           WARM UP         ube BWD x3' Bkwd  x 3 min           TABLE      B shoulder ER strengthening GTB x10 reps  GTB 3x10                               STANDING      Resisted neck EXT/chin tuck Forearms on wall GTB x10 reps Forearms on wall GTB 3x10 reps    Lat rows  RTB 20x3\"    LAE  trial PROPRIOCEPTION                                    MODALITIES                      Other Therapeutic Activities/Education:        Home Exercise Program:        Manual Treatments:        Modalities:        Communication with other providers:        Assessment:  (Response towards treatment session) (Pain Rating) Pain rated 5/10 in the neck after treatment. Progressed strengthening exercises this visit with good tolerance. Pt tolerated  treatment without any adverse reactions or complications this date. . Pt would continue to benefit from skilled therapy interventions to address remaining impairments, improve mobility and strength,  and progress toward goal completion and prepare for d/c including finalizing HEP ;  while reducing risk for re-injury or further decline. ..   Pain complaints after session 0/10       Plan for Next Session:        Time In / Time Out:    3483/1635       Timed Code/Total Treatment Minutes:  25' TE    Next Progress Note due:        Plan of Care Interventions:  [x] Therapeutic Exercise  [] Modalities:  [x] Therapeutic Activity     [] Ultrasound  [x] Estim  [] Gait Training      [] Cervical Traction [] Lumbar Traction  [x] Neuromuscular Re-education    [x] Cold/hotpack [] Iontophoresis   [x] Instruction in HEP      [] Vasopneumatic   [] Dry Needling    [x] Manual Therapy               [] Aquatic Therapy              Electronically signed by:  Jan Howell PTA  10/10/2022, 4:07 PM

## 2022-10-12 ENCOUNTER — HOSPITAL ENCOUNTER (OUTPATIENT)
Dept: PHYSICAL THERAPY | Age: 62
Discharge: HOME | End: 2022-10-12

## 2022-10-12 NOTE — FLOWSHEET NOTE
Physical Therapy  Cancellation/No-show Note  Patient Name:  Dory Dunn  :  1960   Date:  10/12/2022  Cancelled visits to date: 0  No-shows to date: 0    For today's appointment patient:  []  Cancelled  []  Rescheduled appointment  [x]  No-show     Reason given by patient:  []  Patient ill  []  Conflicting appointment  []  No transportation    []  Conflict with work  []  No reason given  []  Other:     Comments:      Electronically signed by:  Rachelle Augustin PTA

## 2022-10-17 ENCOUNTER — HOSPITAL ENCOUNTER (OUTPATIENT)
Dept: PHYSICAL THERAPY | Age: 62
Setting detail: THERAPIES SERIES
Discharge: HOME | End: 2022-10-17
Payer: COMMERCIAL

## 2022-10-17 PROCEDURE — 97110 THERAPEUTIC EXERCISES: CPT

## 2022-10-17 NOTE — FLOWSHEET NOTE
Outpatient Physical Therapy  [place]           [] Phone: [phone]   Fax: [phone]  [place]           [x] Phone: [phone]   Fax: [phone]        Physical Therapy Daily Treatment Note  Date:  10/17/2022    Patient Name:  Teresa Hutchinson    :  1960  MRN: [id number]  Restrictions/Precautions: No data recorded   Position Activity Restriction  Other position/activity restrictions: none  Diagnosis:   DDD (degenerative disc disease), cervical [M50.30] Diagnosis: cervical DDD  Date of Injury/Surgery:   Treatment Diagnosis:  neck pain  Insurance/Certification information: Med [place] BOMN/ no pre cert  Referring Physician:  LEORA Pina   PCP: Danay Olson PA-C  Next Doctor Visit:    Plan of care signed (Y/N):    Outcome Measure: NDI   Visit# / total visits:   3/6 then PN  Pain level: 4/10   Goals:     Patient goals: pain relief  Long Term Goals  Time Frame for Long Term Goals: 4 weeks plan expires 22  patient will be indepedent with HEP  patient will score 10/50 on NDI and will report a score of 1 regarding pain with daily task of driving               Summary of Evaluation:  Assessment: NDI         Subjective:  Patient reports pain 4/10    Any changes in Ambulatory Summary Sheet?   None        Objective:  FWD head  COVID screening questions were asked and patient attested that there had been no contact or symptoms        Exercises: (No more than 4 columns)   Exercise/Equipment Date 10/4/22 Date  10/10/2022 Date 10/17/22           WARM UP         ube BWD x3' Bkwd  x 3 min  Bkwd  x 3 min          TABLE      B shoulder ER strengthening GTB x10 reps  GTB 3x10 GTB 3x10   pulleys   x20                        STANDING      Resisted neck EXT/chin tuck Forearms on wall GTB x10 reps Forearms on wall GTB 3x10 reps Forearms on wall GTB 2x10 reps   Lat rows  RTB 20x3\" GTB x20   LAE  trial GTB x20                                PROPRIOCEPTION MODALITIES                      Other Therapeutic Activities/Education:        Home Exercise Program:        Manual Treatments:        Modalities:        Communication with other providers:        Assessment:  (Response towards treatment session) (Pain Rating) Pain rated 5/10 in the neck after treatment. Progressed strengthening exercises this visit with good tolerance. Pt tolerated  treatment without any adverse reactions or complications this date. . Pt would continue to benefit from skilled therapy interventions to address remaining impairments, improve mobility and strength,  and progress toward goal completion and prepare for d/c including finalizing HEP ;  while reducing risk for re-injury or further decline. ..   Pain complaints after session 3/10       Plan for Next Session:        Time In / Time Out:    2770/4997       Timed Code/Total Treatment Minutes:  25' TE    Next Progress Note due:        Plan of Care Interventions:  [x] Therapeutic Exercise  [] Modalities:  [x] Therapeutic Activity     [] Ultrasound  [x] Estim  [] Gait Training      [] Cervical Traction [] Lumbar Traction  [x] Neuromuscular Re-education    [x] Cold/hotpack [] Iontophoresis   [x] Instruction in HEP      [] Vasopneumatic   [] Dry Needling    [x] Manual Therapy               [] Aquatic Therapy              Electronically signed by:  Deborah Holland, PT, PTA  10/17/2022, 3:37 PM

## 2022-10-19 ENCOUNTER — HOSPITAL ENCOUNTER (OUTPATIENT)
Dept: PHYSICAL THERAPY | Age: 62
Setting detail: THERAPIES SERIES
Discharge: HOME | End: 2022-10-19
Payer: COMMERCIAL

## 2022-10-19 PROCEDURE — 97110 THERAPEUTIC EXERCISES: CPT

## 2022-10-19 NOTE — FLOWSHEET NOTE
Outpatient Physical Therapy  [place]           [] Phone: [phone]   Fax: [phone]  [place] [place]           [x] Phone: [phone]   Fax: [phone]        Physical Therapy Daily Treatment Note  Date:  10/19/2022    Patient Name:  Chris Hoang    :  1960  MRN: [id number]  Restrictions/Precautions: No data recorded   Position Activity Restriction  Other position/activity restrictions: none  Diagnosis:   DDD (degenerative disc disease), cervical [M50.30] Diagnosis: cervical DDD  Date of Injury/Surgery:   Treatment Diagnosis:  neck pain  Insurance/Certification information: Med [place] BOMN/ no pre cert  Referring Physician:  LEORA Galindo   PCP: Lissett Jean PA-C  Next Doctor Visit:    Plan of care signed (Y/N):    Outcome Measure: NDI   Visit# / total visits:   4/6 then PN  Pain level: 4/10   Goals:     Patient goals: pain relief  Long Term Goals  Time Frame for Long Term Goals: 4 weeks plan expires 22  patient will be indepedent with HEP  patient will score 10/50 on NDI and will report a score of 1 regarding pain with daily task of driving               Summary of Evaluation:  Assessment: NDI         Subjective:  Patient reports pain 4/10    Any changes in Ambulatory Summary Sheet?   None        Objective:  FWD head  Good form with exercises  COVID screening questions were asked and patient attested that there had been no contact or symptoms        Exercises: (No more than 4 columns)   Exercise/Equipment Date 10/4/22 Date  10/10/2022 Date 10/17/22 10/19/2022            WARM UP          ube BWD x3' Bkwd  x 3 min  Bkwd  x 3 min  Bkwd  x 3 min          TABLE       B shoulder ER strengthening GTB x10 reps  GTB 3x10 GTB 3x10 GTB 3x10   pulleys   x20 20x                           STANDING       Resisted neck EXT/chin tuck Forearms on wall GTB x10 reps Forearms on wall GTB 3x10 reps Forearms on wall GTB 2x10 reps Forearms on wall GTB 2x10 reps   Lat rows  RTB 20x3\" GTB x20 GTB 20x    LAE  trial GTB x20 GTB 20x                                     PROPRIOCEPTION                                          MODALITIES                         Other Therapeutic Activities/Education:        Home Exercise Program:        Manual Treatments:        Modalities:        Communication with other providers:        Assessment:  (Response towards treatment session) (Pain Rating) Patient rated his neck pain 4/10 after treatment. Pt tolerated  treatment without any adverse reactions or complications this date. . Pt would continue to benefit from skilled therapy interventions to address remaining impairments, improve mobility and strength,  and progress toward goal completion and prepare for d/c including finalizing HEP ;  while reducing risk for re-injury or further decline. ..   Pain complaints after session 3/10       Plan for Next Session:        Time In / Time Out:    3439/2426       Timed Code/Total Treatment Minutes:  21' TE    Next Progress Note due:        Plan of Care Interventions:  [x] Therapeutic Exercise  [] Modalities:  [x] Therapeutic Activity     [] Ultrasound  [x] Estim  [] Gait Training      [] Cervical Traction [] Lumbar Traction  [x] Neuromuscular Re-education    [x] Cold/hotpack [] Iontophoresis   [x] Instruction in HEP      [] Vasopneumatic   [] Dry Needling    [x] Manual Therapy               [] Aquatic Therapy              Electronically signed by:  Francesca Hudson PTA  10/19/2022, 3:34 PM

## 2022-11-03 ENCOUNTER — HOSPITAL ENCOUNTER (OUTPATIENT)
Age: 62
Discharge: HOME | End: 2022-11-03
Payer: COMMERCIAL

## 2022-11-03 LAB
ALBUMIN SERPL-MCNC: 4.7 GM/DL (ref 3.4–5)
ANION GAP SERPL CALCULATED.3IONS-SCNC: 15 MMOL/L (ref 4–16)
BILIRUBIN URINE: NEGATIVE MG/DL
BLOOD, URINE: NEGATIVE
BUN BLDV-MCNC: 24 MG/DL (ref 6–23)
CALCIUM SERPL-MCNC: 9.3 MG/DL (ref 8.3–10.6)
CHLORIDE BLD-SCNC: 97 MMOL/L (ref 99–110)
CLARITY: CLEAR
CO2: 26 MMOL/L (ref 21–32)
COLOR: YELLOW
CREAT SERPL-MCNC: 1.4 MG/DL (ref 0.9–1.3)
CREATININE URINE: 119.3 MG/DL (ref 39–259)
GFR SERPL CREATININE-BSD FRML MDRD: 57 ML/MIN/1.73M2
GLUCOSE FASTING: 132 MG/DL (ref 70–99)
GLUCOSE, URINE: NEGATIVE MG/DL
KETONES, URINE: NEGATIVE MG/DL
LEUKOCYTE ESTERASE, URINE: NEGATIVE
MAGNESIUM: 1.6 MG/DL (ref 1.8–2.4)
NITRITE URINE, QUANTITATIVE: NEGATIVE
PH, URINE: 5.5 (ref 5–8)
PHOSPHORUS: 4.3 MG/DL (ref 2.5–4.9)
POTASSIUM SERPL-SCNC: 3.8 MMOL/L (ref 3.5–5.1)
PROT/CREAT RATIO, UR: 0.1
PROTEIN UA: NEGATIVE MG/DL
SODIUM BLD-SCNC: 138 MMOL/L (ref 135–145)
SPECIFIC GRAVITY UA: 1.02 (ref 1–1.03)
URINE TOTAL PROTEIN: 13.2 MG/DL
UROBILINOGEN, URINE: 0.2 MG/DL (ref 0.2–1)

## 2022-11-03 PROCEDURE — 84100 ASSAY OF PHOSPHORUS: CPT

## 2022-11-03 PROCEDURE — 82040 ASSAY OF SERUM ALBUMIN: CPT

## 2022-11-03 PROCEDURE — 36415 COLL VENOUS BLD VENIPUNCTURE: CPT

## 2022-11-03 PROCEDURE — 84156 ASSAY OF PROTEIN URINE: CPT

## 2022-11-03 PROCEDURE — 81003 URINALYSIS AUTO W/O SCOPE: CPT

## 2022-11-03 PROCEDURE — 83735 ASSAY OF MAGNESIUM: CPT

## 2022-11-03 PROCEDURE — 80048 BASIC METABOLIC PNL TOTAL CA: CPT

## 2022-11-03 PROCEDURE — 82570 ASSAY OF URINE CREATININE: CPT

## 2022-11-07 PROBLEM — N18.31 STAGE 3A CHRONIC KIDNEY DISEASE (HCC): Status: ACTIVE | Noted: 2022-11-07

## 2022-11-15 NOTE — DISCHARGE SUMMARY
Outpatient Physical Therapy           [place]           [] Phone: [phone]   Fax: [phone]  [place]           [x] Phone: [phone]   Fax: [phone]      To: Valentino Harper, PA-C     From: Stephen Mata PT,      Patient: Caitie Nguyen                    : 1960  Diagnosis:  DDD (degenerative disc disease), cervical [M50.30]        Treatment Diagnosis:       Date: 11/15/2022  []  Progress Note                [x]  Discharge Note    Evaluation Date: 10/4/22    Total Visits to date:  4  Cancels/No-shows to date:      Subjective:  10/19/22  Patient reports pain 4/10      Plan of Care/Treatment to date:  [x] Therapeutic Exercise    [] Modalities:  [x] Therapeutic Activity     [] Ultrasound  [] Electrical Stimulation  [] Gait Training      [] Cervical Traction   [] Lumbar Traction  [] Neuromuscular Re-education  [] Cold/hotpack [] Iontophoresis  [x] Instruction in HEP      Other:  [x] Manual Therapy       []  Vasopneumatic  [] Aquatic Therapy       []   Dry Needle Therapy                      Objective/Significant Findings At Last Visit/Comments:  FWD head  Good form with exercises      Assessment:         Goal Status:  [] Achieved [] Partially Achieved  [x] Not Assessed   patient will be indepedent with HEP  patient will score 10/50 on NDI and will report a score of 1 regarding pain with daily task of driving           [x] Patient now discharged    Electronically signed by:  Stephen Mata PT, , 11/15/2022, 3:38 PM    If you have any questions or concerns, please don't hesitate to call.   Thank you for your referral.

## 2022-11-18 ENCOUNTER — HOSPITAL ENCOUNTER (OUTPATIENT)
Dept: PHYSICAL THERAPY | Age: 62
Setting detail: THERAPIES SERIES
Discharge: HOME | End: 2022-11-18
Payer: COMMERCIAL

## 2022-11-18 PROCEDURE — 97110 THERAPEUTIC EXERCISES: CPT

## 2022-11-18 NOTE — FLOWSHEET NOTE
Outpatient Physical Therapy  [place]           [] Phone: [phone]   Fax: [phone]  Jordyn murillo           [x] Phone: [phone]   Fax: [phone]        Physical Therapy Daily Treatment Note  Date:  2022    Patient Name:  Marianne Boston    :  1960  MRN: [id number]  Restrictions/Precautions: No data recorded   Position Activity Restriction  Other position/activity restrictions: none  Diagnosis:   DDD (degenerative disc disease), cervical [M50.30] Diagnosis: cervical DDD  Date of Injury/Surgery:   Treatment Diagnosis:  neck pain  Insurance/Certification information: [employer] [place] BOMN/ no pre cert  Referring Physician:  LEORA Kern   PCP: Tania Erickson PA-C  Next Doctor Visit:    Plan of care signed (Y/N):    Outcome Measure: NDI   Visit# / total visits:   5/6 then PN  Pain level: 6/10   Goals:     Patient goals: pain relief  Long Term Goals  Time Frame for Long Term Goals: 4 weeks plan expires 22  patient will be indepedent with HEP  patient will score 10/50 on NDI and will report a score of 1 regarding pain with daily task of driving     Summary of Evaluation:  Assessment: NDI     Subjective:  Patient reports pain 6/10. He reports he had a number of other doctors appointments and he didn't want to double book himself. He also reports he kept forgetting to call and schedule. Pt reports average pain driving over the last week 6-7/10. Pt reports he does not always remember to use his \"rubber band\" to do HEP. PT suggested he hang paper on fridge and he denied having a handout. Any changes in Ambulatory Summary Sheet?   None    Objective:  NDI: 26 (22)  significant FWD head posture with difficulty holding head up, pt also demonstrated L SB    COVID screening questions were asked and patient attested that there had been no contact or symptoms    Exercises: (No more than 4 columns)   Exercise/Equipment 10/19/2022 Date: 22          WARM UP ube Bkwd  x 3 min Backward x4'        TABLE     B shoulder ER strengthening GTB 3x10    pulleys 20x 20x   R SB stretches                  STANDING     Resisted neck EXT/chin tuck Forearms on wall GTB 2x10 reps Forearms on wall GTB 3x10    Lat rows GTB 20x  GTB 20x 3\"   LAE GTB 20x  GTB 20x 3\"   Scap retraction  GTB 20x 3\"   Pec stretch     cervical retraction   1x5 10\" modified against wall   Stick up against wall       2x10 cues to keep head up   PROPRIOCEPTION                              MODALITIES                 Other Therapeutic Activities/Education:  Pt provided updated HEP handout. Home Exercise Program:  11/18 - resisted neck ext/chin tuck, scap retraction, and stick ups    Manual Treatments:  none    Modalities:  none    Communication with other providers:  PT spoke with evaluating PT    Assessment:  (Response towards treatment session) (Pain Rating) Patient rated his neck pain 7/10 after treatment. Pt tolerated  treatment without any adverse reactions or complications this date. . Pt would continue to benefit from skilled therapy interventions to address remaining impairments, improve mobility and strength,  and progress toward goal completion and prepare for d/c including finalizing HEP ;  while reducing risk for re-injury or further decline. ..   Pain complaints after session 7/10     Plan for Next Session: Continue per POC     Time In / Time Out:  3:30-4:00 pm       Timed Code/Total Treatment Minutes:  30'/ 2 therapeutic exercise    Next Progress Note due:      Plan of Care Interventions:  [x] Therapeutic Exercise  [] Modalities:  [x] Therapeutic Activity     [] Ultrasound  [x] Estim  [] Gait Training      [] Cervical Traction [] Lumbar Traction  [x] Neuromuscular Re-education    [x] Cold/hotpack [] Iontophoresis   [x] Instruction in HEP      [] Vasopneumatic   [] Dry Needling    [x] Manual Therapy               [] Aquatic Therapy              Electronically signed by:  Gabrielle Basurto, PT, DPT [id number] 11/18/2022, 3:29 PM

## 2022-11-21 ENCOUNTER — HOSPITAL ENCOUNTER (OUTPATIENT)
Dept: PHYSICAL THERAPY | Age: 62
Setting detail: THERAPIES SERIES
Discharge: HOME | End: 2022-11-21
Payer: COMMERCIAL

## 2022-11-21 PROCEDURE — 97110 THERAPEUTIC EXERCISES: CPT

## 2022-11-21 NOTE — FLOWSHEET NOTE
Outpatient Physical Therapy  [place]           [] Phone: [phone]   Fax: [phone]  Jordyn murillo           [x] Phone: [phone]   Fax: [phone]        Physical Therapy Daily Treatment Note  Date:  2022    Patient Name:  Sue Bassett    :  1960  MRN: [id number]  Restrictions/Precautions: No data recorded   Position Activity Restriction  Other position/activity restrictions: none  Diagnosis:   DDD (degenerative disc disease), cervical [M50.30] Diagnosis: cervical DDD  Date of Injury/Surgery:   Treatment Diagnosis:  neck pain  Insurance/Certification information: [employer] [place] BOMN/ no pre cert  Referring Physician:  LEORA Perdomo   PCP: Neto Oleary PA-C  Next Doctor Visit:    Plan of care signed (Y/N):    Outcome Measure: NDI   Visit# / total visits:    then PN  Pain level: 6/10   Goals:     Patient goals: pain relief  Long Term Goals  Time Frame for Long Term Goals: 4 weeks plan expires 22  patient will be indepedent with HEP  patient will score 10/50 on NDI and will report a score of 1 regarding pain with daily task of driving     Summary of Evaluation:  Assessment: NDI     Subjective:  Patient reports pain is constant; always 6-7/10; unable to get comfortable position when going to bed at night; not sleeping well. Any changes in Ambulatory Summary Sheet?   None    Objective:  NDI: 26 (22)  significant FWD head posture with difficulty holding head up    COVID screening questions were asked and patient attested that there had been no contact or symptoms    Exercises: (No more than 4 columns)   Exercise/Equipment 10/19/2022 Date: 22           WARM UP         ube Bkwd  x 3 min Backward x4' Backward x4'         TABLE      B shoulder ER strengthening GTB 3x10     pulleys 20x 20x 20x   R SB stretches                     STANDING      Resisted neck EXT/chin tuck Forearms on wall GTB 2x10 reps Forearms on wall GTB 3x10  Forearms on wall GTB 3x10    Lat rows GTB 20x  GTB 20x 3\" GTB 20x 3\"   LAE GTB 20x  GTB 20x 3\" GTB 20x 3\"   Scap retraction  GTB 20x 3\" GTB 20x 3\"   Pec stretch      cervical retraction   1x5 10\" modified against wall Unable today   Stick up against wall       2x10 cues to keep head up 2x10 cues to keep head up   PROPRIOCEPTION                                    MODALITIES                    Other Therapeutic Activities/Education:  Pt provided updated HEP handout. Home Exercise Program:  11/18 - resisted neck ext/chin tuck, scap retraction, and stick ups    Manual Treatments:  none    Modalities:  none    Communication with other providers:  PT spoke with evaluating PT    Assessment:  (Response towards treatment session) (Pain Rating) Patient rated his neck pain 6.5/10 after treatment. Pt tolerated  treatment without any adverse reactions or complications this date. . Pt would continue to benefit from skilled therapy interventions to address remaining impairments, improve mobility and strength,  and progress toward goal completion and prepare for d/c including finalizing HEP ;  while reducing risk for re-injury or further decline. ..   Pain complaints after session 6.5/10     Plan for Next Session: Continue per POC     Time In / Time Out:  1829-8165     Timed Code/Total Treatment Minutes:  33 min ( 2 TE)    Next Progress Note due:      Plan of Care Interventions:  [x] Therapeutic Exercise  [] Modalities:  [x] Therapeutic Activity     [] Ultrasound  [x] Estim  [] Gait Training      [] Cervical Traction [] Lumbar Traction  [x] Neuromuscular Re-education    [x] Cold/hotpack [] Iontophoresis   [x] Instruction in HEP      [] Vasopneumatic   [] Dry Needling    [x] Manual Therapy               [] Aquatic Therapy              Electronically signed by:  Moriah Gustafson PTA,   11/21/2022, 4:10 PM

## 2022-11-23 NOTE — PLAN OF CARE
Outpatient Physical Therapy           [place]           [] Phone: [phone]   Fax: [phone]  Jordyn murillo           [x] Phone: [phone]   Fax: [phone]     To: Johny Castle PA-C     From: Esequiel Morejon PT,    Patient: Joey Mendez       : 1960  Diagnosis: DDD (degenerative disc disease), cervical [M50.30]    Treatment Diagnosis:    Date: 2022    Physical Therapy Certification/Re-Certification Form    The following patient has been evaluated for physical therapy services and for therapy to continue, insurance requires physician review of the treatment plan initially and every 90 days. Please review the attached evaluation and/or summary of the patient's plan of care, and verify that you agree therapy should continue by signing the attached document and sending it back to our office. Assessment:  NDI: 26 (22)   Patient reports pain 6/10. He reports he had a number of other doctors appointments and he didn't want to double book himself. He also reports he kept forgetting to call and schedule. Pt reports average pain driving over the last week 6-7/10. Pt reports he does not always remember to use his \"rubber band\" to do HEP. PT suggested he hang paper on fridge and he denied having a handout.      Plan of Care/Treatment to date:  [x] Therapeutic Exercise  [] Modalities:  [x] Therapeutic Activity     [] Ultrasound  [x] Electrical Stimulation  [] Gait Training      [] Cervical Traction [] Lumbar Traction  [x] Neuromuscular Re-education    [x] Cold/hotpack [] Iontophoresis   [x] Instruction in HEP      [] Vasopneumatic    [] Dry Needling  [x] Manual Therapy               [] Aquatic Therapy     Frequency/Duration:  # Days per week: [] 1 day # Weeks: [] 1 week [x] 5 weeks     [x] 2 days   [] 2 weeks [] 6 weeks     [] 3 days   [] 3 weeks [] 7 weeks     [] 4 days   [] 4 weeks [] 8 weeks         [] 9 weeks [] 10 weeks         [] 11 weeks [] 12 weeks    Rehab Potential/Progress: [] Excellent [x] Good [] Fair  [] Poor     Goals:    Patient goals: pain relief   Plan expires 12/31/22  patient will be indepedent with HEP  patient will score 10/50 on NDI and will report a score of 1 regarding pain with daily task of driving      Electronically signed by:  Patricio Whalen, PT,, 11/23/2022, 8:43 AM  If you have any questions or concerns, please don't hesitate to call.   Thank you for your referral.      Physician Signature:________________________________Date:_________ TIME: _____  By signing above, therapists plan is approved by physician

## 2022-11-23 NOTE — PLAN OF CARE
Patients Plan of Care was received and signed. Signed POC was scanned and placed in the patients chart.     Kapil Foley

## 2022-11-28 ENCOUNTER — HOSPITAL ENCOUNTER (OUTPATIENT)
Dept: PHYSICAL THERAPY | Age: 62
Setting detail: THERAPIES SERIES
Discharge: HOME | End: 2022-11-28
Payer: COMMERCIAL

## 2022-11-28 PROCEDURE — 97110 THERAPEUTIC EXERCISES: CPT

## 2022-11-28 NOTE — FLOWSHEET NOTE
Outpatient Physical Therapy  [place]           [] Phone: [phone]   Fax: [phone]  Jordyn murillo           [x] Phone: [phone]   Fax: [phone]        Physical Therapy Daily Treatment Note  Date:  2022    Patient Name:  James Walden    :  1960  MRN: [id number]  Restrictions/Precautions: No data recorded   Position Activity Restriction  Other position/activity restrictions: none  Diagnosis:   DDD (degenerative disc disease), cervical [M50.30] Diagnosis: cervical DDD  Date of Injury/Surgery:   Treatment Diagnosis:  neck pain  Insurance/Certification information: Med [place] BOMN/ no pre cert  Referring Physician:  LEORA Ramsay   PCP: Edgar Samaniego PA-C  Next Doctor Visit:    Plan of care signed (Y/N):    Outcome Measure: NDI   Visit# / total visits:  7/6 then PN  Pain level: 6/10   Goals:     Patient goals: pain relief  Long Term Goals  Time Frame for Long Term Goals: 4 weeks plan expires 22  patient will be indepedent with HEP  patient will score 10/50 on NDI and will report a score of 1 regarding pain with daily task of driving     Summary of Evaluation:  Assessment: NDI     Subjective:  Patient reports of 6/10 pain upon arrival and voices no new c/o. Any changes in Ambulatory Summary Sheet?   None    Objective:     significant FWD head posture with difficulty holding head up    COVID screening questions were asked and patient attested that there had been no contact or symptoms    Exercises: (No more than 4 columns)   Exercise/Equipment Date: 22           WARM UP         ube Backward x4' Backward x4' Backward  4'         TABLE      B shoulder ER strengthening      pulleys 20x 20x 20x   R SB stretches                     STANDING      Resisted neck EXT/chin tuck Forearms on wall GTB 3x10  Forearms on wall GTB 3x10  Forearms on wall GTB 3x10    Lat rows GTB 20x 3\" GTB 20x 3\" GTB 20x 3\"   LAE GTB 20x 3\" GTB 20x 3\" GTB 20x 3\"   Scap retraction GTB 20x 3\" GTB 20x 3\" GTB 20x3\"   Pec stretch      cervical retraction  1x5 10\" modified against wall Unable today Unable today   Stick up against wall      2x10 cues to keep head up 2x10 cues to keep head up 2x10 cues to keep head up   PROPRIOCEPTION                                    MODALITIES                    Other Therapeutic Activities/Education:  Pt provided updated HEP handout. Home Exercise Program:  11/18 - resisted neck ext/chin tuck, scap retraction, and stick ups    Manual Treatments:  none    Modalities:  none    Communication with other providers:  PT spoke with evaluating PT    Assessment:  (Response towards treatment session) (Pain Rating) Patient rated his neck pain 6-7/10 after treatment. Pt tolerated  treatment without any adverse reactions or complications this date. . Pt would continue to benefit from skilled therapy interventions to address remaining impairments, improve mobility and strength,  and progress toward goal completion and prepare for d/c including finalizing HEP ;  while reducing risk for re-injury or further decline. ..   Pain complaints after session 6-7/10     Plan for Next Session: Continue per POC     Time In / Time Out:   5733-7058     Timed Code/Total Treatment Minutes:  34te     Next Progress Note due:      Plan of Care Interventions:  [x] Therapeutic Exercise  [] Modalities:  [x] Therapeutic Activity     [] Ultrasound  [x] Estim  [] Gait Training      [] Cervical Traction [] Lumbar Traction  [x] Neuromuscular Re-education    [x] Cold/hotpack [] Iontophoresis   [x] Instruction in HEP      [] Vasopneumatic   [] Dry Needling    [x] Manual Therapy               [] Aquatic Therapy              Electronically signed by:  Susan Dyer PTA   11/28/2022, 3:15 PM

## 2022-11-30 ENCOUNTER — HOSPITAL ENCOUNTER (OUTPATIENT)
Dept: PHYSICAL THERAPY | Age: 62
Setting detail: THERAPIES SERIES
Discharge: HOME | End: 2022-11-30
Payer: COMMERCIAL

## 2022-11-30 PROCEDURE — 97110 THERAPEUTIC EXERCISES: CPT

## 2022-11-30 NOTE — FLOWSHEET NOTE
wall GTB 3x10  Forearms on wall GTB 3x10    Lat rows GTB 20x 3\" GTB 20x 3\" GTB 20x 3\" GTB 20x3\"   LAE GTB 20x 3\" GTB 20x 3\" GTB 20x 3\" GTB 20x3\"   Scap retraction GTB 20x 3\" GTB 20x 3\" GTB 20x3\" GTB 20x3\"   Pec stretch       cervical retraction  1x5 10\" modified against wall Unable today Unable today    Stick up against wall      2x10 cues to keep head up 2x10 cues to keep head up 2x10 cues to keep head up 2x10 cues to keep head up   PROPRIOCEPTION                                          MODALITIES                       Other Therapeutic Activities/Education:  Pt provided updated HEP handout. Home Exercise Program:  11/18 - resisted neck ext/chin tuck, scap retraction, and stick ups    Manual Treatments:  none    Modalities:  none    Communication with other providers:  PT spoke with evaluating PT    Assessment:  (Response towards treatment session) (Pain Rating) Patient noted a popping in the left shoulder blade region when on the UBE today. Noted that this was not painful. Pain remained 4/10 after treatment. Pt tolerated  treatment without any adverse reactions or complications this date. . Pt would continue to benefit from skilled therapy interventions to address remaining impairments, improve mobility and strength,  and progress toward goal completion and prepare for d/c including finalizing HEP ;  while reducing risk for re-injury or further decline. ..   Pain complaints after session 6-7/10     Plan for Next Session: Continue per POC     Time In / Time Out:   1515/1545     Timed Code/Total Treatment Minutes:  30 te     Next Progress Note due:      Plan of Care Interventions:  [x] Therapeutic Exercise  [] Modalities:  [x] Therapeutic Activity     [] Ultrasound  [x] Estim  [] Gait Training      [] Cervical Traction [] Lumbar Traction  [x] Neuromuscular Re-education    [x] Cold/hotpack [] Iontophoresis   [x] Instruction in HEP      [] Vasopneumatic   [] Dry Needling    [x] Manual Therapy

## 2022-12-12 RX ORDER — RANOLAZINE 500 MG/1
500 TABLET, EXTENDED RELEASE ORAL 2 TIMES DAILY
Qty: 60 TABLET | Refills: 3 | Status: SHIPPED | OUTPATIENT
Start: 2022-12-12

## 2022-12-14 ENCOUNTER — HOSPITAL ENCOUNTER (OUTPATIENT)
Dept: PHYSICAL THERAPY | Age: 62
Setting detail: THERAPIES SERIES
Discharge: HOME | End: 2022-12-14
Payer: COMMERCIAL

## 2022-12-14 PROCEDURE — 97110 THERAPEUTIC EXERCISES: CPT

## 2022-12-14 NOTE — FLOWSHEET NOTE
Outpatient Physical Therapy  [place]           [] Phone: [phone]   Fax: [phone]  Bipin Weir           [x] Phone: [phone]   Fax: [phone]        Physical Therapy Daily Treatment Note  Date:  2022    Patient Name:  Ina Nyhan    :  1960  MRN: [id number]  Restrictions/Precautions: No data recorded   Position Activity Restriction  Other position/activity restrictions: none  Diagnosis:   DDD (degenerative disc disease), cervical [M50.30] Diagnosis: cervical DDD  Date of Injury/Surgery:   Treatment Diagnosis:  neck pain  Insurance/Certification information: Med [place] BOMN/ no pre cert  Referring Physician:  LEORA Dye Agent   PCP: Braden Muniz PA-C  Next Doctor Visit:    Plan of care signed (Y/N):    Outcome Measure: NDI   Visit# / total visits:  / then PN  Pain level: 5/10   Goals:     Patient goals: pain relief  Long Term Goals  Time Frame for Long Term Goals: patient will be indepedent with HEP  patient will score 10/50 on NDI and will report a score of 1 regarding pain with daily task of driving     Summary of Evaluation:  Assessment: NDI     Subjective:  Patient reports of 5/10 pain upon arrival and describes it as pain and pressure in the chest.   Patient reports he has not sought medical attention for this but took an Asprin and a nitro pill and that helped some. Any changes in Ambulatory Summary Sheet?   None    Objective:   Continues to demonstrate significant forward head posture        COVID screening questions were asked and patient attested that there had been no contact or symptoms    Exercises: (No more than 4 columns)   Exercise/Equipment 22           WARM UP         ube Backward  4' Backward x 4 min  Backward  5'           TABLE      B shoulder ER strengthening      pulleys 20x 20x 20x   R SB stretches                     STANDING      Resisted neck EXT/chin tuck Forearms on wall GTB 3x10  Forearms on wall GTB 3x10  Forearms on wall GTB 3x10    Lat rows GTB 20x 3\" GTB 20x3\" BTB 20x5\"   LAE GTB 20x 3\" GTB 20x3\" BTB 20x5\"   Scap retraction GTB 20x3\" GTB 20x3\" BTB 20x5\"   Pec stretch   4x30\" in doorway   cervical retraction  Unable today     Stick up against wall      2x10 cues to keep head up 2x10 cues to keep head up 2x10 cues to keep head up   PROPRIOCEPTION                                    MODALITIES                    Other Therapeutic Activities/Education:  Pt provided updated HEP handout. Home Exercise Program:  11/18 - resisted neck ext/chin tuck, scap retraction, and stick ups    Manual Treatments:  none    Modalities:  none    Communication with other providers:  PT spoke with evaluating PT    Assessment:  (Response towards treatment session) (Pain Rating)      Pt tolerated  treatment without any adverse reactions or complications this date. . Pt would continue to benefit from skilled therapy interventions to address remaining impairments, improve mobility and strength,  and progress toward goal completion and prepare for d/c including finalizing HEP ;  while reducing risk for re-injury or further decline. ..   Pain complaints after session 6/10     Plan for Next Session: Continue per POC     Time In / Time Out:    4412-9988     Timed Code/Total Treatment Minutes:   35te     Next Progress Note due:      Plan of Care Interventions:  [x] Therapeutic Exercise  [] Modalities:  [x] Therapeutic Activity     [] Ultrasound  [x] Estim  [] Gait Training      [] Cervical Traction [] Lumbar Traction  [x] Neuromuscular Re-education    [x] Cold/hotpack [] Iontophoresis   [x] Instruction in HEP      [] Vasopneumatic   [] Dry Needling    [x] Manual Therapy               [] Aquatic Therapy              Electronically signed by:  Naren Freed PTA   12/14/2022, 2:49 PM

## 2022-12-14 NOTE — PLAN OF CARE
Outpatient Physical Therapy           [place]           [] Phone: [phone]   Fax: [phone]  Jordyn murillo           [x] Phone: [phone]   Fax: [phone]     To: Micah Recinos PA-C     From: Estella Cruz PT,    Patient: James Walden       : 1960  Diagnosis: DDD (degenerative disc disease), cervical [M50.30]    Treatment Diagnosis:    Date: 2022    Physical Therapy Certification/Re-Certification Form    The following patient has been evaluated for physical therapy services and for therapy to continue, insurance requires physician review of the treatment plan initially and every 90 days. Please review the attached evaluation and/or summary of the patient's plan of care, and verify that you agree therapy should continue by signing the attached document and sending it back to our office. Assessment:  NDI: 26 (22)   Patient reports pain 6/10. He reports he had a number of other doctors appointments and he didn't want to double book himself. He also reports he kept forgetting to call and schedule. Pt reports average pain driving over the last week 6-7/10. Pt reports he does not always remember to use his \"rubber band\" to do HEP. PT suggested he hang paper on fridge and he denied having a handout.      Plan of Care/Treatment to date:  [x] Therapeutic Exercise  [] Modalities:  [x] Therapeutic Activity     [] Ultrasound  [x] Electrical Stimulation  [] Gait Training      [] Cervical Traction [] Lumbar Traction  [x] Neuromuscular Re-education    [x] Cold/hotpack [] Iontophoresis   [x] Instruction in HEP      [] Vasopneumatic    [] Dry Needling  [x] Manual Therapy               [] Aquatic Therapy     Frequency/Duration:  # Days per week: [] 1 day # Weeks: [] 1 week [x] 5 weeks     [x] 2 days   [] 2 weeks [] 6 weeks     [] 3 days   [] 3 weeks [] 7 weeks     [] 4 days   [] 4 weeks [] 8 weeks         [] 9 weeks [] 10 weeks         [] 11 weeks [] 12 weeks    Rehab Potential/Progress: [] Excellent [x] Good [] Fair  [] Poor     Goals:    Patient goals: pain relief   Plan expires 12/31/22  patient will be indepedent with HEP  patient will score 10/50 on NDI and will report a score of 1 regarding pain with daily task of driving   Plan : patient to be seen 1-2 x per week x6 sessions -  current plan initiated 11/18/22   Electronically signed by:  Hope Paz, PT,, 12/14/2022, 4:57 PM  If you have any questions or concerns, please don't hesitate to call.   Thank you for your referral.      Physician Signature:________________________________Date:_________ TIME: _____  By signing above, therapists plan is approved by physician

## 2022-12-19 ENCOUNTER — OFFICE VISIT (OUTPATIENT)
Dept: CARDIOLOGY CLINIC | Age: 62
End: 2022-12-19
Payer: COMMERCIAL

## 2022-12-19 VITALS
WEIGHT: 228 LBS | DIASTOLIC BLOOD PRESSURE: 80 MMHG | HEART RATE: 95 BPM | SYSTOLIC BLOOD PRESSURE: 124 MMHG | HEIGHT: 70 IN | BODY MASS INDEX: 32.64 KG/M2

## 2022-12-19 DIAGNOSIS — I48.0 PAF (PAROXYSMAL ATRIAL FIBRILLATION) (HCC): Primary | ICD-10-CM

## 2022-12-19 PROCEDURE — 99214 OFFICE O/P EST MOD 30 MIN: CPT | Performed by: INTERNAL MEDICINE

## 2022-12-19 PROCEDURE — 3074F SYST BP LT 130 MM HG: CPT | Performed by: INTERNAL MEDICINE

## 2022-12-19 PROCEDURE — 93000 ELECTROCARDIOGRAM COMPLETE: CPT | Performed by: INTERNAL MEDICINE

## 2022-12-19 PROCEDURE — 3078F DIAST BP <80 MM HG: CPT | Performed by: INTERNAL MEDICINE

## 2022-12-19 NOTE — PATIENT INSTRUCTIONS
Thank you for allowing us to care for you today! We want to ensure we can follow your treatment plan and we strive to give you the best outcomes and experience possible. If you ever have a life threatening emergency and call 911 - for an ambulance (EMS)   Our providers can only care for you at:   [facility] or [facility]. Even if you have someone take you or you drive yourself we can only care for you in a [facility] Our providers are not setup at the other healthcare locations! **It is YOUR responsibilty to bring medication bottles and/or updated medication list to [address] [place] [address]. This will allow us to better serve you and all your healthcare needs**  Please be informed that if you contact our office outside of normal business hours the physician on call cannot help with any scheduling or rescheduling issues, procedure instruction questions or any type of medication issue. We advise you for any urgent/emergency that you go to the nearest emergency room!     PLEASE CALL OUR OFFICE DURING NORMAL BUSINESS HOURS    Monday - Friday   8 am to 5 pm    [place]: [phone]    [place]:  [phone]

## 2022-12-19 NOTE — PROGRESS NOTES
Patient was here in office & educated on [facility] for Dx: CP.  Procedure is scheduled for 12/23/22 @ 8 AM, w/arrival @ 6 AM , @ [facility]. Pre-admission orders were given to patient for labs & CXR, which are due 12/19/22 OR 12/20/22 @ [facility]. Procedure and risks were explained to patient. Consent forms were signed. Instructions were given to patient to remain NPO after midnight the night before procedure. Patient may take morning meds the morning of procedure with small amount of water. Patient is asked to call hospital @ [phone], 1 to 2 days before procedure to pre-register. Patient was notified that procedure could be delayed due to an emergency. Patient voiced understanding.  Copies of consent, pre-testing orders, & instructions scanned into media

## 2022-12-19 NOTE — PROGRESS NOTES
Joanna Gould MD        OFFICE  FOLLOWUP NOTE    Chief complaints: patient is here for management of of CAD,s/p CABG, DM, HTN, AFIB, DYSLPIDEMIA    Subjective: + chest pain, no shortness of breath, no dizziness, no palpitations    HPI Luz Elena Duenas is a 65 y. o.year old who  has a past medical history of CAD (coronary artery disease), Diabetes mellitus ([address] H/O cardiovascular stress test, H/O Doppler echocardiogram, H/O echocardiogram, H/O echocardiogram, H/O exercise stress test, Herniated lumbar intervertebral disc, History of cardiac cath, History of exercise stress test, History of nuclear stress test, History of nuclear stress test, Hypertension, and Sleep apnea in adult. and presents for management of of CAD,s/p CABG, DM, HTN, AFIB, DYSLPIDEMIA  which are well controlled      Current Outpatient Medications   Medication Sig Dispense Refill    ranolazine (RANEXA) 500 MG extended release tablet Take 1 tablet by mouth 2 times daily 60 tablet 3    glycopyrrolate-formoterol (BEVESPI AEROSPHERE) 9-4.8 MCG/ACT AERO Inhale 2 puffs by mouth twice daily 1 each 5    magnesium oxide (MAG-OX) 400 MG tablet Take 1 tablet by mouth daily 90 tablet 3    furosemide (LASIX) 40 MG tablet Take 1 tablet by mouth 2 times daily 60 tablet 3    carvedilol (COREG) 25 MG tablet Take 1 tablet by mouth 2 times daily (with meals) 60 tablet 6    TRULICITY 3 HG/2.2XP SOPN INJECT 1 SYRINGE SUBCUTANEOUSLY ONCE A WEEK      Omega-3 Fatty Acids (FISH OIL OMEGA-3 PO) Take 2 capsules by mouth daily      prasugrel (EFFIENT) 10 MG TABS Take 1 tablet by mouth in the morning. 90 tablet 3    apixaban (ELIQUIS) 5 MG TABS tablet Take 1 tablet by mouth in the morning and 1 tablet before bedtime.  60 tablet 6    gabapentin (NEURONTIN) 800 MG tablet TAKE 1 TABLET BY MOUTH THREE TIMES DAILY      methocarbamol (ROBAXIN) 750 MG tablet TAKE 1 TABLET BY MOUTH TWICE DAILY      rosuvastatin (CRESTOR) 20 MG tablet TAKE 1 TABLET BY MOUTH ONCE DAILY AT BEDTIME      nitroGLYCERIN (NITROSTAT) 0.4 MG SL tablet Place 1 tablet under the tongue every 5 minutes as needed for Chest pain 25 tablet 3    isosorbide mononitrate (IMDUR) 30 MG extended release tablet Take 1 tablet by mouth daily 30 tablet 3    lisinopril (PRINIVIL;ZESTRIL) 5 MG tablet Take 5 mg by mouth daily       traZODone (DESYREL) 50 MG tablet as needed      naloxone 4 MG/0.1ML LIQD nasal spray as needed      acetaminophen-codeine (TYLENOL #4) 300-60 MG per tablet TAKE 1 TABLET BY MOUTH THREE TIMES DAILY AS NEEDED      dilTIAZem (CARDIZEM CD) 360 MG extended release capsule Take 1 capsule by mouth daily 90 capsule 3    metOLazone (ZAROXOLYN) 2.5 MG tablet Take 1 tablet by mouth three times a week 45 tablet 3    potassium chloride (KLOR-CON M) 20 MEQ extended release tablet Take 1 tablet by mouth 2 times daily 180 tablet 1    acetaminophen (TYLENOL) 500 MG tablet Take 1,000 mg by mouth 2 times daily      metFORMIN (GLUCOPHAGE-XR) 500 MG extended release tablet TAKE 2 TABLETS BY MOUTH TWICE DAILY  5    VENTOLIN  (90 Base) MCG/ACT inhaler INHALE 2 PUFFS BY MOUTH EVERY 4 TO 6 HOURS AS NEEDED  5     No current facility-administered medications for this visit. Allergies: Patient has no known allergies. Past Medical History:   Diagnosis Date    CAD (coronary artery disease)     s/p CABG, sees Dr. Fabi Hill    Diabetes mellitus ([facility]     H/O cardiovascular stress test 05/16/2019; 6/30/2020    abnormal stress test, LVEF 40%, Myocardial perfusion scan shows moderate size, severe intensity, non reversible perfusion defect in inferoapical wall. H/O Doppler echocardiogram 04/05/2022    EF 50-55. Mild septal wall asymmetrical LV hypertrophy. Sclerotic nonstenotic aortic valve. Mitral annualr calcification present. RVSP 28. H/O echocardiogram 05/06/2019    Limited study-Left ventricular function is low normal. EF 50-55% Mild left ventricular hypertrophy.      H/O echocardiogram 03/04/2021    Left ventricular function is mildly abnormal, estimated EF 40-45%. Mild MR & TR.    H/O exercise stress test 2019    Submaximal ECG stress test. stress test stopped due to Shortness of breath. Proceed with cardiac rehab    Herniated lumbar intervertebral disc     History of cardiac cath 2020    PATENT 2/3 GRAFTS, SEVERE NATIVE RCA stenosis at mentioned above, required PCI with YAMILE as mentioned    History of exercise stress test 2020    Treadmill, normal stress test, THR achieved    History of nuclear stress test 2020     Scan shows moderatel size, severe intensity, non reversible perfusion defect. History of nuclear stress test 2022    Small size, moderate intensity, non reversible perfusion defect on inferior apical wall.     Hypertension     Sleep apnea in adult 2019     Past Surgical History:   Procedure Laterality Date    CIRCUMCISION, NON-  2013    CORONARY ARTERY BYPASS GRAFT MAZE PROCEDURE N/A 2019    CABG CORONARY ARTERY BYPASS GRAFT X3, MAZE PROCEDURE, INTRAOPERATIVE WALDEMAR, INDUCED HYPOTHERMIA, ENDOSCOPIC VEIN HARVEST OF THE LEFT LEG performed by Racheal Ibrahim MD at [address]  [facility] Right     Transposition of Right Ulnar Nerve     Family History   Problem Relation Age of Onset    Hypertension Mother     Hypertension Father     Hypertension Sister     Hypertension Brother     Cancer Maternal Grandmother      Social History     Tobacco Use    Smoking status: Former     Packs/day: 0.25     Years: 30.00     Pack years: 7.50     Types: Cigarettes    Smokeless tobacco: Never   Substance Use Topics    Alcohol use: No     Alcohol/week: 0.0 standard drinks      [unfilled]  Review of Systems:   Constitutional: No Fever or Weight Loss   Eyes: No Decreased Vision  ENT: No Headaches, Hearing Loss or Vertigo  Cardiovascular: + chest pain, dyspnea on exertion, palpitations or loss of consciousness  Respiratory: No cough or wheezing    Gastrointestinal: No abdominal pain, appetite loss, blood in stools, constipation, diarrhea or heartburn  Genitourinary: No dysuria, trouble voiding, or hematuria  Musculoskeletal:  No gait disturbance, weakness or joint complaints  Integumentary: No rash or pruritis  Neurological: No TIA or stroke symptoms  Psychiatric: No anxiety or depression  Endocrine: No malaise, fatigue or temperature intolerance  Hematologic/Lymphatic: No bleeding problems, blood clots or swollen lymph nodes  Allergic/Immunologic: No nasal congestion or hives  All systems negative except as marked. Objective:  /80 (Site: Left Upper Arm, Position: Sitting, Cuff Size: Large Adult)   Pulse 95   Ht 5' 10\" (1.778 m)   Wt 228 lb (103.4 kg)   BMI 32.71 kg/m²   Wt Readings from Last 3 Encounters:   12/19/22 228 lb (103.4 kg)   12/06/22 220 lb (99.8 kg)   11/07/22 223 lb 3.2 oz (101.2 kg)     Body mass index is 32.71 kg/m². GENERAL - Alert, oriented, pleasant, in no apparent distress,normal grooming  HEENT - pupils are intact, cornea intact, conjunctive normal color, ears are normal in exam,  Neck - Supple. No jugular venous distention noted. No carotid bruits, no apical -carotid delay  Respiratory:  Normal breath sounds, No respiratory distress, No wheezing, No chest tenderness. ,no use of accessory muscles, diaphragm movement is normal  Cardiovascular: (PMI) apex non displaced,no lifts no thrills, no s3,no s4, Normal heart rate, Normal rhythm, No murmurs, No rubs, No gallops.  Carotid arteries pulse and amplitude are normal no bruit, no abdominal bruit noted ( normal abdominal aorta ausculation),   Extremities - No cyanosis, clubbing, or significant edema, no varicose veins    Abdomen - No masses, tenderness, or organomegaly, no hepato-splenomegally, no bruits  Musculoskeletal - No significant edema, no kyphosis or scoliosis, no deformity in any extremity noted, muscle strength and tone are normal  Skin: no ulcer,no scar,no stasis dermatitis   Neurologic - alert oriented times 3,Cranial nerves II through XII are grossly intact. There were no gross focal neurologic abnormalities. Psychiatric: normal mood and affect    No results found for: CKTOTAL, CKMB, CKMBINDEX, TROPONINI  BNP:  No results found for: BNP  PT/INR:  No results found for: PTINR  Lab Results   Component Value Date    LABA1C 6.4 (H) 07/07/2022    LABA1C 6.6 (H) 11/20/2019     Lab Results   Component Value Date    CHOL 124 07/07/2022    TRIG 216 (H) 07/07/2022    HDL 50 07/07/2022    LDLCALC 31 07/07/2022    LDLDIRECT 56 05/03/2019     Lab Results   Component Value Date    ALT 30 07/07/2022    AST 38 (H) 07/07/2022     TSH:  No results found for: TSH  Ekg: nsr    Impression:  Neal Perea is a 65 y. o.year old who  has a past medical history of CAD (coronary artery disease), Diabetes mellitus ([address] H/O cardiovascular stress test, H/O Doppler echocardiogram, H/O echocardiogram, H/O echocardiogram, H/O exercise stress test, Herniated lumbar intervertebral disc, History of cardiac cath, History of exercise stress test, History of nuclear stress test, History of nuclear stress test, Hypertension, and Sleep apnea in adult.  and presents with     Plan:  CAD : Occasional chest pains\": ;last stress test in April had months ago showed small apical infarction, which is unchanged from 2020,h/o  PCI of native RCA,his SVG to RCA was occluded, LIMA was patent, will get [facility] .will continue  statins, bb, effient and eliquis off aspirin and continue effient and eliquis  renail insufficieny: patient is seen by Dr. Julia Mccormick present care,   ICM: LVEF 40-45%, LVEF IS IMPROVED NOW WILL continue to optimize medication,Continue aspirin  continue statins,ACE INHIBOTR , betablockers,Ntg   CHF\" Orthopnea:HE FEELS better and lost 12 lbs,  TAKES 2 ;PILLOWS  TAKING lasix, recheck chem 7 and if creatinine is trending up, will decrease lasix again , continue Kdur  Anxiety: not seen a psychiatory, continue xanax   Leg swelling: use compression socks  Paroxysmal afib: Continue eliquis, s/p  maze and LOREN ligation  Dyslipidemia: continue statins  HTN: stable, continue COREG AND, lisinopril medication  DM: stable: continue  insulinAll labs, medications   All labs, medications and tests reviewed, continue all other medications of all above medical condition listed as is.

## 2022-12-20 ENCOUNTER — HOSPITAL ENCOUNTER (OUTPATIENT)
Dept: GENERAL RADIOLOGY | Age: 62
Discharge: HOME | End: 2022-12-20
Payer: COMMERCIAL

## 2022-12-20 ENCOUNTER — HOSPITAL ENCOUNTER (OUTPATIENT)
Age: 62
Discharge: HOME | End: 2022-12-20
Payer: COMMERCIAL

## 2022-12-20 ENCOUNTER — HOSPITAL ENCOUNTER (OUTPATIENT)
Age: 62
Discharge: HOME | End: 2022-12-20

## 2022-12-20 DIAGNOSIS — J43.2 CENTRILOBULAR EMPHYSEMA (HCC): ICD-10-CM

## 2022-12-20 LAB
ANION GAP SERPL CALCULATED.3IONS-SCNC: 13 MMOL/L (ref 4–16)
BASOPHILS ABSOLUTE: 0.1 K/CU MM
BASOPHILS RELATIVE PERCENT: 0.8 % (ref 0–1)
BUN BLDV-MCNC: 22 MG/DL (ref 6–23)
CALCIUM SERPL-MCNC: 9.4 MG/DL (ref 8.3–10.6)
CHLORIDE BLD-SCNC: 97 MMOL/L (ref 99–110)
CO2: 28 MMOL/L (ref 21–32)
CREAT SERPL-MCNC: 1.1 MG/DL (ref 0.9–1.3)
DIFFERENTIAL TYPE: ABNORMAL
EOSINOPHILS ABSOLUTE: 0.6 K/CU MM
EOSINOPHILS RELATIVE PERCENT: 5.1 % (ref 0–3)
GFR SERPL CREATININE-BSD FRML MDRD: >60 ML/MIN/1.73M2
GLUCOSE BLD-MCNC: 111 MG/DL (ref 70–99)
HCT VFR BLD CALC: 42.5 % (ref 42–52)
HEMOGLOBIN: 13.8 GM/DL (ref 13.5–18)
IMMATURE NEUTROPHIL %: 0.2 % (ref 0–0.43)
LYMPHOCYTES ABSOLUTE: 2.1 K/CU MM
LYMPHOCYTES RELATIVE PERCENT: 18.7 % (ref 24–44)
MCH RBC QN AUTO: 28.3 PG (ref 27–31)
MCHC RBC AUTO-ENTMCNC: 32.5 % (ref 32–36)
MCV RBC AUTO: 87.1 FL (ref 78–100)
MONOCYTES ABSOLUTE: 0.9 K/CU MM
MONOCYTES RELATIVE PERCENT: 7.8 % (ref 0–4)
PDW BLD-RTO: 13.5 % (ref 11.7–14.9)
PLATELET # BLD: 306 K/CU MM (ref 140–440)
PMV BLD AUTO: 9.1 FL (ref 7.5–11.1)
POTASSIUM SERPL-SCNC: 3.9 MMOL/L (ref 3.5–5.1)
RBC # BLD: 4.88 M/CU MM (ref 4.6–6.2)
SEGMENTED NEUTROPHILS ABSOLUTE COUNT: 7.5 K/CU MM
SEGMENTED NEUTROPHILS RELATIVE PERCENT: 67.4 % (ref 36–66)
SODIUM BLD-SCNC: 138 MMOL/L (ref 135–145)
TOTAL IMMATURE NEUTOROPHIL: 0.02 K/CU MM
WBC # BLD: 11.1 K/CU MM (ref 4–10.5)

## 2022-12-20 PROCEDURE — 85025 COMPLETE CBC W/AUTO DIFF WBC: CPT

## 2022-12-20 PROCEDURE — 36415 COLL VENOUS BLD VENIPUNCTURE: CPT

## 2022-12-20 PROCEDURE — 71046 X-RAY EXAM CHEST 2 VIEWS: CPT

## 2022-12-20 PROCEDURE — 80048 BASIC METABOLIC PNL TOTAL CA: CPT

## 2022-12-23 ENCOUNTER — HOSPITAL ENCOUNTER (OUTPATIENT)
Dept: CARDIAC CATH/INVASIVE PROCEDURES | Age: 62
Discharge: HOME | End: 2022-12-23
Attending: INTERNAL MEDICINE | Admitting: INTERNAL MEDICINE
Payer: COMMERCIAL

## 2022-12-23 VITALS
SYSTOLIC BLOOD PRESSURE: 131 MMHG | HEART RATE: 91 BPM | BODY MASS INDEX: 32.64 KG/M2 | HEIGHT: 70 IN | WEIGHT: 228 LBS | DIASTOLIC BLOOD PRESSURE: 94 MMHG | TEMPERATURE: 96.4 F | RESPIRATION RATE: 15 BRPM | OXYGEN SATURATION: 94 %

## 2022-12-23 PROBLEM — I20.0 UNSTABLE ANGINA (HCC): Status: ACTIVE | Noted: 2022-12-23

## 2022-12-23 PROCEDURE — 2500000003 HC RX 250 WO HCPCS

## 2022-12-23 PROCEDURE — 6360000004 HC RX CONTRAST MEDICATION

## 2022-12-23 PROCEDURE — 93455 CORONARY ART/GRFT ANGIO S&I: CPT

## 2022-12-23 PROCEDURE — 93455 CORONARY ART/GRFT ANGIO S&I: CPT | Performed by: INTERNAL MEDICINE

## 2022-12-23 PROCEDURE — 6360000002 HC RX W HCPCS

## 2022-12-23 PROCEDURE — 2709999900 HC NON-CHARGEABLE SUPPLY

## 2022-12-23 PROCEDURE — 6370000000 HC RX 637 (ALT 250 FOR IP): Performed by: INTERNAL MEDICINE

## 2022-12-23 PROCEDURE — C1894 INTRO/SHEATH, NON-LASER: HCPCS

## 2022-12-23 PROCEDURE — 2580000003 HC RX 258: Performed by: INTERNAL MEDICINE

## 2022-12-23 PROCEDURE — C1769 GUIDE WIRE: HCPCS

## 2022-12-23 RX ORDER — SODIUM CHLORIDE 9 MG/ML
INJECTION, SOLUTION INTRAVENOUS CONTINUOUS
Status: DISCONTINUED | OUTPATIENT
Start: 2022-12-23 | End: 2022-12-23 | Stop reason: HOSPADM

## 2022-12-23 RX ORDER — DIAZEPAM 5 MG/1
5 TABLET ORAL ONCE
Status: COMPLETED | OUTPATIENT
Start: 2022-12-23 | End: 2022-12-23

## 2022-12-23 RX ORDER — DIPHENHYDRAMINE HCL 25 MG
25 TABLET ORAL ONCE
Status: COMPLETED | OUTPATIENT
Start: 2022-12-23 | End: 2022-12-23

## 2022-12-23 RX ADMIN — DIPHENHYDRAMINE HYDROCHLORIDE 25 MG: 25 TABLET ORAL at 06:19

## 2022-12-23 RX ADMIN — SODIUM CHLORIDE: 9 INJECTION, SOLUTION INTRAVENOUS at 06:19

## 2022-12-23 RX ADMIN — DIAZEPAM 5 MG: 5 TABLET ORAL at 06:19

## 2022-12-23 NOTE — PROGRESS NOTES
Pt states he doesn't understand what the doctor said was causing his chest pain. Dr. Suzanna Hodge notified.

## 2022-12-23 NOTE — FLOWSHEET NOTE
Pt to pt  in wheelchair per Ashley Epstein for d/c home in private vehicle per spouse.  Right groin free of bleeding/hematoma at time of d/c

## 2022-12-23 NOTE — DISCHARGE INSTRUCTIONS
Discharge Home Instuctions  Name:Reji Oleary  :1960    Your instructions:    Shower only for the first 5 days, NO TUB BATHS. Wash procedure site with mild soap, rinse and pat dry. Do not rub over the procedure site for two (2) days. Remove dressing and replace with a band-aid in 2 days. Site observations-Observe the area for bleeding or hematoma (lump under the skin caused by bleeding.)      A. Painless bruising is normal.  B. If a firmness/swelling seems to be increasing or there is bright red bleeding from site       (hold pressure over procedure site) and  CALL [phone] IMMEDIATELY. What to do after you leave the hospital:    Recommended activity: no lifting, Driving, or Strenuous exercise for 3 days. DO NOT lift more than 10lbs. For your procedure you may have been given a sedative to help you relax. This drug will make you sleepy. It is usually given in a vein (by IV). Don't do anything for 24 hours that requires attention to detail. It takes time for the medicine effects to completely wear off. Do not sign any legally binding contracts or documents over the next 24 hours. For your safety, you should not drive or operate any machinery that could be dangerous until the medicine wears off and you can think clearly and react easily. Follow-up with physician in 7-10 days. Take your medication as ordered.       If you have any questions, you may call [phone] or your physicians office

## 2022-12-23 NOTE — PROGRESS NOTES
Pt in high fowlers. Femoral site check; no hematoma or bleeding noted. Pt has family at bedside and call light in reach.

## 2022-12-23 NOTE — H&P
Chief complaints: patient is here for management of of CAD,s/p CABG, DM, HTN, AFIB, DYSLPIDEMIA     Subjective: + chest pain, no shortness of breath, no dizziness, no palpitations     HPI Reji is a 65 y. o.year old who  has a past medical history of CAD (coronary artery disease), Diabetes mellitus ([address] H/O cardiovascular stress test, H/O Doppler echocardiogram, H/O echocardiogram, H/O echocardiogram, H/O exercise stress test, Herniated lumbar intervertebral disc, History of cardiac cath, History of exercise stress test, History of nuclear stress test, History of nuclear stress test, Hypertension, and Sleep apnea in adult. and presents for management of of CAD,s/p CABG, DM, HTN, AFIB, DYSLPIDEMIA  which are well controlled        Current Facility-Administered Medications          Current Outpatient Medications   Medication Sig Dispense Refill    ranolazine (RANEXA) 500 MG extended release tablet Take 1 tablet by mouth 2 times daily 60 tablet 3    glycopyrrolate-formoterol (BEVESPI AEROSPHERE) 9-4.8 MCG/ACT AERO Inhale 2 puffs by mouth twice daily 1 each 5    magnesium oxide (MAG-OX) 400 MG tablet Take 1 tablet by mouth daily 90 tablet 3    furosemide (LASIX) 40 MG tablet Take 1 tablet by mouth 2 times daily 60 tablet 3    carvedilol (COREG) 25 MG tablet Take 1 tablet by mouth 2 times daily (with meals) 60 tablet 6    TRULICITY 3 EX/9.9EH SOPN INJECT 1 SYRINGE SUBCUTANEOUSLY ONCE A WEEK        Omega-3 Fatty Acids (FISH OIL OMEGA-3 PO) Take 2 capsules by mouth daily        prasugrel (EFFIENT) 10 MG TABS Take 1 tablet by mouth in the morning. 90 tablet 3    apixaban (ELIQUIS) 5 MG TABS tablet Take 1 tablet by mouth in the morning and 1 tablet before bedtime.  60 tablet 6    gabapentin (NEURONTIN) 800 MG tablet TAKE 1 TABLET BY MOUTH THREE TIMES DAILY        methocarbamol (ROBAXIN) 750 MG tablet TAKE 1 TABLET BY MOUTH TWICE DAILY        rosuvastatin (CRESTOR) 20 MG tablet TAKE 1 TABLET BY MOUTH ONCE DAILY AT BEDTIME nitroGLYCERIN (NITROSTAT) 0.4 MG SL tablet Place 1 tablet under the tongue every 5 minutes as needed for Chest pain 25 tablet 3    isosorbide mononitrate (IMDUR) 30 MG extended release tablet Take 1 tablet by mouth daily 30 tablet 3    lisinopril (PRINIVIL;ZESTRIL) 5 MG tablet Take 5 mg by mouth daily         traZODone (DESYREL) 50 MG tablet as needed        naloxone 4 MG/0.1ML LIQD nasal spray as needed        acetaminophen-codeine (TYLENOL #4) 300-60 MG per tablet TAKE 1 TABLET BY MOUTH THREE TIMES DAILY AS NEEDED        dilTIAZem (CARDIZEM CD) 360 MG extended release capsule Take 1 capsule by mouth daily 90 capsule 3    metOLazone (ZAROXOLYN) 2.5 MG tablet Take 1 tablet by mouth three times a week 45 tablet 3    potassium chloride (KLOR-CON M) 20 MEQ extended release tablet Take 1 tablet by mouth 2 times daily 180 tablet 1    acetaminophen (TYLENOL) 500 MG tablet Take 1,000 mg by mouth 2 times daily        metFORMIN (GLUCOPHAGE-XR) 500 MG extended release tablet TAKE 2 TABLETS BY MOUTH TWICE DAILY   5    VENTOLIN  (90 Base) MCG/ACT inhaler INHALE 2 PUFFS BY MOUTH EVERY 4 TO 6 HOURS AS NEEDED   5      No current facility-administered medications for this visit. Allergies: Patient has no known allergies. Past Medical History        Past Medical History:   Diagnosis Date    CAD (coronary artery disease)       s/p CABG, sees Dr. Estelita Robles    Diabetes mellitus ([address]      H/O cardiovascular stress test 05/16/2019; 6/30/2020     abnormal stress test, LVEF 40%, Myocardial perfusion scan shows moderate size, severe intensity, non reversible perfusion defect in inferoapical wall. H/O Doppler echocardiogram 04/05/2022     EF 50-55. Mild septal wall asymmetrical LV hypertrophy. Sclerotic nonstenotic aortic valve. Mitral annualr calcification present. RVSP 28. H/O echocardiogram 05/06/2019     Limited study-Left ventricular function is low normal. EF 50-55% Mild left ventricular hypertrophy.      H/O echocardiogram 2021     Left ventricular function is mildly abnormal, estimated EF 40-45%. Mild MR & TR.    H/O exercise stress test 2019     Submaximal ECG stress test. stress test stopped due to Shortness of breath. Proceed with cardiac rehab    Herniated lumbar intervertebral disc      History of cardiac cath 2020     PATENT 2/3 GRAFTS, SEVERE NATIVE RCA stenosis at mentioned above, required PCI with YAMILE as mentioned    History of exercise stress test 2020     Treadmill, normal stress test, THR achieved    History of nuclear stress test 2020      Scan shows moderatel size, severe intensity, non reversible perfusion defect. History of nuclear stress test 2022     Small size, moderate intensity, non reversible perfusion defect on inferior apical wall.     Hypertension      Sleep apnea in adult 2019         Past Surgical History         Past Surgical History:   Procedure Laterality Date    CIRCUMCISION, NON-   2013    CORONARY ARTERY BYPASS GRAFT MAZE PROCEDURE N/A 2019     CABG CORONARY ARTERY BYPASS GRAFT X3, MAZE PROCEDURE, INTRAOPERATIVE WALDEMAR, INDUCED HYPOTHERMIA, ENDOSCOPIC VEIN HARVEST OF THE LEFT LEG performed by Sarah Spain MD at [facility] [address] Right       Transposition of Right Ulnar Nerve         Family History         Family History   Problem Relation Age of Onset    Hypertension Mother      Hypertension Father      Hypertension Sister      Hypertension Brother      Cancer Maternal Grandmother           Social History            Tobacco Use    Smoking status: Former       Packs/day: 0.25       Years: 30.00       Pack years: 7.50       Types: Cigarettes    Smokeless tobacco: Never   Substance Use Topics    Alcohol use: No       Alcohol/week: 0.0 standard drinks      [unfilled]  Review of Systems:   Constitutional: No Fever or Weight Loss   Eyes: No Decreased Vision  ENT: No Headaches, Hearing Loss or Vertigo  Cardiovascular: + chest pain, dyspnea on exertion, palpitations or loss of consciousness  Respiratory: No cough or wheezing    Gastrointestinal: No abdominal pain, appetite loss, blood in stools, constipation, diarrhea or heartburn  Genitourinary: No dysuria, trouble voiding, or hematuria  Musculoskeletal:  No gait disturbance, weakness or joint complaints  Integumentary: No rash or pruritis  Neurological: No TIA or stroke symptoms  Psychiatric: No anxiety or depression  Endocrine: No malaise, fatigue or temperature intolerance  Hematologic/Lymphatic: No bleeding problems, blood clots or swollen lymph nodes  Allergic/Immunologic: No nasal congestion or hives  All systems negative except as marked. Objective:  /80 (Site: Left Upper Arm, Position: Sitting, Cuff Size: Large Adult)   Pulse 95   Ht 5' 10\" (1.778 m)   Wt 228 lb (103.4 kg)   BMI 32.71 kg/m²       Wt Readings from Last 3 Encounters:   12/19/22 228 lb (103.4 kg)   12/06/22 220 lb (99.8 kg)   11/07/22 223 lb 3.2 oz (101.2 kg)      Body mass index is 32.71 kg/m². GENERAL - Alert, oriented, pleasant, in no apparent distress,normal grooming  HEENT - pupils are intact, cornea intact, conjunctive normal color, ears are normal in exam,  Neck - Supple. No jugular venous distention noted. No carotid bruits, no apical -carotid delay  Respiratory:  Normal breath sounds, No respiratory distress, No wheezing, No chest tenderness. ,no use of accessory muscles, diaphragm movement is normal  Cardiovascular: (PMI) apex non displaced,no lifts no thrills, no s3,no s4, Normal heart rate, Normal rhythm, No murmurs, No rubs, No gallops.  Carotid arteries pulse and amplitude are normal no bruit, no abdominal bruit noted ( normal abdominal aorta ausculation),   Extremities - No cyanosis, clubbing, or significant edema, no varicose veins    Abdomen - No masses, tenderness, or organomegaly, no hepato-splenomegally, no bruits  Musculoskeletal - No significant edema, no kyphosis or scoliosis, no deformity in any extremity noted, muscle strength and tone are normal  Skin: no ulcer,no scar,no stasis dermatitis   Neurologic - alert oriented times 3,Cranial nerves II through XII are grossly intact. There were no gross focal neurologic abnormalities. Psychiatric: normal mood and affect     No results found for: CKTOTAL, CKMB, CKMBINDEX, TROPONINI  BNP:  No results found for: BNP  PT/INR:  No results found for: PTINR        Lab Results   Component Value Date     LABA1C 6.4 (H) 07/07/2022     LABA1C 6.6 (H) 11/20/2019            Lab Results   Component Value Date     CHOL 124 07/07/2022     TRIG 216 (H) 07/07/2022     HDL 50 07/07/2022     LDLCALC 31 07/07/2022     LDLDIRECT 56 05/03/2019            Lab Results   Component Value Date     ALT 30 07/07/2022     AST 38 (H) 07/07/2022      TSH:  No results found for: TSH  Ekg: nsr     Impression:  Erick Madden is a 65 y. o.year old who  has a past medical history of CAD (coronary artery disease), Diabetes mellitus ([facility] H/O cardiovascular stress test, H/O Doppler echocardiogram, H/O echocardiogram, H/O echocardiogram, H/O exercise stress test, Herniated lumbar intervertebral disc, History of cardiac cath, History of exercise stress test, History of nuclear stress test, History of nuclear stress test, Hypertension, and Sleep apnea in adult.  and presents with      Plan:  CAD : Occasional chest pains\": ;last stress test in April had months ago showed small apical infarction, which is unchanged from 2020,h/o  PCI of native RCA,his SVG to RCA was occluded, LIMA was patent, will get [facility] .will continue  statins, bb, effient and eliquis off aspirin and continue effient and eliquis  renail insufficieny: patient is seen by Dr. Sergio Burgos present care,   ICM: LVEF 40-45%, LVEF IS IMPROVED NOW WILL continue to optimize medication,Continue aspirin  continue statins,ACE INHIBOTR , betablockers,Ntg   CHF\" Orthopnea:HE FEELS better and lost 12 lbs,  TAKES 2 ;PILLOWS  TAKING lasix, recheck chem 7 and if creatinine is trending up, will decrease lasix again , continue Kdur  Anxiety: not seen a psychiatory, continue xanax   Leg swelling: use compression socks  Paroxysmal afib: Continue eliquis, s/p  maze and LOREN ligation  Dyslipidemia: continue statins  HTN: stable, continue COREG AND, lisinopril medication  DM: stable: continue  insulinAll labs, medications

## 2022-12-23 NOTE — FLOWSHEET NOTE
Discharge instructions reviewed with patient and spouse per Russell denton. Ambulated without difficulty.

## 2022-12-23 NOTE — PROGRESS NOTES
Discharge instructions reviewed with patient. Voices understanding. Ambulated without difficulty to the bathroom.

## 2023-01-19 ENCOUNTER — OFFICE VISIT (OUTPATIENT)
Dept: CARDIOLOGY CLINIC | Age: 63
End: 2023-01-19
Payer: COMMERCIAL

## 2023-01-19 VITALS
HEIGHT: 70 IN | WEIGHT: 227 LBS | SYSTOLIC BLOOD PRESSURE: 118 MMHG | BODY MASS INDEX: 32.5 KG/M2 | HEART RATE: 80 BPM | DIASTOLIC BLOOD PRESSURE: 64 MMHG

## 2023-01-19 DIAGNOSIS — F41.9 ANXIETY: ICD-10-CM

## 2023-01-19 DIAGNOSIS — I20.0 UNSTABLE ANGINA (HCC): Primary | ICD-10-CM

## 2023-01-19 DIAGNOSIS — R47.81 SLURRED SPEECH: ICD-10-CM

## 2023-01-19 PROCEDURE — 3074F SYST BP LT 130 MM HG: CPT | Performed by: INTERNAL MEDICINE

## 2023-01-19 PROCEDURE — 99214 OFFICE O/P EST MOD 30 MIN: CPT | Performed by: INTERNAL MEDICINE

## 2023-01-19 PROCEDURE — 3078F DIAST BP <80 MM HG: CPT | Performed by: INTERNAL MEDICINE

## 2023-01-19 RX ORDER — POTASSIUM CHLORIDE 20 MEQ/1
20 TABLET, EXTENDED RELEASE ORAL 2 TIMES DAILY
Qty: 180 TABLET | Refills: 1 | Status: SHIPPED | OUTPATIENT
Start: 2023-01-19

## 2023-01-19 RX ORDER — ALPRAZOLAM 0.5 MG/1
0.5 TABLET ORAL NIGHTLY PRN
Qty: 30 TABLET | Refills: 0 | Status: SHIPPED | OUTPATIENT
Start: 2023-01-19 | End: 2023-02-18

## 2023-01-19 NOTE — PROGRESS NOTES
Cha Avitia MD        OFFICE  FOLLOWUP NOTE    Chief complaints: patient is here for management of CAD,s/p CABG, DM, HTN, AFIB, DYSLPIDEMIA    Subjective: + couple of times chest pain, had slurring of speech a week ago, no shortness of breath, no dizziness, no palpitations    HPI Reji is a 62 y.o.year old who  has a past medical history of CAD (coronary artery disease), Diabetes mellitus (HCC), H/O cardiovascular stress test, H/O Doppler echocardiogram, H/O echocardiogram, H/O echocardiogram, H/O exercise stress test, Herniated lumbar intervertebral disc, History of cardiac cath, History of exercise stress test, History of nuclear stress test, History of nuclear stress test, Hypertension, and Sleep apnea in adult. and presents for management of CAD,s/p CABG, DM, HTN, AFIB, DYSLPIDEMIA which are well controlled      Current Outpatient Medications   Medication Sig Dispense Refill    ranolazine (RANEXA) 500 MG extended release tablet Take 1 tablet by mouth 2 times daily 60 tablet 3    glycopyrrolate-formoterol (BEVESPI AEROSPHERE) 9-4.8 MCG/ACT AERO Inhale 2 puffs by mouth twice daily 1 each 5    magnesium oxide (MAG-OX) 400 MG tablet Take 1 tablet by mouth daily 90 tablet 3    furosemide (LASIX) 40 MG tablet Take 1 tablet by mouth 2 times daily 60 tablet 3    carvedilol (COREG) 25 MG tablet Take 1 tablet by mouth 2 times daily (with meals) 60 tablet 6    Omega-3 Fatty Acids (FISH OIL OMEGA-3 PO) Take 2 capsules by mouth daily      prasugrel (EFFIENT) 10 MG TABS Take 1 tablet by mouth in the morning. 90 tablet 3    apixaban (ELIQUIS) 5 MG TABS tablet Take 1 tablet by mouth in the morning and 1 tablet before bedtime. 60 tablet 6    gabapentin (NEURONTIN) 800 MG tablet TAKE 1 TABLET BY MOUTH THREE TIMES DAILY      methocarbamol (ROBAXIN) 750 MG tablet TAKE 1 TABLET BY MOUTH TWICE DAILY      rosuvastatin (CRESTOR) 20 MG tablet TAKE 1 TABLET BY MOUTH ONCE DAILY AT BEDTIME      nitroGLYCERIN  (NITROSTAT) 0.4 MG SL tablet Place 1 tablet under the tongue every 5 minutes as needed for Chest pain 25 tablet 3    traZODone (DESYREL) 50 MG tablet as needed      acetaminophen-codeine (TYLENOL #4) 300-60 MG per tablet TAKE 1 TABLET BY MOUTH THREE TIMES DAILY AS NEEDED      dilTIAZem (CARDIZEM CD) 360 MG extended release capsule Take 1 capsule by mouth daily 90 capsule 3    potassium chloride (KLOR-CON M) 20 MEQ extended release tablet Take 1 tablet by mouth 2 times daily 180 tablet 1    acetaminophen (TYLENOL) 500 MG tablet Take 1,000 mg by mouth 2 times daily      metFORMIN (GLUCOPHAGE-XR) 500 MG extended release tablet TAKE 2 TABLETS BY MOUTH TWICE DAILY  5    VENTOLIN  (90 Base) MCG/ACT inhaler INHALE 2 PUFFS BY MOUTH EVERY 4 TO 6 HOURS AS NEEDED  5    TRULICITY 3 QU/7.4XL SOPN INJECT 1 SYRINGE SUBCUTANEOUSLY ONCE A WEEK (Patient not taking: Reported on 1/19/2023)      naloxone 4 MG/0.1ML LIQD nasal spray as needed (Patient not taking: Reported on 1/19/2023)       No current facility-administered medications for this visit. Allergies: Patient has no known allergies. Past Medical History:   Diagnosis Date    CAD (coronary artery disease)     s/p CABG, sees Dr. Kahn Stable    [facility]     H/O cardiovascular stress test 05/16/2019; 6/30/2020    abnormal stress test, LVEF 40%, Myocardial perfusion scan shows moderate size, severe intensity, non reversible perfusion defect in inferoapical wall. H/O Doppler echocardiogram 04/05/2022    EF 50-55. Mild septal wall asymmetrical LV hypertrophy. Sclerotic nonstenotic aortic valve. Mitral annualr calcification present. RVSP 28. H/O echocardiogram 05/06/2019    Limited study-Left ventricular function is low normal. EF 50-55% Mild left ventricular hypertrophy. H/O echocardiogram 03/04/2021    Left ventricular function is mildly abnormal, estimated EF 40-45%.  Mild MR & TR.    H/O exercise stress test 12/19/2019    Submaximal ECG stress test. stress test stopped due to Shortness of breath. Proceed with cardiac rehab    Herniated lumbar intervertebral disc     History of cardiac cath 2020    PATENT 2/3 GRAFTS, SEVERE NATIVE RCA stenosis at mentioned above, required PCI with YAMILE as mentioned    History of exercise stress test 2020    Treadmill, normal stress test, THR achieved    History of nuclear stress test 2020     Scan shows moderatel size, severe intensity, non reversible perfusion defect. History of nuclear stress test 2022    Small size, moderate intensity, non reversible perfusion defect on inferior apical wall.     Hypertension     Sleep apnea in adult 2019     Past Surgical History:   Procedure Laterality Date    CIRCUMCISION, NON-  2013    CORONARY ARTERY BYPASS GRAFT MAZE PROCEDURE N/A 2019    CABG CORONARY ARTERY BYPASS GRAFT X3, MAZE PROCEDURE, INTRAOPERATIVE WALDEMAR, INDUCED HYPOTHERMIA, ENDOSCOPIC VEIN HARVEST OF THE LEFT LEG performed by Soo Galindo MD at [place] [address] Right     Transposition of Right Ulnar Nerve     Family History   Problem Relation Age of Onset    Hypertension Mother     Hypertension Father     Hypertension Sister     Hypertension Brother     Cancer Maternal Grandmother      Social History     Tobacco Use    Smoking status: Former     Packs/day: 0.25     Years: 30.00     Pack years: 7.50     Types: Cigarettes     Quit date:      Years since quitting: 3.0    Smokeless tobacco: Never   Substance Use Topics    Alcohol use: No     Comment: Caffiene - pop once a day      [unfilled]  Review of Systems:   Constitutional: No Fever or Weight Loss   Eyes: No Decreased Vision  ENT: No Headaches, Hearing Loss or Vertigo  Cardiovascular: No chest pain, dyspnea on exertion, palpitations or loss of consciousness  Respiratory: No cough or wheezing    Gastrointestinal: No abdominal pain, appetite loss, blood in stools, constipation, diarrhea or heartburn  Genitourinary: No dysuria, trouble voiding, or hematuria  Musculoskeletal:  No gait disturbance, weakness or joint complaints  Integumentary: No rash or pruritis  Neurological: ? TIA or stroke symptoms  Psychiatric: + anxiety or depression  Endocrine: No malaise, fatigue or temperature intolerance  Hematologic/Lymphatic: No bleeding problems, blood clots or swollen lymph nodes  Allergic/Immunologic: No nasal congestion or hives  All systems negative except as marked. Objective:  /64 (Site: Left Upper Arm, Position: Sitting, Cuff Size: Large Adult)   Pulse 80   Ht 5' 10\" (1.778 m)   Wt 227 lb (103 kg)   BMI 32.57 kg/m²   Wt Readings from Last 3 Encounters:   01/19/23 227 lb (103 kg)   12/23/22 228 lb (103.4 kg)   12/19/22 228 lb (103.4 kg)     Body mass index is 32.57 kg/m². GENERAL - Alert, oriented, pleasant, in no apparent distress,normal grooming  HEENT - pupils are intact, cornea intact, conjunctive normal color, ears are normal in exam,  Neck - Supple. No jugular venous distention noted. No carotid bruits, no apical -carotid delay  Respiratory:  Normal breath sounds, No respiratory distress, No wheezing, No chest tenderness. ,no use of accessory muscles, diaphragm movement is normal  Cardiovascular: (PMI) apex non displaced,no lifts no thrills, no s3,no s4, Normal heart rate, Normal rhythm, No murmurs, No rubs, No gallops. Carotid arteries pulse and amplitude are normal no bruit, no abdominal bruit noted ( normal abdominal aorta ausculation),   Extremities - No cyanosis, clubbing, or significant edema, no varicose veins    Abdomen - No masses, tenderness, or organomegaly, no hepato-splenomegally, no bruits  Musculoskeletal - No significant edema, no kyphosis or scoliosis, no deformity in any extremity noted, muscle strength and tone are normal  Skin: no ulcer,no scar,no stasis dermatitis   Neurologic - alert oriented times 3,Cranial nerves II through XII are grossly intact.   There were no gross focal neurologic abnormalities. Psychiatric: normal mood and affect    No results found for: CKTOTAL, CKMB, CKMBINDEX, TROPONINI  BNP:  No results found for: BNP  PT/INR:  No results found for: PTINR  Lab Results   Component Value Date    LABA1C 6.4 (H) 07/07/2022    LABA1C 6.6 (H) 11/20/2019     Lab Results   Component Value Date    CHOL 124 07/07/2022    TRIG 216 (H) 07/07/2022    HDL 50 07/07/2022    LDLCALC 31 07/07/2022    LDLDIRECT 56 05/03/2019     Lab Results   Component Value Date    ALT 30 07/07/2022    AST 38 (H) 07/07/2022     TSH:  No results found for: TSH    Impression:  Kimberley Avina is a 65 y. o.year old who  has a past medical history of CAD (coronary artery disease), Diabetes mellitus ([address] H/O cardiovascular stress test, H/O Doppler echocardiogram, H/O echocardiogram, H/O echocardiogram, H/O exercise stress test, Herniated lumbar intervertebral disc, History of cardiac cath, History of exercise stress test, History of nuclear stress test, History of nuclear stress test, Hypertension, and Sleep apnea in adult. and presents with     Plan:  CAD he continues to have chest pain, his LHC was repeated, has intercostal arteries off LIMA were not ligated he had chest pain in cath lab with LIMA artery injection, his SVG to OM1 is patent, ( LCX was occluded), RCA has mild disease,: ;last stress test in April had months ago showed small apical infarction, which is unchanged from 2020,h/o  PCI of native RCA,his SVG to RCA was occluded, LIMA was patent, will get lhc .will continue  statins, bb, effient and eliquis off aspirin and continue effient and eliquis, will not pursue it further  ?  Stroke: patient thinks he had one week ago slurred speech, will get CT head and refer to neurologist, carotid doppler ordered  renail insufficieny: patient is seen by Dr. Sylvester Rodriguez present care,   ICM: LVEF 40-45%, LVEF IS IMPROVED NOW WILL continue to optimize medication,Continue aspirin  continue statins,ACE INHIBOTR , betablockers,Ntg   CHF\" Orthopnea:HE FEELS better and lost 12 lbs,  TAKES 2 ;PILLOWS  TAKING lasix, recheck chem 7 and if creatinine is trending up, will decrease lasix again , continue Kdur  Anxiety: not seen a psychiatory, refill xanax   Leg swelling: use compression socks  Paroxysmal afib: Continue eliquis, s/p  maze and LOREN ligation  Dyslipidemia: continue statins  HTN: stable, continue COREG AND, lisinopril medication  DM: stable: continue  insulinAll labs, medications All labs, medications and tests reviewed, continue all other medications of all above medical condition listed as is.     @[facility]@

## 2023-01-19 NOTE — PATIENT INSTRUCTIONS
Please be informed that if you contact our office outside of normal business hours the physician on call cannot help with any scheduling or rescheduling issues, procedure instruction questions or any type of medication issue. We advise you for any urgent/emergency that you go to the nearest emergency room! PLEASE CALL OUR OFFICE DURING NORMAL BUSINESS HOURS    Monday - Friday   8 am to 5 pm    [address]: [phone]    [place]:  [phone]  **It is YOUR responsibilty to bring medication bottles and/or updated medication list to [address] [place] [address]. This will allow us to better serve you and all your healthcare needs**  [facility] Locations - No appointment necessary. Sites open Monday to Friday. Call your preferred location for test preparation, business   hours and other information you need. [employer]. [address] [employer], [address] [facility]  Phone: [phone] Jordyn murillo  [address] [facility]  [address] Jordyn murillo, [address] [facility]  Phone: [phone]     Thank you for allowing us to care for you today! We want to ensure we can follow your treatment plan and we strive to give you the best outcomes and experience possible. If you ever have a life threatening emergency and call 911 - for an ambulance (EMS)   Our providers can only care for you at:   [facility] or [facility]. Even if you have someone take you or you drive yourself we can only care for you in a [address] [facility] facility. Our providers are not setup at the other healthcare locations!

## 2023-01-19 NOTE — PROGRESS NOTES
Vein \"LEG PROBLEM Questionnaire\"  Do you have prominent leg veins? No   Do you have any skin discoloration? No  Do you have any healed or active sores? No  Do you have swelling of the legs? Yes  Do you have a family history of varicose veins? No  Does your profession involve pro-longed        standing or heavy lifting? Yes  7. Have you been fighting overweight problems? Yes  8. Do you have restless legs? No  9. Do you have any night time cramps? No  10. Do you have any of the following in your legs:        Itching I  11. If Yes - Have they worn compression stockings Yes  12.  If they have worn compression stockings  Years

## 2023-01-25 ENCOUNTER — HOSPITAL ENCOUNTER (OUTPATIENT)
Dept: CT IMAGING | Age: 63
Discharge: HOME | End: 2023-01-25
Payer: COMMERCIAL

## 2023-01-25 DIAGNOSIS — R47.81 SLURRED SPEECH: ICD-10-CM

## 2023-01-25 PROCEDURE — 70450 CT HEAD/BRAIN W/O DYE: CPT

## 2023-01-26 ENCOUNTER — PROCEDURE VISIT (OUTPATIENT)
Dept: CARDIOLOGY CLINIC | Age: 63
End: 2023-01-26

## 2023-01-26 ENCOUNTER — HOSPITAL ENCOUNTER (OUTPATIENT)
Dept: PHYSICAL THERAPY | Age: 63
Setting detail: THERAPIES SERIES
Discharge: HOME | End: 2023-01-26

## 2023-01-26 DIAGNOSIS — G45.9 TIA (TRANSIENT ISCHEMIC ATTACK): Primary | ICD-10-CM

## 2023-01-26 DIAGNOSIS — R47.81 SLURRED SPEECH: ICD-10-CM

## 2023-01-26 NOTE — FLOWSHEET NOTE
Physical Therapy  Cancellation/No-show Note  Patient Name:  Reji Oleary  :  1960   Date:  2023  Cancelled visits to date: 0  No-shows to date: 0    For today's appointment patient:  []  Cancelled  []  Rescheduled appointment  [x]  No-show     Reason given by patient:  []  Patient ill  []  Conflicting appointment  []  No transportation    []  Conflict with work  [x]  No reason given  []  Other:     Comments:      Electronically signed by:  Janet Negrete PT,DPT [id number]  2023, 3:51 PM

## 2023-01-30 RX ORDER — DILTIAZEM HYDROCHLORIDE 360 MG/1
360 CAPSULE, EXTENDED RELEASE ORAL DAILY
Qty: 90 CAPSULE | Refills: 3 | Status: SHIPPED | OUTPATIENT
Start: 2023-01-30

## 2023-02-01 ENCOUNTER — HOSPITAL ENCOUNTER (OUTPATIENT)
Age: 63
Discharge: HOME | End: 2023-02-01
Payer: COMMERCIAL

## 2023-02-01 LAB
ALBUMIN SERPL-MCNC: 4.6 GM/DL (ref 3.4–5)
ANION GAP SERPL CALCULATED.3IONS-SCNC: 10 MMOL/L (ref 4–16)
BILIRUBIN URINE: NEGATIVE MG/DL
BLOOD, URINE: NEGATIVE
BUN SERPL-MCNC: 18 MG/DL (ref 6–23)
CALCIUM SERPL-MCNC: 10.4 MG/DL (ref 8.3–10.6)
CHLORIDE BLD-SCNC: 96 MMOL/L (ref 99–110)
CLARITY: CLEAR
CO2: 32 MMOL/L (ref 21–32)
COLOR: YELLOW
COMMENT UA: NORMAL
CREAT SERPL-MCNC: 1.4 MG/DL (ref 0.9–1.3)
CREATININE URINE: 27.3 MG/DL (ref 39–259)
GFR SERPL CREATININE-BSD FRML MDRD: 57 ML/MIN/1.73M2
GLUCOSE P FAST SERPL-MCNC: 130 MG/DL (ref 70–99)
GLUCOSE, URINE: NEGATIVE MG/DL
KETONES, URINE: NEGATIVE MG/DL
LEUKOCYTE ESTERASE, URINE: NEGATIVE
MAGNESIUM: 1.9 MG/DL (ref 1.8–2.4)
NITRITE URINE, QUANTITATIVE: NEGATIVE
PH, URINE: 6.5 (ref 5–8)
PHOSPHORUS: 4 MG/DL (ref 2.5–4.9)
POTASSIUM SERPL-SCNC: 4.3 MMOL/L (ref 3.5–5.1)
PROT/CREAT RATIO, UR: 0.1
PROTEIN UA: NEGATIVE MG/DL
SODIUM BLD-SCNC: 138 MMOL/L (ref 135–145)
SPECIFIC GRAVITY UA: 1.01 (ref 1–1.03)
URINE TOTAL PROTEIN: 4 MG/DL
UROBILINOGEN, URINE: 0.2 MG/DL (ref 0.2–1)

## 2023-02-01 PROCEDURE — 83735 ASSAY OF MAGNESIUM: CPT

## 2023-02-01 PROCEDURE — 36415 COLL VENOUS BLD VENIPUNCTURE: CPT

## 2023-02-01 PROCEDURE — 84100 ASSAY OF PHOSPHORUS: CPT

## 2023-02-01 PROCEDURE — 84156 ASSAY OF PROTEIN URINE: CPT

## 2023-02-01 PROCEDURE — 82040 ASSAY OF SERUM ALBUMIN: CPT

## 2023-02-01 PROCEDURE — 82570 ASSAY OF URINE CREATININE: CPT

## 2023-02-01 PROCEDURE — 81003 URINALYSIS AUTO W/O SCOPE: CPT

## 2023-02-01 PROCEDURE — 80048 BASIC METABOLIC PNL TOTAL CA: CPT

## 2023-02-02 RX ORDER — FUROSEMIDE 40 MG/1
40 TABLET ORAL 2 TIMES DAILY
Qty: 60 TABLET | Refills: 3 | Status: SHIPPED | OUTPATIENT
Start: 2023-02-02

## 2023-02-27 ENCOUNTER — OFFICE VISIT (OUTPATIENT)
Dept: CARDIOLOGY CLINIC | Age: 63
End: 2023-02-27
Payer: COMMERCIAL

## 2023-02-27 VITALS
HEART RATE: 93 BPM | DIASTOLIC BLOOD PRESSURE: 82 MMHG | BODY MASS INDEX: 30.81 KG/M2 | SYSTOLIC BLOOD PRESSURE: 124 MMHG | WEIGHT: 215.2 LBS | HEIGHT: 70 IN

## 2023-02-27 DIAGNOSIS — R26.9 GAIT ABNORMALITY: Primary | ICD-10-CM

## 2023-02-27 PROCEDURE — 99214 OFFICE O/P EST MOD 30 MIN: CPT | Performed by: INTERNAL MEDICINE

## 2023-02-27 PROCEDURE — 3074F SYST BP LT 130 MM HG: CPT | Performed by: INTERNAL MEDICINE

## 2023-02-27 PROCEDURE — 3079F DIAST BP 80-89 MM HG: CPT | Performed by: INTERNAL MEDICINE

## 2023-02-27 NOTE — PROGRESS NOTES
Reggie Infante MD        OFFICE  FOLLOWUP NOTE    Chief complaints: patient is here for management of CAD,s/p CABG, DM, HTN, AFIB, DYSLPIDEMIA    F/u on CT HEAD    Subjective: patient feels GAIT IMBALANCE, no chest pain, no shortness of breath, no dizziness, no palpitations    PRICILA Mclaughlin is a 65 y. o.year old who  has a past medical history of Abnormal findings on cardiac catheterization, CAD (coronary artery disease), Diabetes mellitus ([address] H/O cardiovascular stress test, H/O Doppler echocardiogram, H/O echocardiogram, H/O echocardiogram, H/O exercise stress test, Herniated lumbar intervertebral disc, History of cardiac cath, History of exercise stress test, History of nuclear stress test, History of nuclear stress test, Hx of Doppler ultrasound, Hypertension, and Sleep apnea in adult.  and presents for management of CAD,s/p CABG, DM, HTN, AFIB, DYSLPIDEMIA which are well controlled      Current Outpatient Medications   Medication Sig Dispense Refill    furosemide (LASIX) 40 MG tablet Take 1 tablet by mouth 3 times daily 270 tablet 3    metOLazone (ZAROXOLYN) 2.5 MG tablet Take 1 tablet by mouth 2 times daily 180 tablet 3    apixaban (ELIQUIS) 5 MG TABS tablet Take 1 tablet by mouth 2 times daily 60 tablet 6    dilTIAZem (CARDIZEM CD) 360 MG extended release capsule Take 1 capsule by mouth daily 90 capsule 3    potassium chloride (KLOR-CON M) 20 MEQ extended release tablet Take 1 tablet by mouth 2 times daily 180 tablet 1    ranolazine (RANEXA) 500 MG extended release tablet Take 1 tablet by mouth 2 times daily 60 tablet 3    glycopyrrolate-formoterol (BEVESPI AEROSPHERE) 9-4.8 MCG/ACT AERO Inhale 2 puffs by mouth twice daily 1 each 5    magnesium oxide (MAG-OX) 400 MG tablet Take 1 tablet by mouth daily 90 tablet 3    carvedilol (COREG) 25 MG tablet Take 1 tablet by mouth 2 times daily (with meals) 60 tablet 6    TRULICITY 3 NR/5.9AS SOPN       Omega-3 Fatty Acids (FISH OIL OMEGA-3 PO) Take 2 capsules by mouth daily      prasugrel (EFFIENT) 10 MG TABS Take 1 tablet by mouth in the morning. 90 tablet 3    gabapentin (NEURONTIN) 800 MG tablet TAKE 1 TABLET BY MOUTH THREE TIMES DAILY      methocarbamol (ROBAXIN) 750 MG tablet as needed      rosuvastatin (CRESTOR) 20 MG tablet TAKE 1 TABLET BY MOUTH ONCE DAILY AT BEDTIME      nitroGLYCERIN (NITROSTAT) 0.4 MG SL tablet Place 1 tablet under the tongue every 5 minutes as needed for Chest pain 25 tablet 3    traZODone (DESYREL) 50 MG tablet as needed      acetaminophen-codeine (TYLENOL #4) 300-60 MG per tablet TAKE 1 TABLET BY MOUTH THREE TIMES DAILY AS NEEDED      acetaminophen (TYLENOL) 500 MG tablet Take 1,000 mg by mouth 2 times daily      metFORMIN (GLUCOPHAGE-XR) 500 MG extended release tablet TAKE 2 TABLETS BY MOUTH TWICE DAILY  5    VENTOLIN  (90 Base) MCG/ACT inhaler INHALE 2 PUFFS BY MOUTH EVERY 4 TO 6 HOURS AS NEEDED  5    furosemide (LASIX) 40 MG tablet Take 1 tablet by mouth 2 times daily (Patient not taking: Reported on 2/27/2023) 60 tablet 3     No current facility-administered medications for this visit.     Allergies: Patient has no known allergies.  Past Medical History:   Diagnosis Date    Abnormal findings on cardiac catheterization 12/23/2022    severe native LAD and LCX stenosis    CAD (coronary artery disease)     s/p CABG, sees Dr. Avitia    Diabetes mellitus (HCC)     H/O cardiovascular stress test 05/16/2019; 6/30/2020    abnormal stress test, LVEF 40%, Myocardial perfusion scan shows moderate size, severe intensity, non reversible perfusion defect in inferoapical wall.    H/O Doppler echocardiogram 04/05/2022    EF 50-55. Mild septal wall asymmetrical LV hypertrophy. Sclerotic nonstenotic aortic valve. Mitral annualr calcification present. RVSP 28.    H/O echocardiogram 05/06/2019    Limited study-Left ventricular function is low normal. EF 50-55% Mild left ventricular hypertrophy.     H/O echocardiogram 03/04/2021    Left  ventricular function is mildly abnormal, estimated EF 40-45%. Mild MR & TR.    H/O exercise stress test 2019    Submaximal ECG stress test. stress test stopped due to Shortness of breath. Proceed with cardiac rehab    Herniated lumbar intervertebral disc     History of cardiac cath 2020    PATENT 2/3 GRAFTS, SEVERE NATIVE RCA stenosis at mentioned above, required PCI with YAMILE as mentioned    History of exercise stress test 2020    Treadmill, normal stress test, THR achieved    History of nuclear stress test 2020     Scan shows moderatel size, severe intensity, non reversible perfusion defect. History of nuclear stress test 2022    Small size, moderate intensity, non reversible perfusion defect on inferior apical wall.     Hx of Doppler ultrasound 2023    CAROTID- mild 0-49 right    Hypertension     Sleep apnea in adult 2019     Past Surgical History:   Procedure Laterality Date    CIRCUMCISION, NON-  2013    CORONARY ARTERY BYPASS GRAFT MAZE PROCEDURE N/A 2019    CABG CORONARY ARTERY BYPASS GRAFT X3, MAZE PROCEDURE, INTRAOPERATIVE WALDEMAR, INDUCED HYPOTHERMIA, ENDOSCOPIC VEIN HARVEST OF THE LEFT LEG performed by Charlie Gomez MD at [address] [facility] Right     Transposition of Right Ulnar Nerve     Family History   Problem Relation Age of Onset    Hypertension Mother     Hypertension Father     Hypertension Sister     Hypertension Brother     Cancer Maternal Grandmother      Social History     Tobacco Use    Smoking status: Former     Packs/day: 0.25     Years: 30.00     Pack years: 7.50     Types: Cigarettes     Quit date:      Years since quitting: 3.1    Smokeless tobacco: Never   Substance Use Topics    Alcohol use: No     Comment: Caffiene - pop once a day      [unfilled]  Review of Systems:   Constitutional: No Fever or Weight Loss   Eyes: No Decreased Vision  ENT: No Headaches, Hearing Loss or Vertigo  Cardiovascular: No chest pain, dyspnea on exertion, palpitations or loss of consciousness  Respiratory: No cough or wheezing    Gastrointestinal: No abdominal pain, appetite loss, blood in stools, constipation, diarrhea or heartburn  Genitourinary: No dysuria, trouble voiding, or hematuria  Musculoskeletal:  + gait disturbance, weakness or joint complaints  Integumentary: No rash or pruritis  Neurological: No TIA or stroke symptoms  Psychiatric: No anxiety or depression  Endocrine: No malaise, fatigue or temperature intolerance  Hematologic/Lymphatic: No bleeding problems, blood clots or swollen lymph nodes  Allergic/Immunologic: No nasal congestion or hives  All systems negative except as marked. Objective:  /82 (Site: Left Upper Arm, Position: Sitting, Cuff Size: Medium Adult)   Pulse 93   Ht 5' 10\" (1.778 m)   Wt 215 lb 3.2 oz (97.6 kg)   BMI 30.88 kg/m²   Wt Readings from Last 3 Encounters:   02/27/23 215 lb 3.2 oz (97.6 kg)   02/07/23 226 lb 6.4 oz (102.7 kg)   01/19/23 227 lb (103 kg)     Body mass index is 30.88 kg/m². GENERAL - Alert, oriented, pleasant, in no apparent distress,normal grooming  HEENT - pupils are intact, cornea intact, conjunctive normal color, ears are normal in exam,  Neck - Supple. No jugular venous distention noted. No carotid bruits, no apical -carotid delay  Respiratory:  Normal breath sounds, No respiratory distress, No wheezing, No chest tenderness. ,no use of accessory muscles, diaphragm movement is normal  Cardiovascular: (PMI) apex non displaced,no lifts no thrills, no s3,no s4, Normal heart rate, Normal rhythm, No murmurs, No rubs, No gallops.  Carotid arteries pulse and amplitude are normal no bruit, no abdominal bruit noted ( normal abdominal aorta ausculation),   Extremities - No cyanosis, clubbing, or significant edema, no varicose veins    Abdomen - No masses, tenderness, or organomegaly, no hepato-splenomegally, no bruits  Musculoskeletal - No significant edema, no kyphosis or scoliosis, no deformity in any extremity noted, muscle strength and tone are normal  Skin: no ulcer,no scar,no stasis dermatitis   Neurologic - alert oriented times 3,Cranial nerves II through XII are grossly intact. There were no gross focal neurologic abnormalities. Psychiatric: normal mood and affect    No results found for: CKTOTAL, CKMB, CKMBINDEX, TROPONINI  BNP:  No results found for: BNP  PT/INR:  No results found for: PTINR  Lab Results   Component Value Date    LABA1C 6.4 (H) 07/07/2022    LABA1C 6.6 (H) 11/20/2019     Lab Results   Component Value Date    CHOL 124 07/07/2022    TRIG 216 (H) 07/07/2022    HDL 50 07/07/2022    LDLCALC 31 07/07/2022    LDLDIRECT 56 05/03/2019     Lab Results   Component Value Date    ALT 30 07/07/2022    AST 38 (H) 07/07/2022     TSH:  No results found for: TSH    Impression:  Shannon Martines is a 65 y. o.year old who  has a past medical history of Abnormal findings on cardiac catheterization, CAD (coronary artery disease), Diabetes mellitus ([facility] [address] H/O cardiovascular stress test, H/O Doppler echocardiogram, H/O echocardiogram, H/O echocardiogram, H/O exercise stress test, Herniated lumbar intervertebral disc, History of cardiac cath, History of exercise stress test, History of nuclear stress test, History of nuclear stress test, Hx of Doppler ultrasound, Hypertension, and Sleep apnea in adult. and presents with     Plan:  CAD he continues to have chest pain, his LHC was repeated, has intercostal arteries off LIMA were not ligated he had chest pain in cath lab with LIMA artery injection, his SVG to OM1 is patent, ( LCX was occluded), RCA has mild disease,: ;last stress test in April had months ago showed small apical infarction, which is unchanged from 2020,h/o  PCI of native RCA,his SVG to RCA was occluded, LIMA was patent,.will continue  statins, bb, effient and eliquis off aspirin and continue effient and eliquis, will not pursue it further  ?  Stroke: patient thinks he had one week ago slurred speech,  CT head is normal,(  ok to continue effient as there was not stroke on CT HEAD) and refer to neurologist, ct  HEAD is questiong injury on rt parietal lobe, will need neurology input, he feels gait imbalance and fall to  the right.,  carotid doppler SHOWED 0-49% SYDNI stenosis  renail insufficieny: patient is seen by Dr. Artur Pisano present care,   ICM: LVEF 40-45%, LVEF IS IMPROVED NOW WILL continue to optimize medication,Continue aspirin  continue statins,ACE INHIBOTR , betablockers,Ntg   CHF\" Orthopnea:HE FEELS better and lost 12 lbs,  TAKES 2 ;PILLOWS  TAKING lasix, recheck chem 7 and if creatinine is trending up, will decrease lasix again , continue Kdur  Anxiety: not seen a psychiatory, refill xanax   Leg swelling: use compression socks  Paroxysmal afib: Continue eliquis, s/p  maze and LOREN ligation  Dyslipidemia: continue statins  HTN: stable, continue COREG AND, lisinopril medication  DM: stable: continue  insulinAll labs, medications All labs, medications and tests reviewed, continue all other medications of all above medical condition listed as is. All labs, medications and tests reviewed, continue all other medications of all above medical condition listed as is.     [unfilled]

## 2023-02-27 NOTE — PATIENT INSTRUCTIONS
**It is YOUR responsibilty to bring medication bottles and/or updated medication list to [address] [place] [address]. This will allow us to better serve you and all your healthcare needs**    Please be informed that if you contact our office outside of normal business hours the physician on call cannot help with any scheduling or rescheduling issues, procedure instruction questions or any type of medication issue. We advise you for any urgent/emergency that you go to the nearest emergency room! PLEASE CALL OUR OFFICE DURING NORMAL BUSINESS HOURS    Monday - Friday   8 am to 5 pm    [place] [address]: [phone]    [place]:  [phone]    Thank you for allowing us to care for you today! We want to ensure we can follow your treatment plan and we strive to give you the best outcomes and experience possible. If you ever have a life threatening emergency and call 911 - for an ambulance (EMS)   Our providers can only care for you at:   [facility] or [facility]. Even if you have someone take you or you drive yourself we can only care for you in a [facility] facility. Our providers are not setup at the other healthcare locations!

## 2023-03-22 RX ORDER — FUROSEMIDE 40 MG/1
40 TABLET ORAL 3 TIMES DAILY
Qty: 270 TABLET | Refills: 3 | Status: SHIPPED | OUTPATIENT
Start: 2023-03-22

## 2023-03-22 RX ORDER — RANOLAZINE 500 MG/1
500 TABLET, EXTENDED RELEASE ORAL 2 TIMES DAILY
Qty: 180 TABLET | Refills: 3 | Status: SHIPPED | OUTPATIENT
Start: 2023-03-22

## 2023-03-22 RX ORDER — DILTIAZEM HYDROCHLORIDE 360 MG/1
360 CAPSULE, EXTENDED RELEASE ORAL DAILY
Qty: 90 CAPSULE | Refills: 3 | Status: SHIPPED | OUTPATIENT
Start: 2023-03-22

## 2023-03-22 RX ORDER — CARVEDILOL 25 MG/1
25 TABLET ORAL 2 TIMES DAILY WITH MEALS
Qty: 180 TABLET | Refills: 3 | Status: SHIPPED | OUTPATIENT
Start: 2023-03-22

## 2023-04-03 RX ORDER — FUROSEMIDE 40 MG/1
40 TABLET ORAL 3 TIMES DAILY
Qty: 270 TABLET | Refills: 3 | Status: SHIPPED | OUTPATIENT
Start: 2023-04-03

## 2023-04-03 RX ORDER — PRASUGREL 10 MG/1
10 TABLET, FILM COATED ORAL DAILY
Qty: 90 TABLET | Refills: 3 | Status: SHIPPED | OUTPATIENT
Start: 2023-04-03

## 2023-04-03 RX ORDER — CARVEDILOL 25 MG/1
25 TABLET ORAL 2 TIMES DAILY WITH MEALS
Qty: 180 TABLET | Refills: 3 | Status: SHIPPED | OUTPATIENT
Start: 2023-04-03

## 2023-04-03 RX ORDER — DILTIAZEM HYDROCHLORIDE 360 MG/1
360 CAPSULE, EXTENDED RELEASE ORAL DAILY
Qty: 90 CAPSULE | Refills: 3 | Status: SHIPPED | OUTPATIENT
Start: 2023-04-03

## 2023-04-03 RX ORDER — RANOLAZINE 500 MG/1
500 TABLET, EXTENDED RELEASE ORAL 2 TIMES DAILY
Qty: 180 TABLET | Refills: 3 | Status: SHIPPED | OUTPATIENT
Start: 2023-04-03

## 2023-04-17 PROBLEM — E87.6 HYPOKALEMIA: Status: ACTIVE | Noted: 2023-04-17

## 2023-05-15 ENCOUNTER — HOSPITAL ENCOUNTER (OUTPATIENT)
Dept: PHYSICAL THERAPY | Age: 63
Setting detail: THERAPIES SERIES
Discharge: HOME | End: 2023-05-15
Payer: COMMERCIAL

## 2023-05-15 PROCEDURE — 97110 THERAPEUTIC EXERCISES: CPT

## 2023-05-15 PROCEDURE — 97162 PT EVAL MOD COMPLEX 30 MIN: CPT

## 2023-05-15 ASSESSMENT — PAIN DESCRIPTION - LOCATION: LOCATION: NECK

## 2023-05-15 ASSESSMENT — PAIN SCALES - GENERAL: PAINLEVEL_OUTOF10: 4

## 2023-05-15 ASSESSMENT — PAIN DESCRIPTION - PAIN TYPE: TYPE: CHRONIC PAIN

## 2023-05-15 NOTE — PROGRESS NOTES
Physical Therapy: Initial Evaluation    Patient: Andrews Clemons (76 y.o. male)   Examination Date:   Plan of Care Certification Period: 5/15/2023 to        :  1960 ;    Confirmed: Yes MRN: [id number]  CSN: [id number]   Insurance: Payor: [employer] / Plan: [address] [place] [address] / Product Type: *No Product type* /   Insurance ID: [id number] - (Commercial) Secondary Insurance (if applicable):    Referring Physician: LEORA Gutierrez   PCP: Regan Villavicencio PA-C Visits to Date/Visits Approved:   /      No Show/Cancelled Appts:   /       Medical Diagnosis: cervical ddd cervical DDD  Treatment Diagnosis: neck pain     PERTINENT MEDICAL HISTORY           Medical History: Chart Reviewed: Yes   Past Medical History:   Diagnosis Date    Abnormal findings on cardiac catheterization 2022    severe native LAD and LCX stenosis    CAD (coronary artery disease)     s/p CABG, sees Dr. Ashley Younger    [facility]     H/O cardiovascular stress test 2019; 2020    abnormal stress test, LVEF 40%, Myocardial perfusion scan shows moderate size, severe intensity, non reversible perfusion defect in inferoapical wall. H/O Doppler echocardiogram 2022    EF 50-55. Mild septal wall asymmetrical LV hypertrophy. Sclerotic nonstenotic aortic valve. Mitral annualr calcification present. RVSP 28. H/O echocardiogram 2019    Limited study-Left ventricular function is low normal. EF 50-55% Mild left ventricular hypertrophy. H/O echocardiogram 2021    Left ventricular function is mildly abnormal, estimated EF 40-45%. Mild MR & TR.    H/O exercise stress test 2019    Submaximal ECG stress test. stress test stopped due to Shortness of breath.  Proceed with cardiac rehab    Herniated lumbar intervertebral disc     History of cardiac cath 2020    PATENT 2/3 GRAFTS, SEVERE NATIVE RCA stenosis at mentioned above, required PCI with YAMILE as mentioned    History

## 2023-05-15 NOTE — PLAN OF CARE
Outpatient Physical Therapy           [place]           [] Phone: [phone]   Fax: [phone]  [facility]           [x] Phone: [phone]   Fax: [phone]     To: LEORA Nolan   From: Josse Valerio PT,   Patient: Lili Hutchinson       : 1960  Diagnosis: cervical ddd Diagnosis: cervical DDD  Treatment Diagnosis: neck pain  Date: 5/15/2023    Physical Therapy Certification/Re-Certification Form    The following patient has been evaluated for physical therapy services and for therapy to continue, insurance requires physician review of the treatment plan initially and every 90 days. Please review the attached evaluation and/or summary of the patient's plan of care, and verify that you agree therapy should continue by signing the attached document and sending it back to our office.     Assessment:    Assessment: NDI     Plan of Care/Treatment to date:  [x] Therapeutic Exercise  [] Modalities:  [x] Therapeutic Activity     [] Ultrasound  [] Electrical Stimulation  [] Gait Training      [] Cervical Traction [] Lumbar Traction  [x] Neuromuscular Re-education    [] Cold/hotpack [] Iontophoresis   [x] Instruction in HEP      [] Vasopneumatic    [] Dry Needling  [x] Manual Therapy               [] Aquatic Therapy       Other:          Frequency/Duration:  # Days per week: [x] 1 day # Weeks: [] 1 week [] 5 weeks     [x] 2 days   [] 2 weeks [x] 6 weeks     [] 3 days   [] 3 weeks [] 7 weeks     [] 4 days   [] 4 weeks [] 8 weeks         [] 9 weeks [] 10 weeks         [] 11 weeks [] 12 weeks    Rehab Potential/Progress: [] Excellent [x] Good [] Fair  [] Poor     Goals:    Patient goals:  less pain  Long Term Goals  Time Frame for Long Term Goals: 6 weeks plan expires 23  patient will be indepedent with HEP  patient will score 10/50 on NDI and will report a score of 1 regarding pain with daily task of driving   Electronically signed by:  Josse Valerio PT, , 5/15/2023, 6:25 PM  If you have

## 2023-05-16 NOTE — FLOWSHEET NOTE
Outpatient Physical Therapy  [place]           [] Phone: [phone]   Fax: [phone]  [facility]           [x] Phone: [phone]   Fax: [phone]        Physical Therapy Daily Treatment Note  Date:  2023    Patient Name:  Pratik Schmidt    :  1960  MRN: [id number]  Restrictions/Precautions: No data recorded   Position Activity Restriction  Other position/activity restrictions: none  Diagnosis:   cervical ddd Diagnosis: cervical DDD  Date of Injury/Surgery:   Treatment Diagnosis:  neck pain  Insurance/Certification information: [employer]/ no pre cert  Referring Physician:  LEORA Martinez   PCP: Nicolasa Carrasquillo PA-C  Next Doctor Visit:    Plan of care signed (Y/N):    Outcome Measure: NDI   Visit# / total visits:   1/10 then PN  Pain level: 4/10   Goals:     Patient goals:  less R arm pain  Long Term Goals  Time Frame for Long Term Goals: 6 weeks plan expires 23  patient will be indepedent with HEP  patient will score 10/50 on NDI and will report a score of 1 regarding pain with daily task of driving               Summary of Evaluation:  Assessment: NDI         Subjective:  See eval         Any changes in Ambulatory Summary Sheet?   None        Objective:  See eval           Exercises: (No more than 4 columns)   Exercise/Equipment Date 5/15/23 Date Date           WARM UP         UBE  5' BWD           TABLE                                       STANDING      Corner pec stretch 3 x10 ct     LAE Blue TB 3 x10     Resisted neck EXT  With RTB  forearms on wall x10 reps                                  PROPRIOCEPTION                                    MODALITIES                      Other Therapeutic Activities/Education:        Home Exercise Program:        Manual Treatments:        Modalities:        Communication with other providers:        Assessment:  (Response towards treatment session) (Pain Rating)  Pt tolerated  treatment without any adverse reactions

## 2023-05-17 ENCOUNTER — HOSPITAL ENCOUNTER (OUTPATIENT)
Dept: PHYSICAL THERAPY | Age: 63
Setting detail: THERAPIES SERIES
Discharge: HOME | End: 2023-05-17
Payer: COMMERCIAL

## 2023-05-17 PROCEDURE — 97140 MANUAL THERAPY 1/> REGIONS: CPT

## 2023-05-17 PROCEDURE — 97110 THERAPEUTIC EXERCISES: CPT

## 2023-05-17 NOTE — FLOWSHEET NOTE
Manual Treatments:  STM to BUT/neck area 15'      Modalities:        Communication with other providers:        Assessment:  (Response towards treatment session) (Pain Rating)  Pt tolerated  treatment without any adverse reactions or complications this date. . Pt would continue to benefit from skilled therapy interventions to address remaining impairments, improve mobility and strength,  and progress toward goal completion and prepare for d/c including finalizing HEP ;  while reducing risk for re-injury or further decline. Pt performed  all listed interventions and demonstrated adequate to good form with no reports of increased pain. Pt reported temporary  muscle strain and fatigue during today's session. .  Pain complaints after session 4/10       Plan for Next Session:        Time In / Time Out:    9675-4543             Timed Code/Total Treatment Minutes:   40 1man 2te      Next Progress Note due:        Plan of Care Interventions:  [x] Therapeutic Exercise  [] Modalities:  [x] Therapeutic Activity     [] Ultrasound  [] Estim  [] Gait Training      [] Cervical Traction [] Lumbar Traction  [x] Neuromuscular Re-education    [] Cold/hotpack [] Iontophoresis   [x] Instruction in HEP      [] Vasopneumatic   [] Dry Needling    [x] Manual Therapy               [] Aquatic Therapy              Electronically signed by:  Dino Townsend 5/17/2023, 3:14 PM

## 2023-05-24 ENCOUNTER — HOSPITAL ENCOUNTER (OUTPATIENT)
Dept: PHYSICAL THERAPY | Age: 63
Setting detail: THERAPIES SERIES
Discharge: HOME | End: 2023-05-24
Payer: COMMERCIAL

## 2023-05-24 PROCEDURE — 97110 THERAPEUTIC EXERCISES: CPT

## 2023-05-24 PROCEDURE — 97140 MANUAL THERAPY 1/> REGIONS: CPT

## 2023-05-31 ENCOUNTER — OFFICE VISIT (OUTPATIENT)
Dept: CARDIOLOGY CLINIC | Age: 63
End: 2023-05-31
Payer: COMMERCIAL

## 2023-05-31 VITALS
HEART RATE: 84 BPM | HEIGHT: 70 IN | OXYGEN SATURATION: 96 % | RESPIRATION RATE: 16 BRPM | SYSTOLIC BLOOD PRESSURE: 126 MMHG | BODY MASS INDEX: 28.72 KG/M2 | DIASTOLIC BLOOD PRESSURE: 86 MMHG | WEIGHT: 200.6 LBS

## 2023-05-31 DIAGNOSIS — R07.9 CHEST PAIN, UNSPECIFIED TYPE: ICD-10-CM

## 2023-05-31 DIAGNOSIS — I48.0 PAF (PAROXYSMAL ATRIAL FIBRILLATION) (HCC): ICD-10-CM

## 2023-05-31 DIAGNOSIS — I50.22 CHRONIC SYSTOLIC CONGESTIVE HEART FAILURE (HCC): ICD-10-CM

## 2023-05-31 DIAGNOSIS — E78.5 DYSLIPIDEMIA: ICD-10-CM

## 2023-05-31 DIAGNOSIS — R26.9 GAIT ABNORMALITY: Primary | ICD-10-CM

## 2023-05-31 PROCEDURE — 3079F DIAST BP 80-89 MM HG: CPT | Performed by: INTERNAL MEDICINE

## 2023-05-31 PROCEDURE — 99214 OFFICE O/P EST MOD 30 MIN: CPT | Performed by: INTERNAL MEDICINE

## 2023-05-31 PROCEDURE — 3074F SYST BP LT 130 MM HG: CPT | Performed by: INTERNAL MEDICINE

## 2023-05-31 NOTE — PROGRESS NOTES
or loss of consciousness  Respiratory: No cough or wheezing    Gastrointestinal: No abdominal pain, appetite loss, blood in stools, constipation, diarrhea or heartburn  Genitourinary: No dysuria, trouble voiding, or hematuria  Musculoskeletal:  + gait disturbance, weakness or joint complaints  Integumentary: No rash or pruritis  Neurological: No TIA or stroke symptoms  Psychiatric: No anxiety or depression  Endocrine: No malaise, fatigue or temperature intolerance  Hematologic/Lymphatic: No bleeding problems, blood clots or swollen lymph nodes  Allergic/Immunologic: No nasal congestion or hives  All systems negative except as marked. Objective:  /86 (Site: Right Upper Arm, Position: Sitting, Cuff Size: Medium Adult)   Pulse 84   Resp 16   Ht 5' 10\" (1.778 m)   Wt 200 lb 9.6 oz (91 kg)   SpO2 96%   BMI 28.78 kg/m²   Wt Readings from Last 3 Encounters:   05/31/23 200 lb 9.6 oz (91 kg)   04/17/23 206 lb 6.4 oz (93.6 kg)   04/03/23 204 lb (92.5 kg)     Body mass index is 28.78 kg/m². GENERAL - Alert, oriented, pleasant, in no apparent distress,normal grooming  HEENT - pupils are intact, cornea intact, conjunctive normal color, ears are normal in exam,  Neck - Supple. No jugular venous distention noted. No carotid bruits, no apical -carotid delay  Respiratory:  Normal breath sounds, No respiratory distress, No wheezing, No chest tenderness. ,no use of accessory muscles, diaphragm movement is normal  Cardiovascular: (PMI) apex non displaced,no lifts no thrills, no s3,no s4, Normal heart rate, Normal rhythm, No murmurs, No rubs, No gallops.  Carotid arteries pulse and amplitude are normal no bruit, no abdominal bruit noted ( normal abdominal aorta ausculation),   Extremities - No cyanosis, clubbing, or significant edema, no varicose veins    Abdomen - No masses, tenderness, or organomegaly, no hepato-splenomegally, no bruits  Musculoskeletal - No significant edema, no kyphosis or scoliosis, no deformity in

## 2023-06-23 ENCOUNTER — HOSPITAL ENCOUNTER (OUTPATIENT)
Dept: PHYSICAL THERAPY | Age: 63
Setting detail: THERAPIES SERIES
Discharge: HOME | End: 2023-06-23
Payer: COMMERCIAL

## 2023-06-23 ENCOUNTER — HOSPITAL ENCOUNTER (OUTPATIENT)
Age: 63
Discharge: HOME | End: 2023-06-23
Payer: COMMERCIAL

## 2023-06-23 DIAGNOSIS — I48.0 PAF (PAROXYSMAL ATRIAL FIBRILLATION) (HCC): ICD-10-CM

## 2023-06-23 DIAGNOSIS — I10 PRIMARY HYPERTENSION: ICD-10-CM

## 2023-06-23 DIAGNOSIS — E66.09 CLASS 1 OBESITY DUE TO EXCESS CALORIES WITHOUT SERIOUS COMORBIDITY WITH BODY MASS INDEX (BMI) OF 33.0 TO 33.9 IN ADULT: ICD-10-CM

## 2023-06-23 DIAGNOSIS — I50.22 CHRONIC SYSTOLIC CONGESTIVE HEART FAILURE (HCC): ICD-10-CM

## 2023-06-23 DIAGNOSIS — E87.6 HYPOKALEMIA: ICD-10-CM

## 2023-06-23 DIAGNOSIS — E11.9 TYPE 2 DIABETES MELLITUS WITHOUT COMPLICATION, WITHOUT LONG-TERM CURRENT USE OF INSULIN (HCC): ICD-10-CM

## 2023-06-23 LAB
ALBUMIN SERPL-MCNC: 4.8 GM/DL (ref 3.4–5)
ANION GAP SERPL CALCULATED.3IONS-SCNC: 18 MMOL/L (ref 4–16)
BASOPHILS ABSOLUTE: 0.1 K/CU MM
BASOPHILS RELATIVE PERCENT: 0.6 % (ref 0–1)
BILIRUBIN URINE: NEGATIVE MG/DL
BLOOD, URINE: NEGATIVE
BUN SERPL-MCNC: 34 MG/DL (ref 6–23)
CALCIUM SERPL-MCNC: 10.4 MG/DL (ref 8.3–10.6)
CHLORIDE BLD-SCNC: 80 MMOL/L (ref 99–110)
CLARITY: CLEAR
CO2: 34 MMOL/L (ref 21–32)
COLOR: YELLOW
COMMENT UA: NORMAL
CREAT SERPL-MCNC: 1.8 MG/DL (ref 0.9–1.3)
CREATININE URINE: 78.7 MG/DL (ref 39–259)
DIFFERENTIAL TYPE: ABNORMAL
EOSINOPHILS ABSOLUTE: 0.4 K/CU MM
EOSINOPHILS RELATIVE PERCENT: 3 % (ref 0–3)
GFR SERPL CREATININE-BSD FRML MDRD: 42 ML/MIN/1.73M2
GLUCOSE SERPL-MCNC: 110 MG/DL (ref 70–99)
GLUCOSE, URINE: NEGATIVE MG/DL
HCT VFR BLD CALC: 43.7 % (ref 42–52)
HEMOGLOBIN: 14.6 GM/DL (ref 13.5–18)
IMMATURE NEUTROPHIL %: 0.3 % (ref 0–0.43)
KETONES, URINE: NEGATIVE MG/DL
LEUKOCYTE ESTERASE, URINE: NEGATIVE
LYMPHOCYTES ABSOLUTE: 2.8 K/CU MM
LYMPHOCYTES RELATIVE PERCENT: 22.6 % (ref 24–44)
MCH RBC QN AUTO: 29.9 PG (ref 27–31)
MCHC RBC AUTO-ENTMCNC: 33.4 % (ref 32–36)
MCV RBC AUTO: 89.4 FL (ref 78–100)
MONOCYTES ABSOLUTE: 1 K/CU MM
MONOCYTES RELATIVE PERCENT: 8.3 % (ref 0–4)
NITRITE URINE, QUANTITATIVE: NEGATIVE
PDW BLD-RTO: 14.2 % (ref 11.7–14.9)
PH, URINE: 5.5 (ref 5–8)
PHOSPHORUS: 3.4 MG/DL (ref 2.5–4.9)
PLATELET # BLD: 321 K/CU MM (ref 140–440)
PMV BLD AUTO: 9.1 FL (ref 7.5–11.1)
POTASSIUM SERPL-SCNC: 2.9 MMOL/L (ref 3.5–5.1)
PROT/CREAT RATIO, UR: 0.1
PROTEIN UA: NEGATIVE MG/DL
RBC # BLD: 4.89 M/CU MM (ref 4.6–6.2)
SEGMENTED NEUTROPHILS ABSOLUTE COUNT: 8 K/CU MM
SEGMENTED NEUTROPHILS RELATIVE PERCENT: 65.2 % (ref 36–66)
SODIUM BLD-SCNC: 132 MMOL/L (ref 135–145)
SPECIFIC GRAVITY UA: 1.01 (ref 1–1.03)
TOTAL IMMATURE NEUTOROPHIL: 0.04 K/CU MM
URINE TOTAL PROTEIN: 10.1 MG/DL
UROBILINOGEN, URINE: 0.2 MG/DL (ref 0.2–1)
WBC # BLD: 12.2 K/CU MM (ref 4–10.5)

## 2023-06-23 PROCEDURE — 97110 THERAPEUTIC EXERCISES: CPT

## 2023-06-23 PROCEDURE — 82040 ASSAY OF SERUM ALBUMIN: CPT

## 2023-06-23 PROCEDURE — 81003 URINALYSIS AUTO W/O SCOPE: CPT

## 2023-06-23 PROCEDURE — 97140 MANUAL THERAPY 1/> REGIONS: CPT

## 2023-06-23 PROCEDURE — 84100 ASSAY OF PHOSPHORUS: CPT

## 2023-06-23 PROCEDURE — 82570 ASSAY OF URINE CREATININE: CPT

## 2023-06-23 PROCEDURE — 84156 ASSAY OF PROTEIN URINE: CPT

## 2023-06-23 PROCEDURE — 36415 COLL VENOUS BLD VENIPUNCTURE: CPT

## 2023-06-23 PROCEDURE — 80048 BASIC METABOLIC PNL TOTAL CA: CPT

## 2023-06-23 PROCEDURE — 85025 COMPLETE CBC W/AUTO DIFF WBC: CPT

## 2023-06-23 NOTE — FLOWSHEET NOTE
forearms on wall x15 reps With RTB  forearms on wall x15 reps With RTB  forearms on wall x15 reps With RTB  forearms on wall x15 reps; 3-5 ct hold   Elbows on the wall neck/upper back stretch   15x3\"          Back to wall; scap squeezes x 3  Back to wall; BUE horizontal ABD/ADD x5          Stand back to wall with small red PB roll down into mini squat x 5                PROPRIOCEPTION                                                MODALITIES                            Other Therapeutic Activities/Education:        Home Exercise Program:        Manual Treatments:  STM to BUT/neck area; with passive stretching in all planes to tolerance      Modalities:        Communication with other providers:        Assessment:  (Response towards treatment session) (Pain Rating) Patient noted that he was feeling better after treatment, but did not rate any pain. Placed two pillows in patient's lap to help with more upright position. Pt tolerated  treatment without any adverse reactions or complications this date. . Pt would continue to benefit from skilled therapy interventions to address remaining impairments, improve mobility and strength,  and progress toward goal completion and prepare for d/c including finalizing HEP ;  while reducing risk for re-injury or further decline. Pt performed  all listed interventions and demonstrated adequate to good form with no reports of increased pain. Pt reported temporary  muscle strain and fatigue during today's session. .  Pain complaints after session remains same at 5/10       Plan for Next Session:        Time In / Time Out:  1525/1612           Timed Code/Total Treatment Minutes:   47 min (2 TE; 1 Manual)      Next Progress Note due:        Plan of Care Interventions:  [x] Therapeutic Exercise  [] Modalities:  [x] Therapeutic Activity     [] Ultrasound  [] Estim  [] Gait Training      [] Cervical Traction [] Lumbar Traction  [x] Neuromuscular Re-education    [] Cold/hotpack []

## 2023-06-24 RX ORDER — POTASSIUM CHLORIDE 20 MEQ/1
40 TABLET, EXTENDED RELEASE ORAL 2 TIMES DAILY
Qty: 120 TABLET | Refills: 11 | Status: SHIPPED | OUTPATIENT
Start: 2023-06-24

## 2023-06-29 ENCOUNTER — HOSPITAL ENCOUNTER (OUTPATIENT)
Dept: PHYSICAL THERAPY | Age: 63
Setting detail: THERAPIES SERIES
Discharge: HOME | End: 2023-06-29
Payer: COMMERCIAL

## 2023-06-29 PROCEDURE — 97140 MANUAL THERAPY 1/> REGIONS: CPT

## 2023-06-29 PROCEDURE — 97110 THERAPEUTIC EXERCISES: CPT

## 2023-07-06 ENCOUNTER — OFFICE VISIT (OUTPATIENT)
Dept: INTERNAL MEDICINE CLINIC | Age: 63
End: 2023-07-06
Payer: COMMERCIAL

## 2023-07-06 ENCOUNTER — HOSPITAL ENCOUNTER (OUTPATIENT)
Dept: CT IMAGING | Age: 63
Discharge: HOME | End: 2023-07-06
Payer: COMMERCIAL

## 2023-07-06 VITALS
HEART RATE: 98 BPM | WEIGHT: 199 LBS | SYSTOLIC BLOOD PRESSURE: 118 MMHG | OXYGEN SATURATION: 99 % | BODY MASS INDEX: 28.49 KG/M2 | DIASTOLIC BLOOD PRESSURE: 72 MMHG | HEIGHT: 70 IN

## 2023-07-06 DIAGNOSIS — R07.81 RIB PAIN ON LEFT SIDE: ICD-10-CM

## 2023-07-06 DIAGNOSIS — R19.02 LEFT UPPER QUADRANT ABDOMINAL SWELLING, MASS AND LUMP: Primary | ICD-10-CM

## 2023-07-06 DIAGNOSIS — M47.816 OSTEOARTHRITIS OF LUMBAR SPINE, UNSPECIFIED SPINAL OSTEOARTHRITIS COMPLICATION STATUS: ICD-10-CM

## 2023-07-06 DIAGNOSIS — R19.02 LEFT UPPER QUADRANT ABDOMINAL SWELLING, MASS AND LUMP: ICD-10-CM

## 2023-07-06 DIAGNOSIS — M47.814 OSTEOARTHRITIS OF THORACIC SPINE, UNSPECIFIED SPINAL OSTEOARTHRITIS COMPLICATION STATUS: ICD-10-CM

## 2023-07-06 PROCEDURE — 3074F SYST BP LT 130 MM HG: CPT | Performed by: PHYSICIAN ASSISTANT

## 2023-07-06 PROCEDURE — 99213 OFFICE O/P EST LOW 20 MIN: CPT | Performed by: PHYSICIAN ASSISTANT

## 2023-07-06 PROCEDURE — 74176 CT ABD & PELVIS W/O CONTRAST: CPT

## 2023-07-06 PROCEDURE — 3078F DIAST BP <80 MM HG: CPT | Performed by: PHYSICIAN ASSISTANT

## 2023-07-06 ASSESSMENT — ENCOUNTER SYMPTOMS
EYE REDNESS: 0
EYE DISCHARGE: 0
SORE THROAT: 0
DIARRHEA: 0
WHEEZING: 0
VOMITING: 0
SHORTNESS OF BREATH: 0
NAUSEA: 0
RHINORRHEA: 0
PHOTOPHOBIA: 0
BLOOD IN STOOL: 0
EYE PAIN: 0
BACK PAIN: 0
COLOR CHANGE: 0
COUGH: 0
CHEST TIGHTNESS: 0
ABDOMINAL PAIN: 1
CONSTIPATION: 0

## 2023-07-06 NOTE — PROGRESS NOTES
for arthralgias, back pain, gait problem and joint swelling. Skin:  Negative for color change and rash. Neurological:  Negative for dizziness, syncope, weakness, light-headedness and headaches. Hematological:  Negative for adenopathy. Psychiatric/Behavioral:  Negative for agitation, behavioral problems and suicidal ideas. The patient is not nervous/anxious. Physical Exam  Constitutional:       General: He is not in acute distress. Appearance: He is obese. He is not ill-appearing, toxic-appearing or diaphoretic. HENT:      Head: Normocephalic and atraumatic. Right Ear: External ear normal.      Left Ear: External ear normal.   Cardiovascular:      Rate and Rhythm: Regular rhythm. Pulses: Normal pulses. Pulmonary:      Effort: Pulmonary effort is normal. No respiratory distress. Breath sounds: Normal breath sounds. No stridor. No wheezing, rhonchi or rales. Chest:      Chest wall: No tenderness. Abdominal:      General: Abdomen is flat. Bowel sounds are normal. There is no distension. Tenderness: There is abdominal tenderness. There is no right CVA tenderness, left CVA tenderness, guarding or rebound. Comments: Somewhat obvious bulge approximately 4 inches in diameter in the left upper abdomen, bulge is soft and reducible, more noticeable when patient is upright, lower floating ribs are somewhat mobile, no obvious crepitus noted, no overlying erythema or warmth, otherwise normal abdominal exam, no tenderness in any other quadrants, bowel sounds are normal, lung sounds are clear in the left lower lung fields   Musculoskeletal:         General: Normal range of motion. Skin:     General: Skin is warm and dry. Capillary Refill: Capillary refill takes less than 2 seconds. Neurological:      General: No focal deficit present. Mental Status: He is alert and oriented to person, place, and time. Mental status is at baseline.    Psychiatric:         Behavior:

## 2023-07-07 ENCOUNTER — HOSPITAL ENCOUNTER (OUTPATIENT)
Dept: GENERAL RADIOLOGY | Age: 63
Discharge: HOME | End: 2023-07-07
Payer: COMMERCIAL

## 2023-07-07 ENCOUNTER — HOSPITAL ENCOUNTER (OUTPATIENT)
Dept: PHYSICAL THERAPY | Age: 63
Setting detail: THERAPIES SERIES
Discharge: HOME | End: 2023-07-07

## 2023-07-07 ENCOUNTER — HOSPITAL ENCOUNTER (OUTPATIENT)
Age: 63
Discharge: HOME | End: 2023-07-07
Payer: COMMERCIAL

## 2023-07-07 DIAGNOSIS — R07.81 RIB PAIN ON LEFT SIDE: ICD-10-CM

## 2023-07-07 DIAGNOSIS — M47.814 OSTEOARTHRITIS OF THORACIC SPINE, UNSPECIFIED SPINAL OSTEOARTHRITIS COMPLICATION STATUS: ICD-10-CM

## 2023-07-07 DIAGNOSIS — R19.02 LEFT UPPER QUADRANT ABDOMINAL SWELLING, MASS AND LUMP: ICD-10-CM

## 2023-07-07 DIAGNOSIS — M47.816 OSTEOARTHRITIS OF LUMBAR SPINE, UNSPECIFIED SPINAL OSTEOARTHRITIS COMPLICATION STATUS: ICD-10-CM

## 2023-07-07 PROCEDURE — 71101 X-RAY EXAM UNILAT RIBS/CHEST: CPT

## 2023-07-07 PROCEDURE — 72072 X-RAY EXAM THORAC SPINE 3VWS: CPT

## 2023-07-07 PROCEDURE — 72100 X-RAY EXAM L-S SPINE 2/3 VWS: CPT

## 2023-07-07 NOTE — RESULT ENCOUNTER NOTE
Reviewed results with patient; unclear etiology; patient denies any changes at this time; orders placed for xrays of ribs and spine

## 2023-07-07 NOTE — FLOWSHEET NOTE
Outpatient Physical Therapy  [place]           [] Phone: [phone]   Fax: [phone]  [facility]           [x] Phone: [phone]   Fax: [phone]        Physical Therapy Daily Treatment Note  Date:  2023    Patient Name:  Alissa Fontaine    :  1960  MRN: [id number]  Restrictions/Precautions: No data recorded   Position Activity Restriction  Other position/activity restrictions: none  Diagnosis:   cervical ddd Diagnosis: cervical DDD  Date of Injury/Surgery:   Treatment Diagnosis:  neck pain  Insurance/Certification information: [employer] [place] BOMN/ no pre cert  Referring Physician:  LEORA Rubio   PCP: Barney Ramos PA-C  Next Doctor Visit:    Plan of care signed (Y/N):    Outcome Measure: NDI   Visit# / total visits:   7/10 then PN  Pain level: 6/10   Goals:     Patient goals:  less R arm pain  Long Term Goals  Time Frame for Long Term Goals: 6 weeks plan expires 23  patient will be indepedent with HEP  patient will score 10/50 on NDI and will report a score of 1 regarding pain with daily task of driving               Summary of Evaluation:  Assessment: NDI         Subjective: patient reports back is not feeling real good today; reports that he had CT scan yesterday and x-rays today of his spine. Report \"tight muscle\" in L upper quadrant of abdominal/rib  area. Doctor thinks it may be hernia hence the reason to r/o with CT and x-rays. Also reports not working today per doctor request until r/o hernia possibility. Any changes in Ambulatory Summary Sheet?   None        Objective:  patient noted less pain with supine towel roll stretching position        Exercises: (No more than 4 columns)   Exercise/Equipment 2023            WARM UP          UBE  5 min backward 5 min backward 5' bwd 5.5' bwd          TABLE       Supine interscap towel roll stretching  Performed and With cues for home stretching to try 3'  5'

## 2023-07-10 ENCOUNTER — TELEPHONE (OUTPATIENT)
Dept: INTERNAL MEDICINE CLINIC | Age: 63
End: 2023-07-10

## 2023-07-10 NOTE — TELEPHONE ENCOUNTER
Called patient to follow-up and review preliminary readings on x-ray results, prior CT did not show any obvious hernia or explanation for left upper quadrant abdominal bulge; Does have spinal muscle atrophy on CT;preliminary x-ray readings do not show any acute bony abnormalities in ribs or spine; patient reports pain is consistent with previous, does report he has a little bit more back pain now which waxes and wanes usually as he does have chronic degenerative disc disease etc.; possible explanation for patient's symptoms is weakening of abdominal and spinal musculature causing bulge etc.; this remains unclear; advised patient to closely monitor for any changes or worsening at this time, can return to clinic or report to ED for any worsening or changes, otherwise, keep appointment to follow-up with primary care as scheduled in 1 week.

## 2023-07-11 ENCOUNTER — HOSPITAL ENCOUNTER (OUTPATIENT)
Age: 63
Discharge: HOME | End: 2023-07-11
Payer: COMMERCIAL

## 2023-07-11 DIAGNOSIS — E87.6 HYPOKALEMIA: ICD-10-CM

## 2023-07-11 DIAGNOSIS — N18.31 STAGE 3A CHRONIC KIDNEY DISEASE (HCC): ICD-10-CM

## 2023-07-11 LAB
ANION GAP SERPL CALCULATED.3IONS-SCNC: 12 MMOL/L (ref 4–16)
BUN SERPL-MCNC: 23 MG/DL (ref 6–23)
CALCIUM SERPL-MCNC: 10.2 MG/DL (ref 8.3–10.6)
CHLORIDE BLD-SCNC: 93 MMOL/L (ref 99–110)
CO2: 33 MMOL/L (ref 21–32)
CREAT SERPL-MCNC: 1.7 MG/DL (ref 0.9–1.3)
GFR SERPL CREATININE-BSD FRML MDRD: 45 ML/MIN/1.73M2
GLUCOSE SERPL-MCNC: 96 MG/DL (ref 70–99)
POTASSIUM SERPL-SCNC: 4 MMOL/L (ref 3.5–5.1)
SODIUM BLD-SCNC: 138 MMOL/L (ref 135–145)

## 2023-07-11 PROCEDURE — 36415 COLL VENOUS BLD VENIPUNCTURE: CPT

## 2023-07-11 PROCEDURE — 80048 BASIC METABOLIC PNL TOTAL CA: CPT

## 2023-07-12 ENCOUNTER — HOSPITAL ENCOUNTER (OUTPATIENT)
Dept: PHYSICAL THERAPY | Age: 63
Setting detail: THERAPIES SERIES
Discharge: HOME | End: 2023-07-12
Payer: COMMERCIAL

## 2023-07-12 PROCEDURE — 97140 MANUAL THERAPY 1/> REGIONS: CPT

## 2023-07-12 PROCEDURE — 97110 THERAPEUTIC EXERCISES: CPT

## 2023-07-12 NOTE — FLOWSHEET NOTE
reps; 3-5 ct hold With RTB  forearms on wall x15 reps; 3-5 ct hold Used supine towel roll stretching time for neck ext/chin tuck into folded pillow. Used supine towel roll stretching time for neck ext/chin tuck into folded pillow. Elbows on the wall neck/upper back stretch        Back to wall; scap squeezes x 3  Back to wall; BUE horizontal ABD/ADD x5         Stand back to wall with small red PB roll down into mini squat x 5                  PROPRIOCEPTION                                          MODALITIES                         Other Therapeutic Activities/Education:        Home Exercise Program:        Manual Treatments:  STM to B UT while lying on towel roll      Modalities:        Communication with other providers:        Assessment:  (Response towards treatment session) (Pain Rating) Patient rated his pain 5-6/10 after treatment. Pt tolerated  treatment without any adverse reactions or complications this date. . Pt would continue to benefit from skilled therapy interventions to address remaining impairments, improve mobility and strength,  and progress toward goal completion and prepare for d/c including finalizing HEP ;  while reducing risk for re-injury or further decline. Pt performed  all listed interventions and demonstrated adequate to good form with some pain relief; mostly with supine positional towel stretching.   Pain complaints after session is 5/10       Plan for Next Session:        Time In / Time Out:  1515/1555           Timed Code/Total Treatment Minutes:   40 min (2 TE, 1 MT)      Next Progress Note due:        Plan of Care Interventions:  [x] Therapeutic Exercise  [] Modalities:  [x] Therapeutic Activity     [] Ultrasound  [] Estim  [] Gait Training      [] Cervical Traction [] Lumbar Traction  [x] Neuromuscular Re-education    [] Cold/hotpack [] Iontophoresis   [x] Instruction in HEP      [] Vasopneumatic   [] Dry Needling    [x] Manual Therapy               [] Aquatic Therapy

## 2023-07-14 ENCOUNTER — HOSPITAL ENCOUNTER (OUTPATIENT)
Dept: PHYSICAL THERAPY | Age: 63
Setting detail: THERAPIES SERIES
Discharge: HOME | End: 2023-07-14
Payer: COMMERCIAL

## 2023-07-14 PROCEDURE — 97110 THERAPEUTIC EXERCISES: CPT

## 2023-07-14 PROCEDURE — 97140 MANUAL THERAPY 1/> REGIONS: CPT

## 2023-07-14 NOTE — FLOWSHEET NOTE
Outpatient Physical Therapy  [place]           [] Phone: [phone]   Fax: [phone]  [place]           [x] Phone: [phone]   Fax: [phone]        Physical Therapy Daily Treatment Note  Date:  2023    Patient Name:  Clara Officer    :  1960  MRN: [id number]  Restrictions/Precautions: No data recorded   Position Activity Restriction  Other position/activity restrictions: none  Diagnosis:   cervical ddd Diagnosis: cervical DDD  Date of Injury/Surgery:   Treatment Diagnosis:  neck pain  Insurance/Certification information: Med [place] BOMN/ no pre cert  Referring Physician:  LEORA Burnett Loop   PCP: Vanessa Crane PA-C  Next Doctor Visit:    Plan of care signed (Y/N):    Outcome Measure: NDI   Visit# / total visits:   8/10 then PN  Pain level: 6/10   Goals:     Patient goals:  less R arm pain  Long Term Goals  Time Frame for Long Term Goals  patient will be indepedent with HEP  patient will score 10/50 on NDI and will report a score of 1 regarding pain with daily task of driving               Summary of Evaluation:  Assessment: NDI         Subjective: patient reports pain less @ times  Any changes in Ambulatory Summary Sheet? None        Objective:       Exercises: (No more than 4 columns)   Exercise/Equipment 23            WARM UP          UBE  5' bwd 5.5' bwd 5.5 bkwd 6' BWD          TABLE       Supine interscap towel roll stretching 3'  5' 5 min  5 min                                   STANDING       Corner pec stretch 6x15\" hold 4x 15\" forearm corner pec stretch 4x 15\" forearm corner pec stretch 5x 15\" forearm corner pec stretch   LAE 5x10 BTB 2x 10 BTB 3x10 Blue TB  5x10 Blue TB    Resisted neck EXT  With RTB  forearms on wall x15 reps; 3-5 ct hold Used supine towel roll stretching time for neck ext/chin tuck into folded pillow. Used supine towel roll stretching time for neck ext/chin tuck into folded pillow.  Blue TBB in

## 2023-07-18 ENCOUNTER — HOSPITAL ENCOUNTER (OUTPATIENT)
Dept: PHYSICAL THERAPY | Age: 63
Setting detail: THERAPIES SERIES
Discharge: HOME | End: 2023-07-18
Payer: COMMERCIAL

## 2023-07-18 PROCEDURE — 97110 THERAPEUTIC EXERCISES: CPT

## 2023-07-18 PROCEDURE — 97140 MANUAL THERAPY 1/> REGIONS: CPT

## 2023-07-18 NOTE — FLOWSHEET NOTE
Outpatient Physical Therapy  [place]           [] Phone: [phone]   Fax: [phone]  [facility]           [x] Phone: [phone]   Fax: [phone]        Physical Therapy Daily Treatment Note  Date:  2023    Patient Name:  Juan Page    :  1960  MRN: [id number]  Restrictions/Precautions: No data recorded   Position Activity Restriction  Other position/activity restrictions: none  Diagnosis:   cervical ddd Diagnosis: cervical DDD  Date of Injury/Surgery:   Treatment Diagnosis:  neck pain  Insurance/Certification information: Med [place] BOMN/ no pre cert  Referring Physician:  LEORA Contreras Nephew   PCP: Nicole Munoz PA-C  Next Doctor Visit:    Plan of care signed (Y/N):    Outcome Measure: NDI   Visit# / total visits:   9/10 then PN  Pain level: 4/10   Goals:     Patient goals:  less R arm pain  Long Term Goals  Time Frame for Long Term Goals  patient will be indepedent with HEP  patient will score 10/50 on NDI and will report a score of 1 regarding pain with daily task of driving               Summary of Evaluation:  Assessment: NDI         Subjective: Patient rates his pain 4/10 currently. Any changes in Ambulatory Summary Sheet? None        Objective: Continues to require VC's to keep head upright       Exercises: (No more than 4 columns)   Exercise/Equipment 2023           WARM UP         UBE  5.5 bkwd 6' BWD 6' Bkwd         TABLE      Supine interscap towel roll stretching 5 min  5 min  5 min                               STANDING      Corner pec stretch 4x 15\" forearm corner pec stretch 5x 15\" forearm corner pec stretch 5x 15\" forearm corner pec stretch   LAE 3x10 Blue TB  5x10 Blue TB  5x10 Blue TB    Resisted neck EXT  Used supine towel roll stretching time for neck ext/chin tuck into folded pillow.  Blue TBB in sitting 5 ct x10 reps Blue TBB in sitting 5 ct x10 reps                          PROPRIOCEPTION

## 2023-07-21 ENCOUNTER — HOSPITAL ENCOUNTER (OUTPATIENT)
Dept: PHYSICAL THERAPY | Age: 63
Setting detail: THERAPIES SERIES
Discharge: HOME | End: 2023-07-21
Payer: COMMERCIAL

## 2023-07-21 PROCEDURE — 97140 MANUAL THERAPY 1/> REGIONS: CPT

## 2023-07-21 PROCEDURE — 97110 THERAPEUTIC EXERCISES: CPT

## 2023-07-21 NOTE — FLOWSHEET NOTE
Outpatient Physical Therapy  [place]           [] Phone: [phone]   Fax: [phone]  [facility] [place]           [x] Phone: [phone]   Fax: [phone]        Physical Therapy Daily Treatment Note  Date:  2023    Patient Name:  Kathy Bowens    :  1960  MRN: [id number]  Restrictions/Precautions: No data recorded   Position Activity Restriction  Other position/activity restrictions: none  Diagnosis:   cervical ddd Diagnosis: cervical DDD  Date of Injury/Surgery:   Treatment Diagnosis:  neck pain  Insurance/Certification information: Med [place] BOMN/ no pre cert  Referring Physician:  LEORA Slaughter   PCP: Vikas Wesley PA-C  Next Doctor Visit:    Plan of care signed (Y/N):    Outcome Measure: NDI   Visit# / total visits:   10/10 then PN  Pain level: 5/10   Goals:     Patient goals:  less R arm pain  Long Term Goals  Time Frame for Long Term Goals  patient will be indepedent with HEP  patient will score 10/50 on NDI and will report a score of 1 regarding pain with daily task of driving               Summary of Evaluation:  Assessment: NDI         Subjective: Patient states they are doing US on the swollen rib area on his L side on Monday. Any changes in Ambulatory Summary Sheet?   None        Objective: improving postural awareness with self correction for head up      Exercises: (No more than 4 columns)   Exercise/Equipment 2023            WARM UP          UBE  5.5 bkwd 6' BWD 6' Bkwd 6' bwd  (Towel roll between shldrs)          TABLE       Supine interscap towel roll stretching 5 min  5 min  5 min  5 min + STM                                  STANDING       Corner pec stretch 4x 15\" forearm corner pec stretch 5x 15\" forearm corner pec stretch 5x 15\" forearm corner pec stretch 5x 15\" forearm corner pec stretch   LAE 3x10 Blue TB  5x10 Blue TB  5x10 Blue TB  5x10 Blue TB    Resisted neck EXT  Used supine towel roll stretching time for

## 2023-07-24 ENCOUNTER — HOSPITAL ENCOUNTER (OUTPATIENT)
Dept: ULTRASOUND IMAGING | Age: 63
Discharge: HOME | End: 2023-07-24
Payer: COMMERCIAL

## 2023-07-24 DIAGNOSIS — R16.1: ICD-10-CM

## 2023-07-24 PROCEDURE — 76705 ECHO EXAM OF ABDOMEN: CPT

## 2023-07-25 ENCOUNTER — HOSPITAL ENCOUNTER (OUTPATIENT)
Dept: PHYSICAL THERAPY | Age: 63
Setting detail: THERAPIES SERIES
Discharge: HOME | End: 2023-07-25
Payer: COMMERCIAL

## 2023-07-25 PROCEDURE — 97140 MANUAL THERAPY 1/> REGIONS: CPT

## 2023-07-25 PROCEDURE — 97110 THERAPEUTIC EXERCISES: CPT

## 2023-07-25 NOTE — FLOWSHEET NOTE
Outpatient Physical Therapy  [place]           [] Phone: [phone]   Fax: [phone]  [place]           [x] Phone: [phone]   Fax: [phone]        Physical Therapy Daily Treatment Note  Date:  2023    Patient Name:  Francesca Robles    :  1960  MRN: [id number]  Restrictions/Precautions: No data recorded   Position Activity Restriction  Other position/activity restrictions: none  Diagnosis:   cervical ddd Diagnosis: cervical DDD  Date of Injury/Surgery:   Treatment Diagnosis:  neck pain  Insurance/Certification information: Med [place] BOMN/ no pre cert  Referring Physician:  LEORA Dillon   PCP: Artie Williamson PA-C  Next Doctor Visit:    Plan of care signed (Y/N):    Outcome Measure: NDI   Visit# / total visits:    due 23  Pain level: 6/10   Goals:     Patient goals:  less R arm pain  Long Term Goals  Time Frame for Long Term Goals  patient will be indepedent with HEP  patient will score 10/50 on NDI and will report a score of 1 regarding pain with daily task of driving               Summary of Evaluation:  Assessment: NDI         Subjective: Did a lot of bending over at work today so his back and neck are sore. Rates his pain 6/10. Has not gotten the results of the [address] done on Monday  Any changes in Ambulatory Summary Sheet? None        Objective: R UT tighter than L UT with manual treatment.        Exercises: (No more than 4 columns)   Exercise/Equipment 2023             WARM UP           UBE  5.5 bkwd 6' BWD 6' Bkwd 6' bwd  (Towel roll between [employer]) 6' bwd  (Towel roll between shldrs)           TABLE        Supine interscap towel roll stretching 5 min  5 min  5 min  5 min + STM 5 min + STM                                      STANDING        Corner pec stretch 4x 15\" forearm corner pec stretch 5x 15\" forearm corner pec stretch 5x 15\" forearm corner pec stretch 5x 15\" forearm corner pec stretch 5x

## 2023-07-28 ENCOUNTER — HOSPITAL ENCOUNTER (OUTPATIENT)
Dept: PHYSICAL THERAPY | Age: 63
Setting detail: THERAPIES SERIES
Discharge: HOME | End: 2023-07-28
Payer: COMMERCIAL

## 2023-07-28 PROCEDURE — 97110 THERAPEUTIC EXERCISES: CPT

## 2023-07-28 PROCEDURE — 97140 MANUAL THERAPY 1/> REGIONS: CPT

## 2023-07-28 NOTE — FLOWSHEET NOTE
Outpatient Physical Therapy  [place]           [] Phone: [phone]   Fax: [phone]  [place]           [x] Phone: [phone]   Fax: [phone]        Physical Therapy Daily Treatment Note  Date:  2023    Patient Name:  Marva Andrea    :  1960  MRN: [id number]  Restrictions/Precautions: No data recorded   Position Activity Restriction  Other position/activity restrictions: none  Diagnosis:   cervical ddd Diagnosis: cervical DDD  Date of Injury/Surgery:   Treatment Diagnosis:  neck pain  Insurance/Certification information: Med [place] BOMN/ no pre cert  Referring Physician:  LEORA Dee   PCP: Marely Crandall PA-C  Next Doctor Visit:    Plan of care signed (Y/N):    Outcome Measure: NDI   Visit# / total visits:   11/   due 23  Pain level: 4/10   Goals:     Patient goals:  less R arm pain  Long Term Goals  Time Frame for Long Term Goals  patient will be indepedent with HEP  patient will score 10/50 on NDI and will report a score of 1 regarding pain with daily task of driving               Summary of Evaluation:  Assessment: NDI         Subjective:  patient reports his pain at a 4/10 upon arrival and reports it's better than the other day, reporting he didn't due as much bending today as he did the other day          Any changes in Ambulatory Summary Sheet?   None        Objective:  forward flexed head and shlds persist      Exercises: (No more than 4 columns)   Exercise/Equipment 2023 (11)           WARM UP         UBE  6' bwd  (Towel roll between shldrs) 6' bwd  (Towel roll between shldrs) 6' bwd  (Towel roll between shldrs)         TABLE      Supine interscap towel roll stretching 5 min + STM 5 min + STM 10-15mins + STM                              STANDING      Corner pec stretch 5x 15\" forearm corner pec stretch 5x 15\" forearm corner pec stretch 5x 15\" forearm corner pec stretch   LAE 5x10 Blue TB  5x10 Blue TB 5x10 Blue TB

## 2023-08-01 ENCOUNTER — APPOINTMENT (OUTPATIENT)
Dept: PHYSICAL THERAPY | Age: 63
End: 2023-08-01
Payer: COMMERCIAL

## 2023-08-04 ENCOUNTER — HOSPITAL ENCOUNTER (OUTPATIENT)
Dept: PHYSICAL THERAPY | Age: 63
Setting detail: THERAPIES SERIES
Discharge: HOME | End: 2023-08-04
Payer: COMMERCIAL

## 2023-08-04 PROCEDURE — 97110 THERAPEUTIC EXERCISES: CPT

## 2023-08-04 NOTE — FLOWSHEET NOTE
Outpatient Physical Therapy  [place]           [] Phone: [phone]   Fax: [phone]  [place]           [x] Phone: [phone]   Fax: [phone]        Physical Therapy Daily Treatment Note  Date:  2023    Patient Name:  Rene Bailey    :  1960  MRN: [id number]  Restrictions/Precautions: No data recorded   Position Activity Restriction  Other position/activity restrictions: none  Diagnosis:   cervical ddd Diagnosis: cervical DDD  Date of Injury/Surgery:   Treatment Diagnosis:  neck pain  Insurance/Certification information: Med [employer] BOMN/ no pre cert  Referring Physician:  LEORA Schroeder   PCP: Shweta Salinas PA-C  Next Doctor Visit:    Plan of care signed (Y/N):    Outcome Measure: NDI   Visit# / total visits:    due 23  Pain level: 4/10   Goals:     Patient goals:  less R arm pain  Long Term Goals  Time Frame for Long Term Goals  patient will be indepedent with HEP  patient will score 10/50 on NDI and will report a score of 1 regarding pain with daily task of driving               Summary of Evaluation:  Assessment: NDI         Subjective:  patient reports 25% improvement since starting PT          Any changes in Ambulatory Summary Sheet?   None        Objective:  forward flexed head and shlds persist      Exercises: (No more than 4 columns)   Exercise/Equipment 2023 (11) 23            WARM UP          UBE  6' bwd  (Towel roll between shldrs) 6' bwd  (Towel roll between [employer]) 6' bwd  (Towel roll between [employer]) 6' bwd  (Towel roll between shldrs)          TABLE       Supine interscap towel roll stretching 5 min + STM 5 min + STM 10-15mins + STM Manual UTstretch                                  STANDING       Corner pec stretch 5x 15\" forearm corner pec stretch 5x 15\" forearm corner pec stretch 5x 15\" forearm corner pec stretch 5x 15\" forearm corner pec stretch   LAE 5x10 Blue TB  5x10 Blue TB 5x10 Blue TB 5x10 Blue

## 2023-08-18 ENCOUNTER — HOSPITAL ENCOUNTER (OUTPATIENT)
Dept: PHYSICAL THERAPY | Age: 63
Setting detail: THERAPIES SERIES
Discharge: HOME | End: 2023-08-18
Payer: COMMERCIAL

## 2023-08-18 PROCEDURE — 97110 THERAPEUTIC EXERCISES: CPT

## 2023-08-18 PROCEDURE — 97140 MANUAL THERAPY 1/> REGIONS: CPT

## 2023-08-18 NOTE — FLOWSHEET NOTE
Outpatient Physical Therapy  [place]           [] Phone: [phone]   Fax: [phone]  [facility]           [x] Phone: [phone]   Fax: [phone]        Physical Therapy Daily Treatment Note  Date:  2023    Patient Name:  Roxane Hernandez    :  1960  MRN: [id number]  Restrictions/Precautions: No data recorded   Position Activity Restriction  Other position/activity restrictions: none  Diagnosis:   cervical ddd Diagnosis: cervical DDD  Date of Injury/Surgery:   Treatment Diagnosis:  neck pain  Insurance/Certification information: Med [place] BOMN/ no pre cert  Referring Physician:  LEORA Roman   PCP: Manuel Olivia PA-C  Next Doctor Visit:    Plan of care signed (Y/N):    Outcome Measure: NDI   Visit# / total visits:   13/  PN due 23  Pain level: 5/10   Goals:     Patient goals:  less R arm pain  Long Term Goals  Time Frame for Long Term Goals  patient will be indepedent with HEP  patient will score 10/50 on NDI and will report a score of 1 regarding pain with daily task of driving               Summary of Evaluation:  Assessment: NDI         Subjective:  Patient reports of 5/10 pain upon arrival and voices no new c/o. Any changes in Ambulatory Summary Sheet?   None        Objective:  forward flexed head and shlds persist      Exercises: (No more than 4 columns)   Exercise/Equipment 2023 (11) 23             WARM UP           UBE  6' bwd  (Towel roll between shldrs) 6' bwd  (Towel roll between [employer]) 6' bwd  (Towel roll between [employer]) 6' bwd  (Towel roll between [employer]) 6' bwd  (Towel roll between shldrs)           TABLE        Supine interscap towel roll stretching 5 min + STM 5 min + STM 10-15mins + STM Manual UTstretch 10'                                      STANDING        Corner pec stretch 5x 15\" forearm corner pec stretch 5x 15\" forearm corner pec stretch 5x 15\" forearm corner pec stretch 5x 15\" forearm

## 2023-08-18 NOTE — PROGRESS NOTES
Physical Therapy      Outpatient Physical Therapy           [place]           [] Phone: [phone]   Fax: [phone]  [place]           [x] Phone: [phone]   Fax: [phone]      To: Live Valles PA-C     From: Juan Miguel Fallon PT,    Patient: Kenji Fontaine                    : 1960  Diagnosis:  cervical ddd        Treatment Diagnosis:       Date: 2023  [x]  Progress Note                []  Discharge Note    Evaluation Date:  5/15/23   Total Visits to date:13   Cancels/No-shows to date:      Subjective: 23  Patient reports of 5/10 pain upon arrival and voices no new c/o. Plan of Care/Treatment to date:  [x] Therapeutic Exercise    [] Modalities:  [x] Therapeutic Activity     [] Ultrasound  [] Electrical Stimulation  [] Gait Training      [] Cervical Traction   [] Lumbar Traction  [x] Neuromuscular Re-education  [] Cold/hotpack [] Iontophoresis  [x] Instruction in HEP      Other:  [x] Manual Therapy       []  Vasopneumatic  [] Aquatic Therapy       []   Dry Needle Therapy                      Objective/Significant Findings At Last Visit/Comments:  patient reports 25% improvement since starting PT     Goal Status:  [] Achieved [] Partially Achieved  [x] Not Assessed   Patient goals:  less R arm pain  Long Term Goals  patient will be indepedent with HEP  patient will score 10/50 on NDI and will report a score of 1 regarding pain with daily task of driving    Rehab Potential: [] Excellent [x] Good [] Fair  [] Poor    Patient Status: [] Continue per initial plan of Care     [] Patient now discharged     [] Additional visits requested, Please re-certify for additional visits:      Requested frequency/duration:  1-2/week for 4weeks  If we are requesting more visits, we fully anticipate the patient's condition is expected to improve within the treatment timeframe we are requesting.   Electronically signed by:  Juan Miguel Fallon PT, , 2023, 5:21 PM  If you have any questions or concerns,

## 2023-08-21 RX ORDER — PRASUGREL 10 MG/1
TABLET, FILM COATED ORAL
Qty: 30 TABLET | Refills: 5 | Status: SHIPPED | OUTPATIENT
Start: 2023-08-21

## 2023-08-22 ENCOUNTER — HOSPITAL ENCOUNTER (OUTPATIENT)
Dept: PHYSICAL THERAPY | Age: 63
Setting detail: THERAPIES SERIES
Discharge: HOME | End: 2023-08-22
Payer: COMMERCIAL

## 2023-08-22 PROCEDURE — 97140 MANUAL THERAPY 1/> REGIONS: CPT

## 2023-08-22 PROCEDURE — 97110 THERAPEUTIC EXERCISES: CPT

## 2023-08-22 NOTE — FLOWSHEET NOTE
Outpatient Physical Therapy  [place]           [] Phone: [phone]   Fax: [phone]  Antonia Buckner           [x] Phone: [phone]   Fax: [phone]        Physical Therapy Daily Treatment Note  Date:  2023    Patient Name:  Shelby Desouza    :  1960  MRN: [id number]  Restrictions/Precautions: No data recorded   Position Activity Restriction  Other position/activity restrictions: none  Diagnosis:   cervical ddd Diagnosis: cervical DDD  Date of Injury/Surgery:   Treatment Diagnosis:  neck pain  Insurance/Certification information: Med [place] BOMN/ no pre cert  Referring Physician:  Darleen Crigler, PA-C C. Lilia Memos   PCP: Darleen Crigler, PA-C  Next Doctor Visit:    Plan of care signed (Y/N):    Outcome Measure: NDI   Visit# / total visits:   14/  PN due 23  Pain level: 5/10   Goals:     Patient goals:  less R arm pain  Long Term Goals  Time Frame for Long Term Goals  patient will be indepedent with HEP  patient will score 10/50 on NDI and will report a score of 1 regarding pain with daily task of driving               Summary of Evaluation:  Assessment: NDI         Subjective:  Patient reports of 5/10 pain upon arrival.        Any changes in Ambulatory Summary Sheet?   None        Objective:  forward flexed head and shlds persist      Exercises: (No more than 4 columns)   Exercise/Equipment 23 (11) 23            WARM UP          UBE  6' bwd  (Towel roll between [employer]) 6' bwd  (Towel roll between [employer]) 6' bwd  (Towel roll between [employer]) 6' bwd  (Towel roll between shldrs)          TABLE       Supine interscap towel roll stretching 10-15mins + STM Manual UTstretch 10' 10 min                                   STANDING       Corner pec stretch 5x 15\" forearm corner pec stretch 5x 15\" forearm corner pec stretch 5x 15\" forearm corner pec stretch 5x 15\" forearm corner pec stretch   LAE 5x10 Blue TB 5x10 Blue TB BHUMI 4x10  BHUMI 4x10   Resisted neck EXT  Blue

## 2023-09-05 NOTE — PROGRESS NOTES
This RN answered this pt call light, this pt daughter is upset and on phone with family member. States that they think he needs to be moved to higher level of care, would like to speak with a MD, and that pt takes flexeril BID at home. This RN reviewed current medications with pt and family. Perfect Serve message sent to Netta Courtney NP regarding concerns. Temp 100.1. Tylenol and tessalon pearls given, pt repositioned, all questions answered. Will continue to monitor.  Electronically signed by Jackie Luke RN on 5/5/2019 at 9:12 PM Former smoker

## 2023-09-13 ENCOUNTER — HOSPITAL ENCOUNTER (OUTPATIENT)
Dept: PHYSICAL THERAPY | Age: 63
Setting detail: THERAPIES SERIES
Discharge: HOME | End: 2023-09-13
Payer: COMMERCIAL

## 2023-09-13 PROCEDURE — 97110 THERAPEUTIC EXERCISES: CPT

## 2023-09-13 PROCEDURE — 97140 MANUAL THERAPY 1/> REGIONS: CPT

## 2023-09-13 NOTE — FLOWSHEET NOTE
Outpatient Physical Therapy  [place]           [] Phone: [phone]   Fax: [phone]  Karolyn Zavaleta           [x] Phone: [phone]   Fax: [phone]        Physical Therapy Daily Treatment Note  Date:  2023    Patient Name:  Nitish Oro    :  1960  MRN: [id number]  Restrictions/Precautions: No data recorded   Position Activity Restriction  Other position/activity restrictions: none  Diagnosis:   cervical ddd Diagnosis: cervical DDD  Date of Injury/Surgery:   Treatment Diagnosis:  neck pain  Insurance/Certification information: Med [place] BOMN/ no pre cert  Referring Physician:  LEORA Perez   PCP: Jewell Nickerson PA-C  Next Doctor Visit:    Plan of care signed (Y/N):    Outcome Measure: NDI   Visit# / total visits:   15/    Pain level: 3/10   Goals:     Patient goals:  less R arm pain  Long Term Goals  Time Frame for Long Term Goals  patient will be indepedent with HEP  patient will score 10/50 on NDI and will report a score of 1 regarding pain with daily task of driving               Summary of Evaluation:  Assessment: NDI         Subjective:  Patient reports R arm pain is less; now pain         Any changes in Ambulatory Summary Sheet?   None        Objective:  forward flexed head and shlds persist; NDI 23/50      Exercises: (No more than 4 columns)   Exercise/Equipment 23            WARM UP          UBE  6' bwd  (Towel roll between [employer]) 6' bwd  (Towel roll between [employer]) 6' bwd  (Towel roll between [employer]) 8' bwd  (Towel roll between shldrs)          TABLE       Supine interscap towel roll stretching Manual UTstretch 10' 10 min  10 min                                  STANDING       Corner pec stretch 5x 15\" forearm corner pec stretch 5x 15\" forearm corner pec stretch 5x 15\" forearm corner pec stretch 5x 15\" forearm corner pec stretch   LAE 5x10 Blue TB BHUMI 4x10  BHUMI 4x10 BHUMI 5x10   Resisted neck EXT  Green TB in sitting 15 x

## 2023-09-13 NOTE — PROGRESS NOTES
Physical Therapy      Outpatient Physical Therapy           [place]           [] Phone: [phone]   Fax: [phone]  [facility]           [x] Phone: [phone]   Fax: [phone]      To: Irina Nguyen PA-C     From: Timmy Brito PT,    Patient: Nagi Parsons                    : 1960  Diagnosis:  cervical ddd        Treatment Diagnosis:       Date: 2023  [x]  Progress Note                []  Discharge Note    Evaluation Date:  5/15/23   Total Visits to date:15   Cancels/No-shows to date:      Subjective  Patient reports R arm pain is less lately; concerned about with a lump oL side or thorax       Plan of Care/Treatment to date:  [x] Therapeutic Exercise    [] Modalities:  [x] Therapeutic Activity     [] Ultrasound  [] Electrical Stimulation  [] Gait Training      [] Cervical Traction   [] Lumbar Traction  [x] Neuromuscular Re-education  [] Cold/hotpack [] Iontophoresis  [x] Instruction in HEP      Other:  [x] Manual Therapy       []  Vasopneumatic  [] Aquatic Therapy       []   Dry Needle Therapy                      Objective/Significant Findings At Last Visit/Comments:  patient reports 25% improvement since starting PT;NDI 23/50     Goal Status:  [] Achieved [] Partially Achieved  [] Not Ascheived   Patient goals:  less R arm pain  Long Term Goals  patient will be indepedent with HEP  patient will score 10/50 on NDI and will report a score of 1 regarding pain with daily task of driving    Rehab Potential: [] Excellent [x] Good [] Fair  [] Poor    Patient Status: [] Continue per initial plan of Care     [] Patient now discharged     [x] Additional visits requested, Please re-certify for additional visits:      Requested frequency/duration:  1-2/week for 4weeks  If we are requesting more visits, we fully anticipate the patient's condition is expected to improve within the treatment timeframe we are requesting.   Electronically signed by:  Timmy Brito PT, , 2023, 6:23 PM  If you have

## 2023-09-20 ENCOUNTER — OFFICE VISIT (OUTPATIENT)
Dept: CARDIOLOGY CLINIC | Age: 63
End: 2023-09-20
Payer: COMMERCIAL

## 2023-09-20 ENCOUNTER — NURSE ONLY (OUTPATIENT)
Dept: CARDIOLOGY CLINIC | Age: 63
End: 2023-09-20

## 2023-09-20 ENCOUNTER — HOSPITAL ENCOUNTER (OUTPATIENT)
Dept: PHYSICAL THERAPY | Age: 63
Discharge: HOME | End: 2023-09-20

## 2023-09-20 VITALS
SYSTOLIC BLOOD PRESSURE: 104 MMHG | WEIGHT: 194.4 LBS | HEIGHT: 70 IN | HEART RATE: 81 BPM | BODY MASS INDEX: 27.83 KG/M2 | DIASTOLIC BLOOD PRESSURE: 62 MMHG

## 2023-09-20 DIAGNOSIS — F41.9 ANXIETY: ICD-10-CM

## 2023-09-20 DIAGNOSIS — I10 PRIMARY HYPERTENSION: ICD-10-CM

## 2023-09-20 DIAGNOSIS — R07.9 CHEST PAIN, UNSPECIFIED TYPE: Primary | ICD-10-CM

## 2023-09-20 DIAGNOSIS — R26.9 GAIT ABNORMALITY: ICD-10-CM

## 2023-09-20 DIAGNOSIS — I50.22 CHRONIC SYSTOLIC CONGESTIVE HEART FAILURE (HCC): ICD-10-CM

## 2023-09-20 DIAGNOSIS — E78.5 DYSLIPIDEMIA: ICD-10-CM

## 2023-09-20 DIAGNOSIS — R47.81 SLURRED SPEECH: ICD-10-CM

## 2023-09-20 DIAGNOSIS — I48.0 PAF (PAROXYSMAL ATRIAL FIBRILLATION) (HCC): ICD-10-CM

## 2023-09-20 DIAGNOSIS — I20.0 UNSTABLE ANGINA (HCC): ICD-10-CM

## 2023-09-20 DIAGNOSIS — G45.9 TIA (TRANSIENT ISCHEMIC ATTACK): ICD-10-CM

## 2023-09-20 PROCEDURE — 3078F DIAST BP <80 MM HG: CPT | Performed by: INTERNAL MEDICINE

## 2023-09-20 PROCEDURE — 97110 THERAPEUTIC EXERCISES: CPT

## 2023-09-20 PROCEDURE — 97140 MANUAL THERAPY 1/> REGIONS: CPT

## 2023-09-20 PROCEDURE — 99214 OFFICE O/P EST MOD 30 MIN: CPT | Performed by: INTERNAL MEDICINE

## 2023-09-20 PROCEDURE — 3074F SYST BP LT 130 MM HG: CPT | Performed by: INTERNAL MEDICINE

## 2023-09-20 PROCEDURE — 93000 ELECTROCARDIOGRAM COMPLETE: CPT | Performed by: INTERNAL MEDICINE

## 2023-09-20 RX ORDER — NITROGLYCERIN 0.4 MG/1
0.4 TABLET SUBLINGUAL EVERY 5 MIN PRN
Qty: 25 TABLET | Refills: 3 | Status: SHIPPED | OUTPATIENT
Start: 2023-09-20

## 2023-09-20 NOTE — PROGRESS NOTES
Zana Shone, MD        OFFICE  FOLLOWUP NOTE    Chief complaints: patient is here for management of  CAD,s/p CABG, DM, HTN, AFIB, DYSLPIDEMIA    Subjective:+  chest pain, no shortness of breath, no dizziness, + palpitations    HPI Georgia Buchanan is a 59 y. o.year old who  has a past medical history of Abnormal findings on cardiac catheterization, CAD (coronary artery disease), Diabetes mellitus ([address]), H/O cardiovascular stress test, H/O Doppler echocardiogram, H/O echocardiogram, H/O echocardiogram, H/O exercise stress test, Herniated lumbar intervertebral disc, History of cardiac cath, History of exercise stress test, History of nuclear stress test, History of nuclear stress test, Hx of Doppler ultrasound, Hypertension, and Sleep apnea in adult.  and presents for management of  CAD,s/p CABG, DM, HTN, AFIB, DYSLPIDEMIA which are well controlled    He felt some nausea and chest pain  Current Outpatient Medications   Medication Sig Dispense Refill    glycopyrrolate-formoterol (BEVESPI AEROSPHERE) 9-4.8 MCG/ACT AERO Inhale 2 puffs by mouth twice daily 11 g 2    Elastic Bandages & Supports (ABDOMINAL BINDER/ELASTIC XL) MISC 1 each by Does not apply route daily 1 each 0    prasugrel (EFFIENT) 10 MG TABS TAKE 1 TABLET BY MOUTH IN THE MORNING 30 tablet 5    hydroCHLOROthiazide (HYDRODIURIL) 25 MG tablet Take 1 tablet by mouth daily      potassium chloride (KLOR-CON M) 20 MEQ extended release tablet Take 2 tablets by mouth 2 times daily 120 tablet 11    carvedilol (COREG) 25 MG tablet Take 1 tablet by mouth 2 times daily (with meals) 180 tablet 3    ranolazine (RANEXA) 500 MG extended release tablet Take 1 tablet by mouth 2 times daily 180 tablet 3    furosemide (LASIX) 40 MG tablet Take 1 tablet by mouth 3 times daily (Patient taking differently: Take 1 tablet by mouth 2 times daily) 270 tablet 3    dilTIAZem (CARDIZEM CD) 360 MG extended release capsule Take 1 capsule by mouth daily 90 capsule 3    apixaban

## 2023-09-20 NOTE — FLOWSHEET NOTE
Outpatient Physical Therapy  [place]           [] Phone: [phone]   Fax: [phone]  [place]           [x] Phone: [phone]   Fax: [phone]        Physical Therapy Daily Treatment Note  Date:  2023    Patient Name:  Derik Vega    :  1960  MRN: [id number]  Restrictions/Precautions: No data recorded   Position Activity Restriction  Other position/activity restrictions: none  Diagnosis:   cervical ddd Diagnosis: cervical DDD  Date of Injury/Surgery:   Treatment Diagnosis:  neck pain  Insurance/Certification information: Med [place] BOMN/ no pre cert  Referring Physician:  LEORA Pan   PCP: Karen Patton PA-C  Next Doctor Visit:    Plan of care signed (Y/N):    Outcome Measure: NDI   Visit# / total visits:    then PN  Pain level: 5/10   Goals:     Patient goals:  less R arm pain  Long Term Goals  Time Frame for Long Term Goals  patient will be indepedent with HEP  patient will score 10/50 on NDI and will report a score of 1 regarding pain with daily task of driving               Summary of Evaluation:  Assessment: NDI         Subjective: Any changes in Ambulatory Summary Sheet?   None        Objective:      Exercises: (No more than 4 columns)   Exercise/Equipment 23            WARM UP          UBE  6' bwd  (Towel roll between [employer]) 6' bwd  (Towel roll between [employer]) 8' bwd  (Towel roll between [employer]) 8' bwd  (Towel roll between shldrs)          TABLE       Supine interscap towel roll stretching 10' 10 min  10 min 10 min                                  STANDING       Corner pec stretch 5x 15\" forearm corner pec stretch 5x 15\" forearm corner pec stretch 5x 15\" forearm corner pec stretch 5x 20\" forearm corner pec stretch   LAE BHUMI 4x10  BHUMI 4x10 BHUMI 5x10 BHUMI 5x10   Resisted neck EXT  Green TB in sitting 15 x 5 ct hold Green TB in sitting 15 x 5 ct hold Green TB in standing 20 x 5 ct hold Green TB in standing

## 2023-09-22 ENCOUNTER — HOSPITAL ENCOUNTER (OUTPATIENT)
Dept: PHYSICAL THERAPY | Age: 63
Setting detail: THERAPIES SERIES
Discharge: HOME | End: 2023-09-22
Payer: COMMERCIAL

## 2023-09-22 PROCEDURE — 97140 MANUAL THERAPY 1/> REGIONS: CPT

## 2023-09-22 PROCEDURE — 97110 THERAPEUTIC EXERCISES: CPT

## 2023-09-22 NOTE — FLOWSHEET NOTE
Outpatient Physical Therapy  [place]           [] Phone: [phone]   Fax: [phone]  Arturo Eddy           [x] Phone: [phone]   Fax: [phone]        Physical Therapy Daily Treatment Note  Date:  2023    Patient Name:  Stoney Garcia    :  1960  MRN: [id number]  Restrictions/Precautions: No data recorded   Position Activity Restriction  Other position/activity restrictions: none  Diagnosis:   cervical ddd Diagnosis: cervical DDD  Date of Injury/Surgery:   Treatment Diagnosis:  neck pain  Insurance/Certification information: Med [place] BOMN/ no pre cert  Referring Physician:  LEORA Zaragoza Author Model   PCP: Moriah Rosa PA-C  Next Doctor Visit:    Plan of care signed (Y/N):    Outcome Measure: NDI   Visit# / total visits:    then PN  Pain level: 5/10   Goals:     Patient goals:  less R arm pain  Long Term Goals  Time Frame for Long Term Goals  patient will be indepedent with HEP  patient will score 10/50 on NDI and will report a score of 1 regarding pain with daily task of driving               Summary of Evaluation:  Assessment: NDI         Subjective:patient reports 6 /10 pain after working today    Any changes in Ambulatory Summary Sheet?   None        Objective:  minimal UT TTP    Exercises: (No more than 4 columns)   Exercise/Equipment 2023            WARM UP          UBE  6' bwd  (Towel roll between [employer]) 8' bwd  (Towel roll between [employer]) 8' bwd  (Towel roll between [employer]) 10' bwd  (Towel roll between shldrs)          TABLE       Supine interscap towel roll stretching 10 min  10 min 10 min 10 min                                  STANDING       Corner pec stretch 5x 15\" forearm corner pec stretch 5x 15\" forearm corner pec stretch 5x 20\" forearm corner pec stretch 5x 20\" forearm corner pec stretch   LAE BHUMI 4x10 BHUMI 5x10 BHUMI 5x10 BHUMI 5x10   Resisted neck EXT  Green TB in sitting 15 x 5 ct hold Green TB in standing 20 x 5 ct

## 2023-09-25 ENCOUNTER — HOSPITAL ENCOUNTER (OUTPATIENT)
Dept: PHYSICAL THERAPY | Age: 63
Setting detail: THERAPIES SERIES
Discharge: HOME | End: 2023-09-25
Payer: COMMERCIAL

## 2023-09-25 DIAGNOSIS — R07.9 CHEST PAIN, UNSPECIFIED TYPE: ICD-10-CM

## 2023-09-25 PROCEDURE — 97112 NEUROMUSCULAR REEDUCATION: CPT

## 2023-09-25 PROCEDURE — 97110 THERAPEUTIC EXERCISES: CPT

## 2023-09-25 PROCEDURE — 97140 MANUAL THERAPY 1/> REGIONS: CPT

## 2023-09-25 NOTE — FLOWSHEET NOTE
PROPRIOCEPTION                                    MODALITIES                      Other Therapeutic Activities/Education:        Home Exercise Program:        Manual Treatments:  STM manual stretching to B UT;chin tucks10 ct x 10 reps    Modalities:        Communication with other providers:        Assessment:  (Response towards treatment session) (Pain Rating)  6/10 pain  Pt tolerated  treatment without any adverse reactions or complications this date. . Pt would continue to benefit from skilled therapy interventions to address remaining impairments, improve mobility and strength,  and progress toward goal completion and prepare for d/c including finalizing HEP ;  while reducing risk for re-injury or further decline. Pt performed  all listed interventions and demonstrated adequate to good form with some pain relief; mostly with supine positional towel stretching.         Plan for Next Session:        Time In / Time Out:  6753-5743           Timed Code/Total Treatment Minutes:  47  1man  1nr 2te    Next Progress Note due:        Plan of Care Interventions:  [x] Therapeutic Exercise  [] Modalities:  [x] Therapeutic Activity     [] Ultrasound  [] Estim  [] Gait Training      [] Cervical Traction [] Lumbar Traction  [x] Neuromuscular Re-education    [] Cold/hotpack [] Iontophoresis   [x] Instruction in HEP      [] Vasopneumatic   [] Dry Needling    [x] Manual Therapy               [] Aquatic Therapy              Electronically signed by:  Yandy Bronson PTA/Jenni Hernandez PT   9/25/2023, 1:45 PM

## 2023-09-26 ENCOUNTER — PROCEDURE VISIT (OUTPATIENT)
Dept: CARDIOLOGY CLINIC | Age: 63
End: 2023-09-26

## 2023-09-26 DIAGNOSIS — R07.9 CHEST PAIN, UNSPECIFIED TYPE: ICD-10-CM

## 2023-09-27 ENCOUNTER — HOSPITAL ENCOUNTER (OUTPATIENT)
Dept: PHYSICAL THERAPY | Age: 63
Setting detail: THERAPIES SERIES
Discharge: HOME | End: 2023-09-27
Payer: COMMERCIAL

## 2023-09-27 PROCEDURE — 97110 THERAPEUTIC EXERCISES: CPT

## 2023-09-27 NOTE — FLOWSHEET NOTE
Outpatient Physical Therapy  [place]           [] Phone: [phone]   Fax: [phone]  [place] Chir[facility]           [x] Phone: [phone]   Fax: [phone]        Physical Therapy Daily Treatment Note  Date:  2023    Patient Name:  Hannah Saini    :  1960  MRN: [id number]  Restrictions/Precautions: No data recorded   Position Activity Restriction  Other position/activity restrictions: none  Diagnosis:   cervical ddd Diagnosis: cervical DDD  Date of Injury/Surgery:   Treatment Diagnosis:  neck pain  Insurance/Certification information: Med [place] BOMN/ no pre cert  Referring Physician:  LEORA Mcmnaus   PCP: Anna Gupta PA-C  Next Doctor Visit:    Plan of care signed (Y/N):    Outcome Measure: NDI   Visit# / total visits:    then PN  Pain level: 4/10   Goals:     Patient goals:  less R arm pain  Long Term Goals  Time Frame for Long Term Goals  patient will be indepedent with HEP  patient will score 10/50 on NDI and will report a score of 1 regarding pain with daily task of driving               Summary of Evaluation:  Assessment: NDI         Subjective: Patient reports of 4/10 pain upon arrival and voices no new c/o. Any changes in Ambulatory Summary Sheet?   None        Objective:   flexed cervical and thoracic posture continues    Exercises: (No more than 4 columns)   Exercise/Equipment 23  (18) 23             WARM UP          UBE  8' bwd  (Towel roll between shldrs) 10' bwd  (Towel roll between shldrs) 10' bwd  (Towel roll between shldrs) 10' bwd  (Towel roll between shldrs)          TABLE       Supine interscap towel roll stretching 10 min 10 min 10' Not today                                  STANDING       Corner pec stretch 5x 20\" forearm corner pec stretch 5x 20\" forearm corner pec stretch 5x 20\" forearm corner pec stretch 5x 20\" forearm corner pec stretch   LAE BHUMI 5x10 BHUMI 5x10 BHUMI 5x10 BHUMI 5x10   Resisted neck EXT  Green

## 2023-10-02 ENCOUNTER — HOSPITAL ENCOUNTER (OUTPATIENT)
Dept: PHYSICAL THERAPY | Age: 63
Setting detail: THERAPIES SERIES
Discharge: HOME | End: 2023-10-02
Payer: COMMERCIAL

## 2023-10-02 ENCOUNTER — TELEPHONE (OUTPATIENT)
Dept: CARDIOLOGY CLINIC | Age: 63
End: 2023-10-02

## 2023-10-02 PROCEDURE — 97140 MANUAL THERAPY 1/> REGIONS: CPT

## 2023-10-02 PROCEDURE — 97112 NEUROMUSCULAR REEDUCATION: CPT

## 2023-10-02 PROCEDURE — 97110 THERAPEUTIC EXERCISES: CPT

## 2023-10-02 NOTE — TELEPHONE ENCOUNTER
Called patient left a message with the results of his recent testing NM stress test - abnormal stress test, mildly depressed LVEF, INCREASED EDV, Myocardial perfusion scan shows small size, severe intensity,non reversible perfusion defect in infero-apical wall. This is unchanged result.

## 2023-10-02 NOTE — FLOWSHEET NOTE
PROPRIOCEPTION                                    MODALITIES                      Other Therapeutic Activities/Education:        Home Exercise Program:        Manual Treatments:  STM manual stretching to B UT;chin tucks 10 ct x 10 reps      Modalities:        Communication with other providers:        Assessment:  (Response towards treatment session) (Pain Rating)  5/10 pain        Pt tolerated  treatment without any adverse reactions or complications this date. . Pt would continue to benefit from skilled therapy interventions to address remaining impairments, improve mobility and strength,  and progress toward goal completion and prepare for d/c including finalizing HEP ;  while reducing risk for re-injury or further decline. Pt performed  all listed interventions and demonstrated adequate to good form with some pain relief; mostly with supine positional towel stretching.         Plan for Next Session:        Time In / Time Out:   2533-2916           Timed Code/Total Treatment Minutes:   47 1man 1nr 2te    Next Progress Note due:        Plan of Care Interventions:  [x] Therapeutic Exercise  [] Modalities:  [x] Therapeutic Activity     [] Ultrasound  [] Estim  [] Gait Training      [] Cervical Traction [] Lumbar Traction  [x] Neuromuscular Re-education    [] Cold/hotpack [] Iontophoresis   [x] Instruction in HEP      [] Vasopneumatic   [] Dry Needling    [x] Manual Therapy               [] Aquatic Therapy              Electronically signed by:  Maria Elena Painting PTA  10/2/2023, 3:21 PM

## 2023-10-04 ENCOUNTER — HOSPITAL ENCOUNTER (OUTPATIENT)
Dept: PHYSICAL THERAPY | Age: 63
Setting detail: THERAPIES SERIES
Discharge: HOME | End: 2023-10-04
Payer: COMMERCIAL

## 2023-10-04 PROCEDURE — 97140 MANUAL THERAPY 1/> REGIONS: CPT

## 2023-10-04 PROCEDURE — 97110 THERAPEUTIC EXERCISES: CPT

## 2023-10-04 NOTE — FLOWSHEET NOTE
Outpatient Physical Therapy  [place]           [] Phone: [phone]   Fax: [phone]  [facility]           [x] Phone: [phone]   Fax: [phone]        Physical Therapy Daily Treatment Note  Date:  10/4/2023    Patient Name:  Iain Negron    :  1960  MRN: [id number]  Restrictions/Precautions: No data recorded   Position Activity Restriction  Other position/activity restrictions: none  Diagnosis:   cervical ddd Diagnosis: cervical DDD  Date of Injury/Surgery:   Treatment Diagnosis:  neck pain  Insurance/Certification information: Med [place] BOMN/ no pre cert  Referring Physician:  LEORA Raymond   PCP: Lamonte Antoine PA-C  Next Doctor Visit:    Plan of care signed (Y/N):    Outcome Measure: NDI   Visit# / total visits:    then PN  Pain level: 4/10   Goals:     Patient goals:  less R arm pain  Long Term Goals  Time Frame for Long Term Goals  patient will be indepedent with HEP  patient will score 10/50 on NDI and will report a score of 1 regarding pain with daily task of driving               Summary of Evaluation:  Assessment: NDI         Subjective: Patient reports of 4/10 pain upon arrival and voices no new c/o. He feels therapy is helping and feel he's standing up straighter and can hold his head up better. He requested not to do the forearms on the wall today due to cutting his arm at work and being on blood thinners he's afraid he'll start bleeding again. Any changes in Ambulatory Summary Sheet?   None        Objective:   flexed cervical and thoracic posture continues     NDI    Exercises: (No more than 4 columns)   Exercise/Equipment 23  (18) 9/27/23  10/2/23 10/4/23            WARM UP          UBE  10' bwd  (Towel roll between shldrs) 10' bwd  (Towel roll between shldrs) 10' bwd  (Towel roll between shldrs) 0' bwd  (Towel roll between shldrs)          TABLE       Supine interscap towel roll stretching 10' Not today 10' 10'

## 2023-10-17 NOTE — PROGRESS NOTES
Called patient and discussed Monitor results. This is cardiac monitor report, basic rhtyhm is sinus, min hr is 69 bpm max hr is130 bpm,  brief PAF, no heart block, no VTACH , few pvcs and APCs noted, CONTINUE anticoagulation. Patient will follow up in office and continue blood thinners.

## 2023-10-18 ENCOUNTER — TELEPHONE (OUTPATIENT)
Dept: CARDIOLOGY CLINIC | Age: 63
End: 2023-10-18

## 2023-10-18 ENCOUNTER — HOSPITAL ENCOUNTER (OUTPATIENT)
Dept: PHYSICAL THERAPY | Age: 63
Setting detail: THERAPIES SERIES
Discharge: HOME | End: 2023-10-18
Payer: COMMERCIAL

## 2023-10-18 PROCEDURE — 97110 THERAPEUTIC EXERCISES: CPT

## 2023-10-18 PROCEDURE — 97140 MANUAL THERAPY 1/> REGIONS: CPT

## 2023-10-18 NOTE — TELEPHONE ENCOUNTER
Called patient and went over Monitor results. This is cardiac monitor report, basic rhtyhm is sinus, min hr is 69 bpm max hr is130 bpm,  brief PAF, no heart block, no VTACH , few pvcs and APCs noted, CONTINUE anticoagulation. Will follow up in office.

## 2023-10-18 NOTE — FLOWSHEET NOTE
Outpatient Physical Therapy  [place]           [] Phone: [phone]   Fax: [phone]  [facility]           [[facility] Phone: [phone]   Fax: [phone]        Physical Therapy Daily Treatment Note  Date:  10/18/2023    Patient Name:  Felipe Katz    :  1960  MRN: [id number]  Restrictions/Precautions: No data recorded   Position Activity Restriction  Other position/activity restrictions: none  Diagnosis:   cervical ddd Diagnosis: cervical DDD  Date of Injury/Surgery:   Treatment Diagnosis:  neck pain  Insurance/Certification information: Med [place] BOMN/ no pre cert  Referring Physician:  LEORA Weir Estimable   PCP: Vani Teixeira PA-C  Next Doctor Visit:    Plan of care signed (Y/N):    Outcome Measure: NDI   Visit# / total visits:    then PN  Pain level: 4/10   Goals:     Patient goals:  less R arm pain  Long Term Goals  Time Frame for Long Term Goals expires 23  patient will be indepedent with HEP  patient will score 10/50 on NDI and will report a score of 1 regarding pain with daily task of driving               Summary of Evaluation:  Assessment: NDI         Subjective: Patient reports of 4/10 pain upon arrival from \"bending over all day\"    Any changes in Ambulatory Summary Sheet?   None        Objective:   Pt was able to perform all exercises on POC today and received STM to B UT    Exercises: (No more than 4 columns)   Exercise/Equipment 9/27/23  10/2/23 10/4/23 10/18/2023            WARM UP          UBE  10' bwd  (Towel roll between [employer]) 10' bwd  (Towel roll between shldrs) 0' bwd  (Towel roll between shldrs) 2' fwd 2' bwd  (Towel roll between shldrs)    bwd       TABLE       Supine interscap towel roll stretching Not today 10' 10' 10'                                  STANDING       Corner pec stretch 5x 20\" forearm corner pec stretch 5x 20\" forearm corner pec stretch Not today 3x 30\" forearm corner neck stretch   LAE BHUMI 5x10 BHUMI 5x10 BHUMI 5x10 BHUMI 5x10

## 2023-10-20 ENCOUNTER — HOSPITAL ENCOUNTER (OUTPATIENT)
Dept: PHYSICAL THERAPY | Age: 63
Setting detail: THERAPIES SERIES
Discharge: HOME | End: 2023-10-20
Payer: COMMERCIAL

## 2023-10-20 PROCEDURE — 97110 THERAPEUTIC EXERCISES: CPT

## 2023-10-20 PROCEDURE — 97140 MANUAL THERAPY 1/> REGIONS: CPT

## 2023-10-20 NOTE — FLOWSHEET NOTE
TB  10x5\" Not today Black TB  10x5\" Black TB 10x5\"   Forearms on wall - thoracic EXT 6x15\" Not today 6x15 6x15\"                       PROPRIOCEPTION                                          MODALITIES                         Other Therapeutic Activities/Education:        Home Exercise Program:        Manual Treatments:  STM manual stretching to B UT 15'    Modalities:        Communication with other providers:        Assessment:  (Response towards treatment session) (Pain Rating) 5/10 pain at end of session  Pt tolerated treatment without any adverse reactions or complications today. Pt would continue to benefit from skilled therapy interventions to address remaining impairments, improve mobility and strength, and progress towards goal completion and prepare for d/c including finalizing HEP; while reducing risk for re-injury or further decline.   Pt performed  all listed interventions on POC and demonstrated adequate to good form        Plan for Next Session:        Time In / Time Out:    0624-3607           Timed Code/Total Treatment Minutes:   62 1man 3te    Next Progress Note due:        Plan of Care Interventions:  [x] Therapeutic Exercise  [] Modalities:  [x] Therapeutic Activity     [] Ultrasound  [] Estim  [] Gait Training      [] Cervical Traction [] Lumbar Traction  [x] Neuromuscular Re-education    [] Cold/hotpack [] Iontophoresis   [x] Instruction in HEP      [] Vasopneumatic   [] Dry Needling    [x] Manual Therapy               [] Aquatic Therapy              Electronically signed by:  Chino Quintero  10/20/2023, 3:18 PM

## 2023-10-23 ENCOUNTER — HOSPITAL ENCOUNTER (OUTPATIENT)
Dept: PHYSICAL THERAPY | Age: 63
Setting detail: THERAPIES SERIES
Discharge: HOME | End: 2023-10-23
Payer: COMMERCIAL

## 2023-11-21 ENCOUNTER — HOSPITAL ENCOUNTER (OUTPATIENT)
Dept: PHYSICAL THERAPY | Age: 63
Setting detail: THERAPIES SERIES
Discharge: HOME | End: 2023-11-21
Payer: COMMERCIAL

## 2023-11-21 PROCEDURE — 97140 MANUAL THERAPY 1/> REGIONS: CPT

## 2023-11-21 PROCEDURE — 97110 THERAPEUTIC EXERCISES: CPT

## 2023-11-21 NOTE — FLOWSHEET NOTE
Outpatient Physical Therapy  [place]           [] Phone: [phone]   Fax: [phone]  [place]           [x] Phone: [phone]   Fax: [phone]        Physical Therapy Daily Treatment Note  Date:  2023    Patient Name:  Carin Hodgson    :  1960  MRN: [id number]  Restrictions/Precautions: No data recorded   Position Activity Restriction  Other position/activity restrictions: none  Diagnosis:   cervical ddd Diagnosis: cervical DDD  Date of Injury/Surgery:   Treatment Diagnosis:  neck pain  Insurance/Certification information: Med [place] BOMN/ no pre cert  Referring Physician:  LEORA Kumar [facility]   PCP: Naida Chinchilla PA-C  Next Doctor Visit:    Plan of care signed (Y/N):    Outcome Measure: NDI 19/50  Visit# / total visits:    then PN  Pain level: 5/10   Goals:     Patient goals:  less R arm pain  Long Term Goals  Time Frame for Long Term Goals expires 23  patient will be indepedent with HEP  patient will score 10/50 on NDI and will report a score of 1 regarding pain with daily task of driving               Summary of Evaluation:  Assessment: NDI         Subjective: Patient reports of 5/10 pain upon arrival and voices no new c/o. Any changes in Ambulatory Summary Sheet?   None        Objective:    decreased pain noted at end of session    Exercises: (No more than 4 columns)   Exercise/Equipment 10/2/23 10/4/23 10/18/2023 10/20/23 11/21/23             WARM UP           UBE  10' bwd  (Towel roll between [employer]) 0' bwd  (Towel roll between [employer]) 2' fwd 2' bwd  (Towel roll between [employer]) 10' bwd  (Towel roll between shldrs) 10' bwd  (Towel roll between shldrs)    bwd        TABLE        Supine interscap towel roll stretching 10' 10' 10' 10' 10'                                      STANDING        Corner pec stretch 5x 20\" forearm corner pec stretch Not today 3x 30\" forearm corner neck stretch 3x 30\" forearm corner neck stretch 3x 30\" forearm corner neck

## 2023-11-27 ENCOUNTER — HOSPITAL ENCOUNTER (OUTPATIENT)
Dept: PHYSICAL THERAPY | Age: 63
Setting detail: THERAPIES SERIES
Discharge: HOME | End: 2023-11-27
Payer: COMMERCIAL

## 2023-11-27 PROCEDURE — 97140 MANUAL THERAPY 1/> REGIONS: CPT

## 2023-11-27 PROCEDURE — 97110 THERAPEUTIC EXERCISES: CPT

## 2023-11-27 NOTE — FLOWSHEET NOTE
Outpatient Physical Therapy  [place]           [] Phone: [phone]   Fax: [phone]  [place]           [x] Phone: [phone]   Fax: [phone]        Physical Therapy Daily Treatment Note  Date:  2023    Patient Name:  Shelby Desouza    :  1960  MRN: [id number]  Restrictions/Precautions: No data recorded   Position Activity Restriction  Other position/activity restrictions: none  Diagnosis:   cervical ddd Diagnosis: cervical DDD  Date of Injury/Surgery:   Treatment Diagnosis:  neck pain  Insurance/Certification information: Med [place] BOMN/ no pre cert  Referring Physician:  Darleen Crigler, PA-C C. L[facility]os   PCP: Darleen Crigler, PA-C  Next Doctor Visit:    Plan of care signed (Y/N):    Outcome Measure: NDI   Visit# / total visits:    then PN  Pain level: 4/10   Goals:     Patient goals:  less R arm pain  Long Term Goals  Time Frame for Long Term Goals expires 23  patient will be indepedent with HEP  patient will score 10/50 on NDI and will report a score of 1 regarding pain with daily task of driving               Summary of Evaluation:  Assessment: NDI         Subjective: Patient reports of 4/10 pain upon arrival and voices no new c/o. Any changes in Ambulatory Summary Sheet?   None        Objective:    increased pain noted at end of session    Exercises: (No more than 4 columns)   Exercise/Equipment 10/20/23 11/21/23 11/27/23             WARM UP    Needs NDI      UBE  10' bwd  (Towel roll between shldrs) 10' bwd  (Towel roll between shldrs) 10' bwd  (Towel roll between shldrs)     bwd       TABLE       Supine interscap towel roll stretching 10' 10' 10'                                   STANDING       Corner pec stretch 3x 30\" forearm corner neck stretch 3x 30\" forearm corner neck stretch 3x 30\" forearm corner neck stretch    LAE BHUMI 5x10 BHUMI 5x10 BHUMI 5x10    Resisted neck EXT  Black TB 10x5\" Black TB 10x5\" Black TB 10x5\"    Forearms on wall - thoracic EXT

## 2023-11-29 ENCOUNTER — HOSPITAL ENCOUNTER (OUTPATIENT)
Dept: PHYSICAL THERAPY | Age: 63
Discharge: HOME | End: 2023-11-29

## 2023-11-29 NOTE — FLOWSHEET NOTE
Physical Therapy  Cancellation/No-show Note  Patient Name:  Peggy Knutson  :  1960   Date:  2023  Cancelled visits to date: 0  No-shows to date: 2    For today's appointment patient:  []  Cancelled  []  Rescheduled appointment  [x]  No-show     Reason given by patient:  []  Patient ill  []  Conflicting appointment  []  No transportation    []  Conflict with work  []  No reason given  []  Other:     Comments:      Electronically signed by:  Aubrie Christian, PTA

## 2024-01-08 ENCOUNTER — HOSPITAL ENCOUNTER (OUTPATIENT)
Dept: PHYSICAL THERAPY | Age: 64
Setting detail: THERAPIES SERIES
Discharge: HOME | End: 2024-01-08
Payer: COMMERCIAL

## 2024-01-08 PROCEDURE — 97110 THERAPEUTIC EXERCISES: CPT

## 2024-01-08 PROCEDURE — 97140 MANUAL THERAPY 1/> REGIONS: CPT

## 2024-01-08 NOTE — PROGRESS NOTES
call.  Thank you for your referral.    Physician Signature:______________________ Date:______ Time: ________  By signing above, therapist’s plan is approved by physician

## 2024-01-08 NOTE — FLOWSHEET NOTE
Outpatient Physical Therapy  [place]           [] Phone: [phone]   Fax: [phone]  [place]           [x] Phone: [phone]   Fax: [phone]        Physical Therapy Daily Treatment Note  Date:  2024    Patient Name:  Reji Oleary    :  1960  MRN: [id number]  Restrictions/Precautions: No data recorded   Position Activity Restriction  Other position/activity restrictions: none  Diagnosis:   cervical ddd Diagnosis: cervical DDD  Date of Injury/Surgery:   Treatment Diagnosis:  neck pain  Insurance/Certification information: Med [place] BOMN/ no pre cert  Referring Physician:  Tresa Copeland PA-C [facility]. [facility]   PCP: Tresa Copeland PA-C  Next Doctor Visit:    Plan of care signed (Y/N):    Outcome Measure: NDI   Visit# / total visits:   then PN  Pain level: 4/10   Goals:     Patient goals:  less R arm pain  Long Term Goals  patient will be indepedent with HEP  patient will score 10/50 on NDI and will report a score of 1 regarding pain with daily task of driving               Summary of Evaluation:  Assessment: NDI         Subjective: Patient reports of 4/10 pain upon arrival and voices no new c/o.    Any changes in Ambulatory Summary Sheet?  None        Objective:    increased pain noted at end of session    Exercises: (No more than 4 columns)   Exercise/Equipment 10/20/23 11/21/23 11/27/23 1/8/24            WARM UP    Needs NDI      UBE  10' bwd  (Towel roll between shldrs) 10' bwd  (Towel roll between shldrs) 10' bwd  (Towel roll between shldrs) 5' BWD/ FWD    TABLE       Supine interscap towel roll stretching 10' 10' 10' 10'                                  STANDING       Corner pec stretch 3x 30\" forearm corner neck stretch 3x 30\" forearm corner neck stretch 3x 30\" forearm corner neck stretch 3x 30\" forearm corner neck stretch   LAE BHUMI 5x10 BHUMI 5x10 BHUMI 5x10 BHUMI x15   Resisted neck EXT  Black TB 10x5\" Black TB 10x5\" Black TB 10x5\" hold   Forearms on wall - thoracic EXT

## 2024-01-10 ENCOUNTER — OFFICE VISIT (OUTPATIENT)
Dept: CARDIOLOGY CLINIC | Age: 64
End: 2024-01-10
Payer: COMMERCIAL

## 2024-01-10 VITALS
DIASTOLIC BLOOD PRESSURE: 76 MMHG | SYSTOLIC BLOOD PRESSURE: 126 MMHG | HEART RATE: 80 BPM | HEIGHT: 71 IN | WEIGHT: 186 LBS | BODY MASS INDEX: 26.04 KG/M2

## 2024-01-10 DIAGNOSIS — R07.9 CHEST PAIN, UNSPECIFIED TYPE: Primary | ICD-10-CM

## 2024-01-10 DIAGNOSIS — I50.22 CHRONIC SYSTOLIC CONGESTIVE HEART FAILURE (HCC): ICD-10-CM

## 2024-01-10 DIAGNOSIS — I10 PRIMARY HYPERTENSION: ICD-10-CM

## 2024-01-10 DIAGNOSIS — I49.9 IRREGULAR HEART RATE: ICD-10-CM

## 2024-01-10 DIAGNOSIS — R26.9 GAIT ABNORMALITY: ICD-10-CM

## 2024-01-10 DIAGNOSIS — R06.02 SOB (SHORTNESS OF BREATH): ICD-10-CM

## 2024-01-10 DIAGNOSIS — E78.5 DYSLIPIDEMIA: ICD-10-CM

## 2024-01-10 DIAGNOSIS — J06.9 VIRAL UPPER RESPIRATORY TRACT INFECTION: ICD-10-CM

## 2024-01-10 DIAGNOSIS — I48.0 PAF (PAROXYSMAL ATRIAL FIBRILLATION) (HCC): ICD-10-CM

## 2024-01-10 DIAGNOSIS — I20.0 UNSTABLE ANGINA (HCC): ICD-10-CM

## 2024-01-10 DIAGNOSIS — G45.9 TIA (TRANSIENT ISCHEMIC ATTACK): ICD-10-CM

## 2024-01-10 PROCEDURE — 99214 OFFICE O/P EST MOD 30 MIN: CPT | Performed by: INTERNAL MEDICINE

## 2024-01-10 PROCEDURE — 3074F SYST BP LT 130 MM HG: CPT | Performed by: INTERNAL MEDICINE

## 2024-01-10 PROCEDURE — 3078F DIAST BP <80 MM HG: CPT | Performed by: INTERNAL MEDICINE

## 2024-01-10 RX ORDER — METHYLPREDNISOLONE 4 MG/1
4 TABLET ORAL DAILY
Qty: 6 TABLET | Refills: 0 | Status: SHIPPED | OUTPATIENT
Start: 2024-01-10 | End: 2024-01-16

## 2024-01-10 NOTE — PROGRESS NOTES
Cha Avitia MD        OFFICE  FOLLOWUP NOTE    Chief complaints: patient is here for management of CAD,s/p CABG, DM, HTN, AFIB, DYSLPIDEMIA     Subjective: patient has cold, no chest pain, no shortness of breath, no dizziness, no palpitations    HPI Reji is a 63 y.o.year old who  has a past medical history of Abnormal findings on cardiac catheterization, CAD (coronary artery disease), Diabetes mellitus (HCC), H/O cardiovascular stress test, H/O Doppler echocardiogram, H/O echocardiogram, H/O echocardiogram, H/O exercise stress test, Herniated lumbar intervertebral disc, History of cardiac cath, History of cardiac monitoring, History of exercise stress test, History of nuclear stress test, History of nuclear stress test, History of nuclear stress test, Hx of Doppler ultrasound, Hypertension, Nausea vomiting and diarrhea, and Sleep apnea in adult. and presents for management of CAD,s/p CABG, DM, HTN, AFIB, DYSLPIDEMIA , which are well controlled      Current Outpatient Medications   Medication Sig Dispense Refill    umeclidinium-vilanterol (ANORO ELLIPTA) 62.5-25 MCG/ACT inhaler Inhale 1 puff into the lungs daily 1 each 5    nitroGLYCERIN (NITROSTAT) 0.4 MG SL tablet Place 1 tablet under the tongue every 5 minutes as needed for Chest pain 25 tablet 3    glycopyrrolate-formoterol (BEVESPI AEROSPHERE) 9-4.8 MCG/ACT AERO Inhale 2 puffs by mouth twice daily 11 g 2    prasugrel (EFFIENT) 10 MG TABS TAKE 1 TABLET BY MOUTH IN THE MORNING 30 tablet 5    hydroCHLOROthiazide (HYDRODIURIL) 25 MG tablet Take 1 tablet by mouth daily      potassium chloride (KLOR-CON M) 20 MEQ extended release tablet Take 2 tablets by mouth 2 times daily 120 tablet 11    carvedilol (COREG) 25 MG tablet Take 1 tablet by mouth 2 times daily (with meals) 180 tablet 3    ranolazine (RANEXA) 500 MG extended release tablet Take 1 tablet by mouth 2 times daily 180 tablet 3    furosemide (LASIX) 40 MG tablet Take 1 tablet by mouth 3

## 2024-01-16 ENCOUNTER — HOSPITAL ENCOUNTER (OUTPATIENT)
Dept: PHYSICAL THERAPY | Age: 64
Setting detail: THERAPIES SERIES
Discharge: HOME | End: 2024-01-16
Payer: COMMERCIAL

## 2024-01-16 PROCEDURE — 97140 MANUAL THERAPY 1/> REGIONS: CPT

## 2024-01-16 PROCEDURE — 97110 THERAPEUTIC EXERCISES: CPT

## 2024-01-16 NOTE — FLOWSHEET NOTE
Outpatient Physical Therapy  [place]           [] Phone: [phone]   Fax: [phone]  [place]           [x] Phone: [phone]   Fax: [phone]        Physical Therapy Daily Treatment Note  Date:  2024    Patient Name:  Reji Oleary    :  1960  MRN: [id number]  Restrictions/Precautions: No data recorded   Position Activity Restriction  Other position/activity restrictions: none  Diagnosis:   cervical ddd Diagnosis: cervical DDD  Date of Injury/Surgery:   Treatment Diagnosis:  neck pain  Insurance/Certification information: Med [place] BOMN/ no pre cert  Referring Physician:  Tresa Copeland PA-C [facility]. [facility]   PCP: Tresa Copeland PA-C  Next Doctor Visit:    Plan of care signed (Y/N):    Outcome Measure: NDI   Visit# / total visits:  then PN  Pain level: 4/10   Goals:     Patient goals:  less R arm pain  Long Term Goals  patient will be indepedent with HEP  patient will score 10/50 on NDI and will report a score of 1 regarding pain with daily task of driving               Summary of Evaluation:  Assessment: NDI         Subjective: Patient reports of 4/10 pain upon arrival and voices no new c/o.    Any changes in Ambulatory Summary Sheet?  None        Objective:      Exercises: (No more than 4 columns)   Exercise/Equipment 23            WARM UP   Needs NDI       UBE  10' bwd  (Towel roll between shldrs) 10' bwd  (Towel roll between shldrs) 5' BWD/ FWD  10' bwd  (Towel roll between shldrs)   TABLE       Supine interscap towel roll stretching 10' 10' 10' 10'                                  STANDING       Corner pec stretch 3x 30\" forearm corner neck stretch 3x 30\" forearm corner neck stretch 3x 30\" forearm corner neck stretch 3x 30\" forearm corner neck stretch   LAE BHUMI 5x10 BHUMI 5x10 BHUMI x15 BHUMI x20   Resisted neck EXT  Black TB 10x5\" Black TB 10x5\" hold Blue TE x 10 reps   Forearms on wall - thoracic EXT 3x30\"  3x30\"  1 x30 sec   1 foot on 8\"

## 2024-01-22 ENCOUNTER — HOSPITAL ENCOUNTER (OUTPATIENT)
Dept: PHYSICAL THERAPY | Age: 64
Discharge: HOME | End: 2024-01-22

## 2024-01-22 NOTE — FLOWSHEET NOTE
Physical Therapy  Cancellation/No-show Note  Patient Name:  Reji Oleary  :  1960   Date:  2024  Cancelled visits to date: 0  No-shows to date: 3    For today's appointment patient:  []  Cancelled  []  Rescheduled appointment  [x]  No-show     Reason given by patient:  []  Patient ill  []  Conflicting appointment  []  No transportation    []  Conflict with work  []  No reason given  []  Other:     Comments:      Electronically signed by:  Sheree Monteiro, PTA

## 2024-02-05 ENCOUNTER — HOSPITAL ENCOUNTER (OUTPATIENT)
Dept: PHYSICAL THERAPY | Age: 64
Setting detail: THERAPIES SERIES
Discharge: HOME | End: 2024-02-05
Payer: COMMERCIAL

## 2024-02-05 PROCEDURE — 97110 THERAPEUTIC EXERCISES: CPT

## 2024-02-05 NOTE — FLOWSHEET NOTE
Outpatient Physical Therapy  [place]           [] Phone: [phone]   Fax: [phone]  [place]           [x] Phone: [phone]   Fax: [phone]        Physical Therapy Daily Treatment Note  Date:  2024    Patient Name:  Reji Oleary    :  1960  MRN: [id number]  Restrictions/Precautions: No data recorded   Position Activity Restriction  Other position/activity restrictions: none  Diagnosis:   cervical ddd Diagnosis: cervical DDD  Date of Injury/Surgery:   Treatment Diagnosis:  neck pain  Insurance/Certification information: Med [place] BOMN/ no pre cert  Referring Physician:  Tresa Copeland PA-C [facility]. [facility]   PCP: Tresa Copeland PA-C  Next Doctor Visit:    Plan of care signed (Y/N):    Outcome Measure: NDI   Visit# / total visits:  then PN  Pain level: 6/10   Goals:     Patient goals:  less R arm pain  Long Term Goals  patient will be indepedent with HEP  patient will score 10/50 on NDI and will report a score of 1 regarding pain with daily task of driving               Summary of Evaluation:  Assessment: NDI         Subjective: Patient reports of 6/10 pain upon arrival and states that he fell this morning at home due to LOB and did not go work today.Pt. States \"I am having  pain in my L arm like I hyperextended it when I fell.\" He also states he is having \"sharp pain in lower back \"     Any changes in Ambulatory Summary Sheet?  None        Objective:   116/78 BP Taken after LOB.        Exercises: (No more than 4 columns)   Exercise/Equipment 23            WARM UP  Needs NDI        UBE  10' bwd  (Towel roll between shldrs) 5' BWD/ FWD  10' bwd  (Towel roll between shldrs) 10' bwd  (Towel roll between shldrs   TABLE       Supine interscap towel roll stretching 10' 10' 10'                                   STANDING       Corner pec stretch 3x 30\" forearm corner neck stretch 3x 30\" forearm corner neck stretch 3x 30\" forearm corner neck stretch

## 2024-02-08 ENCOUNTER — HOSPITAL ENCOUNTER (OUTPATIENT)
Dept: PHYSICAL THERAPY | Age: 64
Setting detail: THERAPIES SERIES
Discharge: HOME | End: 2024-02-08
Payer: COMMERCIAL

## 2024-02-08 PROCEDURE — 97140 MANUAL THERAPY 1/> REGIONS: CPT

## 2024-02-08 PROCEDURE — 97110 THERAPEUTIC EXERCISES: CPT

## 2024-02-08 NOTE — FLOWSHEET NOTE
Outpatient Physical Therapy  [place]           [] Phone: [phone]   Fax: [phone]  [place]           [x] Phone: [phone]   Fax: [phone]        Physical Therapy Daily Treatment Note  Date:  2024    Patient Name:  Reji Oleary    :  1960  MRN: [id number]  Restrictions/Precautions: No data recorded   Position Activity Restriction  Other position/activity restrictions: none  Diagnosis:   cervical ddd Diagnosis: cervical DDD  Date of Injury/Surgery:   Treatment Diagnosis:  neck pain  Insurance/Certification information: Med [place] BOMN/ no pre cert  Referring Physician:  Tresa Copeland PA-C [facility]. [facility]   PCP: Tresa Copeland PA-C  Next Doctor Visit:    Plan of care signed (Y/N):    Outcome Measure: NDI   Visit# / total visits:  then PN  Pain level: 4/10   Goals:     Patient goals:  less R arm pain  Long Term Goals  patient will be indepedent with HEP  patient will score 10/50 on NDI and will report a score of 1 regarding pain with daily task of driving               Summary of Evaluation:  Assessment: NDI         Subjective: Patient reports of 6/10 pain upon arrival and states that he fell this morning at home due to LOB and did not go work today.Pt. States \"I am having  pain in my L arm like I hyperextended it when I fell.\" He also states he is having \"sharp pain in lower back \"     Any changes in Ambulatory Summary Sheet?  None        Objective:   able to correct thoracic kyphosis with Forearms on wall - thoracic EXT        Exercises: (No more than 4 columns)   Exercise/Equipment 2024          WARM UP        UBE  10' bwd  (Towel roll between shldrs 5\" BWD (Towel roll between shldrs)   TABLE     Supine interscap towel roll stretching                            STANDING     Corner pec stretch 3x 30\" forearm corner neck stretch 3x 30\" forearm corner neck stretch   LAE BHUMI 2sets 10x hold   Forearms on wall - thoracic EXT 1 x30 sec 3 x30 sec           
No

## 2024-02-13 ENCOUNTER — HOSPITAL ENCOUNTER (OUTPATIENT)
Dept: PHYSICAL THERAPY | Age: 64
Setting detail: THERAPIES SERIES
Discharge: HOME | End: 2024-02-13
Payer: COMMERCIAL

## 2024-02-13 PROCEDURE — 97110 THERAPEUTIC EXERCISES: CPT

## 2024-02-13 PROCEDURE — 97140 MANUAL THERAPY 1/> REGIONS: CPT

## 2024-02-13 NOTE — FLOWSHEET NOTE
Outpatient Physical Therapy  [place]           [] Phone: [phone]   Fax: [phone]  [place]           [x] Phone: [phone]   Fax: [phone]        Physical Therapy Daily Treatment Note  Date:  2024    Patient Name:  Reji Oleary    :  1960  MRN: [id number]  Restrictions/Precautions: No data recorded   Position Activity Restriction  Other position/activity restrictions: none  Diagnosis:   cervical ddd Diagnosis: cervical DDD  Date of Injury/Surgery:   Treatment Diagnosis:  neck pain  Insurance/Certification information: Med [place] BOMN/ no pre cert  Referring Physician:  Tresa Copeland PA-C [facility]. [facility]   PCP: Tresa Copeland PA-C  Next Doctor Visit:    Plan of care signed (Y/N):    Outcome Measure: NDI   Visit# / total visits:  then PN  Pain level: 4/10   Goals:     Patient goals:  less R arm pain  Long Term Goals  patient will be indepedent with HEP  patient will score 10/50 on NDI and will report a score of 1 regarding pain with daily task of driving               Summary of Evaluation:  Assessment: NDI         Subjective: States that he is having good and bad days.      Any changes in Ambulatory Summary Sheet?  None        Objective:   Increased tightness with UT stretches and OP        Exercises: (No more than 4 columns)   Exercise/Equipment 2024           WARM UP         UBE  10' bwd  (Towel roll between shldrs 5\" BWD (Towel roll between shldrs) 5\" BWD (Towel roll between shldrs)   TABLE      Supine interscap towel roll stretching                                 STANDING      Corner pec stretch 3x 30\" forearm corner neck stretch 3x 30\" forearm corner neck stretch 3x 30\" forearm corner neck stretch   LAE BHUMI 2sets 10x hold    Forearms on wall - thoracic EXT 1 x30 sec 3 x30 sec 3x30\"              PROPRIOCEPTION                                    MODALITIES                      Other Therapeutic Activities/Education:        Home Exercise

## 2024-02-15 ENCOUNTER — HOSPITAL ENCOUNTER (OUTPATIENT)
Dept: PHYSICAL THERAPY | Age: 64
Setting detail: THERAPIES SERIES
Discharge: HOME | End: 2024-02-15
Payer: COMMERCIAL

## 2024-02-15 PROCEDURE — 97112 NEUROMUSCULAR REEDUCATION: CPT

## 2024-02-15 PROCEDURE — 97140 MANUAL THERAPY 1/> REGIONS: CPT

## 2024-02-15 PROCEDURE — 97110 THERAPEUTIC EXERCISES: CPT

## 2024-02-15 NOTE — FLOWSHEET NOTE
Outpatient Physical Therapy  [place]           [] Phone: [phone]   Fax: [phone]  [place]           [x] Phone: [phone]   Fax: [phone]        Physical Therapy Daily Treatment Note  Date:  2/15/2024    Patient Name:  Reji Oleary    :  1960  MRN: [id number]  Restrictions/Precautions: No data recorded   Position Activity Restriction  Other position/activity restrictions: none  Diagnosis:   cervical ddd Diagnosis: cervical DDD  Date of Injury/Surgery:   Treatment Diagnosis:  neck pain  Insurance/Certification information: Med [place] BOMN/ no pre cert  Referring Physician:  Tresa Copeland PA-C C. [facility]   PCP: Tresa Copeland PA-C  Next Doctor Visit:    Plan of care signed (Y/N):    Outcome Measure: NDI   Visit# / total visits:  then PN  Pain level: 6/10   Goals:     Patient goals:  less R arm pain  Long Term Goals  patient will be indepedent with HEP  patient will score 10/50 on NDI and will report a score of 1 regarding pain with daily task of driving               Summary of Evaluation:  Assessment: NDI         Subjective:  patient reports of 6/10 pain upon arrival and c/o being tired, took muscle relaxer at work today due to the pain    Any changes in Ambulatory Summary Sheet?  None        Objective:    no change in c/o at end of session        Exercises: (No more than 4 columns)   Exercise/Equipment 2024           WARM UP         UBE  10' bwd  (Towel roll between shldrs 5\" BWD (Towel roll between shldrs) 5\" BWD (Towel roll between shldrs)   TABLE      Supine interscap towel roll stretching   5'                              STANDING      Corner pec stretch 3x 30\" forearm corner neck stretch 3x 30\" forearm corner neck stretch 3x 30\" forearm corner neck stretch   LAE BHUMI 2sets 10x hold    Forearms on wall - thoracic EXT 1 x30 sec 3 x30 sec 3x30\"              PROPRIOCEPTION                                    MODALITIES                      Other

## 2024-02-21 ENCOUNTER — HOSPITAL ENCOUNTER (OUTPATIENT)
Dept: PHYSICAL THERAPY | Age: 64
Discharge: HOME | End: 2024-02-21

## 2024-02-21 NOTE — FLOWSHEET NOTE
Physical Therapy  Cancellation/No-show Note  Patient Name:  Reji Oleary  :  1960   Date:  2024  Cancelled visits to date: 1  No-shows to date: 4    For today's appointment patient:  [x]  Cancelled  []  Rescheduled appointment  []  No-show     Reason given by patient:  [x]  Patient ill  []  Conflicting appointment  []  No transportation    []  Conflict with work  []  No reason given  []  Other:     Comments:      Electronically signed by:  Anne Marie Iqbal PTA

## 2024-02-23 ENCOUNTER — HOSPITAL ENCOUNTER (OUTPATIENT)
Dept: PHYSICAL THERAPY | Age: 64
Setting detail: THERAPIES SERIES
Discharge: HOME | End: 2024-02-23
Payer: COMMERCIAL

## 2024-02-23 PROCEDURE — 97112 NEUROMUSCULAR REEDUCATION: CPT

## 2024-02-23 PROCEDURE — 97140 MANUAL THERAPY 1/> REGIONS: CPT

## 2024-02-23 PROCEDURE — 97110 THERAPEUTIC EXERCISES: CPT

## 2024-02-23 NOTE — FLOWSHEET NOTE
stretch 3x 30\" forearm corner neck stretch 3x 30\" forearm corner neck stretch 3x 30\" forearm corner neck stretch   LAE BHUMI 2sets 10x hold     Forearms on wall - thoracic EXT 1 x30 sec 3 x30 sec 3x30\" 3x30\"               PROPRIOCEPTION                                          MODALITIES                         Other Therapeutic Activities/Education:        Home Exercise Program:        Manual Treatments:    STM to neck and UT      Modalities:        Communication with other providers:        Assessment:  (Response towards treatment session) (Pain Rating) Continues to note soreness in the neck at the end of treatment.    Advised pt to contact his heart doctor as soon as possible as his symptoms seem heart related    Pt would continue to benefit from skilled therapy interventions to address remaining impairments, improve mobility and strength, and progress towards goal completion and prepare for d/c including finalizing HEP; while reducing risk for re-injury or further decline.  Pt performed  all listed interventions on POC and demonstrated adequate to good form, pain rating 6/10    Plan for Next Session:        Time In / Time Out:   9328-7816        Timed Code/Total Treatment Minutes:  45 1man 1te 1nr     Next Progress Note due:        Plan of Care Interventions:  [x] Therapeutic Exercise  [] Modalities:  [x] Therapeutic Activity     [] Ultrasound  [] Estim  [] Gait Training      [] Cervical Traction [] Lumbar Traction  [x] Neuromuscular Re-education    [] Cold/hotpack [] Iontophoresis   [x] Instruction in HEP      [] Vasopneumatic   [] Dry Needling    [x] Manual Therapy               [] Aquatic Therapy              Electronically signed by:  Anne Marie Iqbal PTA  2/23/2024, 3:16 PM

## 2024-02-23 NOTE — PROGRESS NOTES
visits, we fully anticipate the patient's condition is expected to improve within the treatment timeframe we are requesting.  Electronically signed by:  TRE Hernandez PT, , 2/23/2024, 5:37 PM  If you have any questions or concerns, please don't hesitate to call.  Thank you for your referral.    Physician Signature:______________________ Date:______ Time: ________  By signing above, therapist’s plan is approved by physician

## 2024-02-26 ENCOUNTER — HOSPITAL ENCOUNTER (OUTPATIENT)
Dept: PHYSICAL THERAPY | Age: 64
Setting detail: THERAPIES SERIES
Discharge: HOME | End: 2024-02-26
Payer: COMMERCIAL

## 2024-02-26 PROCEDURE — 97110 THERAPEUTIC EXERCISES: CPT

## 2024-02-26 PROCEDURE — 97112 NEUROMUSCULAR REEDUCATION: CPT

## 2024-02-26 PROCEDURE — 97140 MANUAL THERAPY 1/> REGIONS: CPT

## 2024-02-26 NOTE — FLOWSHEET NOTE
Outpatient Physical Therapy  [place]           [] Phone: [phone]   Fax: [phone]  [place]           [x] Phone: [phone]   Fax: [phone]        Physical Therapy Daily Treatment Note  Date:  2024    Patient Name:  Reji Oleary    :  1960  MRN: [id number]  Restrictions/Precautions: No data recorded   Position Activity Restriction  Other position/activity restrictions: none  Diagnosis:   cervical ddd Diagnosis: cervical DDD  Date of Injury/Surgery:   Treatment Diagnosis:  neck pain  Insurance/Certification information: Med [place] BOMN/ no pre cert  Referring Physician:  Tresa Copeland PA-C C. [facility]   PCP: Tresa Copeland PA-C  Next Doctor Visit:    Plan of care signed (Y/N):    Outcome Measure: NDI   Visit# / total visits:  33/36then PN  Pain level: 4/10   Goals:     Patient goals:  less R arm pain  Long Term Goals  patient will be indepedent with HEP  patient will score 10/50 on NDI and will report a score of 1 regarding pain with daily task of driving               Summary of Evaluation:  Assessment: NDI         Subjective:  Patient rates his pain 4/10 today.  Sat at a higher table while at work today which allowed him to sit more upright.      Any changes in Ambulatory Summary Sheet?  None        Objective:    Tightness noted in B UT        Exercises: (No more than 4 columns)   Exercise/Equipment 2024           WARM UP         UBE  5\" BWD (Towel roll between shldrs) 5\" BWD (Towel roll between shldrs) 5\" BWD (Towel roll between shldrs)   TABLE      Supine interscap towel roll stretching 5' 5' 5 min                               STANDING      Corner pec stretch 3x 30\" forearm corner neck stretch 3x 30\" forearm corner neck stretch 3x 30\" forearm corner neck stretch   LAE      Forearms on wall - thoracic EXT 3x30\" 3x30\" 3x30\"              PROPRIOCEPTION                                    MODALITIES                      Other Therapeutic

## 2024-02-27 ENCOUNTER — OFFICE VISIT (OUTPATIENT)
Dept: NEUROLOGY | Age: 64
End: 2024-02-27
Payer: COMMERCIAL

## 2024-02-27 VITALS
BODY MASS INDEX: 26.38 KG/M2 | OXYGEN SATURATION: 98 % | DIASTOLIC BLOOD PRESSURE: 68 MMHG | SYSTOLIC BLOOD PRESSURE: 114 MMHG | WEIGHT: 188.4 LBS | HEART RATE: 88 BPM | HEIGHT: 71 IN

## 2024-02-27 DIAGNOSIS — G50.0 SUPRAORBITAL NEURALGIA: ICD-10-CM

## 2024-02-27 DIAGNOSIS — G24.3 CERVICAL DYSTONIA: ICD-10-CM

## 2024-02-27 DIAGNOSIS — M51.36 DEGENERATIVE LUMBAR DISC: ICD-10-CM

## 2024-02-27 DIAGNOSIS — E11.42 DIABETIC PERIPHERAL NEUROPATHY ASSOCIATED WITH TYPE 2 DIABETES MELLITUS (HCC): Primary | ICD-10-CM

## 2024-02-27 DIAGNOSIS — E55.9 VITAMIN D DEFICIENCY: ICD-10-CM

## 2024-02-27 DIAGNOSIS — M50.30 DEGENERATIVE DISC DISEASE, CERVICAL: ICD-10-CM

## 2024-02-27 PROCEDURE — 3078F DIAST BP <80 MM HG: CPT | Performed by: STUDENT IN AN ORGANIZED HEALTH CARE EDUCATION/TRAINING PROGRAM

## 2024-02-27 PROCEDURE — 99205 OFFICE O/P NEW HI 60 MIN: CPT | Performed by: STUDENT IN AN ORGANIZED HEALTH CARE EDUCATION/TRAINING PROGRAM

## 2024-02-27 PROCEDURE — 3074F SYST BP LT 130 MM HG: CPT | Performed by: STUDENT IN AN ORGANIZED HEALTH CARE EDUCATION/TRAINING PROGRAM

## 2024-02-27 RX ORDER — RIMABOTULINUMTOXINB 5000 [USP'U]/ML
5000 INJECTION, SOLUTION INTRAMUSCULAR ONCE
Qty: 0.5 ML | Refills: 1 | Status: SHIPPED | OUTPATIENT
Start: 2024-02-27 | End: 2024-02-27

## 2024-02-27 NOTE — PROGRESS NOTES
Consult Note  [facility]  Patient Name: Reji Oleary  : 1960        Subjective:   Reason for consult:   Patient seen and examined. Chart reviewed in detail.    63 y.o. -male with PMH DM, HTN, GENEVIEVE presenting to [facility] for balance difficulty.     He feels this began about a year ago, isn't sure what caused it. Lots of falls in past year. Denies lightheadedness. Denies weakness or legs giving out. Denies tripping when he falls. He does feel he falls to the right side more often than L. Finds himself brushing up against the wall when trying to walk down a hallway.     Intermittent \"sharp stabbing\" pain radiating fro medial border of his right eyebrown toward the crown of head. Started 6 mo ago. Occurring 1-2 times per week at this point.     He is currently in physical therapy and reports benefit.         No data to display                 Past Medical History:   Diagnosis Date    Abnormal findings on cardiac catheterization 2022    severe native LAD and LCX stenosis    CAD (coronary artery disease)     s/p CABG, sees Dr. Avitia    Diabetes mellitus (HCC)     H/O cardiovascular stress test 2019; 2020    abnormal stress test, LVEF 40%, Myocardial perfusion scan shows moderate size, severe intensity, non reversible perfusion defect in inferoapical wall.    H/O Doppler echocardiogram 2022    EF 50-55. Mild septal wall asymmetrical LV hypertrophy. Sclerotic nonstenotic aortic valve. Mitral annualr calcification present. RVSP 28.    H/O echocardiogram 2019    Limited study-Left ventricular function is low normal. EF 50-55% Mild left ventricular hypertrophy.     H/O echocardiogram 2021    Left ventricular function is mildly abnormal, estimated EF 40-45%. Mild MR & TR.    H/O exercise stress test 2019    Submaximal ECG stress test. stress test stopped due to Shortness of breath. Proceed with cardiac rehab    Herniated lumbar intervertebral disc

## 2024-02-28 ENCOUNTER — HOSPITAL ENCOUNTER (OUTPATIENT)
Dept: PHYSICAL THERAPY | Age: 64
Setting detail: THERAPIES SERIES
Discharge: HOME | End: 2024-02-28
Payer: COMMERCIAL

## 2024-02-28 ENCOUNTER — HOSPITAL ENCOUNTER (OUTPATIENT)
Age: 64
Discharge: HOME | End: 2024-02-28
Payer: COMMERCIAL

## 2024-02-28 DIAGNOSIS — E11.42 DIABETIC PERIPHERAL NEUROPATHY ASSOCIATED WITH TYPE 2 DIABETES MELLITUS (HCC): ICD-10-CM

## 2024-02-28 DIAGNOSIS — E55.9 VITAMIN D DEFICIENCY: ICD-10-CM

## 2024-02-28 LAB
25(OH)D3 SERPL-MCNC: 38.26 NG/ML
FOLATE SERPL-MCNC: >20 NG/ML (ref 3.1–17.5)
VITAMIN B-12: 336.6 PG/ML (ref 211–911)

## 2024-02-28 PROCEDURE — 36415 COLL VENOUS BLD VENIPUNCTURE: CPT

## 2024-02-28 PROCEDURE — 84155 ASSAY OF PROTEIN SERUM: CPT

## 2024-02-28 PROCEDURE — 84165 PROTEIN E-PHORESIS SERUM: CPT

## 2024-02-28 PROCEDURE — 97110 THERAPEUTIC EXERCISES: CPT

## 2024-02-28 PROCEDURE — 97112 NEUROMUSCULAR REEDUCATION: CPT

## 2024-02-28 PROCEDURE — 82607 VITAMIN B-12: CPT

## 2024-02-28 PROCEDURE — 82746 ASSAY OF FOLIC ACID SERUM: CPT

## 2024-02-28 PROCEDURE — 82306 VITAMIN D 25 HYDROXY: CPT

## 2024-02-28 NOTE — FLOWSHEET NOTE
MODALITIES                         Other Therapeutic Activities/Education:        Home Exercise Program:        Manual Treatments:    STM to neck and UT- requests to skip this session      Modalities:        Communication with other providers:        Assessment:  (Response towards treatment session) (Pain Rating) Pt performed all listed interventions on POC and demonstrated adequate to good form. Pain level at end of session 5/10.   Pt would continue to benefit from skilled therapy interventions to address remaining impairments, improve mobility and strength, and progress towards goal completion and prepare for d/c including finalizing HEP; while reducing risk for re-injury or further decline.      Plan for Next Session: Continue per POC.      Time In / Time Out:   1520/1550    Timed Code/Total Treatment Minutes:  30' (1 TE, 1 NR)    Next Progress Note due:        Plan of Care Interventions:  [x] Therapeutic Exercise  [] Modalities:  [x] Therapeutic Activity     [] Ultrasound  [] Estim  [] Gait Training      [] Cervical Traction [] Lumbar Traction  [x] Neuromuscular Re-education    [] Cold/hotpack [] Iontophoresis   [x] Instruction in HEP      [] Vasopneumatic   [] Dry Needling    [x] Manual Therapy               [] Aquatic Therapy              Electronically signed by:  Teto Issa PTA 2/28/2024, 3:20 PM

## 2024-02-29 NOTE — PLAN OF CARE
Patients Plan of Care was received and signed. Signed POC was scanned and placed in the patients chart.    Johanne Moon

## 2024-03-01 LAB
ALBUMIN SERPL ELPH-MCNC: 4 GM/DL (ref 3.2–5.6)
ALPHA-1-GLOBULIN: 0.3 GM/DL (ref 0.1–0.4)
ALPHA-2-GLOBULIN: 1.3 GM/DL (ref 0.4–1.2)
BETA GLOBULIN: 1.1 GM/DL (ref 0.5–1.3)
GAMMA GLOBULIN: 0.7 GM/DL (ref 0.5–1.6)
TOTAL PROTEIN: 7.3 GM/DL (ref 6.4–8.2)

## 2024-03-04 ENCOUNTER — HOSPITAL ENCOUNTER (OUTPATIENT)
Dept: PHYSICAL THERAPY | Age: 64
Setting detail: THERAPIES SERIES
Discharge: HOME | End: 2024-03-04
Payer: COMMERCIAL

## 2024-03-04 ENCOUNTER — TELEPHONE (OUTPATIENT)
Dept: NEUROLOGY | Age: 64
End: 2024-03-04

## 2024-03-04 PROCEDURE — 97140 MANUAL THERAPY 1/> REGIONS: CPT

## 2024-03-04 PROCEDURE — 97110 THERAPEUTIC EXERCISES: CPT

## 2024-03-04 NOTE — TELEPHONE ENCOUNTER
Called Justine at [facility] ph# [phone] to verify that CPT 31544 does not require precertification for one visit reference# [id number]. Called Yasir chadwick [facility] ph# [phone] to obtain PA for  (Myobloc). Myobloc approved 3/20/24 through 3/19/25 x 5 doses for buy and bill auth ID# [id number].

## 2024-03-04 NOTE — FLOWSHEET NOTE
Outpatient Physical Therapy  [place]           [] Phone: [phone]   Fax: [phone]  [place]           [x] Phone: [phone]   Fax: [phone]        Physical Therapy Daily Treatment Note  Date:  3/4/2024    Patient Name:  Reji Oleary    :  1960  MRN: [id number]  Restrictions/Precautions: No data recorded   Position Activity Restriction  Other position/activity restrictions: none  Diagnosis:   cervical ddd Diagnosis: cervical DDD  Date of Injury/Surgery:   Treatment Diagnosis:  neck pain  Insurance/Certification information: Med [place] BOMN/ no pre cert  Referring Physician:  Tresa Copeland PA-C C. Earl   PCP: Tresa Copeland PA-C  Next Doctor Visit:    Plan of care signed (Y/N):    Outcome Measure: NDI   Visit# / total visits:  33/36then PN  Pain level: 6/10   Goals:     Patient goals:  less R arm pain  Long Term Goals  patient will be indepedent with HEP  patient will score 10/50 on NDI and will report a score of 1 regarding pain with daily task of driving           Summary of Evaluation:  Assessment: NDI       Subjective:  Patient rates his pain 6/10 upon arrival and reports his back is really hurting today.    Patient reports he did not get the results he was hoping for at the neurologists, he feels as though when she found out about his neuropathy that she didn't go any further with his examination and put it all on that.   He reports he's had the neuropathy for quite awhile now and the stumbling has only started within the last year.   He sees the neurologist again when the insurance approves the injections she wants to give him in the neck, to loosen and retrain the muscles.           Any changes in Ambulatory Summary Sheet?  None    Objective:   Tightness noted in  suboccipital area      Exercises: (No more than 4 columns)   Exercise/Equipment 2024 2024 3/4/24           WARM UP         UBE  5\" BWD (Towel roll between shldrs) 5\" BWD (Towel roll between

## 2024-03-06 ENCOUNTER — HOSPITAL ENCOUNTER (OUTPATIENT)
Dept: PHYSICAL THERAPY | Age: 64
Setting detail: THERAPIES SERIES
Discharge: HOME | End: 2024-03-06
Payer: COMMERCIAL

## 2024-03-06 PROCEDURE — 97110 THERAPEUTIC EXERCISES: CPT

## 2024-03-06 PROCEDURE — 97140 MANUAL THERAPY 1/> REGIONS: CPT

## 2024-03-06 NOTE — FLOWSHEET NOTE
Outpatient Physical Therapy  [place]           [] Phone: [phone]   Fax: [phone]  [place]           [x] Phone: [phone]   Fax: [phone]        Physical Therapy Daily Treatment Note  Date:  3/6/2024    Patient Name:  Reji Oleary    :  1960  MRN: [id number]  Restrictions/Precautions: No data recorded   Position Activity Restriction  Other position/activity restrictions: none  Diagnosis:   cervical ddd Diagnosis: cervical DDD  Date of Injury/Surgery:   Treatment Diagnosis:  neck pain  Insurance/Certification information: Med [place] BOMN/ no pre cert  Referring Physician:  Tresa Copeland PA-C C. Earl   PCP: Tresa Copeland PA-C  Next Doctor Visit:    Plan of care signed (Y/N):    Outcome Measure: NDI   Visit# / total visits:  34/36then PN  Pain level: 4/10   Goals:     Patient goals:  less R arm pain  Long Term Goals  patient will be indepedent with HEP  patient will score 10/50 on NDI and will report a score of 1 regarding pain with daily task of driving           Summary of Evaluation:  Assessment: NDI       Subjective:  Patient rates his pain 6/10 upon arrival and reports his back is really hurting today.    Waiting to see if the insurance will pay for injections          Any changes in Ambulatory Summary Sheet?  None    Objective:   Tightness noted in  suboccipital area      Exercises: (No more than 4 columns)   Exercise/Equipment 2024 3/4/24 3/6/24           WARM UP         UBE  5\" BWD (Towel roll between shldrs) 5\" BWD (Towel roll between shldrs) 5\" BWD (Towel roll between shldrs)   TABLE      Supine interscap towel roll stretching 5' 5' 5'                              STANDING      Corner pec stretch 3x30\" forearm corner neck stretch 3x30\" forearm corner neck stretch 3x30\" forearm corner neck stretch   LAE      Forearms on wall - thoracic EXT 3x30\" 3x30\" 3x30\"              PROPRIOCEPTION                                    MODALITIES                      Other

## 2024-03-07 LAB
ALBUMIN SERPL ELPH-MCNC: 4 GM/DL (ref 3.2–5.6)
ALPHA-1-GLOBULIN: 0.3 GM/DL (ref 0.1–0.4)
ALPHA-2-GLOBULIN: 1.3 GM/DL (ref 0.4–1.2)
BETA GLOBULIN: 1.1 GM/DL (ref 0.5–1.3)
GAMMA GLOBULIN: 0.7 GM/DL (ref 0.5–1.6)
SPEP INTERPRETATION: ABNORMAL
TOTAL PROTEIN: 7.3 GM/DL (ref 6.4–8.2)

## 2024-03-11 RX ORDER — RANOLAZINE 500 MG/1
500 TABLET, EXTENDED RELEASE ORAL 2 TIMES DAILY
Qty: 180 TABLET | Refills: 3 | Status: SHIPPED | OUTPATIENT
Start: 2024-03-11

## 2024-03-11 RX ORDER — DILTIAZEM HYDROCHLORIDE 360 MG/1
360 CAPSULE, EXTENDED RELEASE ORAL DAILY
Qty: 90 CAPSULE | Refills: 3 | Status: SHIPPED | OUTPATIENT
Start: 2024-03-11

## 2024-03-11 RX ORDER — FUROSEMIDE 40 MG/1
40 TABLET ORAL 3 TIMES DAILY
Qty: 270 TABLET | Refills: 3 | Status: SHIPPED | OUTPATIENT
Start: 2024-03-11

## 2024-03-11 RX ORDER — PRASUGREL 10 MG/1
10 TABLET, FILM COATED ORAL DAILY
Qty: 90 TABLET | Refills: 3 | Status: SHIPPED | OUTPATIENT
Start: 2024-03-11

## 2024-03-11 RX ORDER — CARVEDILOL 25 MG/1
25 TABLET ORAL 2 TIMES DAILY WITH MEALS
Qty: 180 TABLET | Refills: 3 | Status: SHIPPED | OUTPATIENT
Start: 2024-03-11

## 2024-03-13 ENCOUNTER — HOSPITAL ENCOUNTER (OUTPATIENT)
Dept: PHYSICAL THERAPY | Age: 64
Setting detail: THERAPIES SERIES
Discharge: HOME | End: 2024-03-13
Payer: COMMERCIAL

## 2024-03-13 PROCEDURE — 97110 THERAPEUTIC EXERCISES: CPT

## 2024-03-13 PROCEDURE — 97140 MANUAL THERAPY 1/> REGIONS: CPT

## 2024-03-13 NOTE — FLOWSHEET NOTE
Outpatient Physical Therapy  [place]           [] Phone: [phone]   Fax: [phone]  [place]           [x] Phone: [phone]   Fax: [phone]        Physical Therapy Daily Treatment Note  Date:  3/13/2024    Patient Name:  Reji Oleary    :  1960  MRN: [id number]  Restrictions/Precautions: No data recorded   Position Activity Restriction  Other position/activity restrictions: none  Diagnosis:   cervical ddd Diagnosis: cervical DDD  Date of Injury/Surgery:   Treatment Diagnosis:  neck pain  Insurance/Certification information: Med [place] BOMN/ no pre cert  Referring Physician:  Tresa Copeland PA-C C. Earl   PCP: Tresa Copeland PA-C  Next Doctor Visit:    Plan of care signed (Y/N):    Outcome Measure: NDI   Visit# / total visits:  3636then PN  Pain level: 4/10   Goals:     Patient goals:  less R arm pain  Long Term Goals  patient will be indepedent with HEP  patient will score 10/50 on NDI and will report a score of 1 regarding pain with daily task of driving           Summary of Evaluation:  Assessment: NDI       Subjective:patient reports he will be havong Botox injection in neck next week         Any changes in Ambulatory Summary Sheet?  None    Objective:   Tightness noted in  suboccipital area      Exercises: (No more than 4 columns)   Exercise/Equipment 3/4/24 3/6/24 3/13/24           WARM UP         UBE  5\" BWD (Towel roll between shldrs) 5\" BWD (Towel roll between shldrs) 5\" BWD (Towel roll between shldrs)   TABLE      Supine interscap towel roll stretching 5' 5' 5'                              STANDING      Corner pec stretch 3x30\" forearm corner neck stretch 3x30\" forearm corner neck stretch 3x30\" forearm corner neck stretch   Face pulls      Forearms on wall - thoracic EXT 3x30\" 3x30\" 3x30\"              PROPRIOCEPTION                                    MODALITIES                      Other Therapeutic Activities/Education:        Home Exercise Program:

## 2024-03-13 NOTE — PROGRESS NOTES
Physical Therapy      Outpatient Physical Therapy           [place]           [] Phone: [phone]   Fax: [phone]  [place]           [x] Phone: [phone]   Fax: [phone]      To: Tresa Copeland PA-C     From: TRE Hernandez PT,    Patient: Reji Oleary                    : 1960  Diagnosis:  cervical ddd        Treatment Diagnosis:       Date: 3/13/2024  [x]  Progress Note                []  Discharge Note    Evaluation Date:  5/15/23   Total Visits to date36 Cancels/No-shows to date:      Subjective  :  3/13/24 patient reports he will be havong Botox injection in neck next week        Plan of Care/Treatment to date:  [x] Therapeutic Exercise    [] Modalities:  [x] Therapeutic Activity     [] Ultrasound  [] Electrical Stimulation  [] Gait Training      [] Cervical Traction   [] Lumbar Traction  [x] Neuromuscular Re-education  [] Cold/hotpack [] Iontophoresis  [x] Instruction in HEP      Other:  [x] Manual Therapy       []  Vasopneumatic  [] Aquatic Therapy       []   Dry Needle Therapy                      Objective/Significant Findings At Last Visit/Comments:  patient reports 25% improvement since starting PT;NDI 23/50     Goal Status:  [] Achieved [] Partially Achieved  [x] Not Ascheived   Patient goals:  less R arm pain  Long Term Goals  patient will be indepedent with HEP  patient will score 20/50 and score of 3 with driving on NDI    Rehab Potential: [] Excellent [x] Good [] Fair  [] Poor    Patient Status: [] Continue per initial plan of Care     [] Patient now discharged     [x] Additional visits requested, Please re-certify for additional visits:      Requested frequency/duration:  1-2/week for 4weeks  If we are requesting more visits, we fully anticipate the patient's condition is expected to improve within the treatment timeframe we are requesting.  Electronically signed by:  TRE Hernandez, PT, , 3/13/2024, 5:51 PM  If you have any questions or concerns, please don't hesitate to

## 2024-03-15 ENCOUNTER — HOSPITAL ENCOUNTER (OUTPATIENT)
Dept: PHYSICAL THERAPY | Age: 64
Setting detail: THERAPIES SERIES
Discharge: HOME | End: 2024-03-15
Payer: COMMERCIAL

## 2024-03-15 PROCEDURE — 97140 MANUAL THERAPY 1/> REGIONS: CPT

## 2024-03-15 PROCEDURE — 97110 THERAPEUTIC EXERCISES: CPT

## 2024-03-15 NOTE — FLOWSHEET NOTE
Manual Treatments:    STM to neck and UT       Modalities:        Communication with other providers:        Assessment:  (Response towards treatment session) (Pain Rating) Pt performed all listed interventions on POC and demonstrated adequate to good form. Pain level at end of session 4/10.   Pt would continue to benefit from skilled therapy interventions to address remaining impairments, improve mobility and strength, and progress towards goal completion and prepare for d/c including finalizing HEP; while reducing risk for re-injury or further decline.      Plan for Next Session: Continue per POC.      Time In / Time Out:    6525-4185      Timed Code/Total Treatment Minutes:  41  1man 2te    Next Progress Note due:        Plan of Care Interventions:  [x] Therapeutic Exercise  [] Modalities:  [x] Therapeutic Activity     [] Ultrasound  [] Estim  [] Gait Training      [] Cervical Traction [] Lumbar Traction  [x] Neuromuscular Re-education    [] Cold/hotpack [] Iontophoresis   [x] Instruction in HEP      [] Vasopneumatic   [] Dry Needling    [x] Manual Therapy               [] Aquatic Therapy              Electronically signed by:  Anne Marie Iqbal PTA 3/15/2024, 3:15 PM

## 2024-03-18 ENCOUNTER — HOSPITAL ENCOUNTER (OUTPATIENT)
Dept: PHYSICAL THERAPY | Age: 64
Setting detail: THERAPIES SERIES
Discharge: HOME | End: 2024-03-18
Payer: COMMERCIAL

## 2024-03-18 PROCEDURE — 97110 THERAPEUTIC EXERCISES: CPT

## 2024-03-18 PROCEDURE — 97140 MANUAL THERAPY 1/> REGIONS: CPT

## 2024-03-18 NOTE — FLOWSHEET NOTE
Outpatient Physical Therapy  [place]           [] Phone: [phone]   Fax: [phone]  [place]           [x] Phone: [phone]   Fax: [phone]        Physical Therapy Daily Treatment Note  Date:  3/18/2024    Patient Name:  Reji Oleary    :  1960  MRN: [id number]  Restrictions/Precautions: No data recorded   Position Activity Restriction  Other position/activity restrictions: none  Diagnosis:   cervical ddd Diagnosis: cervical DDD  Date of Injury/Surgery:   Treatment Diagnosis:  neck pain  Insurance/Certification information: Med [place] BOMN/ no pre cert  Referring Physician:  Tresa Copeland PA-C C. Earl   PCP: Tresa Copeland PA-C  Next Doctor Visit:    Plan of care signed (Y/N):    Outcome Measure: NDI   Visit# / total visits:  39 - 42 then PN  Pain level: 6/10   Goals:     Patient goals:  less R arm pain  Long Term Goals  patient will be indepedent with HEP  patient will score 10/50 on NDI and will report a score of 1 regarding pain with daily task of driving           Summary of Evaluation:  Assessment: NDI       Subjective:    patient reports of 6/10 pain upon arrival and voices no new c/o         Any changes in Ambulatory Summary Sheet?  None    Objective:   Tightness noted in  suboccipital area      Exercises: (No more than 4 columns)   Exercise/Equipment 3/13/24 3/15/24 (38) 3/18/24           WARM UP         UBE  5\" BWD (Towel roll between shldrs) 5\" BWD (Towel roll between shldrs) 5\" BWD (Towel roll between shldrs)   TABLE      Supine interscap towel roll stretching 5' 5' 5'                              STANDING      Corner pec stretch 3x30\" forearm corner neck stretch 3x30\" forearm corner neck stretch 3x30\" forearm corner neck stretch   Face pulls      Forearms on wall - thoracic EXT 3x30\" 3x30\" 3x30\"              PROPRIOCEPTION                                    MODALITIES                      Other Therapeutic Activities/Education:        Home Exercise Program:

## 2024-03-19 NOTE — PROGRESS NOTES
Follow up for Botox for Cervical Dystonia:    Theron Mendoza is being see to undergo first set of botulinum toxin injections for treatment of spasmodic torticollis.He is a patient of Dr. Roger. He has an anterocollis with subtle right torticollis.     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  He feels this contributed to his postural changes.  He has had no benefit with any conservative treatments.  He is currently in physical therapy.  We did spend time today discussing the procedure of botulinum toxin injections and after detailed discussion he was agreeable to proceeding.  I did discuss with him that he has a prominent anterocollis on examination which can be sometimes difficult to treat.  He describes prominent pain in the posterior aspect of his neck.  We discussed how this can be compensatory from the anterocollis but that I would not want to inject in the muscles in his posterior aspect of his neck as this can cause a further weakness in extension.  He does verbalize understanding of this and continues to be in agreement to proceeding with injections today.    Examination  A&Ox4, pleasant  CN II-XII intact  Antigravity x 4 extremities  No overt dysmetria  Ambulates independently    Cervical dystonia:  -Subtle right torticollis with prominent anterocollis  Range of motion diminished in left rotation and sidebending bilaterally    Potential risks including (but not exclusively) allergic reaction, pain, bruising, bleeding, muscle weakness, dysphagia, aspiration pneumonia, dry mouth etc. as well as potential benefits of the injections were discussed with the patient. Verbal consent to procede with the treatment was obtained. 5000 units of botulinum toxin (myobloc) was drawn up for this procedure    EMG guidance was utilized for administration of botox.     The patient received myobloc distributed as follows:    Left SCM 1000 units in 2

## 2024-03-20 ENCOUNTER — HOSPITAL ENCOUNTER (OUTPATIENT)
Dept: PHYSICAL THERAPY | Age: 64
Setting detail: THERAPIES SERIES
Discharge: HOME | End: 2024-03-20
Payer: COMMERCIAL

## 2024-03-20 ENCOUNTER — OFFICE VISIT (OUTPATIENT)
Dept: NEUROLOGY | Age: 64
End: 2024-03-20

## 2024-03-20 DIAGNOSIS — G24.3 SPASMODIC TORTICOLLIS: Primary | ICD-10-CM

## 2024-03-20 DIAGNOSIS — M43.6 ANTEROCOLLIS: ICD-10-CM

## 2024-03-20 PROCEDURE — 97110 THERAPEUTIC EXERCISES: CPT

## 2024-03-20 PROCEDURE — 97530 THERAPEUTIC ACTIVITIES: CPT

## 2024-03-20 NOTE — FLOWSHEET NOTE
Outpatient Physical Therapy  [place]           [] Phone: [phone]   Fax: [phone]  [place]           [x] Phone: [phone]   Fax: [phone]        Physical Therapy Daily Treatment Note  Date:  3/20/2024    Patient Name:  Reji Oleary    :  1960  MRN: [id number]  Restrictions/Precautions: No data recorded   Position Activity Restriction  Other position/activity restrictions: none  Diagnosis:   cervical ddd Diagnosis: cervical DDD  Date of Injury/Surgery:   Treatment Diagnosis:  neck pain  Insurance/Certification information: Med [place] BOMN/ no pre cert  Referring Physician:  Tresa Copeland PA-C C. Earl   PCP: Tresa Copeland PA-C  Next Doctor Visit:    Plan of care signed (Y/N):    Outcome Measure: NDI   Visit# / total visits:  40 - 42 then PN  Pain level: 4/10   Goals:     Patient goals:  less R arm pain  Long Term Goals  patient will be indepedent with HEP  patient will score 10/50 on NDI and will report a score of 1 regarding pain with daily task of driving        Summary of Evaluation:  Assessment: NDI     Subjective:    patient reports of 4/10 pain upon arrival and voices no new c/o. He reports he just had a doctors appointment and he got botox in his neck and he is a little sore. Pt reports his only restriction from the physician was that if it hurts he should not do it.     Any changes in Ambulatory Summary Sheet?  None    Objective:   Tightness noted in  suboccipital area      Exercises: (No more than 4 columns)   Exercise/Equipment 3/13/24 3/15/24 (38) 3/18/24 Date: 3/20/24            WARM UP          UBE  5\" BWD (Towel roll between shldrs) 5\" BWD (Towel roll between shldrs) 5\" BWD (Towel roll between shldrs) 5' BWD (Towel roll between shldrs)   TABLE       Supine interscap towel roll stretching 5' 5' 5' 5'  2 towels                                  STANDING       Corner pec stretch 3x30\" forearm corner neck stretch 3x30\" forearm corner neck stretch 3x30\" forearm

## 2024-03-25 ENCOUNTER — HOSPITAL ENCOUNTER (OUTPATIENT)
Dept: PHYSICAL THERAPY | Age: 64
Setting detail: THERAPIES SERIES
Discharge: HOME | End: 2024-03-25
Payer: COMMERCIAL

## 2024-03-25 PROCEDURE — 97110 THERAPEUTIC EXERCISES: CPT

## 2024-03-25 PROCEDURE — 97530 THERAPEUTIC ACTIVITIES: CPT

## 2024-03-25 NOTE — FLOWSHEET NOTE
Outpatient Physical Therapy  [place]           [] Phone: [phone]   Fax: [phone]  [place]           [x] Phone: [phone]   Fax: [phone]        Physical Therapy Daily Treatment Note  Date:  3/25/2024    Patient Name:  Reji Oleary    :  1960  MRN: [id number]  Restrictions/Precautions: No data recorded   Position Activity Restriction  Other position/activity restrictions: none  Diagnosis:   cervical ddd Diagnosis: cervical DDD  Date of Injury/Surgery:   Treatment Diagnosis:  neck pain  Insurance/Certification information: Med [place] BOMN/ no pre cert  Referring Physician:  Tresa Copeland PA-C C. Earl   PCP: Tresa Copeland PA-C  Next Doctor Visit:    Plan of care signed (Y/N):    Outcome Measure: NDI   Visit# / total visits:  41 - 42 then PN  Pain level: 4/10   Goals:     Patient goals:  less R arm pain  Long Term Goals  patient will be indepedent with HEP  patient will score 10/50 on NDI and will report a score of 1 regarding pain with daily task of driving        Summary of Evaluation:  Assessment: NDI     Subjective:    patient reports of 4/10 pain upon arrival and voices no new c/o. He reports he has not had to bend over lately at work, which has helped.   He feels the injection has helped some    Any changes in Ambulatory Summary Sheet?  None    Objective:   no change in c/o      Exercises: (No more than 4 columns)   Exercise/Equipment 3/18/24 Date: 3/20/24 3/25/24  (41)           WARM UP         UBE  5\" BWD (Towel roll between shldrs) 5' BWD (Towel roll between shldrs) 5' BWD (Towel roll between shldrs)   TABLE      Supine interscap towel roll stretching 5' 5'  2 towels 5' 2 towels                              STANDING      Corner pec stretch 3x30\" forearm corner neck stretch 3x30\" forearm corner neck stretch 3x30\" forearm corner neck stretch   Face pulls  -    Forearms on wall - thoracic EXT 3x30\" 3x30\" 3x30\"   Seated UT stretch w/ chair pull  3x30\" ea way 3x30\"

## 2024-03-27 ENCOUNTER — HOSPITAL ENCOUNTER (OUTPATIENT)
Dept: PHYSICAL THERAPY | Age: 64
Discharge: HOME | End: 2024-03-27

## 2024-03-27 NOTE — FLOWSHEET NOTE
Physical Therapy  Cancellation/No-show Note  Patient Name:  Reji Oleary  :  1960   Date:  3/27/2024  Cancelled visits to date: 2  No-shows to date: 4    For today's appointment patient:  [x]  Cancelled  []  Rescheduled appointment  []  No-show     Reason given by patient:  [x]  Patient ill  []  Conflicting appointment  []  No transportation    []  Conflict with work  []  No reason given  []  Other:     Comments:      Electronically signed by:  Anne Marie Iqbal PTA

## 2024-04-01 ENCOUNTER — HOSPITAL ENCOUNTER (OUTPATIENT)
Dept: PHYSICAL THERAPY | Age: 64
Setting detail: THERAPIES SERIES
Discharge: HOME | End: 2024-04-01
Payer: COMMERCIAL

## 2024-04-01 PROCEDURE — 97110 THERAPEUTIC EXERCISES: CPT

## 2024-04-01 PROCEDURE — 97530 THERAPEUTIC ACTIVITIES: CPT

## 2024-04-01 NOTE — FLOWSHEET NOTE
Outpatient Physical Therapy  [place]           [] Phone: [phone]   Fax: [phone]  [place]           [x] Phone: [phone]   Fax: [phone]        Physical Therapy Daily Treatment Note  Date:  2024    Patient Name:  Reji Oleary    :  1960  MRN: [id number]  Restrictions/Precautions: No data recorded   Position Activity Restriction  Other position/activity restrictions: none  Diagnosis:   cervical ddd Diagnosis: cervical DDD  Date of Injury/Surgery:   Treatment Diagnosis:  neck pain  Insurance/Certification information: Med [place] BOMN/ no pre cert  Referring Physician:  Tresa Copeland PA-C C. Earl   PCP: Tresa Copeland PA-C  Next Doctor Visit:    Plan of care signed (Y/N):    Outcome Measure: NDI   Visit# / total visits:  42 - 42 then PN  Pain level: 6/10   Goals:     Patient goals:  less R arm pain  Long Term Goals  patient will be indepedent with HEP  patient will score 10/50 on NDI and will report a score of 1 regarding pain with daily task of driving        Summary of Evaluation:  Assessment: NDI     Subjective:    patient reports of 6/10 pain upon arrival and reports it's been bothering him since last week.    Any changes in Ambulatory Summary Sheet?  None    Objective:   NDI       Exercises: (No more than 4 columns)   Exercise/Equipment Date: 3/20/24 3/25/24  (41) 24            WARM UP         UBE  5' BWD (Towel roll between shldrs) 5' BWD (Towel roll between shldrs) 5' BWD (Towel roll between shldrs)   TABLE      Supine interscap towel roll stretching 5'  2 towels 5' 2 towels 10'  2 towels                               STANDING      Corner pec stretch 3x30\" forearm corner neck stretch 3x30\" forearm corner neck stretch 5x30\" forearm corner neck stretch   Face pulls -     Forearms on wall - thoracic EXT 3x30\" 3x30\" 5x30\"   Seated UT stretch w/ chair pull 3x30\" ea way 3x30\" B 3x30\" B   Seated cervical rotation stretch 3x30\"  ea way 3x30\" B 3x30\" B

## 2024-04-02 NOTE — PROGRESS NOTES
Physical Therapy      Outpatient Physical Therapy           [place]           [] Phone: [phone]   Fax: [phone]  [place]           [x] Phone: [phone]   Fax: [phone]      To: Tresa Copeland PA-C     From: TRE Hernandez PT,    Patient: Reji Oleary                    : 1960  Diagnosis:  cervical ddd        Treatment Diagnosis:       Date: 2024  [x]  Progress Note                []  Discharge Note    Evaluation Date:  5/15/23   Total Visits to date42 Cancels/No-shows to date:      Subjective  :  24   patient reports of 6/10 pain upon arrival and reports it's been bothering him since last week;  Had Botox injection.3/20/24 and will repeat injection 24       Plan of Care/Treatment to date:  [x] Therapeutic Exercise    [] Modalities:  [x] Therapeutic Activity     [] Ultrasound  [] Electrical Stimulation  [] Gait Training      [] Cervical Traction   [] Lumbar Traction  [x] Neuromuscular Re-education  [] Cold/hotpack [] Iontophoresis  [x] Instruction in HEP      Other:  [x] Manual Therapy       []  Vasopneumatic  [] Aquatic Therapy       []   Dry Needle Therapy                      Objective/Significant Findings At Last Visit/Comments: ;NDI 21/50     Goal Status:  [] Achieved [] Partially Achieved  [x] Not Ascheived   Patient goals:  less R arm pain  Long Term Goals  patient will be indepedent with HEP  patient will score 20/50 and score of 3 with driving on NDI    Rehab Potential: [] Excellent [x] Good [] Fair  [] Poor    Patient Status: [] Continue per initial plan of Care     [] Patient now discharged     [x] Additional visits requested, Please re-certify for additional visits:      Requested frequency/duration:  1-2/week for 4weeks  If we are requesting more visits, we fully anticipate the patient's condition is expected to improve within the treatment timeframe we are requesting.  Electronically signed by:  TRE Hernandez PT, , 2024, 2:34 PM  If you have any questions or

## 2024-04-03 ENCOUNTER — HOSPITAL ENCOUNTER (OUTPATIENT)
Dept: PHYSICAL THERAPY | Age: 64
Setting detail: THERAPIES SERIES
Discharge: HOME | End: 2024-04-03
Payer: COMMERCIAL

## 2024-04-03 PROCEDURE — 97110 THERAPEUTIC EXERCISES: CPT

## 2024-04-03 PROCEDURE — 97530 THERAPEUTIC ACTIVITIES: CPT

## 2024-04-03 NOTE — FLOWSHEET NOTE
Outpatient Physical Therapy  [place]           [] Phone: [phone]   Fax: [phone]  [place]           [x] Phone: [phone]   Fax: [phone]        Physical Therapy Daily Treatment Note  Date:  4/3/2024    Patient Name:  Reji Oleary    :  1960  MRN: [id number]  Restrictions/Precautions: No data recorded   Position Activity Restriction  Other position/activity restrictions: none  Diagnosis:   cervical ddd Diagnosis: cervical DDD  Date of Injury/Surgery:   Treatment Diagnosis:  neck pain  Insurance/Certification information: Med [place] BOMN/ no pre cert  Referring Physician:  Tresa Copeland PA-C C. Earl   PCP: Tresa Copeland PA-C  Next Doctor Visit:    Plan of care signed (Y/N):    Outcome Measure: NDI   Visit# / total visits:  43 - 50 then PN  Pain level: 4/10   Goals:     Patient goals:  less R arm pain  Long Term Goals  patient will be indepedent with HEP  patient will score 10/50 on NDI and will report a score of 1 regarding pain with daily task of driving        Summary of Evaluation:  Assessment: NDI     Subjective:    patient reports of 4/10 pain upon arrival and reports he's just not been feeling good today.  He c/o nausea, headache and just feeling run down, stating: \"I just feel like my battery has run down to zero and I don't feel like eating nothing sounds good.\"    Any changes in Ambulatory Summary Sheet?  None    Objective:   NDI       Exercises: (No more than 4 columns)   Exercise/Equipment 4/1/24  4/3/24  (43)          WARM UP        UBE  5' BWD (Towel roll between shldrs) 5' BWD (Towel roll between shldrs)   TABLE     Supine interscap towel roll stretching 10'  2 towels  10'  2 towels                          STANDING     Corner pec stretch 5x30\" forearm corner neck stretch 5x30\" forearm corner neck stretch   Face pulls     Forearms on wall - thoracic EXT 5x30\" 5x30\"   Seated UT stretch w/ chair pull 3x30\" B 3x30\" B   Seated cervical rotation stretch

## 2024-04-10 ENCOUNTER — HOSPITAL ENCOUNTER (OUTPATIENT)
Dept: PHYSICAL THERAPY | Age: 64
Setting detail: THERAPIES SERIES
Discharge: HOME | End: 2024-04-10
Payer: COMMERCIAL

## 2024-04-10 PROCEDURE — 97530 THERAPEUTIC ACTIVITIES: CPT

## 2024-04-10 PROCEDURE — 97110 THERAPEUTIC EXERCISES: CPT

## 2024-04-10 NOTE — FLOWSHEET NOTE
Outpatient Physical Therapy  [place]           [] Phone: [phone]   Fax: [phone]  [place]           [x] Phone: [phone]   Fax: [phone]        Physical Therapy Daily Treatment Note  Date:  4/10/2024    Patient Name:  Reji Oleary    :  1960  MRN: [id number]  Restrictions/Precautions: No data recorded   Position Activity Restriction  Other position/activity restrictions: none  Diagnosis:   cervical ddd Diagnosis: cervical DDD  Date of Injury/Surgery:   Treatment Diagnosis:  neck pain  Insurance/Certification information: Med [place] BOMN/ no pre cert  Referring Physician:  Tresa Copeland PA-C C. Earl   PCP: Tresa Copeland PA-C  Next Doctor Visit:    Plan of care signed (Y/N):    Outcome Measure: NDI   Visit# / total visits:  44 - 50 then PN  Pain level: 610   Goals:     Patient goals:  less R arm pain  Long Term Goals  patient will be indepedent with HEP  patient will score 10/50 on NDI and will report a score of 1 regarding pain with daily task of driving        Summary of Evaluation:  Assessment: NDI     Subjective:    patient reports of 4/10 pain upon arrival and reports he's just not been feeling good today.  He c/o nausea, headache and just feeling run down, stating: \"I just feel like my battery has run down to zero and I don't feel like eating nothing sounds good.\"    Any changes in Ambulatory Summary Sheet?  None    Objective:   NDI       Exercises: (No more than 4 columns)   Exercise/Equipment 4/1/24  4/3/24  (43) 4/10/24           WARM UP         UBE  5' BWD (Towel roll between shldrs) 5' BWD (Towel roll between shldrs) 5' BWD (Towel roll between shldrs)   TABLE      Supine interscap towel roll stretching 10'  2 towels  10'  2 towels 10'  2 towels                              STANDING      Corner pec stretch 5x30\" forearm corner neck stretch 5x30\" forearm corner neck stretch 5x30\" forearm corner neck stretch   Face pulls      Forearms on wall - thoracic

## 2024-04-12 ENCOUNTER — OFFICE VISIT (OUTPATIENT)
Dept: CARDIOLOGY CLINIC | Age: 64
End: 2024-04-12
Payer: COMMERCIAL

## 2024-04-12 VITALS
SYSTOLIC BLOOD PRESSURE: 120 MMHG | BODY MASS INDEX: 29.4 KG/M2 | HEIGHT: 68 IN | OXYGEN SATURATION: 93 % | HEART RATE: 88 BPM | DIASTOLIC BLOOD PRESSURE: 78 MMHG | WEIGHT: 194 LBS

## 2024-04-12 DIAGNOSIS — I25.5 ISCHEMIC CARDIOMYOPATHY: Primary | ICD-10-CM

## 2024-04-12 PROCEDURE — 3078F DIAST BP <80 MM HG: CPT | Performed by: INTERNAL MEDICINE

## 2024-04-12 PROCEDURE — 3074F SYST BP LT 130 MM HG: CPT | Performed by: INTERNAL MEDICINE

## 2024-04-12 PROCEDURE — 99214 OFFICE O/P EST MOD 30 MIN: CPT | Performed by: INTERNAL MEDICINE

## 2024-04-12 NOTE — PROGRESS NOTES
ICM: improved, it was LVEF 40-45%, WILL continue to optimize medication,Continue aspirin  continue statins,ACE INHIBOTR , betablockers,Ntg   CHF\" Orthopnea:HE FEELS better and lost 12 lbs,  TAKES 2 ;PILLOWS  TAKING lasix, recheck chem 7 and if creatinine is trending up, will decrease lasix again , continue Kdur  Anxiety: not seen a psychiatory, refill xanax   Leg swelling: use compression socks  Paroxysmal afib: Continue eliquis, s/p  maze and LOREN ligation  Dyslipidemia: continue statins  HTN: stable, continue COREG AND, lisinopril medication  DM: stable: continue  insulin  All labs, medications and tests reviewed, continue all other medications of all above medical condition listed as is.    All labs, medications and tests reviewed, continue all other medications of all above medical condition listed as is.    [unfilled]

## 2024-04-17 ENCOUNTER — HOSPITAL ENCOUNTER (OUTPATIENT)
Dept: PHYSICAL THERAPY | Age: 64
Discharge: HOME | End: 2024-04-17

## 2024-04-17 NOTE — FLOWSHEET NOTE
Physical Therapy  Cancellation/No-show Note  Patient Name:  Reji Oleary  :  1960   Date:  2024  Cancelled visits to date: 3  No-shows to date: 4    For today's appointment patient:  [x]  Cancelled  []  Rescheduled appointment  []  No-show     Reason given by patient:  []  Patient ill  []  Conflicting appointment  []  No transportation    []  Conflict with work  []  No reason given  [x]  Other:  Cut himself at work and is covered in blood and is going home to clean up   Comments:      Electronically signed by:  Anne Marie Iqbal PTA

## 2024-04-22 ENCOUNTER — HOSPITAL ENCOUNTER (OUTPATIENT)
Age: 64
Discharge: HOME | End: 2024-04-22
Payer: COMMERCIAL

## 2024-04-22 DIAGNOSIS — E66.09 CLASS 1 OBESITY DUE TO EXCESS CALORIES WITHOUT SERIOUS COMORBIDITY WITH BODY MASS INDEX (BMI) OF 33.0 TO 33.9 IN ADULT: ICD-10-CM

## 2024-04-22 DIAGNOSIS — N18.31 STAGE 3A CHRONIC KIDNEY DISEASE (HCC): ICD-10-CM

## 2024-04-22 DIAGNOSIS — E87.6 HYPOKALEMIA: ICD-10-CM

## 2024-04-22 DIAGNOSIS — E11.9 TYPE 2 DIABETES MELLITUS WITHOUT COMPLICATION, WITHOUT LONG-TERM CURRENT USE OF INSULIN (HCC): ICD-10-CM

## 2024-04-22 DIAGNOSIS — I50.22 CHRONIC SYSTOLIC CONGESTIVE HEART FAILURE (HCC): ICD-10-CM

## 2024-04-22 LAB
ALBUMIN SERPL-MCNC: 4.3 GM/DL (ref 3.4–5)
ANION GAP SERPL CALCULATED.3IONS-SCNC: 19 MMOL/L (ref 7–16)
BILIRUBIN URINE: NEGATIVE MG/DL
BLOOD, URINE: NEGATIVE
BUN SERPL-MCNC: 23 MG/DL (ref 6–23)
CALCIUM SERPL-MCNC: 10.3 MG/DL (ref 8.3–10.6)
CHLORIDE BLD-SCNC: 90 MMOL/L (ref 99–110)
CLARITY: CLEAR
CO2: 26 MMOL/L (ref 21–32)
COLOR: YELLOW
COMMENT UA: NORMAL
CREAT SERPL-MCNC: 1.6 MG/DL (ref 0.9–1.3)
CREATININE URINE: 40 MG/DL (ref 39–259)
GFR SERPL CREATININE-BSD FRML MDRD: 48 ML/MIN/1.73M2
GLUCOSE SERPL-MCNC: 117 MG/DL (ref 70–99)
GLUCOSE, URINE: NEGATIVE MG/DL
KETONES, URINE: NEGATIVE MG/DL
LEUKOCYTE ESTERASE, URINE: NEGATIVE
MAGNESIUM: 2 MG/DL (ref 1.8–2.4)
NITRITE URINE, QUANTITATIVE: NEGATIVE
PH, URINE: 5.5 (ref 5–8)
PHOSPHORUS: 4.7 MG/DL (ref 2.5–4.9)
POTASSIUM SERPL-SCNC: 3.8 MMOL/L (ref 3.5–5.1)
PROT/CREAT RATIO, UR: 0.1
PROTEIN UA: NEGATIVE MG/DL
SODIUM BLD-SCNC: 135 MMOL/L (ref 135–145)
SPECIFIC GRAVITY UA: 1.01 (ref 1–1.03)
URINE TOTAL PROTEIN: 5 MG/DL
UROBILINOGEN, URINE: 0.2 MG/DL (ref 0.2–1)

## 2024-04-22 PROCEDURE — 36415 COLL VENOUS BLD VENIPUNCTURE: CPT

## 2024-04-22 PROCEDURE — 82570 ASSAY OF URINE CREATININE: CPT

## 2024-04-22 PROCEDURE — 84100 ASSAY OF PHOSPHORUS: CPT

## 2024-04-22 PROCEDURE — 82040 ASSAY OF SERUM ALBUMIN: CPT

## 2024-04-22 PROCEDURE — 80048 BASIC METABOLIC PNL TOTAL CA: CPT

## 2024-04-22 PROCEDURE — 83735 ASSAY OF MAGNESIUM: CPT

## 2024-04-22 PROCEDURE — 84156 ASSAY OF PROTEIN URINE: CPT

## 2024-04-22 PROCEDURE — 81003 URINALYSIS AUTO W/O SCOPE: CPT

## 2024-04-23 ENCOUNTER — HOSPITAL ENCOUNTER (OUTPATIENT)
Dept: PHYSICAL THERAPY | Age: 64
Setting detail: THERAPIES SERIES
Discharge: HOME | End: 2024-04-23
Payer: COMMERCIAL

## 2024-04-23 PROCEDURE — 97530 THERAPEUTIC ACTIVITIES: CPT

## 2024-04-23 PROCEDURE — 97110 THERAPEUTIC EXERCISES: CPT

## 2024-04-23 NOTE — FLOWSHEET NOTE
be held today secondary to his botox shots.     Home Exercise Program:  pt encouraged to perform stretches at home.     Manual Treatments:   Hold secondary to Botox injections    Modalities:      Communication with other providers:      Assessment:  (Response towards treatment session) (Pain Rating) Pt performed all listed interventions on POC and demonstrated adequate to good form. Pain level at end of session 5/10.   Pt would continue to benefit from skilled therapy interventions to address remaining impairments, improve mobility and strength, and progress towards goal completion and prepare for d/c including finalizing HEP; while reducing risk for re-injury or further decline.      Plan for Next Session: Continue per POC. - hold manual at this time.      Time In / Time Out:  1514/-1554    Timed Code/Total Treatment Minutes:   40  2te 1ta    Next Progress Note due:        Plan of Care Interventions:  [x] Therapeutic Exercise  [] Modalities:  [x] Therapeutic Activity     [] Ultrasound  [] Estim  [] Gait Training      [] Cervical Traction [] Lumbar Traction  [x] Neuromuscular Re-education    [] Cold/hotpack [] Iontophoresis   [x] Instruction in HEP      [] Vasopneumatic   [] Dry Needling    [x] Manual Therapy               [] Aquatic Therapy              Electronically signed by:  TRE Hernandez, PT   4/23/2024, 3:10 PM

## 2024-04-24 ENCOUNTER — TELEPHONE (OUTPATIENT)
Dept: CARDIOLOGY CLINIC | Age: 64
End: 2024-04-24

## 2024-04-25 ENCOUNTER — OFFICE VISIT (OUTPATIENT)
Dept: CARDIOLOGY CLINIC | Age: 64
End: 2024-04-25
Payer: COMMERCIAL

## 2024-04-25 VITALS
HEIGHT: 71 IN | WEIGHT: 195.4 LBS | HEART RATE: 86 BPM | SYSTOLIC BLOOD PRESSURE: 116 MMHG | BODY MASS INDEX: 27.35 KG/M2 | OXYGEN SATURATION: 97 % | DIASTOLIC BLOOD PRESSURE: 60 MMHG

## 2024-04-25 DIAGNOSIS — I49.9 IRREGULAR HEART RATE: Primary | ICD-10-CM

## 2024-04-25 DIAGNOSIS — F41.9 ANXIETY: ICD-10-CM

## 2024-04-25 DIAGNOSIS — I20.0 UNSTABLE ANGINA (HCC): ICD-10-CM

## 2024-04-25 DIAGNOSIS — I25.5 ISCHEMIC CARDIOMYOPATHY: ICD-10-CM

## 2024-04-25 DIAGNOSIS — R06.02 SOB (SHORTNESS OF BREATH): ICD-10-CM

## 2024-04-25 DIAGNOSIS — G45.9 TIA (TRANSIENT ISCHEMIC ATTACK): ICD-10-CM

## 2024-04-25 DIAGNOSIS — I50.22 CHRONIC SYSTOLIC CONGESTIVE HEART FAILURE (HCC): ICD-10-CM

## 2024-04-25 DIAGNOSIS — R07.9 CHEST PAIN, UNSPECIFIED TYPE: ICD-10-CM

## 2024-04-25 DIAGNOSIS — I48.0 PAF (PAROXYSMAL ATRIAL FIBRILLATION) (HCC): ICD-10-CM

## 2024-04-25 PROCEDURE — 3074F SYST BP LT 130 MM HG: CPT | Performed by: INTERNAL MEDICINE

## 2024-04-25 PROCEDURE — 99214 OFFICE O/P EST MOD 30 MIN: CPT | Performed by: INTERNAL MEDICINE

## 2024-04-25 PROCEDURE — 3078F DIAST BP <80 MM HG: CPT | Performed by: INTERNAL MEDICINE

## 2024-04-25 NOTE — PATIENT INSTRUCTIONS
**It is YOUR responsibilty to bring medication bottles and/or updated medication list to EACH APPOINTMENT. This will allow us to better serve you and all your healthcare needs**   Thank you for allowing us to care for you today!   We want to ensure we can follow your treatment plan and we strive to give you the best outcomes and experience possible.   If you ever have a life threatening emergency and call 911 - for an ambulance (EMS)   Our providers can only care for you at:   [facility] [place] [place] or [facility].   Even if you have someone take you or you drive yourself we can only care for you in a [facility]. Our providers are not setup at the other healthcare locations!   Please be informed that if you contact our office outside of normal business hours the physician on call cannot help with any scheduling or rescheduling issues, procedure instruction questions or any type of medication issue.    We advise you for any urgent/emergency that you go to the nearest emergency room!    PLEASE CALL OUR OFFICE DURING NORMAL BUSINESS HOURS    Monday - Friday   8 am to 5 pm    [place]: [phone]    [place]: [phone]    [place]:  [phone]  We are committed to providing you the best care possible.    If you receive a survey after visiting one of our offices, please take time to share your experience concerning your physician office visit.  These surveys are confidential and no health information about you is shared.    We are eager to improve for you and we are counting on your feedback to help make that happen.

## 2024-04-25 NOTE — PROGRESS NOTES
Cha Avitia MD        OFFICE  FOLLOWUP NOTE    Chief complaints: patient is here for management of  CAD,s/p CABG, DM, HTN, AFIB, DYSLPIDEMIA     Subjective: patient feels better, no chest pain, no shortness of breath, no dizziness, no palpitations    HPI Reji is a 63 y.o.year old who  has a past medical history of Abnormal findings on cardiac catheterization, CAD (coronary artery disease), Diabetes mellitus (HCC), H/O cardiovascular stress test, H/O Doppler echocardiogram, H/O echocardiogram, H/O echocardiogram, H/O exercise stress test, Herniated lumbar intervertebral disc, History of cardiac cath, History of cardiac monitoring, History of exercise stress test, History of nuclear stress test, History of nuclear stress test, History of nuclear stress test, Hx of Doppler ultrasound, Hypertension, Nausea vomiting and diarrhea, and Sleep apnea in adult. and presents for management of  CAD,s/p CABG, DM, HTN, AFIB, DYSLPIDEMIA which are well controlled      Current Outpatient Medications   Medication Sig Dispense Refill    apixaban (ELIQUIS) 5 MG TABS tablet Take 1 tablet by mouth 2 times daily 180 tablet 3    dilTIAZem (CARDIZEM CD) 360 MG extended release capsule TAKE 1 CAPSULE DAILY 90 capsule 3    furosemide (LASIX) 40 MG tablet TAKE 1 TABLET THREE TIMES A  tablet 3    prasugrel (EFFIENT) 10 MG TABS TAKE 1 TABLET DAILY 90 tablet 3    ranolazine (RANEXA) 500 MG extended release tablet TAKE 1 TABLET TWICE A  tablet 3    carvedilol (COREG) 25 MG tablet TAKE 1 TABLET TWICE A DAY WITH MEALS 180 tablet 3    metOLazone (ZAROXOLYN) 2.5 MG tablet TAKE 1 TABLET TWICE A  tablet 3    Ascorbic Acid (VITAMIN C PO) Take by mouth daily      Pyridoxine HCl (VITAMIN B6 PO) Take by mouth daily      Cyanocobalamin (VITAMIN B-12 PO) Take by mouth daily      Cholecalciferol (VITAMIN D-3 PO) Take by mouth daily      VITAMIN K PO Take by mouth daily      umeclidinium-vilanterol (ANORO ELLIPTA)

## 2024-05-01 ENCOUNTER — HOSPITAL ENCOUNTER (OUTPATIENT)
Dept: PHYSICAL THERAPY | Age: 64
Setting detail: THERAPIES SERIES
Discharge: HOME | End: 2024-05-01
Payer: COMMERCIAL

## 2024-05-01 PROCEDURE — 97530 THERAPEUTIC ACTIVITIES: CPT

## 2024-05-01 PROCEDURE — 97110 THERAPEUTIC EXERCISES: CPT

## 2024-05-01 NOTE — FLOWSHEET NOTE
Outpatient Physical Therapy  [place]           [] Phone: [phone]   Fax: [phone]  [place]           [x] Phone: [phone]   Fax: [phone]        Physical Therapy Daily Treatment Note  Date:  2024    Patient Name:  Reji Oleary    :  1960  MRN: [id number]  Restrictions/Precautions: No data recorded   Position Activity Restriction  Other position/activity restrictions: none  Diagnosis:   cervical ddd Diagnosis: cervical DDD  Date of Injury/Surgery:   Treatment Diagnosis:  neck pain  Insurance/Certification information: Med [place] BOMN/ no pre cert  Referring Physician:  Tresa Copeland PA-C C. Earl   PCP: Tresa Copeland PA-C  Next Doctor Visit:    Plan of care signed (Y/N):    Outcome Measure: NDI   Visit# / total visits:  45- 50 then PN  Pain level: 510   Goals:     Patient goals:  less R arm pain  Long Term Goals  patient will be indepedent with HEP  patient will score 10/50 on NDI and will report a score of 1 regarding pain with daily task of driving        Summary of Evaluation:  Assessment: NDI     Subjective: Patient states that he has not been feeling well this week.  Has had a lot of dizziness and disorientation.  Did not work yesterday because he didn't feel well.      Any changes in Ambulatory Summary Sheet?  None    Objective:   Continues to demonstrate increased tightness       Exercises: (No more than 4 columns)   Exercise/Equipment 4/10/24 4/23/24 2024           WARM UP         UBE  5' BWD (Towel roll between shldrs) 5' BWD (Towel roll between shldrs) 5' BWD (Towel roll between shldrs)   TABLE      Supine interscap towel roll stretching 10'  2 towels 10'  2 towels 10'  2 towels                              STANDING      Corner pec stretch 5x30\" forearm corner neck stretch 5x30\" forearm corner neck stretch 5x30\" forearm corner neck stretch   Face pulls  YTB x10 reps    Forearms on wall - thoracic EXT 5x30\" 5x30\" 5x30\"   Seated UT stretch w/ chair

## 2024-05-08 ENCOUNTER — HOSPITAL ENCOUNTER (OUTPATIENT)
Dept: PHYSICAL THERAPY | Age: 64
Setting detail: THERAPIES SERIES
Discharge: HOME | End: 2024-05-08
Payer: COMMERCIAL

## 2024-05-08 PROCEDURE — 97110 THERAPEUTIC EXERCISES: CPT

## 2024-05-08 PROCEDURE — 97530 THERAPEUTIC ACTIVITIES: CPT

## 2024-05-08 NOTE — FLOWSHEET NOTE
Outpatient Physical Therapy  [place]           [] Phone: [phone]   Fax: [phone]  [place]           [x] Phone: [phone]   Fax: [phone]        Physical Therapy Daily Treatment Note  Date:  2024    Patient Name:  Reji Oleary    :  1960  MRN: [id number]  Restrictions/Precautions: No data recorded   Position Activity Restriction  Other position/activity restrictions: none  Diagnosis:   cervical ddd Diagnosis: cervical DDD  Date of Injury/Surgery:   Treatment Diagnosis:  neck pain  Insurance/Certification information: Med [place] BOMN/ no pre cert  Referring Physician:  Tresa Copeland PA-C C. Earl   PCP: Tresa Copeland PA-C  Next Doctor Visit:    Plan of care signed (Y/N):    Outcome Measure: NDI   Visit# / total visits:   47        45- 50 then PN  Pain level: 5/10   Goals:     Patient goals:  less R arm pain  Long Term Goals  patient will be indepedent with HEP  patient will score 10/50 on NDI and will report a score of 1 regarding pain with daily task of driving        Summary of Evaluation:  Assessment: NDI     Subjective: Patient reports of 5/10 pain upon arrival and voices no new c/o.    Any changes in Ambulatory Summary Sheet?  None    Objective:   Continues with forward flexed posture and cervical flex      Exercises: (No more than 4 columns)   Exercise/Equipment 2024           WARM UP        UBE  5' BWD (Towel roll between shldrs) 5' BWD (Towel roll between shldrs)   TABLE     Supine interscap towel roll stretching 10'  2 towels 10'  2 towels                          STANDING     Corner pec stretch 5x30\" forearm corner neck stretch 5x30\" forearm corner neck stretch   Face pulls     Forearms on wall - thoracic EXT 5x30\" 5x30\"   Seated UT stretch w/ chair pull 3x30\" B 3x30\" B   Seated cervical rotation stretch 3x30\" B  3x30\" B             PROPRIOCEPTION                              MODALITIES                 Other Therapeutic Activities/Education:

## 2024-05-15 ENCOUNTER — HOSPITAL ENCOUNTER (OUTPATIENT)
Dept: PHYSICAL THERAPY | Age: 64
Setting detail: THERAPIES SERIES
Discharge: HOME | End: 2024-05-15
Payer: COMMERCIAL

## 2024-05-15 PROCEDURE — 97110 THERAPEUTIC EXERCISES: CPT

## 2024-05-15 PROCEDURE — 97530 THERAPEUTIC ACTIVITIES: CPT

## 2024-05-15 NOTE — FLOWSHEET NOTE
Outpatient Physical Therapy  [place]           [] Phone: [phone]   Fax: [phone]  [place]           [x] Phone: [phone]   Fax: [phone]        Physical Therapy Daily Treatment Note  Date:  5/15/2024    Patient Name:  Reji Oleary    :  1960  MRN: [id number]  Restrictions/Precautions: No data recorded   Position Activity Restriction  Other position/activity restrictions: none  Diagnosis:   cervical ddd Diagnosis: cervical DDD  Date of Injury/Surgery:   Treatment Diagnosis:  neck pain  Insurance/Certification information: Med [place] BOMN/ no pre cert  Referring Physician:  Tresa Copeland PA-C C. Earl   PCP: Tresa Copeland PA-C  Next Doctor Visit:    Plan of care signed (Y/N):    Outcome Measure: NDI   Visit# / total visits:   48        45- 50 then PN  Pain level: 6/10   Goals:     Patient goals:  less R arm pain  Long Term Goals  patient will be indepedent with HEP  patient will score 10/50 on NDI and will report a score of 1 regarding pain with daily task of driving        Summary of Evaluation:  Assessment: NDI     Subjective: Patient reports of 5/10 pain upon arrival and voices no new c/o.    Any changes in Ambulatory Summary Sheet?  None    Objective:   Continues with forward flexed posture and cervical flex      Exercises: (No more than 4 columns)   Exercise/Equipment 2024 5/8/24  5/15/24            WARM UP         UBE  5' BWD (Towel roll between shldrs) 5' BWD (Towel roll between shldrs) 5' BWD (Towel roll between shldrs)   TABLE      Supine interscap towel roll stretching 10'  2 towels 10'  2 towels 10'  2 towels                              STANDING      Corner pec stretch 5x30\" forearm corner neck stretch 5x30\" forearm corner neck stretch 5x30\" forearm corner neck stretch   Face pulls      Forearms on wall - thoracic EXT 5x30\" 5x30\" 5x30\"   Seated UT stretch w/ chair pull 3x30\" B 3x30\" B 3x30\" B   Seated cervical rotation stretch 3x30\" B  3x30\" B 3x30\" B

## 2024-05-22 ENCOUNTER — HOSPITAL ENCOUNTER (OUTPATIENT)
Dept: PHYSICAL THERAPY | Age: 64
Setting detail: THERAPIES SERIES
Discharge: HOME | End: 2024-05-22
Payer: COMMERCIAL

## 2024-05-22 PROCEDURE — 97530 THERAPEUTIC ACTIVITIES: CPT

## 2024-05-22 PROCEDURE — 97110 THERAPEUTIC EXERCISES: CPT

## 2024-05-22 NOTE — FLOWSHEET NOTE
Outpatient Physical Therapy  [place]           [] Phone: [phone]   Fax: [phone]  [place]           [x] Phone: [phone]   Fax: [phone]        Physical Therapy Daily Treatment Note  Date:  2024    Patient Name:  Reji Oleary    :  1960  MRN: [id number]  Restrictions/Precautions: No data recorded   Position Activity Restriction  Other position/activity restrictions: none  Diagnosis:   cervical ddd Diagnosis: cervical DDD  Date of Injury/Surgery:   Treatment Diagnosis:  neck pain  Insurance/Certification information: Med [place] BOMN/ no pre cert  Referring Physician:  Tresa Copeland PA-C C. Earl   PCP: Tresa Copeland PA-C  Next Doctor Visit:    Plan of care signed (Y/N):    Outcome Measure: NDI   Visit# / total visits:   49     45- 50 then PN  Pain level: 4/10   Goals:     Patient goals:  less R arm pain  Long Term Goals  patient will be indepedent with HEP  patient will score 10/50 on NDI and will report a score of 1 regarding pain with daily task of driving        Summary of Evaluation:  Assessment: NDI     Subjective: Patient reports feeling much less exhausted this week; not having much R arm pain lately  Any changes in Ambulatory Summary Sheet?  None    Objective:   NDI     Exercises: (No more than 4 columns)   Exercise/Equipment 5/8/24  5/15/24  5/22/24           WARM UP         UBE  5' BWD (Towel roll between shldrs) 5' BWD (Towel roll between shldrs) 5' BWD (Towel roll between shldrs)   TABLE      Supine interscap towel roll stretching 10'  2 towels 10'  2 towels 10'  2 towels                              STANDING      Corner pec stretch 5x30\" forearm corner neck stretch 5x30\" forearm corner neck stretch 5x30\" forearm corner neck stretch   Face pulls      Forearms on wall - thoracic EXT 5x30\" 5x30\" 5x30   Seated UT stretch w/ chair pull 3x30\" B 3x30\" B 3x30\" B   Seated cervical rotation stretch 3x30\" B 3x30\" B 3x30\" B              PROPRIOCEPTION

## 2024-05-29 ENCOUNTER — HOSPITAL ENCOUNTER (OUTPATIENT)
Dept: PHYSICAL THERAPY | Age: 64
Setting detail: THERAPIES SERIES
Discharge: HOME | End: 2024-05-29
Payer: COMMERCIAL

## 2024-05-29 PROCEDURE — 97530 THERAPEUTIC ACTIVITIES: CPT

## 2024-05-29 PROCEDURE — 97110 THERAPEUTIC EXERCISES: CPT

## 2024-05-29 NOTE — FLOWSHEET NOTE
Outpatient Physical Therapy  [place]           [] Phone: [phone]   Fax: [phone]  [place]           [x] Phone: [phone]   Fax: [phone]        Physical Therapy Daily Treatment Note  Date:  2024    Patient Name:  Reji Oleary    :  1960  MRN: [id number]  Restrictions/Precautions: No data recorded   Position Activity Restriction  Other position/activity restrictions: none  Diagnosis:   cervical ddd Diagnosis: cervical DDD  Date of Injury/Surgery:   Treatment Diagnosis:  neck pain  Insurance/Certification information: Med [place] BOMN/ no pre cert  Referring Physician:  Tresa Copeland PA-C C. Earl   PCP: Tresa Copeland PA-C  Next Doctor Visit:    Plan of care signed (Y/N):    Outcome Measure: NDI   Visit# / total visits:   50     45- 50 then PN  Pain level: 3/10   Goals:     Patient goals:  less R arm pain  Long Term Goals  patient will be indepedent with HEP  patient will score 10/50 on NDI and will report a score of 1 regarding pain with daily task of driving        Summary of Evaluation:  Assessment: NDI     Subjective: Patient reports of 3/10 pain upon arrival and voices no new c/o.        Any changes in Ambulatory Summary Sheet?  None    Objective:   NDI     Exercises: (No more than 4 columns)   Exercise/Equipment 5/8/24  5/15/24  5/22/24 5/29/24   (50)            WARM UP          UBE  5' BWD (Towel roll between shldrs) 5' BWD (Towel roll between shldrs) 5' BWD (Towel roll between shldrs) 5' BWD (Towel roll between shldrs)   TABLE       Supine interscap towel roll stretching 10'  2 towels 10'  2 towels 10'  2 towels 10'                                  STANDING       Corner pec stretch 5x30\" forearm corner neck stretch 5x30\" forearm corner neck stretch 5x30\" forearm corner neck stretch 5x30\" forearm corner neck stretch   Face pulls       Forearms on wall - thoracic EXT 5x30\" 5x30\" 5x30 5x30   Seated UT stretch w/ chair pull 3x30\" B 3x30\" B 3x30\" B 3x30\" B

## 2024-05-30 NOTE — PROGRESS NOTES
Physical Therapy      Outpatient Physical Therapy           [place]           [] Phone: [phone]   Fax: [phone]  [place]           [x] Phone: [phone]   Fax: [phone]      To: Tresa Copeland PA-C     From: TRE Hernandez PT,    Patient: Reji Oleary                    : 1960  Diagnosis:  cervical ddd        Treatment Diagnosis:       Date: 2024  [x]  Progress Note                []  Discharge Note    Evaluation Date:  5/15/23   Total Visits to date 50 Cancels/No-shows to date:      Subjective  :  24  Patient reports of 3/10 pain upon arrival and voices no new c/o.        Plan of Care/Treatment to date:  [x] Therapeutic Exercise    [] Modalities:  [x] Therapeutic Activity     [] Ultrasound  [] Electrical Stimulation  [] Gait Training      [] Cervical Traction   [] Lumbar Traction  [x] Neuromuscular Re-education  [] Cold/hotpack [] Iontophoresis  [x] Instruction in HEP      Other:  [x] Manual Therapy       []  Vasopneumatic  [] Aquatic Therapy       []   Dry Needle Therapy                      Objective/Significant Findings At Last Visit/Comments: ;NDI 19/50     Goal Status:  [] Achieved [] Partially Achieved  [x] Not Ascheived   Patient goals:  less R arm pain  Long Term Goals  patient will be indepedent with HEP  patient will score 20/50 and score of 3 with driving on NDI    Rehab Potential: [] Excellent [x] Good [] Fair  [] Poor    Patient Status: [] Continue per initial plan of Care     [] Patient now discharged     [x] Additional visits requested, Please re-certify for additional visits:      Requested frequency/duration:  1/week for 4weeks  If we are requesting more visits, we fully anticipate the patient's condition is expected to improve within the treatment timeframe we are requesting.  Electronically signed by:  TRE Hernandez PT, , 2024, 9:07 AM  If you have any questions or concerns, please don't hesitate to call.  Thank you for your referral.    Physician

## 2024-06-05 ENCOUNTER — HOSPITAL ENCOUNTER (OUTPATIENT)
Dept: PHYSICAL THERAPY | Age: 64
Discharge: HOME | End: 2024-06-05

## 2024-06-05 NOTE — FLOWSHEET NOTE
Physical Therapy  Cancellation/No-show Note  Patient Name:  Reji Oleary  :  1960   Date:  2024  Cancelled visits to date: 4  No-shows to date: 4    For today's appointment patient:  [x]  Cancelled  []  Rescheduled appointment  []  No-show     Reason given by patient:  []  Patient ill  []  Conflicting appointment  []  No transportation    [x]  Conflict with work  []  No reason given  []  Other:      Comments:      Electronically signed by:  Anne Marie Iqbal PTA

## 2024-06-10 ENCOUNTER — HOSPITAL ENCOUNTER (OUTPATIENT)
Age: 64
Discharge: HOME | End: 2024-06-10
Payer: COMMERCIAL

## 2024-06-10 LAB
BACTERIA: 0 /HPF
BILIRUBIN, URINE: NEGATIVE MG/DL
BLOOD, URINE: ABNORMAL
CAST TYPE: ABNORMAL /HPF
CLARITY: CLEAR
COLOR: YELLOW
CRYSTAL TYPE: NEGATIVE /HPF
EPITHELIAL CELLS, UA: 1 /HPF
GLUCOSE URINE: NEGATIVE MG/DL
KETONES, URINE: NEGATIVE MG/DL
LEUKOCYTE ESTERASE, URINE: NEGATIVE
NITRITE URINE, QUANTITATIVE: NEGATIVE
PH, URINE: 6 (ref 5–8)
PROTEIN UA: ABNORMAL MG/DL
RBC URINE: 50 /HPF (ref 0–3)
SPECIFIC GRAVITY UA: 1.01 (ref 1–1.03)
UROBILINOGEN, URINE: 0.2 MG/DL (ref 0.2–1)
WBC UA: 0 /HPF (ref 0–2)

## 2024-06-10 PROCEDURE — 81001 URINALYSIS AUTO W/SCOPE: CPT

## 2024-06-10 PROCEDURE — 87086 URINE CULTURE/COLONY COUNT: CPT

## 2024-06-11 LAB
CULTURE: NORMAL
Lab: NORMAL
SPECIMEN: NORMAL

## 2024-06-12 ENCOUNTER — HOSPITAL ENCOUNTER (OUTPATIENT)
Dept: PHYSICAL THERAPY | Age: 64
Setting detail: THERAPIES SERIES
Discharge: HOME | End: 2024-06-12
Payer: COMMERCIAL

## 2024-06-12 PROCEDURE — 97530 THERAPEUTIC ACTIVITIES: CPT

## 2024-06-12 PROCEDURE — 97110 THERAPEUTIC EXERCISES: CPT

## 2024-06-12 NOTE — FLOWSHEET NOTE
Outpatient Physical Therapy  [place]           [] Phone: [phone]   Fax: [phone]  [place]           [x] Phone: [phone]   Fax: [phone]        Physical Therapy Daily Treatment Note  Date:  2024    Patient Name:  Reji Oleary    :  1960  MRN: [id number]  Restrictions/Precautions: No data recorded   Position Activity Restriction  Other position/activity restrictions: none  Diagnosis:   cervical ddd Diagnosis: cervical DDD  Date of Injury/Surgery:   Treatment Diagnosis:  neck pain  Insurance/Certification information: Med [place] BOMN/ no pre cert  Referring Physician:  Tresa Copeland PA-C C. Earl   PCP: Tresa Copeland PA-C  Next Doctor Visit:    Plan of care signed (Y/N):    Outcome Measure: NDI   Visit# / total visits:   51/54    Pain level: 3/10   Goals:     Patient goals:  less R arm pain  Long Term Goals  patient will be indepedent with HEP  patient will score 10/50 on NDI and will report a score of 1 regarding pain with daily task of driving        Summary of Evaluation:  Assessment: NDI     Subjective: Has an injection tomorrow.  Very little arm pain now.         Any changes in Ambulatory Summary Sheet?  None    Objective:  Able to stand with more upright posture with verbal cues.     Exercises: (No more than 4 columns)   Exercise/Equipment 24   (50) 2024           WARM UP         UBE  5' BWD (Towel roll between shldrs) 5' BWD (Towel roll between shldrs) 5' BWD (Towel roll between shldrs)   TABLE      Supine interscap towel roll stretching 10'  2 towels 10' 10 min                               STANDING      Corner pec stretch 5x30\" forearm corner neck stretch 5x30\" forearm corner neck stretch 5x30\" forearm corner neck stretch   Face pulls      Forearms on wall - thoracic EXT 5x30 5x30 5x30   Seated UT stretch w/ chair pull 3x30\" B 3x30\" B 3x30\" B   Seated cervical rotation stretch 3x30\" B 3x30\" B 3x30\" B              PROPRIOCEPTION

## 2024-06-12 NOTE — PROGRESS NOTES
Follow up for Botox for Cervical Dystonia:    Subjective    Reji is being see to undergo second set of botulinum toxin injections for treatment of spasmodic torticollis.He is a patient of Dr. Roger. He has an anterocollis with subtle right torticollis.     Following his first set of injections, Reji reports noticing significant improvement in his anterocollis.  He states that approximately 3 days following his last set of injections he noted he was able to lift his head up and felt as if he was back to normal.  He also reports a significant improvement in the pain he has in his neck.  He states that these benefits lasted approximately 2 months and then did wear off in this past month.  He denies any side effects.  He does report that at baseline he does have chronic difficulties with swallowing and may be noticed that the swallowing was improved when Botox had taken effect.    Examination  A&Ox4, pleasant  CN II-XII intact  Antigravity x 4 extremities  No overt dysmetria  Ambulates independently    Cervical dystonia:  -Subtle right torticollis with prominent anterocollis  Range of motion diminished in left rotation and sidebending bilaterally    Potential risks including (but not exclusively) allergic reaction, pain, bruising, bleeding, muscle weakness, dysphagia, aspiration pneumonia, dry mouth etc. as well as potential benefits of the injections were discussed with the patient. Verbal consent to procede with the treatment was obtained. 5000 units of botulinum toxin (myobloc) was drawn up for this procedure    EMG guidance was utilized for administration of botox.     The patient received myobloc distributed as follows:    Left SCM 1000 units in 2 sites (0.1,0.1)  Left scalene 750 units in 1 site (0.15)  Right scalene 750 units in 1 site (0.15)  Right splenius capitus 750 units in 1 site (0.15)      The patient received a total of __3250__ units with _1750___ units wasted.    The botulinum was provided via

## 2024-06-13 ENCOUNTER — OFFICE VISIT (OUTPATIENT)
Dept: NEUROLOGY | Age: 64
End: 2024-06-13

## 2024-06-13 DIAGNOSIS — M43.6 ANTEROCOLLIS: ICD-10-CM

## 2024-06-13 DIAGNOSIS — G24.3 SPASMODIC TORTICOLLIS: Primary | ICD-10-CM

## 2024-06-19 ENCOUNTER — HOSPITAL ENCOUNTER (OUTPATIENT)
Dept: PHYSICAL THERAPY | Age: 64
Setting detail: THERAPIES SERIES
Discharge: HOME | End: 2024-06-19
Payer: COMMERCIAL

## 2024-06-19 PROCEDURE — 97110 THERAPEUTIC EXERCISES: CPT

## 2024-06-19 PROCEDURE — 97140 MANUAL THERAPY 1/> REGIONS: CPT

## 2024-06-19 PROCEDURE — 97530 THERAPEUTIC ACTIVITIES: CPT

## 2024-06-19 PROCEDURE — 97112 NEUROMUSCULAR REEDUCATION: CPT

## 2024-06-19 NOTE — FLOWSHEET NOTE
Outpatient Physical Therapy  [place]           [] Phone: [phone]   Fax: [phone]  [place]           [x] Phone: [phone]   Fax: [phone]        Physical Therapy Daily Treatment Note  Date:  2024    Patient Name:  Reji Oleary    :  1960  MRN: [id number]  Restrictions/Precautions: No data recorded   Position Activity Restriction  Other position/activity restrictions: none  Diagnosis:   cervical ddd Diagnosis: cervical DDD  Date of Injury/Surgery:   Treatment Diagnosis:  neck pain  Insurance/Certification information: Med [place] BOMN/ no pre cert  Referring Physician:  Tresa Copeland PA-C C. Earl   PCP: Tresa Copeland PA-C  Next Doctor Visit:    Plan of care signed (Y/N):    Outcome Measure: NDI   Visit# / total visits:   52/54    Pain level: 3/10   Goals:     Patient goals:  less R arm pain  Long Term Goals  patient will be indepedent with HEP  patient will score 10/50 on NDI and will report a score of 1 regarding pain with daily task of driving        Summary of Evaluation:  Assessment: NDI     Subjective:. Had Botox injection 24= patient states  that the injections are beneficial; patient joan states that his is 50% better since starting PT; - patient is meting his goal of less R arm pain        Any changes in Ambulatory Summary Sheet?  None    Objective:  Able to stand with more upright posture with verbal cues.     Exercises: (No more than 4 columns)   Exercise/Equipment 24   (50) 2024           WARM UP         UBE  5' BWD (Towel roll between shldrs) 5' BWD (Towel roll between shldrs) 5' BWD (Towel roll between shldrs)   TABLE      Supine interscap towel roll stretching 10' 10 min  10'                              STANDING      Corner pec stretch 5x30\" forearm corner neck stretch 5x30\" forearm corner neck stretch 5x30\" forearm corner neck stretch   Face pulls   YTB  2  x 5 reps   Forearms on wall - thoracic EXT 5x30 5x30 5x30   Seated UT

## 2024-06-26 ENCOUNTER — HOSPITAL ENCOUNTER (OUTPATIENT)
Dept: PHYSICAL THERAPY | Age: 64
Setting detail: THERAPIES SERIES
Discharge: HOME | End: 2024-06-26
Payer: COMMERCIAL

## 2024-06-26 PROCEDURE — 97110 THERAPEUTIC EXERCISES: CPT

## 2024-06-26 PROCEDURE — 97140 MANUAL THERAPY 1/> REGIONS: CPT

## 2024-06-26 PROCEDURE — 97112 NEUROMUSCULAR REEDUCATION: CPT

## 2024-06-26 NOTE — FLOWSHEET NOTE
Outpatient Physical Therapy  [place]           [] Phone: [phone]   Fax: [phone]  [place]           [x] Phone: [phone]   Fax: [phone]        Physical Therapy Daily Treatment Note  Date:  2024    Patient Name:  Reji Oleary    :  1960  MRN: [id number]  Restrictions/Precautions: No data recorded   Position Activity Restriction  Other position/activity restrictions: none  Diagnosis:   cervical ddd Diagnosis: cervical DDD  Date of Injury/Surgery:   Treatment Diagnosis:  neck pain  Insurance/Certification information: Med [place] BOMN/ no pre cert  Referring Physician:  Tresa Copeland PA-C C. Earl   PCP: Tresa Copeland PA-C  Next Doctor Visit:    Plan of care signed (Y/N):    Outcome Measure: NDI   Visit# / total visits:   53/54    Pain level: 4/10   Goals:     Patient goals:  stand straighter  Long Term Goals  patient will be indepedent with HEP  patient will score 10/50 on NDI and will report a score of 1 regarding pain with daily task of driving        Summary of Evaluation:  Assessment: NDI     Subjective:.patient reports that his coworkers have complimented him about his improved posture      Any changes in Ambulatory Summary Sheet?  None    Objective:    Exercises: (No more than 4 columns)   Exercise/Equipment 24          WARM UP        UBE  5' BWD (Towel roll between shldrs) 7' BWD (Towel roll between shldrs)   TABLE     Supine interscap towel roll stretching 10' 10'                          STANDING     Corner pec stretch 5x30\" forearm corner neck stretch 5x30\" forearm corner neck stretch   Face pulls YTB  2  x 5 reps YTB x20 reps   Forearms on wall - thoracic EXT 5x30 5x30   Seated UT stretch w/ chair pull 3x30\" B 3x30\" B   Seated cervical rotation stretch 3x30\" B 3x30\" B             PROPRIOCEPTION                              MODALITIES                 Other Therapeutic Activities/Education:  Pt educated on that manual work would be held today

## 2024-07-17 ENCOUNTER — HOSPITAL ENCOUNTER (OUTPATIENT)
Dept: PHYSICAL THERAPY | Age: 64
Setting detail: THERAPIES SERIES
Discharge: HOME | End: 2024-07-17
Payer: COMMERCIAL

## 2024-07-17 PROCEDURE — 97110 THERAPEUTIC EXERCISES: CPT

## 2024-07-17 PROCEDURE — 97140 MANUAL THERAPY 1/> REGIONS: CPT

## 2024-07-17 PROCEDURE — 97112 NEUROMUSCULAR REEDUCATION: CPT

## 2024-07-17 NOTE — FLOWSHEET NOTE
Outpatient Physical Therapy  [place]           [] Phone: [phone]   Fax: [phone]  [place]           [x] Phone: [phone]   Fax: [phone]        Physical Therapy Daily Treatment Note  Date:  2024    Patient Name:  Reji Oleary    :  1960  MRN: [id number]  Restrictions/Precautions: No data recorded   Position Activity Restriction  Other position/activity restrictions: none  Diagnosis:   cervical ddd Diagnosis: cervical DDD  Date of Injury/Surgery:   Treatment Diagnosis:  neck pain  Insurance/Certification information: Med [place] BOMN/ no pre cert  Referring Physician:  Tresa Copeland PA-C C. Earl   PCP: Tresa Copeland PA-C  Next Doctor Visit:    Plan of care signed (Y/N):    Outcome Measure: NDI   Visit# / total visits:   54/54    Pain level: 4/10   Goals:     Patient goals:  stand straighter  Long Term Goals  patient will be indepedent with HEP  patient will score 10/50 on NDI and will report a score of 1 regarding pain with daily task of driving        Summary of Evaluation:  Assessment: NDI     Subjective:.Patient rates his pain 4/10 in the neck today.  Feels that he is standing 70% straighter now.      Any changes in Ambulatory Summary Sheet?  None    Objective:  NDI:     Exercises: (No more than 4 columns)   Exercise/Equipment 24           WARM UP         UBE  5' BWD (Towel roll between shldrs) 7' BWD (Towel roll between shldrs) 7' BWD (Towel roll between shldrs)   TABLE      Supine interscap towel roll stretching 10' 10' 10 min                               STANDING      Corner pec stretch 5x30\" forearm corner neck stretch 5x30\" forearm corner neck stretch 5x30\" forearm corner neck stretch   Face pulls YTB  2  x 5 reps YTB x20 reps    Forearms on wall - thoracic EXT 5x30 5x30 5x30\"   Seated UT stretch w/ chair pull 3x30\" B 3x30\" B 3x30\" B    Seated cervical rotation stretch 3x30\" B 3x30\" B 3x30\" B               PROPRIOCEPTION

## 2024-07-18 NOTE — PROGRESS NOTES
Physical Therapy      Outpatient Physical Therapy           [place]           [] Phone: [phone]   Fax: [phone]  [place]           [x] Phone: [phone]   Fax: [phone]      To: Tresa Copeland PA-C     From: TRE Hernandez PT,    Patient: Reji Oleary                    : 1960  Diagnosis:  cervical ddd        Treatment Diagnosis:       Date: 2024  [x]  Progress Note                []  Discharge Note    Evaluation Date:  5/15/23   Total Visits to date 54 Cancels/No-shows to date:      Subjective  : 24 .Patient rates his pain 4/10 in the neck today.  Feels that he is standing 70% straighter now.         Plan of Care/Treatment to date:  [x] Therapeutic Exercise    [] Modalities:  [x] Therapeutic Activity     [] Ultrasound  [] Electrical Stimulation  [] Gait Training      [] Cervical Traction   [] Lumbar Traction  [x] Neuromuscular Re-education  [] Cold/hotpack [] Iontophoresis  [x] Instruction in HEP      Other:  [x] Manual Therapy       []  Vasopneumatic  [] Aquatic Therapy       []   Dry Needle Therapy                      Objective/Significant Findings At Last Visit/Comments: ;NDI: 14/50 - score of 2 on driving     Goal Status:  [x] Achieved [] Partially Achieved  [] Not Ascheived   Patient goals:  less R arm pain  Long Term Goals  patient will be indepedent with HEP  patient will score 20/50 and score of 3 with driving on NDI-meeting  Updated goal  patient will score 13/50 and score of 2 with driving on NDI-    Rehab Potential: [] Excellent [x] Good [] Fair  [] Poor    Patient Status: [] Continue per initial plan of Care     [] Patient now discharged     [x] Additional visits requested, Please re-certify for additional visits:      Requested frequency/duration:  1/week for 4weeks  If we are requesting more visits, we fully anticipate the patient's condition is expected to improve within the treatment timeframe we are requesting.  Electronically signed by:  TRE Hernandez PT, ,

## 2024-07-19 ENCOUNTER — HOSPITAL ENCOUNTER (OUTPATIENT)
Dept: CT IMAGING | Age: 64
Discharge: HOME | End: 2024-07-19
Payer: COMMERCIAL

## 2024-07-19 DIAGNOSIS — R31.0 GROSS HEMATURIA: ICD-10-CM

## 2024-07-19 LAB
EGFR, POC: 56 ML/MIN/1.73M2
POC CREATININE: 1.4 MG/DL (ref 0.5–1.2)

## 2024-07-19 PROCEDURE — 6360000004 HC RX CONTRAST MEDICATION: Performed by: PHYSICIAN ASSISTANT

## 2024-07-19 PROCEDURE — 82565 ASSAY OF CREATININE: CPT

## 2024-07-19 PROCEDURE — 74178 CT ABD&PLV WO CNTR FLWD CNTR: CPT | Performed by: PHYSICIAN ASSISTANT

## 2024-07-19 RX ADMIN — IOPAMIDOL 125 ML: 755 INJECTION, SOLUTION INTRAVENOUS at 08:43

## 2024-07-25 ENCOUNTER — HOSPITAL ENCOUNTER (OUTPATIENT)
Age: 64
Discharge: HOME | End: 2024-07-25
Payer: COMMERCIAL

## 2024-07-25 ENCOUNTER — HOSPITAL ENCOUNTER (OUTPATIENT)
Dept: GENERAL RADIOLOGY | Age: 64
Discharge: HOME | End: 2024-07-25
Payer: COMMERCIAL

## 2024-07-25 DIAGNOSIS — R05.1 ACUTE COUGH: ICD-10-CM

## 2024-07-25 PROCEDURE — 71046 X-RAY EXAM CHEST 2 VIEWS: CPT

## 2024-07-29 ENCOUNTER — OFFICE VISIT (OUTPATIENT)
Dept: CARDIOLOGY CLINIC | Age: 64
End: 2024-07-29
Payer: COMMERCIAL

## 2024-07-29 VITALS
WEIGHT: 190 LBS | DIASTOLIC BLOOD PRESSURE: 74 MMHG | HEART RATE: 90 BPM | HEIGHT: 71 IN | BODY MASS INDEX: 26.6 KG/M2 | OXYGEN SATURATION: 96 % | SYSTOLIC BLOOD PRESSURE: 130 MMHG

## 2024-07-29 DIAGNOSIS — I50.22 CHRONIC SYSTOLIC CONGESTIVE HEART FAILURE (HCC): ICD-10-CM

## 2024-07-29 DIAGNOSIS — R06.02 SOB (SHORTNESS OF BREATH): ICD-10-CM

## 2024-07-29 DIAGNOSIS — I10 PRIMARY HYPERTENSION: ICD-10-CM

## 2024-07-29 DIAGNOSIS — J06.9 VIRAL UPPER RESPIRATORY TRACT INFECTION: ICD-10-CM

## 2024-07-29 DIAGNOSIS — I48.0 PAF (PAROXYSMAL ATRIAL FIBRILLATION) (HCC): ICD-10-CM

## 2024-07-29 DIAGNOSIS — I49.9 IRREGULAR HEART RATE: Primary | ICD-10-CM

## 2024-07-29 DIAGNOSIS — G45.9 TIA (TRANSIENT ISCHEMIC ATTACK): ICD-10-CM

## 2024-07-29 DIAGNOSIS — I20.0 UNSTABLE ANGINA (HCC): ICD-10-CM

## 2024-07-29 DIAGNOSIS — I25.5 ISCHEMIC CARDIOMYOPATHY: ICD-10-CM

## 2024-07-29 PROCEDURE — 3078F DIAST BP <80 MM HG: CPT | Performed by: INTERNAL MEDICINE

## 2024-07-29 PROCEDURE — 3075F SYST BP GE 130 - 139MM HG: CPT | Performed by: INTERNAL MEDICINE

## 2024-07-29 PROCEDURE — 99214 OFFICE O/P EST MOD 30 MIN: CPT | Performed by: INTERNAL MEDICINE

## 2024-07-29 NOTE — PROGRESS NOTES
BYPASS GRAFT X3, MAZE PROCEDURE, INTRAOPERATIVE WALDEMAR, INDUCED HYPOTHERMIA, ENDOSCOPIC VEIN HARVEST OF THE LEFT LEG performed by Arcenio Goldstein MD at [facility] OR    OTHER SURGICAL HISTORY Right     Transposition of Right Ulnar Nerve     Family History   Problem Relation Age of Onset    Hypertension Mother     Hypertension Father     Hypertension Sister     Hypertension Brother     Cancer Maternal Grandmother      Social History     Tobacco Use    Smoking status: Former     Current packs/day: 0.00     Average packs/day: 0.3 packs/day for 30.0 years (7.5 ttl pk-yrs)     Types: Cigarettes     Start date:      Quit date:      Years since quittin.5    Smokeless tobacco: Never    Tobacco comments:     VAPE PEN   Substance Use Topics    Alcohol use: No     Comment: Caffiene - pop once a day      [unfilled]  Review of Systems:   Constitutional: No Fever or Weight Loss   Eyes: No Decreased Vision  ENT: No Headaches, Hearing Loss or Vertigo  Cardiovascular: No chest pain, dyspnea on exertion, palpitations or loss of consciousness  Respiratory: No cough or wheezing    Gastrointestinal: No abdominal pain, appetite loss, blood in stools, constipation, diarrhea or heartburn  Genitourinary: No dysuria, trouble voiding, or hematuria  Musculoskeletal:  No gait disturbance, weakness or joint complaints  Integumentary: No rash or pruritis  Neurological: No TIA or stroke symptoms  Psychiatric: No anxiety or depression  Endocrine: No malaise, fatigue or temperature intolerance  Hematologic/Lymphatic: No bleeding problems, blood clots or swollen lymph nodes  Allergic/Immunologic: No nasal congestion or hives  All systems negative except as marked.   Objective:  /74 (Site: Left Upper Arm, Position: Sitting, Cuff Size: Medium Adult)   Pulse 90   Ht 1.803 m (5' 11\")   Wt 86.2 kg (190 lb)   SpO2 96%   BMI 26.50 kg/m²   Wt Readings from Last 3 Encounters:   24 86.2 kg (190 lb)   24 88.9 kg (196 lb)   24

## 2024-07-30 ENCOUNTER — TELEPHONE (OUTPATIENT)
Dept: CARDIOLOGY CLINIC | Age: 64
End: 2024-07-30

## 2024-07-30 DIAGNOSIS — I48.0 PAROXYSMAL ATRIAL FIBRILLATION (HCC): Primary | ICD-10-CM

## 2024-07-30 NOTE — TELEPHONE ENCOUNTER
[facility]     Dr. Cha Avitia     Transesophageal Echocardiogram    Patient Name: Reji Oleary   : 1960   MRN# [id number]     Date of Procedure: 8/15/24 Time: 11am Arrival Time: 10am   (Arrival time is scheduled for one (1) hour before procedure is scheduled.)     Hospital: [facility] ([facility])     X   If you have received orders for blood work and or a chest x-ray, please have         them done on assigned date at [facility],         [facility], or [facility].    X   Please do not have anything by mouth after midnight prior to or 8 hours before         the procedure.    X   You may take your medication with a sip of water unless advised otherwise below.     X Please continue to take Eliquis (apixaban) as directed.     X If you are taking Lasix (furosemide) please do not take it the morning before your procedure.

## 2024-07-30 NOTE — TELEPHONE ENCOUNTER
Notified and confirmed procedure date/time w/pt. Procedure scheduled for 8/15/24 @11am, arrival time 10am. Instruction call scheduled for 8/12/24 after 1:30pm. Pt voiced understanding.

## 2024-08-01 ENCOUNTER — HOSPITAL ENCOUNTER (OUTPATIENT)
Dept: PHYSICAL THERAPY | Age: 64
Setting detail: THERAPIES SERIES
Discharge: HOME | End: 2024-08-01
Payer: COMMERCIAL

## 2024-08-01 PROCEDURE — 97110 THERAPEUTIC EXERCISES: CPT

## 2024-08-01 PROCEDURE — 97112 NEUROMUSCULAR REEDUCATION: CPT

## 2024-08-01 PROCEDURE — 97530 THERAPEUTIC ACTIVITIES: CPT

## 2024-08-01 NOTE — FLOWSHEET NOTE
Outpatient Physical Therapy  [place]           [] Phone: [phone]   Fax: [phone]  [place]           [x] Phone: [phone]   Fax: [phone]        Physical Therapy Daily Treatment Note  Date:  2024    Patient Name:  Reji Oleary    :  1960  MRN: [id number]  Restrictions/Precautions: No data recorded   Position Activity Restriction  Other position/activity restrictions: none  Diagnosis:   cervical ddd Diagnosis: cervical DDD  Date of Injury/Surgery:   Treatment Diagnosis:  neck pain  Insurance/Certification information: Med [place] BOMN/ no pre cert  Referring Physician:  Tresa Copeland PA-C C. Earl   PCP: Tresa Copeland PA-C  Next Doctor Visit:    Plan of care signed (Y/N):    Outcome Measure: NDI   Visit# / total visits:   55/58    Pain level: 4/10   Goals:     Patient goals:  stand straighter  Long Term Goals  patient will be indepedent with HEP  patient will score 10/50 on NDI and will report a score of 1 regarding pain with daily task of driving        Summary of Evaluation:  Assessment: NDI     Subjective:   Has some medical testing coming up in the next couple of weeks.  Neck soreness remains.     Any changes in Ambulatory Summary Sheet?  None    Objective:  Increased stiffness noted with stretching    Exercises: (No more than 4 columns)   Exercise/Equipment 24           WARM UP         UBE  7' BWD (Towel roll between shldrs) 7' BWD (Towel roll between shldrs) 7' BWD (Towel roll between shldrs)   TABLE      Supine interscap towel roll stretching 10' 10 min  10 min                               STANDING      Corner pec stretch 5x30\" forearm corner neck stretch 5x30\" forearm corner neck stretch 5x30\" forearm corner neck stretch   Face pulls YTB x20 reps     Forearms on wall - thoracic EXT 5x30 5x30\" 5x30\"   Seated UT stretch w/ chair pull 3x30\" B 3x30\" B  3x30\" B    Seated cervical rotation stretch 3x30\" B 3x30\" B  3x30\" B

## 2024-08-06 ENCOUNTER — TRANSCRIBE ORDERS (OUTPATIENT)
Dept: ADMINISTRATIVE | Age: 64
End: 2024-08-06

## 2024-08-06 DIAGNOSIS — R11.2 NAUSEA AND VOMITING, UNSPECIFIED VOMITING TYPE: ICD-10-CM

## 2024-08-06 DIAGNOSIS — R63.4 LOSS OF WEIGHT: ICD-10-CM

## 2024-08-06 DIAGNOSIS — R68.81 EARLY SATIETY: ICD-10-CM

## 2024-08-06 DIAGNOSIS — K83.8 BILE DUCT PROLIFERATION: Primary | ICD-10-CM

## 2024-08-12 ENCOUNTER — TELEPHONE (OUTPATIENT)
Dept: CARDIOLOGY CLINIC | Age: 64
End: 2024-08-12

## 2024-08-12 NOTE — TELEPHONE ENCOUNTER
Patient given instructions over telephone on 8/12/2024.  Procedure is scheduled for 8/15/2024 @ 11 am, w/arrival @ 10 am, @ [facility]. Medication/Education Letter gone over with patient. Procedure and risks were explained to patient. Questions answered, Patient voiced understanding.        Patient was notified that surgery date or time could be changed due to an emergency. Patient voiced understanding.

## 2024-08-13 ENCOUNTER — HOSPITAL ENCOUNTER (OUTPATIENT)
Dept: MRI IMAGING | Age: 64
Discharge: HOME | End: 2024-08-13
Payer: COMMERCIAL

## 2024-08-13 DIAGNOSIS — R63.4 LOSS OF WEIGHT: ICD-10-CM

## 2024-08-13 DIAGNOSIS — R68.81 EARLY SATIETY: ICD-10-CM

## 2024-08-13 DIAGNOSIS — R11.2 NAUSEA AND VOMITING, UNSPECIFIED VOMITING TYPE: ICD-10-CM

## 2024-08-13 DIAGNOSIS — K83.8 BILE DUCT PROLIFERATION: ICD-10-CM

## 2024-08-13 PROCEDURE — A9579 GAD-BASE MR CONTRAST NOS,1ML: HCPCS | Performed by: PHYSICIAN ASSISTANT

## 2024-08-13 PROCEDURE — 74183 MRI ABD W/O CNTR FLWD CNTR: CPT

## 2024-08-13 PROCEDURE — 6360000004 HC RX CONTRAST MEDICATION: Performed by: PHYSICIAN ASSISTANT

## 2024-08-13 RX ADMIN — GADOTERIDOL 20 ML: 279.3 INJECTION, SOLUTION INTRAVENOUS at 08:06

## 2024-08-15 ENCOUNTER — ANESTHESIA (OUTPATIENT)
Dept: NON INVASIVE DIAGNOSTICS | Age: 64
End: 2024-08-15
Payer: COMMERCIAL

## 2024-08-15 ENCOUNTER — ANESTHESIA EVENT (OUTPATIENT)
Dept: NON INVASIVE DIAGNOSTICS | Age: 64
End: 2024-08-15
Payer: COMMERCIAL

## 2024-08-15 ENCOUNTER — HOSPITAL ENCOUNTER (OUTPATIENT)
Dept: NON INVASIVE DIAGNOSTICS | Age: 64
Discharge: HOME | End: 2024-08-17
Attending: INTERNAL MEDICINE
Payer: COMMERCIAL

## 2024-08-15 VITALS
HEIGHT: 71 IN | RESPIRATION RATE: 16 BRPM | OXYGEN SATURATION: 98 % | HEART RATE: 91 BPM | BODY MASS INDEX: 26.6 KG/M2 | WEIGHT: 190 LBS | SYSTOLIC BLOOD PRESSURE: 104 MMHG | DIASTOLIC BLOOD PRESSURE: 64 MMHG

## 2024-08-15 DIAGNOSIS — I48.0 PAROXYSMAL ATRIAL FIBRILLATION (HCC): ICD-10-CM

## 2024-08-15 LAB — ECHO BSA: 2.08 M2

## 2024-08-15 PROCEDURE — 7100000011 HC PHASE II RECOVERY - ADDTL 15 MIN: Performed by: INTERNAL MEDICINE

## 2024-08-15 PROCEDURE — 93325 DOPPLER ECHO COLOR FLOW MAPG: CPT

## 2024-08-15 PROCEDURE — 7100000010 HC PHASE II RECOVERY - FIRST 15 MIN: Performed by: INTERNAL MEDICINE

## 2024-08-15 PROCEDURE — 3700000000 HC ANESTHESIA ATTENDED CARE: Performed by: INTERNAL MEDICINE

## 2024-08-15 PROCEDURE — 2580000003 HC RX 258: Performed by: NURSE ANESTHETIST, CERTIFIED REGISTERED

## 2024-08-15 PROCEDURE — 2500000003 HC RX 250 WO HCPCS: Performed by: NURSE ANESTHETIST, CERTIFIED REGISTERED

## 2024-08-15 PROCEDURE — 6360000002 HC RX W HCPCS: Performed by: NURSE ANESTHETIST, CERTIFIED REGISTERED

## 2024-08-15 PROCEDURE — 3700000001 HC ADD 15 MINUTES (ANESTHESIA): Performed by: INTERNAL MEDICINE

## 2024-08-15 RX ORDER — SODIUM CHLORIDE 9 MG/ML
INJECTION, SOLUTION INTRAVENOUS CONTINUOUS PRN
Status: DISCONTINUED | OUTPATIENT
Start: 2024-08-15 | End: 2024-08-15 | Stop reason: SDUPTHER

## 2024-08-15 RX ORDER — LIDOCAINE HYDROCHLORIDE 20 MG/ML
INJECTION, SOLUTION INFILTRATION; PERINEURAL PRN
Status: DISCONTINUED | OUTPATIENT
Start: 2024-08-15 | End: 2024-08-15 | Stop reason: SDUPTHER

## 2024-08-15 RX ORDER — PROPOFOL 10 MG/ML
INJECTION, EMULSION INTRAVENOUS PRN
Status: DISCONTINUED | OUTPATIENT
Start: 2024-08-15 | End: 2024-08-15 | Stop reason: SDUPTHER

## 2024-08-15 RX ADMIN — LIDOCAINE HYDROCHLORIDE 100 MG: 20 INJECTION, SOLUTION INFILTRATION; PERINEURAL at 13:15

## 2024-08-15 RX ADMIN — SODIUM CHLORIDE: 9 INJECTION, SOLUTION INTRAVENOUS at 13:07

## 2024-08-15 RX ADMIN — PROPOFOL 100 MG: 10 INJECTION, EMULSION INTRAVENOUS at 13:15

## 2024-08-15 NOTE — PROGRESS NOTES
Pt complained of chest pain 4 out 10 after drinking a cold drink. Put pt on 2L NC and gave 0.4 mg SL. DR. Avitia aware, no new orders at this time. Chest pt rated 3 out of 4. Pt will take Ranexa.

## 2024-08-15 NOTE — ANESTHESIA POSTPROCEDURE EVALUATION
Department of Anesthesiology  Postprocedure Note    Patient: Reji Oleary  MRN: [id number]  YOB: 1960  Date of evaluation: 8/15/2024    Procedure Summary       Date: 08/15/24 Room / Location: [facility]    Anesthesia Start: 1307 Anesthesia Stop: 1327    Procedure: WALDEMAR (PRN CONTRAST/BUBBLE/3D) Diagnosis: Paroxysmal atrial fibrillation (HCC)    Scheduled Providers: Cha Avitia MD Responsible Provider: Luis A Syed MD    Anesthesia Type: MAC ASA Status: 4            Anesthesia Type: No value filed.    Eileen Phase I: Eileen Score: 10    Eileen Phase II:      Anesthesia Post Evaluation    Patient location during evaluation: bedside  Patient participation: complete - patient participated  Level of consciousness: awake and alert  Pain score: 0  Airway patency: patent  Nausea & Vomiting: no vomiting and no nausea  Cardiovascular status: blood pressure returned to baseline and hemodynamically stable  Respiratory status: acceptable, room air, spontaneous ventilation and nonlabored ventilation  Hydration status: stable  Pain management: adequate    No notable events documented.

## 2024-08-15 NOTE — ANESTHESIA PRE PROCEDURE
Department of Anesthesiology  Preprocedure Note       Name:  Reji Oleary   Age:  64 y.o.  :  1960                                          MRN:  [id number]         Date:  8/15/2024      Surgeon: * Surgery not found *    Procedure:     Medications prior to admission:   Prior to Admission medications    Medication Sig Start Date End Date Taking? Authorizing Provider   ANORO ELLIPTA 62.5-25 MCG/ACT inhaler Inhale 1 puff by mouth once daily 24   Kevin Caban MD   apixaban (ELIQUIS) 5 MG TABS tablet Take 1 tablet by mouth 2 times daily 4/3/24   Jason De La Rosa APRN - CNP   dilTIAZem (CARDIZEM CD) 360 MG extended release capsule TAKE 1 CAPSULE DAILY 3/11/24   Jason De La Rosa APRN - CNP   furosemide (LASIX) 40 MG tablet TAKE 1 TABLET THREE TIMES A DAY 3/11/24   Jason De La Rosa APRN - CNP   prasugrel (EFFIENT) 10 MG TABS TAKE 1 TABLET DAILY 3/11/24   Jason De La Rosa APRN - CNP   ranolazine (RANEXA) 500 MG extended release tablet TAKE 1 TABLET TWICE A DAY 3/11/24   Jason De La Rosa APRN - CNP   carvedilol (COREG) 25 MG tablet TAKE 1 TABLET TWICE A DAY WITH MEALS 3/11/24   Jason De La Rosa APRN - CNP   metOLazone (ZAROXOLYN) 2.5 MG tablet TAKE 1 TABLET TWICE A DAY 24   Neno Goldsmith MD   Ascorbic Acid (VITAMIN C PO) Take by mouth daily    Humberto Stahl MD   Pyridoxine HCl (VITAMIN B6 PO) Take by mouth daily    Humberto Stahl MD   Cyanocobalamin (VITAMIN B-12 PO) Take by mouth daily    Humberto Stahl MD   Cholecalciferol (VITAMIN D-3 PO) Take by mouth daily    Humberto Stahl MD   VITAMIN K PO Take by mouth daily    Humberto Stahl MD   nitroGLYCERIN (NITROSTAT) 0.4 MG SL tablet Place 1 tablet under the tongue every 5 minutes as needed for Chest pain 23   Cha Avitia MD   Elastic Bandages & Supports (ABDOMINAL BINDER/ELASTIC XL) MISC 1 each by Does not apply route daily  Patient not taking: Reported on 2024  
regurgitation.  ·  Tricuspid Valve: Mild regurgitation. The estimated RVSP is 36 mmHg.  ·  Left Atrium: Left atrium is mildly dilated.  ·  Pericardium: No pericardial effusion.  ·  Image quality is good.    Stress test 9/2023:  Summary   Supervising physician Dr. Lucia .   abnormal stress test, mildly depressed LVEF, INCREASED EDV, Myocardial   perfusion scan shows small size, severe intensity,non reversible perfusion   defect in infero-apical wall.      Recommendation   Recommendation: Routine follow-up.       Neuro/Psych:   Negative Neuro/Psych ROS              GI/Hepatic/Renal:   (+) renal disease: CRI          Endo/Other:    (+) DiabetesType II DM, blood dyscrasia: anemia:..                 Abdominal: normal exam            Vascular: negative vascular ROS.         Other Findings:             Anesthesia Plan      MAC     ASA 4       Induction: intravenous.      Anesthetic plan and risks discussed with patient.      Plan discussed with CRNA.                    ASMITA Interiano - CRNA   8/15/2024         Pre Anesthesia Assessment complete. Chart reviewed on 8/15/2024

## 2024-08-16 NOTE — PROGRESS NOTES
Sedation Plan  ASA: class 3 - patient with severe systemic disease     Mallampati class: III - soft palate, base of uvula visible.    Sedation plan: moderate (conscious sedation)    Risks, benefits, and alternatives discussed with patient.          Sedation time 12:36 pm to 1:00pm    Sedation used: versed and fentanyl ,continous monitoring of heart rate and blood pressure and oxygen saturation was done    no

## 2024-08-28 ENCOUNTER — OFFICE VISIT (OUTPATIENT)
Dept: CARDIOLOGY CLINIC | Age: 64
End: 2024-08-28
Payer: COMMERCIAL

## 2024-08-28 VITALS
SYSTOLIC BLOOD PRESSURE: 114 MMHG | DIASTOLIC BLOOD PRESSURE: 70 MMHG | WEIGHT: 193 LBS | HEIGHT: 71 IN | HEART RATE: 88 BPM | BODY MASS INDEX: 27.02 KG/M2

## 2024-08-28 DIAGNOSIS — I20.0 UNSTABLE ANGINA (HCC): ICD-10-CM

## 2024-08-28 DIAGNOSIS — I25.5 ISCHEMIC CARDIOMYOPATHY: Primary | ICD-10-CM

## 2024-08-28 DIAGNOSIS — I50.22 CHRONIC SYSTOLIC CONGESTIVE HEART FAILURE (HCC): ICD-10-CM

## 2024-08-28 DIAGNOSIS — I48.0 PAROXYSMAL ATRIAL FIBRILLATION (HCC): ICD-10-CM

## 2024-08-28 DIAGNOSIS — I10 PRIMARY HYPERTENSION: ICD-10-CM

## 2024-08-28 DIAGNOSIS — E78.5 DYSLIPIDEMIA: ICD-10-CM

## 2024-08-28 DIAGNOSIS — I49.9 IRREGULAR HEART RATE: ICD-10-CM

## 2024-08-28 PROCEDURE — 99214 OFFICE O/P EST MOD 30 MIN: CPT | Performed by: INTERNAL MEDICINE

## 2024-08-28 PROCEDURE — 3074F SYST BP LT 130 MM HG: CPT | Performed by: INTERNAL MEDICINE

## 2024-08-28 PROCEDURE — 3078F DIAST BP <80 MM HG: CPT | Performed by: INTERNAL MEDICINE

## 2024-08-28 NOTE — PROGRESS NOTES
Cha Avitia MD        OFFICE  FOLLOWUP NOTE    Chief complaints: patient is here for management of CAD,s/p CABG, DM, HTN, AFIB, DYSLPIDEMIA     Flu with WALDEMAR  Subjective: patient feels better, no chest pain, no shortness of breath, no dizziness, no palpitations    HPI Reji is a 64 y.o.year old who  has a past medical history of Abnormal findings on cardiac catheterization, CAD (coronary artery disease), Diabetes mellitus (HCC), H/O cardiovascular stress test, H/O Doppler echocardiogram, H/O echocardiogram, H/O echocardiogram, H/O exercise stress test, Herniated lumbar intervertebral disc, History of cardiac cath, History of cardiac monitoring, History of exercise stress test, History of nuclear stress test, History of nuclear stress test, History of nuclear stress test, Hx of Doppler ultrasound, Hypertension, Nausea vomiting and diarrhea, and Sleep apnea in adult. and presents for management of CAD,s/p CABG, DM, HTN, AFIB, DYSLPIDEMIA  which are well controlled      Current Outpatient Medications   Medication Sig Dispense Refill    ANORO ELLIPTA 62.5-25 MCG/ACT inhaler Inhale 1 puff by mouth once daily 60 each 0    apixaban (ELIQUIS) 5 MG TABS tablet Take 1 tablet by mouth 2 times daily 180 tablet 3    dilTIAZem (CARDIZEM CD) 360 MG extended release capsule TAKE 1 CAPSULE DAILY 90 capsule 3    furosemide (LASIX) 40 MG tablet TAKE 1 TABLET THREE TIMES A  tablet 3    prasugrel (EFFIENT) 10 MG TABS TAKE 1 TABLET DAILY 90 tablet 3    ranolazine (RANEXA) 500 MG extended release tablet TAKE 1 TABLET TWICE A  tablet 3    carvedilol (COREG) 25 MG tablet TAKE 1 TABLET TWICE A DAY WITH MEALS 180 tablet 3    metOLazone (ZAROXOLYN) 2.5 MG tablet TAKE 1 TABLET TWICE A  tablet 3    Ascorbic Acid (VITAMIN C PO) Take by mouth daily      Pyridoxine HCl (VITAMIN B6 PO) Take by mouth daily      Cyanocobalamin (VITAMIN B-12 PO) Take by mouth daily      Cholecalciferol (VITAMIN D-3 PO) Take by  will decrease lasix again , continue Kdur  Anxiety: not seen a psychiatory, refill xanax   Leg swelling: use compression socks  Paroxysmal afib: on eliquis, s/p  maze and LOREN ligation, WALDEMAR performed, has small remnant pouch of LOREN, will continue eliquis  Dyslipidemia: continue statins  HTN: stable, continue COREG AND, lisinopril medication  DM: stable: continue  insulin  All labs, medications and tests reviewed, continue all other medications of all above medical condition listed as is.    [unfilled]

## 2024-09-10 ENCOUNTER — OFFICE VISIT (OUTPATIENT)
Dept: NEUROLOGY | Age: 64
End: 2024-09-10

## 2024-09-10 DIAGNOSIS — G24.3 SPASMODIC TORTICOLLIS: Primary | ICD-10-CM

## 2024-09-10 DIAGNOSIS — M43.6 ANTEROCOLLIS: ICD-10-CM

## 2024-10-15 RX ORDER — POTASSIUM CHLORIDE 1500 MG/1
40 TABLET, EXTENDED RELEASE ORAL 2 TIMES DAILY
Qty: 180 TABLET | Refills: 3 | Status: SHIPPED | OUTPATIENT
Start: 2024-10-15

## 2024-12-04 ENCOUNTER — OFFICE VISIT (OUTPATIENT)
Dept: CARDIOLOGY CLINIC | Age: 64
End: 2024-12-04
Payer: COMMERCIAL

## 2024-12-04 VITALS
HEIGHT: 71 IN | BODY MASS INDEX: 27.16 KG/M2 | DIASTOLIC BLOOD PRESSURE: 70 MMHG | HEART RATE: 87 BPM | WEIGHT: 194 LBS | SYSTOLIC BLOOD PRESSURE: 118 MMHG

## 2024-12-04 DIAGNOSIS — I48.0 PAROXYSMAL ATRIAL FIBRILLATION (HCC): Primary | ICD-10-CM

## 2024-12-04 PROCEDURE — 99214 OFFICE O/P EST MOD 30 MIN: CPT | Performed by: INTERNAL MEDICINE

## 2024-12-04 PROCEDURE — 3078F DIAST BP <80 MM HG: CPT | Performed by: INTERNAL MEDICINE

## 2024-12-04 PROCEDURE — 93000 ELECTROCARDIOGRAM COMPLETE: CPT | Performed by: INTERNAL MEDICINE

## 2024-12-04 PROCEDURE — 3074F SYST BP LT 130 MM HG: CPT | Performed by: INTERNAL MEDICINE

## 2024-12-04 NOTE — PROGRESS NOTES
echocardiogram, H/O echocardiogram, H/O echocardiogram, H/O exercise stress test, Herniated lumbar intervertebral disc, History of cardiac cath, History of cardiac monitoring, History of exercise stress test, History of nuclear stress test, History of nuclear stress test, History of nuclear stress test, Hx of Doppler ultrasound, Hypertension, Nausea vomiting and diarrhea, and Sleep apnea in adult. and presents with     Plan:  Occasional epistaxis\": stable, no need to change effient or eliquis at this time.  Recelty had CT abd which showed dilated common bile duct 10mm, recommended MRCP, recommend to see Gi  Fatigued and tiredness, rechecked echo to check LVEF, LVEF 55-60%, HIS tiredness is not cardiac.  Chest pain and CAD: stress test ordered, his last stress test last yr showed apical defect, no ischemia, will defer LHC again at this time, he continues to have chest pain, his LHC was repeated, has intercostal arteries off LIMA were not ligated he had chest pain in cath lab with LIMA artery injection, his SVG to OM1 is patent, ( LCX was occluded), RCA has mild disease,: ;last stress test in April had months ago showed small apical infarction, which is unchanged from 2020,h/o  PCI of native RCA,his SVG to RCA was occluded, LIMA was patent,.will continue  statins, bb, effient and eliquis off aspirin and continue effient and eliquis,   Palpitations: 24 hrs holter monitor ordered  ? Stroke  in past,  CT head was normal,(  ok to continue effient as there was not stroke on CT HEAD) and refer to neurologist, ct  HEAD is questiong injury on rt parietal lobe, will need neurology input, he feels gait imbalance and fall to  the right.,  carotid doppler SHOWED 0-49% SYDNI stenosis  renail insufficieny: patient is seen by Dr. Goldsmith:continue present care,   ICM: improved, it was LVEF 40-45%, WILL continue to optimize medication,Continue aspirin  continue statins,ACE INHIBOTR , betablockers,Ntg   CHF\" Orthopnea:HE FEELS better

## 2024-12-19 ENCOUNTER — OFFICE VISIT (OUTPATIENT)
Age: 64
End: 2024-12-19

## 2024-12-19 DIAGNOSIS — G24.3 SPASMODIC TORTICOLLIS: Primary | ICD-10-CM

## 2024-12-19 DIAGNOSIS — M43.6 ANTEROCOLLIS: ICD-10-CM

## 2024-12-19 NOTE — PROGRESS NOTES
Follow up for Botox for Cervical Dystonia:    Subjective    Reji is being seen to undergo fourth set of botulinum toxin injections for treatment of spasmodic torticollis.He is a patient of Dr. Roger. He has an anterocollis with subtle right torticollis.     Following his third set of injections, Reji reports noticing the same amount of improvement in regards to his anterocollis, however, he continues to endorse wear off.  He states the past 2 to 3 weeks he found it very difficult to lift his head up.  He felt as though his head was being pulled forward.  Otherwise, he noted no adverse effects and was pleased in the first couple months following injections.      Examination  A&Ox4, pleasant  CN II-XII intact  Antigravity x 4 extremities  No overt dysmetria  Ambulates independently    Cervical dystonia:  -Subtle right torticollis with prominent anterocollis  Range of motion diminished in left rotation and sidebending right    Potential risks including (but not exclusively) allergic reaction, pain, bruising, bleeding, muscle weakness, dysphagia, aspiration pneumonia, dry mouth etc. as well as potential benefits of the injections were discussed with the patient. Verbal consent to procede with the treatment was obtained. 5000 units of botulinum toxin (myobloc) was drawn up for this procedure    EMG guidance was utilized for administration of botox.     The patient received myobloc distributed as follows:    Left SCM 1500 units in 2 sites (0.15,0.15)  Left scalene 1250 units in 2 sites (0.25)  Right scalene 1250 units in 2 sites (0.25)  Right splenius capitus 1000 units in 1 site (0.2)       The patient received a total of __5000__ units with _0___ units wasted.    The botulinum was provided via [employer]    The patient tolerated the procedure well and there were no complications. The patient was asked to contact us if there were any questions or concerns. The return appointment is expected to occur in approximately 12

## 2025-01-13 LAB
ALBUMIN: 4.4 G/DL
ALP BLD-CCNC: 85 U/L
ALT SERPL-CCNC: 12 U/L
ANION GAP SERPL CALCULATED.3IONS-SCNC: NORMAL MMOL/L
AST SERPL-CCNC: 17 U/L
BASOPHILS ABSOLUTE: 0 /ΜL
BASOPHILS RELATIVE PERCENT: 0 %
BILIRUB SERPL-MCNC: 0.1 MG/DL (ref 0.1–1.4)
BUN BLDV-MCNC: 27 MG/DL
CALCIUM SERPL-MCNC: 9.9 MG/DL
CHLORIDE BLD-SCNC: 93 MMOL/L
CO2: 27 MMOL/L
CREAT SERPL-MCNC: 1.4 MG/DL
EOSINOPHILS ABSOLUTE: 0.3 /ΜL
EOSINOPHILS RELATIVE PERCENT: 4 %
ESTIMATED AVERAGE GLUCOSE: NORMAL
FOLATE: 11.5
GFR, ESTIMATED: 56
GLUCOSE BLD-MCNC: 174 MG/DL
HBA1C MFR BLD: 6.2 %
HCT VFR BLD CALC: 39.2 % (ref 41–53)
HEMOGLOBIN: 13 G/DL (ref 13.5–17.5)
LYMPHOCYTES ABSOLUTE: 2.2 /ΜL
LYMPHOCYTES RELATIVE PERCENT: 24 %
MAGNESIUM: 2.4 MG/DL
MAGNESIUM: 2.4 MG/DL
MCH RBC QN AUTO: 28.3 PG
MCHC RBC AUTO-ENTMCNC: 33.2 G/DL
MCV RBC AUTO: 85 FL
MONOCYTES ABSOLUTE: 0.8 /ΜL
MONOCYTES RELATIVE PERCENT: 9 %
NEUTROPHILS ABSOLUTE: 5.9 /ΜL
NEUTROPHILS RELATIVE PERCENT: 63 %
PDW BLD-RTO: 14.3 %
PLATELET # BLD: 311 K/ΜL
PMV BLD AUTO: ABNORMAL FL
POTASSIUM SERPL-SCNC: 3.1 MMOL/L
RBC # BLD: 4.59 10^6/ΜL
SODIUM BLD-SCNC: 139 MMOL/L
TOTAL PROTEIN: 6.7 G/DL (ref 6.4–8.2)
VITAMIN B-12: 423
WBC # BLD: 9.4 10^3/ML

## 2025-01-16 ENCOUNTER — TELEPHONE (OUTPATIENT)
Age: 65
End: 2025-01-16

## 2025-01-16 NOTE — TELEPHONE ENCOUNTER
Spoke with Judy at [facility] ph# [phone] to submit precertification for [id number] ref# [id number]. Spoke with Bernard at [employer] ph# [phone] and unable to complete Myobloc precert verbally, was directed to [email or web address], member, resources, download PA form. Faxed form with ov notes to [employer] #[phone].

## 2025-01-17 NOTE — TELEPHONE ENCOUNTER
Received request for additional information from [employer]. Faxed form with clinicals to [phone].

## 2025-01-21 NOTE — TELEPHONE ENCOUNTER
Received fax from [employer] stating Myobloc does not meet specific criteria at this time, scanned to chart. To fax appeal.

## 2025-01-28 NOTE — TELEPHONE ENCOUNTER
Received fax from [employer] stating appeal overturned and Myobloc 10,000 units approved from 1/28/25 through 1/27/26, scanned to chart.

## 2025-03-05 RX ORDER — RANOLAZINE 500 MG/1
500 TABLET, EXTENDED RELEASE ORAL 2 TIMES DAILY
Qty: 180 TABLET | Refills: 3 | Status: SHIPPED | OUTPATIENT
Start: 2025-03-05

## 2025-03-05 RX ORDER — FUROSEMIDE 40 MG/1
40 TABLET ORAL 3 TIMES DAILY
Qty: 270 TABLET | Refills: 3 | Status: SHIPPED | OUTPATIENT
Start: 2025-03-05

## 2025-03-05 RX ORDER — PRASUGREL 10 MG/1
10 TABLET, FILM COATED ORAL DAILY
Qty: 90 TABLET | Refills: 3 | Status: SHIPPED | OUTPATIENT
Start: 2025-03-05

## 2025-03-05 RX ORDER — CARVEDILOL 25 MG/1
25 TABLET ORAL 2 TIMES DAILY WITH MEALS
Qty: 180 TABLET | Refills: 3 | Status: SHIPPED | OUTPATIENT
Start: 2025-03-05

## 2025-03-14 RX ORDER — DILTIAZEM HYDROCHLORIDE 360 MG/1
360 CAPSULE, EXTENDED RELEASE ORAL DAILY
Qty: 90 CAPSULE | Refills: 3 | OUTPATIENT
Start: 2025-03-14

## 2025-03-14 RX ORDER — DILTIAZEM HYDROCHLORIDE 360 MG/1
360 CAPSULE, EXTENDED RELEASE ORAL DAILY
Qty: 90 CAPSULE | Refills: 1 | Status: SHIPPED | OUTPATIENT
Start: 2025-03-14

## 2025-03-19 ENCOUNTER — OFFICE VISIT (OUTPATIENT)
Age: 65
End: 2025-03-19

## 2025-03-19 DIAGNOSIS — G24.3 SPASMODIC TORTICOLLIS: Primary | ICD-10-CM

## 2025-03-19 DIAGNOSIS — M43.6 ANTEROCOLLIS: ICD-10-CM

## 2025-03-19 RX ORDER — APIXABAN 5 MG/1
5 TABLET, FILM COATED ORAL 2 TIMES DAILY
Qty: 180 TABLET | Refills: 4 | Status: SHIPPED | OUTPATIENT
Start: 2025-03-19

## 2025-03-19 NOTE — PROGRESS NOTES
approximately 12 weeks.     The patient tolerated the procedure well and there were no complications.   Assessment/Plan     Diagnosis Orders   1. Spasmodic torticollis  rimabotulinumtoxinB (MYOBLOC) injection 6,500 Units      2. Anterocollis  rimabotulinumtoxinB (MYOBLOC) injection 6,500 Units            Reji tolerated his fifth set of botulinum toxin injections for spasmodic torticollis well today.  He continues to report notable improvement following injections, however also continues to have wear off.  As such, we were successful at getting increased dosage of Myobloc approved.  We did increase his dosing today.  He received 6500 units of Myobloc.  He was educated to monitor for his response to therapy so that he can help guide my treatment moving forward.     He will continue to follow with Dr. Roger for his neurologic needs and I will be happy to continue providing botulinum toxin injections.    Reji was asked to contact us if any questions or concerns arise.  I anticipate seeing him back for reassessment and probable reinjection in approximately 12 weeks.  Thank you for allowing us to participate in the care of your patient.  Please do not hesitate to contact us if any questions or concerns arise.       Electronically Signed By: Naomi Pop DO 3/19/2025 9:26 AM

## 2025-03-25 RX ORDER — POTASSIUM CHLORIDE 1500 MG/1
20 TABLET, EXTENDED RELEASE ORAL DAILY
Qty: 90 TABLET | Refills: 1 | Status: SHIPPED | OUTPATIENT
Start: 2025-03-25

## 2025-04-17 RX ORDER — POTASSIUM CHLORIDE 1500 MG/1
40 TABLET, EXTENDED RELEASE ORAL 2 TIMES DAILY
Qty: 120 TABLET | Refills: 5 | Status: SHIPPED | OUTPATIENT
Start: 2025-04-17

## 2025-04-22 ENCOUNTER — HOSPITAL ENCOUNTER (OUTPATIENT)
Age: 65
Discharge: HOME | End: 2025-04-22
Payer: COMMERCIAL

## 2025-04-22 DIAGNOSIS — G47.61 PLMD (PERIODIC LIMB MOVEMENT DISORDER): ICD-10-CM

## 2025-04-22 DIAGNOSIS — E11.9 TYPE 2 DIABETES MELLITUS WITHOUT COMPLICATION, WITHOUT LONG-TERM CURRENT USE OF INSULIN (HCC): ICD-10-CM

## 2025-04-22 DIAGNOSIS — N18.31 STAGE 3A CHRONIC KIDNEY DISEASE (HCC): ICD-10-CM

## 2025-04-22 DIAGNOSIS — I50.22 CHRONIC SYSTOLIC CONGESTIVE HEART FAILURE (HCC): ICD-10-CM

## 2025-04-22 DIAGNOSIS — I10 PRIMARY HYPERTENSION: ICD-10-CM

## 2025-04-22 LAB
ALBUMIN SERPL-MCNC: 4.8 G/DL (ref 3.4–5)
ANION GAP SERPL CALCULATED.3IONS-SCNC: 20 MMOL/L (ref 9–17)
BASOPHILS # BLD: 0.08 K/UL
BASOPHILS NFR BLD: 1 % (ref 0–1)
BILIRUB UR QL STRIP: NEGATIVE
BUN SERPL-MCNC: 20 MG/DL (ref 7–20)
CALCIUM SERPL-MCNC: 10.1 MG/DL (ref 8.3–10.6)
CHLORIDE SERPL-SCNC: 92 MMOL/L (ref 99–110)
CLARITY UR: CLEAR
CO2 SERPL-SCNC: 25 MMOL/L (ref 21–32)
COLOR UR: YELLOW
COMMENT: NORMAL
CREAT SERPL-MCNC: 1.6 MG/DL (ref 0.8–1.3)
CREAT UR-MCNC: 36.1 MG/DL (ref 39–259)
EOSINOPHIL # BLD: 0.44 K/UL
EOSINOPHILS RELATIVE PERCENT: 5 % (ref 0–3)
ERYTHROCYTE [DISTWIDTH] IN BLOOD BY AUTOMATED COUNT: 13.7 % (ref 11.7–14.9)
GFR, ESTIMATED: 48 ML/MIN/1.73M2
GLUCOSE SERPL-MCNC: 103 MG/DL (ref 74–99)
GLUCOSE UR STRIP-MCNC: NEGATIVE MG/DL
HCT VFR BLD AUTO: 40.7 % (ref 42–52)
HGB BLD-MCNC: 13.3 G/DL (ref 13.5–18)
HGB UR QL STRIP.AUTO: NEGATIVE
IMM GRANULOCYTES # BLD AUTO: 0.02 K/UL
IMM GRANULOCYTES NFR BLD: 0 %
KETONES UR STRIP-MCNC: NEGATIVE MG/DL
LEUKOCYTE ESTERASE UR QL STRIP: NEGATIVE
LYMPHOCYTES NFR BLD: 2.15 K/UL
LYMPHOCYTES RELATIVE PERCENT: 23 % (ref 24–44)
MAGNESIUM SERPL-MCNC: 1.9 MG/DL (ref 1.8–2.4)
MCH RBC QN AUTO: 27.8 PG (ref 27–31)
MCHC RBC AUTO-ENTMCNC: 32.7 G/DL (ref 32–36)
MCV RBC AUTO: 85 FL (ref 78–100)
MONOCYTES NFR BLD: 0.88 K/UL
MONOCYTES NFR BLD: 9 % (ref 0–5)
NEUTROPHILS NFR BLD: 62 % (ref 36–66)
NEUTS SEG NFR BLD: 5.82 K/UL
NITRITE UR QL STRIP: NEGATIVE
PH UR STRIP: 6 [PH] (ref 5–8)
PHOSPHATE SERPL-MCNC: 3.9 MG/DL (ref 2.5–4.9)
PLATELET # BLD AUTO: 336 K/UL (ref 140–440)
PMV BLD AUTO: 9.2 FL (ref 7.5–11.1)
POTASSIUM SERPL-SCNC: 3.1 MMOL/L (ref 3.5–5.1)
PROT UR STRIP-MCNC: NEGATIVE MG/DL
RBC # BLD AUTO: 4.79 M/UL (ref 4.6–6.2)
SODIUM SERPL-SCNC: 136 MMOL/L (ref 136–145)
SP GR UR STRIP: 1.01 (ref 1–1.03)
TOTAL PROTEIN, URINE: 7 MG/DL
URINE TOTAL PROTEIN CREATININE RATIO: 0.19 (ref 0–0.2)
UROBILINOGEN UR STRIP-ACNC: 0.2 EU/DL (ref 0–1)
WBC OTHER # BLD: 9.4 K/UL (ref 4–10.5)

## 2025-04-22 PROCEDURE — 85025 COMPLETE CBC W/AUTO DIFF WBC: CPT

## 2025-04-22 PROCEDURE — 80048 BASIC METABOLIC PNL TOTAL CA: CPT

## 2025-04-22 PROCEDURE — 36415 COLL VENOUS BLD VENIPUNCTURE: CPT

## 2025-04-22 PROCEDURE — 84156 ASSAY OF PROTEIN URINE: CPT

## 2025-04-22 PROCEDURE — 82040 ASSAY OF SERUM ALBUMIN: CPT

## 2025-04-22 PROCEDURE — 84100 ASSAY OF PHOSPHORUS: CPT

## 2025-04-22 PROCEDURE — 83735 ASSAY OF MAGNESIUM: CPT

## 2025-04-22 PROCEDURE — 81003 URINALYSIS AUTO W/O SCOPE: CPT

## 2025-04-22 PROCEDURE — 82570 ASSAY OF URINE CREATININE: CPT

## 2025-04-23 ENCOUNTER — HOSPITAL ENCOUNTER (OUTPATIENT)
Dept: GENERAL RADIOLOGY | Age: 65
Discharge: HOME | End: 2025-04-23
Payer: COMMERCIAL

## 2025-04-23 ENCOUNTER — HOSPITAL ENCOUNTER (OUTPATIENT)
Age: 65
Discharge: HOME | End: 2025-04-23
Payer: COMMERCIAL

## 2025-04-23 DIAGNOSIS — J43.2 CENTRILOBULAR EMPHYSEMA (HCC): ICD-10-CM

## 2025-04-23 PROCEDURE — 71046 X-RAY EXAM CHEST 2 VIEWS: CPT

## 2025-06-04 ENCOUNTER — TELEPHONE (OUTPATIENT)
Dept: CARDIOLOGY CLINIC | Age: 65
End: 2025-06-04

## 2025-06-04 ENCOUNTER — OFFICE VISIT (OUTPATIENT)
Dept: CARDIOLOGY CLINIC | Age: 65
End: 2025-06-04
Payer: COMMERCIAL

## 2025-06-04 VITALS
HEART RATE: 90 BPM | WEIGHT: 203.4 LBS | SYSTOLIC BLOOD PRESSURE: 100 MMHG | BODY MASS INDEX: 28.48 KG/M2 | DIASTOLIC BLOOD PRESSURE: 62 MMHG | HEIGHT: 71 IN

## 2025-06-04 DIAGNOSIS — R00.2 PALPITATIONS: ICD-10-CM

## 2025-06-04 DIAGNOSIS — R07.9 CHEST PAIN, UNSPECIFIED TYPE: Primary | ICD-10-CM

## 2025-06-04 PROCEDURE — G8428 CUR MEDS NOT DOCUMENT: HCPCS | Performed by: INTERNAL MEDICINE

## 2025-06-04 PROCEDURE — 3078F DIAST BP <80 MM HG: CPT | Performed by: INTERNAL MEDICINE

## 2025-06-04 PROCEDURE — G8419 CALC BMI OUT NRM PARAM NOF/U: HCPCS | Performed by: INTERNAL MEDICINE

## 2025-06-04 PROCEDURE — 4004F PT TOBACCO SCREEN RCVD TLK: CPT | Performed by: INTERNAL MEDICINE

## 2025-06-04 PROCEDURE — 99214 OFFICE O/P EST MOD 30 MIN: CPT | Performed by: INTERNAL MEDICINE

## 2025-06-04 PROCEDURE — 3074F SYST BP LT 130 MM HG: CPT | Performed by: INTERNAL MEDICINE

## 2025-06-04 PROCEDURE — 3017F COLORECTAL CA SCREEN DOC REV: CPT | Performed by: INTERNAL MEDICINE

## 2025-06-04 NOTE — PROGRESS NOTES
HARVEST OF THE LEFT LEG performed by Arcenio Goldstein MD at [facility] OR    OTHER SURGICAL HISTORY Right     Transposition of Right Ulnar Nerve     Family History   Problem Relation Age of Onset    Hypertension Mother     Hypertension Father     Hypertension Sister     Hypertension Brother     Cancer Maternal Grandmother      Social History     Tobacco Use    Smoking status: Former     Current packs/day: 0.00     Average packs/day: 0.3 packs/day for 30.0 years (7.5 ttl pk-yrs)     Types: Cigarettes     Start date:      Quit date:      Years since quittin.4    Smokeless tobacco: Current    Tobacco comments:     VAPE PEN   Substance Use Topics    Alcohol use: No     Comment: Caffiene - pop once a day      [unfilled]  Review of Systems:   Constitutional: No Fever or Weight Loss   Eyes: No Decreased Vision  ENT: No Headaches, Hearing Loss or Vertigo  Cardiovascular: resolved chest pain, dyspnea on exertion, palpitations or loss of consciousness  Respiratory: No cough or wheezing    Gastrointestinal: No abdominal pain, appetite loss, blood in stools, constipation, diarrhea or heartburn  Genitourinary: No dysuria, trouble voiding, or hematuria  Musculoskeletal:  No gait disturbance, weakness or joint complaints  Integumentary: No rash or pruritis  Neurological: No TIA or stroke symptoms  Psychiatric: No anxiety or depression  Endocrine: No malaise, fatigue or temperature intolerance  Hematologic/Lymphatic: No bleeding problems, blood clots or swollen lymph nodes  Allergic/Immunologic: No nasal congestion or hives  All systems negative except as marked.   Objective:  /62 (BP Site: Left Upper Arm, Patient Position: Sitting, BP Cuff Size: Large Adult)   Pulse 90   Ht 1.803 m (5' 11\")   Wt 92.3 kg (203 lb 6.4 oz)   BMI 28.37 kg/m²   Wt Readings from Last 3 Encounters:   25 92.3 kg (203 lb 6.4 oz)   25 92.5 kg (204 lb)   25 88 kg (194 lb)     Body mass index is 28.37 kg/m².  GENERAL -

## 2025-06-11 ENCOUNTER — OFFICE VISIT (OUTPATIENT)
Age: 65
End: 2025-06-11
Payer: COMMERCIAL

## 2025-06-11 DIAGNOSIS — M43.6 ANTEROCOLLIS: ICD-10-CM

## 2025-06-11 DIAGNOSIS — G24.3 SPASMODIC TORTICOLLIS: Primary | ICD-10-CM

## 2025-06-11 PROCEDURE — 64616 CHEMODENERV MUSC NECK DYSTON: CPT | Performed by: STUDENT IN AN ORGANIZED HEALTH CARE EDUCATION/TRAINING PROGRAM

## 2025-06-11 PROCEDURE — 95874 GUIDE NERV DESTR NEEDLE EMG: CPT | Performed by: STUDENT IN AN ORGANIZED HEALTH CARE EDUCATION/TRAINING PROGRAM

## 2025-06-11 NOTE — PROGRESS NOTES
patient was asked to contact us if there were any questions or concerns. The return appointment is expected to occur in approximately 12 weeks.     The patient tolerated the procedure well and there were no complications.   Assessment/Plan     Diagnosis Orders   1. Spasmodic torticollis  rimabotulinumtoxinB (MYOBLOC) injection 8,500 Units      2. Anterocollis  rimabotulinumtoxinB (MYOBLOC) injection 8,500 Units          Reji tolerated his botulinum toxin injections for spasmodic torticollis well today.  He has a prominent anterocollis.  He continues to report notable improvement following injections, however also continues to have wear off.  He was agreeable to again increasing his dosage today.  He received a total of 8500 units of Myobloc.  He was educated to monitor for his response to therapy so that he can help guide my treatment moving forward.     He will continue to follow with Dr. Roger for his neurologic needs and I will be happy to continue providing botulinum toxin injections.    Reji was asked to contact us if any questions or concerns arise.  I anticipate seeing him back for reassessment and probable reinjection in approximately 12 weeks.  Thank you for allowing us to participate in the care of your patient.  Please do not hesitate to contact us if any questions or concerns arise.       Electronically Signed By: Naomi Pop DO 6/11/2025 10:40 AM

## 2025-06-16 ENCOUNTER — TELEPHONE (OUTPATIENT)
Dept: CARDIOLOGY CLINIC | Age: 65
End: 2025-06-16

## 2025-06-16 NOTE — TELEPHONE ENCOUNTER
Called patient left a message to remind him of his NM stress test tomorrow. Make sure you do not have any caffeine 12 hours prior to testing and do not take the coreg but take the rest of his medications.

## 2025-06-20 ENCOUNTER — RESULTS FOLLOW-UP (OUTPATIENT)
Dept: CARDIOLOGY CLINIC | Age: 65
End: 2025-06-20

## 2025-06-25 ENCOUNTER — TELEPHONE (OUTPATIENT)
Dept: CARDIOLOGY CLINIC | Age: 65
End: 2025-06-25

## 2025-06-25 NOTE — TELEPHONE ENCOUNTER
This is cardiac monitor report, basic rhtyhm is sinus, min hr is 71 bpm max hr is 127 bpm, no afib, no heart block, no VTACH , few pvcs and APCs noted, brief SVT noted.   Patient has follow up scheduled on 7/15/25 with Jason

## 2025-07-15 ENCOUNTER — TELEPHONE (OUTPATIENT)
Dept: CARDIOLOGY CLINIC | Age: 65
End: 2025-07-15

## 2025-07-15 ENCOUNTER — OFFICE VISIT (OUTPATIENT)
Dept: CARDIOLOGY CLINIC | Age: 65
End: 2025-07-15
Payer: COMMERCIAL

## 2025-07-15 VITALS
HEART RATE: 82 BPM | HEIGHT: 71 IN | SYSTOLIC BLOOD PRESSURE: 120 MMHG | WEIGHT: 201 LBS | DIASTOLIC BLOOD PRESSURE: 72 MMHG | BODY MASS INDEX: 28.14 KG/M2

## 2025-07-15 DIAGNOSIS — I50.32 CHRONIC DIASTOLIC CONGESTIVE HEART FAILURE (HCC): Primary | ICD-10-CM

## 2025-07-15 DIAGNOSIS — I25.10 ASCVD (ARTERIOSCLEROTIC CARDIOVASCULAR DISEASE): ICD-10-CM

## 2025-07-15 DIAGNOSIS — I48.0 PAF (PAROXYSMAL ATRIAL FIBRILLATION) (HCC): ICD-10-CM

## 2025-07-15 DIAGNOSIS — I10 PRIMARY HYPERTENSION: ICD-10-CM

## 2025-07-15 DIAGNOSIS — E78.5 DYSLIPIDEMIA: ICD-10-CM

## 2025-07-15 DIAGNOSIS — I20.0 UNSTABLE ANGINA PECTORIS (HCC): ICD-10-CM

## 2025-07-15 PROCEDURE — G8427 DOCREV CUR MEDS BY ELIG CLIN: HCPCS | Performed by: NURSE PRACTITIONER

## 2025-07-15 PROCEDURE — 99214 OFFICE O/P EST MOD 30 MIN: CPT | Performed by: NURSE PRACTITIONER

## 2025-07-15 PROCEDURE — 3017F COLORECTAL CA SCREEN DOC REV: CPT | Performed by: NURSE PRACTITIONER

## 2025-07-15 PROCEDURE — 3074F SYST BP LT 130 MM HG: CPT | Performed by: NURSE PRACTITIONER

## 2025-07-15 PROCEDURE — G8419 CALC BMI OUT NRM PARAM NOF/U: HCPCS | Performed by: NURSE PRACTITIONER

## 2025-07-15 PROCEDURE — 4004F PT TOBACCO SCREEN RCVD TLK: CPT | Performed by: NURSE PRACTITIONER

## 2025-07-15 PROCEDURE — 3078F DIAST BP <80 MM HG: CPT | Performed by: NURSE PRACTITIONER

## 2025-07-15 RX ORDER — RANOLAZINE 1000 MG/1
1000 TABLET, EXTENDED RELEASE ORAL 2 TIMES DAILY
Qty: 60 TABLET | Refills: 2 | Status: SHIPPED | OUTPATIENT
Start: 2025-07-15

## 2025-07-15 ASSESSMENT — ENCOUNTER SYMPTOMS: SHORTNESS OF BREATH: 1

## 2025-07-15 NOTE — PROGRESS NOTES
[place] [address]  Phone: [phone]    Fax [phone]    Romeo Padilla MD, Merged with [facility]  Ector Ray MD, Merged with [facility]   Flor Lucia MD, Merged with [facility] MD Cha Quezada MD, Merged with [facility]  Mickey Simons MD, Merged with [facility]    Lexi Gregg MD, Merged with [facility]   Mable Mendoza, APRN  Caro Alvarez, APRN  Heidi Back, APRN  Jason De La Rosa, APRN        Cardiology Progress Note      7/15/2025    RE: Reji Oleary  (1960)                             Primary cardiologist: Dr. Nikita Avitia       Subjective: Patient remittent chest pain and shortness of breath happening multiple times over the last several months sometimes associated with exertion others while at rest.    CC:   1. Chronic diastolic congestive heart failure (HCC)    2. PAF (paroxysmal atrial fibrillation) (HCC)    3. ASCVD (arteriosclerotic cardiovascular disease)    4. Unstable angina pectoris (HCC)    5. Primary hypertension    6. Dyslipidemia        HPI: Reji Oleary, who is a  65 y.o. year old male who presents to the office for follow up on Patient presents to the office for follow up on CAD (CABG x 3 2019), HTN, Afib, CHF and hyperlipidemia. LHC in 2020 showed patent LIMA-LAD, SVG to RCA was occluded,then had PCI of RCA.Patient is  an active male who walks regularly. Patient is  compliant with medications.        Current Outpatient Medications   Medication Sig Dispense Refill    ranolazine (RANEXA) 1000 MG extended release tablet Take 1 tablet by mouth 2 times daily 60 tablet 2    ipratropium 0.5 mg-albuterol 2.5 mg (DUONEB) 0.5-2.5 (3) MG/3ML SOLN nebulizer solution USE 3 ML IN NEBULIZER  EVERY 6 HOURS AS NEEDED FOR SHORTNESS OF BREATH 360 mL 3    umeclidinium-vilanterol (ANORO ELLIPTA) 62.5-25 MCG/ACT inhaler Inhale 1 puff by mouth once daily 60 each 3    Testosterone 1.62 % GEL       potassium chloride (KLOR-CON M20) 20 MEQ extended release tablet Take 2 tablets by mouth 2 times daily 120 tablet 5

## 2025-07-15 NOTE — TELEPHONE ENCOUNTER
----- Message from ASMITA Grossman CNP sent at 7/15/2025  1:20 PM EDT -----  Regarding: labs  Please get most recent labs from PCP, need lipid, BMP, and CBC

## 2025-08-19 ENCOUNTER — OFFICE VISIT (OUTPATIENT)
Dept: CARDIOLOGY CLINIC | Age: 65
End: 2025-08-19
Payer: COMMERCIAL

## 2025-08-19 VITALS
HEIGHT: 71 IN | WEIGHT: 196.8 LBS | SYSTOLIC BLOOD PRESSURE: 108 MMHG | DIASTOLIC BLOOD PRESSURE: 78 MMHG | HEART RATE: 87 BPM | BODY MASS INDEX: 27.55 KG/M2

## 2025-08-19 DIAGNOSIS — I50.32 CHRONIC DIASTOLIC CONGESTIVE HEART FAILURE (HCC): ICD-10-CM

## 2025-08-19 DIAGNOSIS — I20.0 UNSTABLE ANGINA PECTORIS (HCC): ICD-10-CM

## 2025-08-19 DIAGNOSIS — E78.5 DYSLIPIDEMIA: ICD-10-CM

## 2025-08-19 DIAGNOSIS — I10 PRIMARY HYPERTENSION: ICD-10-CM

## 2025-08-19 DIAGNOSIS — I25.10 ASCVD (ARTERIOSCLEROTIC CARDIOVASCULAR DISEASE): ICD-10-CM

## 2025-08-19 DIAGNOSIS — I48.0 PAF (PAROXYSMAL ATRIAL FIBRILLATION) (HCC): Primary | ICD-10-CM

## 2025-08-19 PROCEDURE — 3017F COLORECTAL CA SCREEN DOC REV: CPT | Performed by: NURSE PRACTITIONER

## 2025-08-19 PROCEDURE — G8427 DOCREV CUR MEDS BY ELIG CLIN: HCPCS | Performed by: NURSE PRACTITIONER

## 2025-08-19 PROCEDURE — 99214 OFFICE O/P EST MOD 30 MIN: CPT | Performed by: NURSE PRACTITIONER

## 2025-08-19 PROCEDURE — 3074F SYST BP LT 130 MM HG: CPT | Performed by: NURSE PRACTITIONER

## 2025-08-19 PROCEDURE — 3078F DIAST BP <80 MM HG: CPT | Performed by: NURSE PRACTITIONER

## 2025-08-19 PROCEDURE — 4004F PT TOBACCO SCREEN RCVD TLK: CPT | Performed by: NURSE PRACTITIONER

## 2025-08-19 PROCEDURE — G8419 CALC BMI OUT NRM PARAM NOF/U: HCPCS | Performed by: NURSE PRACTITIONER

## 2025-08-19 RX ORDER — FUROSEMIDE 40 MG/1
40 TABLET ORAL 2 TIMES DAILY
Qty: 60 TABLET | Refills: 3
Start: 2025-08-19

## 2025-08-19 ASSESSMENT — ENCOUNTER SYMPTOMS: SHORTNESS OF BREATH: 1

## 2025-08-19 ASSESSMENT — LIFESTYLE VARIABLES
HOW MANY STANDARD DRINKS CONTAINING ALCOHOL DO YOU HAVE ON A TYPICAL DAY: PATIENT DOES NOT DRINK
HOW OFTEN DO YOU HAVE A DRINK CONTAINING ALCOHOL: NEVER

## 2025-08-29 ENCOUNTER — HOSPITAL ENCOUNTER (OUTPATIENT)
Dept: LAB | Age: 65
Discharge: HOME | End: 2025-08-29
Payer: COMMERCIAL

## 2025-08-29 DIAGNOSIS — E87.6 HYPOKALEMIA: ICD-10-CM

## 2025-08-29 DIAGNOSIS — E11.9 TYPE 2 DIABETES MELLITUS WITHOUT COMPLICATION, WITHOUT LONG-TERM CURRENT USE OF INSULIN (HCC): ICD-10-CM

## 2025-08-29 DIAGNOSIS — N18.32 STAGE 3B CHRONIC KIDNEY DISEASE (HCC): ICD-10-CM

## 2025-08-29 DIAGNOSIS — G47.61 PLMD (PERIODIC LIMB MOVEMENT DISORDER): ICD-10-CM

## 2025-08-29 DIAGNOSIS — I50.22 CHRONIC SYSTOLIC CONGESTIVE HEART FAILURE (HCC): ICD-10-CM

## 2025-08-29 LAB
ALBUMIN SERPL-MCNC: 4.5 G/DL (ref 3.4–5)
ANION GAP SERPL CALCULATED.3IONS-SCNC: 16 MMOL/L (ref 9–17)
BASOPHILS # BLD: 0.07 K/UL
BASOPHILS NFR BLD: 1 % (ref 0–1)
BILIRUB UR QL STRIP: NEGATIVE
BUN SERPL-MCNC: 37 MG/DL (ref 7–20)
CALCIUM SERPL-MCNC: 10 MG/DL (ref 8.3–10.6)
CHLORIDE SERPL-SCNC: 94 MMOL/L (ref 99–110)
CLARITY UR: CLEAR
CO2 SERPL-SCNC: 31 MMOL/L (ref 21–32)
COLOR UR: YELLOW
COMMENT: NORMAL
CREAT SERPL-MCNC: 2 MG/DL (ref 0.8–1.3)
CREAT UR-MCNC: 109 MG/DL (ref 39–259)
EOSINOPHIL # BLD: 0.27 K/UL
EOSINOPHILS RELATIVE PERCENT: 4 % (ref 0–3)
ERYTHROCYTE [DISTWIDTH] IN BLOOD BY AUTOMATED COUNT: 15 % (ref 11.7–14.9)
GFR, ESTIMATED: 36 ML/MIN/1.73M2
GLUCOSE SERPL-MCNC: 112 MG/DL (ref 74–99)
GLUCOSE UR STRIP-MCNC: NEGATIVE MG/DL
HCT VFR BLD AUTO: 40.5 % (ref 42–52)
HGB BLD-MCNC: 13.1 G/DL (ref 13.5–18)
HGB UR QL STRIP.AUTO: NEGATIVE
IMM GRANULOCYTES # BLD AUTO: 0.02 K/UL
IMM GRANULOCYTES NFR BLD: 0 %
KETONES UR STRIP-MCNC: NEGATIVE MG/DL
LEUKOCYTE ESTERASE UR QL STRIP: NEGATIVE
LYMPHOCYTES NFR BLD: 2.3 K/UL
LYMPHOCYTES RELATIVE PERCENT: 32 % (ref 24–44)
MAGNESIUM SERPL-MCNC: 1.8 MG/DL (ref 1.8–2.4)
MCH RBC QN AUTO: 27.6 PG (ref 27–31)
MCHC RBC AUTO-ENTMCNC: 32.3 G/DL (ref 32–36)
MCV RBC AUTO: 85.4 FL (ref 78–100)
MONOCYTES NFR BLD: 0.75 K/UL
MONOCYTES NFR BLD: 10 % (ref 0–5)
NEUTROPHILS NFR BLD: 53 % (ref 36–66)
NEUTS SEG NFR BLD: 3.9 K/UL
NITRITE UR QL STRIP: NEGATIVE
PH UR STRIP: 6 [PH] (ref 5–8)
PHOSPHATE SERPL-MCNC: 3.6 MG/DL (ref 2.5–4.9)
PLATELET # BLD AUTO: 262 K/UL (ref 140–440)
PMV BLD AUTO: 9.5 FL (ref 7.5–11.1)
POTASSIUM SERPL-SCNC: 3.7 MMOL/L (ref 3.5–5.1)
PROT UR STRIP-MCNC: NEGATIVE MG/DL
RBC # BLD AUTO: 4.74 M/UL (ref 4.6–6.2)
SODIUM SERPL-SCNC: 141 MMOL/L (ref 136–145)
SP GR UR STRIP: 1.02 (ref 1–1.03)
TOTAL PROTEIN, URINE: 18 MG/DL
URINE TOTAL PROTEIN CREATININE RATIO: 0.17 (ref 0–0.2)
UROBILINOGEN UR STRIP-ACNC: 0.2 EU/DL (ref 0–1)
WBC OTHER # BLD: 7.3 K/UL (ref 4–10.5)

## 2025-08-29 PROCEDURE — 84100 ASSAY OF PHOSPHORUS: CPT

## 2025-08-29 PROCEDURE — 82570 ASSAY OF URINE CREATININE: CPT

## 2025-08-29 PROCEDURE — 36415 COLL VENOUS BLD VENIPUNCTURE: CPT

## 2025-08-29 PROCEDURE — 80048 BASIC METABOLIC PNL TOTAL CA: CPT

## 2025-08-29 PROCEDURE — 84156 ASSAY OF PROTEIN URINE: CPT

## 2025-08-29 PROCEDURE — 82040 ASSAY OF SERUM ALBUMIN: CPT

## 2025-08-29 PROCEDURE — 85025 COMPLETE CBC W/AUTO DIFF WBC: CPT

## 2025-08-29 PROCEDURE — 83735 ASSAY OF MAGNESIUM: CPT

## 2025-08-29 PROCEDURE — 81003 URINALYSIS AUTO W/O SCOPE: CPT

## 2025-09-03 RX ORDER — POTASSIUM CHLORIDE 1500 MG/1
20 TABLET, EXTENDED RELEASE ORAL DAILY
Qty: 90 TABLET | Refills: 1 | Status: SHIPPED | OUTPATIENT
Start: 2025-09-03

## 2025-09-04 ENCOUNTER — OFFICE VISIT (OUTPATIENT)
Age: 65
End: 2025-09-04
Payer: COMMERCIAL

## 2025-09-04 DIAGNOSIS — G24.3 SPASMODIC TORTICOLLIS: Primary | ICD-10-CM

## 2025-09-04 DIAGNOSIS — M43.6 ANTEROCOLLIS: ICD-10-CM

## 2025-09-04 PROCEDURE — 64616 CHEMODENERV MUSC NECK DYSTON: CPT | Performed by: STUDENT IN AN ORGANIZED HEALTH CARE EDUCATION/TRAINING PROGRAM

## 2025-09-04 PROCEDURE — 95874 GUIDE NERV DESTR NEEDLE EMG: CPT | Performed by: STUDENT IN AN ORGANIZED HEALTH CARE EDUCATION/TRAINING PROGRAM

## (undated) DEVICE — CANNULA SAPH VEIN OPQ BLNT 3MM

## (undated) DEVICE — DRAIN SURG SGL COLL PT TB FOR ATS BG OASIS

## (undated) DEVICE — MPS® DELIVERY SET W/ARREST AGENT AND ADDITIVE CASSETTES, HEAT EXCHANGER & 10 FT. DELIVERY TUBING: Brand: MPS

## (undated) DEVICE — CONNECTOR DRNGE W3/8-0.5XH3/16XL3/16IN 2:1 SIL CARD STR

## (undated) DEVICE — GOWN,SIRUS,FABRNF,RAGLAN,L,ST,30/CS: Brand: MEDLINE

## (undated) DEVICE — DRESSING TRNSPAR W5XL4.5IN FLM SHT SEMIPERMEABLE WIND

## (undated) DEVICE — ROTATING SURGICAL PUNCHES, 1 PER POUCH: Brand: A&E MEDICAL / ROTATING SURGICAL PUNCHES

## (undated) DEVICE — SPONGE LAP W18XL18IN WHT COT 4 PLY FLD STRUNG RADPQ DISP ST

## (undated) DEVICE — 3M™ STERI-DRAPE™ INSTRUMENT POUCH 1018: Brand: STERI-DRAPE™

## (undated) DEVICE — RETRACTOR SURG INSRT SUT HLD OCTOBASE

## (undated) DEVICE — INTENDED FOR TISSUE SEPARATION, AND OTHER PROCEDURES THAT REQUIRE A SHARP SURGICAL BLADE TO PUNCTURE OR CUT.: Brand: BARD-PARKER ® STAINLESS STEEL BLADES

## (undated) DEVICE — ELECTRODE ES AD CRDLSS PT RET REM POLYHESIVE

## (undated) DEVICE — SUTURE VCRL SZ 2 L27IN ABSRB UD L65MM TP-1 1/2 CIR J849G

## (undated) DEVICE — GLOVE ORANGE PI 8   MSG9080

## (undated) DEVICE — SENSOR PLSE OXMTR AD CBL L3FT ADH TRANSMISSIVE

## (undated) DEVICE — SWAN-GANZ THERMODILUTION CATHETER: Brand: SWAN-GANZ

## (undated) DEVICE — TOWEL,OR,DSP,ST,WHITE,DLX,XR,4/PK,20PK/C: Brand: MEDLINE

## (undated) DEVICE — TUBING SUCT 9 11FR L475IN RIG SHFT MINI SUC TIP DLP

## (undated) DEVICE — TAPE MED W1/8XL30IN WHT POLY

## (undated) DEVICE — GLOVE ORANGE PI 7   MSG9070

## (undated) DEVICE — PROTECTOR EYE PT SELF ADH NS OPT GRD LF

## (undated) DEVICE — SET ADMIN L105IN 10GTT 3 NDL FREE CK VLV 2 PC M LUERLOCK

## (undated) DEVICE — CONNECTOR PIPE 3/8X1/2IN REDUC STR

## (undated) DEVICE — CLIP SM RED INTERN HMOCLP TITAN LIGATING

## (undated) DEVICE — SUTURE ETHIB EXCL X BR GRN V-7 DA 2-0 30 PX977 PX977H

## (undated) DEVICE — SUTURE PROL 3-0 L36IN NONABSORBABLE BLU L26MM SH 1/2 CIR P8522

## (undated) DEVICE — DEVICE ABLAT CARD BPLR RF SYS CARDIOBLATE BP2

## (undated) DEVICE — MARKER SURG SKIN UTIL REGULAR/FINE 2 TIP W/ RUL AND 9 LBL

## (undated) DEVICE — LINER,SEMI-RIGID,3000CC,50EA/CS: Brand: MEDLINE

## (undated) DEVICE — WAX SURG 2.5GM HEMSTAT BNE BEESWAX PARAFFIN ISO PALMITATE

## (undated) DEVICE — SUTURE PROL 7-0 L18IN NONABSORBABLE BLU L9.3MM BV-1 3/8 CIR M8701

## (undated) DEVICE — GLOVE SURG SZ 65 L12IN FNGR THK87MIL WHT LTX FREE

## (undated) DEVICE — 6 FOOT DISPOSABLE EXTENSION CABLE WITH SAFE CONNECT / SCREW-DOWN

## (undated) DEVICE — TOWEL,OR,DSP,ST,BLUE,STD,6/PK,12PK/CS: Brand: MEDLINE

## (undated) DEVICE — SET ADMIN PRIMING 67ML L105IN NVENT 180UM FLTR 3 RLER CLMP

## (undated) DEVICE — DECANTER FLD 9IN ST BG FOR ASEP TRNSF OF FLD

## (undated) DEVICE — BLADE OPHTH GRN ROUNDED TIP 1 SIDE SHRP GRINDLESS MINI-BLDE

## (undated) DEVICE — SET INFUS 25ML L117IN PMP MOD CK VLV RLER CLMP 2 SMRTSITE

## (undated) DEVICE — SUTURE ETHBND EXCEL SZ 2-0 L36IN NONABSORBABLE GRN L26MM SH X523H

## (undated) DEVICE — PLEDGET VASC W3/16XL3/8IN THK1/16IN PTFE SFT

## (undated) DEVICE — DRAIN,WOUND,ROUND,24FR,5/16",FULL-FLUTED: Brand: MEDLINE

## (undated) DEVICE — DRESSING TRNSPAR W2XL2.75IN FLM SHT SEMIPERMEABLE WIND

## (undated) DEVICE — GOWN,SIRUS,FABRNF,RAGLAN,XL,ST,28/CS: Brand: MEDLINE

## (undated) DEVICE — Z INACTIVE USE 2641837 CLIP LIG M BLU TI HRT SHP WIRE HORZ 600 PER BX

## (undated) DEVICE — SUTURE NONABSORBABLE MONOFILAMENT 6-0 C-1 4X18 IN PROLENE M8718

## (undated) DEVICE — CANNULA PERF L15IN DIA29FR VEN 3 STG THN WALL DSGN W  VENT

## (undated) DEVICE — SOLUTION IV IRRIG POUR BRL 0.9% SODIUM CHL 2F7124

## (undated) DEVICE — ADAPTER IV TBNG CLR POLYCARB DBL M LUERLOCK

## (undated) DEVICE — CANNULA ART 20FR NVENT 3 8IN CONN EOPA 3D

## (undated) DEVICE — BLADE SAW STRNM 10X35X0.6MM

## (undated) DEVICE — ELECTRODE ES L2.75IN S STL INSUL BLDE W/ SL EDGE

## (undated) DEVICE — DEVICE RESUS AD TB L40IN SELF INFL MASK TEXT BG DBL SWVL EL

## (undated) DEVICE — SUTURE SURGLON SZ 1 L30IN NONABSORBABLE BLK NO NDL NYL PRE 8886191971

## (undated) DEVICE — CHLORAPREP 26ML ORANGE

## (undated) DEVICE — SENSOR OXMTR SM AD DISP FOR INVOS SYS

## (undated) DEVICE — SYRINGE, LUER LOCK, 10ML: Brand: MEDLINE

## (undated) DEVICE — Device: Brand: VIRTUOSAPH PLUS WITH RADIAL INDICATION

## (undated) DEVICE — Z INACTIVE USE 2660664 SOLUTION IRRIG 3000ML 0.9% SOD CHL USP UROMATIC PLAS CONT

## (undated) DEVICE — SUTURE VCRL 2-0 L36IN ABSRB UD CTX L48MM 1/2 CIR TAPERPOINT J979H

## (undated) DEVICE — KIT INTRO 8.5FR L4IN PERC POLYUR SHTH RADPQ W/ INTEGR

## (undated) DEVICE — SUTURE NRLN SZ 1 L18IN NONABSORBABLE BLK L36MM CT-1 1/2 CIR C520D

## (undated) DEVICE — SUTURE S STL SZ 5 L18IN NONABSORBABLE SIL L48MM CCS 1/4 CIR M657G

## (undated) DEVICE — SUTURE MCRYL SZ 3-0 L27IN ABSRB UD L24MM PS-1 3/8 CIR PRIM Y936H

## (undated) DEVICE — SKIN AFFIX SURG ADHESIVE 72/CS 0.55ML: Brand: MEDLINE

## (undated) DEVICE — Z INACTIVE USE 2540311 LEAD PACE L475MM CHN A OR V MYOCARDIAL STEROID ELUT SIL

## (undated) DEVICE — ELECTRODE ES L4IN PTFE INSUL BLDE W/ SFTY SL DISP EDGE

## (undated) DEVICE — FOGARTY - HYDRAGRIP SURGICAL - CLAMP INSERTS: Brand: FOGARTY SOFTJAW

## (undated) DEVICE — LOOP VES W25MM THK1MM MAXI RED SIL FLD REPELLENT 100 PER

## (undated) DEVICE — KIT CVC AD 7FR L20CM POLYUR BLU FLEXTIP ANTIMIC MULTILUMEN

## (undated) DEVICE — BLANKET THER AD W24XL60IN FAB COVERING SUP SFT ULT THN LTWT

## (undated) DEVICE — GLOVE SURG SZ 65 THK91MIL LTX FREE SYN POLYISOPRENE

## (undated) DEVICE — BLADE ES L2.75IN ELASTOMERIC COAT DURABLE BEND UPTO 90DEG

## (undated) DEVICE — SUTURE S STL SZ 5 L18IN NONABSORBABLE SIL V-40 L48MM 1/2 M650G

## (undated) DEVICE — TOTAL TRAY, 16FR 10ML SIL FOLEY, URN: Brand: MEDLINE

## (undated) DEVICE — CANNULA PERF L5.5IN DIA9FR AORT ROOT AG STD TIP W/ VENT LN

## (undated) DEVICE — STOPCOCK IV HI PRSS 1050PSI NDLLSS INJ 3 W LUER SWVL NUT

## (undated) DEVICE — DRAPE,UTILITY,XL,4/PK,STERILE: Brand: MEDLINE

## (undated) DEVICE — TUBING INSUFFLATOR HEAT HI FLO SET PNEUMOCLEAR

## (undated) DEVICE — SET ADMIN PRIMING 7ML L30IN 7.35LB 20 GTT 2ND RLER CLMP

## (undated) DEVICE — LINER SUCT CANSTR 1500CC SEMI RIG W/ POR HYDROPHOBIC SHUT